# Patient Record
Sex: MALE | Race: BLACK OR AFRICAN AMERICAN | NOT HISPANIC OR LATINO | Employment: FULL TIME | URBAN - METROPOLITAN AREA
[De-identification: names, ages, dates, MRNs, and addresses within clinical notes are randomized per-mention and may not be internally consistent; named-entity substitution may affect disease eponyms.]

---

## 2023-06-26 ENCOUNTER — APPOINTMENT (EMERGENCY)
Dept: RADIOLOGY | Facility: HOSPITAL | Age: 43
DRG: 720 | End: 2023-06-26
Payer: COMMERCIAL

## 2023-06-26 ENCOUNTER — APPOINTMENT (EMERGENCY)
Dept: CT IMAGING | Facility: HOSPITAL | Age: 43
DRG: 720 | End: 2023-06-26
Payer: COMMERCIAL

## 2023-06-26 ENCOUNTER — HOSPITAL ENCOUNTER (INPATIENT)
Facility: HOSPITAL | Age: 43
LOS: 8 days | Discharge: LEFT BEFORE DISCHARGE | DRG: 720 | End: 2023-07-04
Attending: EMERGENCY MEDICINE | Admitting: FAMILY MEDICINE
Payer: COMMERCIAL

## 2023-06-26 DIAGNOSIS — A41.9 SEVERE SEPSIS (HCC): ICD-10-CM

## 2023-06-26 DIAGNOSIS — B96.1 BACTEREMIA DUE TO KLEBSIELLA PNEUMONIAE: ICD-10-CM

## 2023-06-26 DIAGNOSIS — Z21 HIV P24 ANTIGEN POSITIVE (HCC): ICD-10-CM

## 2023-06-26 DIAGNOSIS — I76 SEPTIC EMBOLISM (HCC): ICD-10-CM

## 2023-06-26 DIAGNOSIS — R16.0 LIVER MASS: ICD-10-CM

## 2023-06-26 DIAGNOSIS — R65.20 SEVERE SEPSIS (HCC): ICD-10-CM

## 2023-06-26 DIAGNOSIS — K75.0 LIVER ABSCESS: ICD-10-CM

## 2023-06-26 DIAGNOSIS — I82.0 THROMBOSIS, HEPATIC VEIN (HCC): ICD-10-CM

## 2023-06-26 DIAGNOSIS — R77.8 ELEVATED TROPONIN: ICD-10-CM

## 2023-06-26 DIAGNOSIS — A41.9 SEPSIS (HCC): ICD-10-CM

## 2023-06-26 DIAGNOSIS — R78.81 BACTEREMIA DUE TO KLEBSIELLA PNEUMONIAE: ICD-10-CM

## 2023-06-26 DIAGNOSIS — J18.9 COMMUNITY ACQUIRED PNEUMONIA: Primary | ICD-10-CM

## 2023-06-26 PROBLEM — N17.9 ACUTE RENAL FAILURE (ARF) (HCC): Status: ACTIVE | Noted: 2023-06-26

## 2023-06-26 PROBLEM — R79.89 ELEVATED TROPONIN: Status: ACTIVE | Noted: 2023-06-26

## 2023-06-26 PROBLEM — R73.9 HYPERGLYCEMIA: Status: ACTIVE | Noted: 2023-06-26

## 2023-06-26 PROBLEM — E87.1 HYPONATREMIA: Status: ACTIVE | Noted: 2023-06-26

## 2023-06-26 LAB
2HR DELTA HS TROPONIN: -2 NG/L
4HR DELTA HS TROPONIN: -3 NG/L
ALBUMIN SERPL BCP-MCNC: 3.9 G/DL (ref 3.5–5)
ALP SERPL-CCNC: 141 U/L (ref 34–104)
ALT SERPL W P-5'-P-CCNC: 191 U/L (ref 7–52)
ANION GAP SERPL CALCULATED.3IONS-SCNC: 17 MMOL/L
ANISOCYTOSIS BLD QL SMEAR: PRESENT
APTT PPP: 33 SECONDS (ref 23–37)
AST SERPL W P-5'-P-CCNC: 82 U/L (ref 13–39)
BACTERIA UR QL AUTO: NORMAL /HPF
BASE EX.OXY STD BLDV CALC-SCNC: 65.2 % (ref 60–80)
BASE EXCESS BLDV CALC-SCNC: -5.3 MMOL/L
BASOPHILS # BLD MANUAL: 0.11 THOUSAND/UL (ref 0–0.1)
BASOPHILS NFR MAR MANUAL: 1 % (ref 0–1)
BILIRUB SERPL-MCNC: 1.64 MG/DL (ref 0.2–1)
BILIRUB UR QL STRIP: NEGATIVE
BNP SERPL-MCNC: 225 PG/ML (ref 0–100)
BUN SERPL-MCNC: 24 MG/DL (ref 5–25)
CALCIUM SERPL-MCNC: 9.9 MG/DL (ref 8.4–10.2)
CARDIAC TROPONIN I PNL SERPL HS: 52 NG/L
CARDIAC TROPONIN I PNL SERPL HS: 53 NG/L
CARDIAC TROPONIN I PNL SERPL HS: 55 NG/L
CHLORIDE SERPL-SCNC: 95 MMOL/L (ref 96–108)
CLARITY UR: CLEAR
CO2 SERPL-SCNC: 18 MMOL/L (ref 21–32)
COLOR UR: ABNORMAL
CREAT SERPL-MCNC: 1.33 MG/DL (ref 0.6–1.3)
D DIMER PPP FEU-MCNC: 3.07 UG/ML FEU
DOHLE BOD BLD QL SMEAR: PRESENT
EOSINOPHIL # BLD MANUAL: 0 THOUSAND/UL (ref 0–0.4)
EOSINOPHIL NFR BLD MANUAL: 0 % (ref 0–6)
ERYTHROCYTE [DISTWIDTH] IN BLOOD BY AUTOMATED COUNT: 14.2 % (ref 11.6–15.1)
FLUAV RNA RESP QL NAA+PROBE: NEGATIVE
FLUBV RNA RESP QL NAA+PROBE: NEGATIVE
GFR SERPL CREATININE-BSD FRML MDRD: 65 ML/MIN/1.73SQ M
GLUCOSE SERPL-MCNC: 302 MG/DL (ref 65–140)
GLUCOSE SERPL-MCNC: 448 MG/DL (ref 65–140)
GLUCOSE UR STRIP-MCNC: ABNORMAL MG/DL
HCO3 BLDV-SCNC: 19.4 MMOL/L (ref 24–30)
HCT VFR BLD AUTO: 39.5 % (ref 36.5–49.3)
HGB BLD-MCNC: 13 G/DL (ref 12–17)
HGB UR QL STRIP.AUTO: ABNORMAL
INR PPP: 1.25 (ref 0.84–1.19)
KETONES UR STRIP-MCNC: ABNORMAL MG/DL
LACTATE SERPL-SCNC: 1.4 MMOL/L (ref 0.5–2)
LACTATE SERPL-SCNC: 2.6 MMOL/L (ref 0.5–2)
LACTATE SERPL-SCNC: 2.7 MMOL/L (ref 0.5–2)
LEUKOCYTE ESTERASE UR QL STRIP: ABNORMAL
LG PLATELETS BLD QL SMEAR: PRESENT
LYMPHOCYTES # BLD AUTO: 1.9 THOUSAND/UL (ref 0.6–4.47)
LYMPHOCYTES # BLD AUTO: 17 % (ref 14–44)
MCH RBC QN AUTO: 28.1 PG (ref 26.8–34.3)
MCHC RBC AUTO-ENTMCNC: 32.9 G/DL (ref 31.4–37.4)
MCV RBC AUTO: 85 FL (ref 82–98)
METAMYELOCYTES NFR BLD MANUAL: 1 % (ref 0–1)
MONOCYTES # BLD AUTO: 0.45 THOUSAND/UL (ref 0–1.22)
MONOCYTES NFR BLD: 4 % (ref 4–12)
NEUTROPHILS # BLD MANUAL: 8.59 THOUSAND/UL (ref 1.85–7.62)
NEUTS BAND NFR BLD MANUAL: 8 % (ref 0–8)
NEUTS SEG NFR BLD AUTO: 69 % (ref 43–75)
NITRITE UR QL STRIP: NEGATIVE
NON-SQ EPI CELLS URNS QL MICRO: NORMAL /HPF
O2 CT BLDV-SCNC: 12.7 ML/DL
PCO2 BLDV: 35.2 MM HG (ref 42–50)
PH BLDV: 7.36 [PH] (ref 7.3–7.4)
PH UR STRIP.AUTO: 5 [PH]
PLATELET # BLD AUTO: 122 THOUSANDS/UL (ref 149–390)
PLATELET # BLD AUTO: 152 THOUSANDS/UL (ref 149–390)
PLATELET BLD QL SMEAR: ADEQUATE
PMV BLD AUTO: 11.5 FL (ref 8.9–12.7)
PMV BLD AUTO: 11.7 FL (ref 8.9–12.7)
PO2 BLDV: 36.1 MM HG (ref 35–45)
POLYCHROMASIA BLD QL SMEAR: PRESENT
POTASSIUM SERPL-SCNC: 4.3 MMOL/L (ref 3.5–5.3)
PROCALCITONIN SERPL-MCNC: 14.64 NG/ML
PROT SERPL-MCNC: 8.1 G/DL (ref 6.4–8.4)
PROT UR STRIP-MCNC: ABNORMAL MG/DL
PROTHROMBIN TIME: 15.5 SECONDS (ref 11.6–14.5)
RBC # BLD AUTO: 4.63 MILLION/UL (ref 3.88–5.62)
RBC #/AREA URNS AUTO: NORMAL /HPF
RBC MORPH BLD: PRESENT
RSV RNA RESP QL NAA+PROBE: NEGATIVE
SARS-COV-2 RNA RESP QL NAA+PROBE: NEGATIVE
SODIUM SERPL-SCNC: 130 MMOL/L (ref 135–147)
SP GR UR STRIP.AUTO: 1.03 (ref 1–1.03)
TOXIC GRANULES BLD QL SMEAR: PRESENT
UROBILINOGEN UR STRIP-ACNC: <2 MG/DL
WBC # BLD AUTO: 11.15 THOUSAND/UL (ref 4.31–10.16)
WBC #/AREA URNS AUTO: NORMAL /HPF
WBC TOXIC VACUOLES BLD QL SMEAR: PRESENT

## 2023-06-26 PROCEDURE — 87154 CUL TYP ID BLD PTHGN 6+ TRGT: CPT | Performed by: PHYSICIAN ASSISTANT

## 2023-06-26 PROCEDURE — 85730 THROMBOPLASTIN TIME PARTIAL: CPT | Performed by: PHYSICIAN ASSISTANT

## 2023-06-26 PROCEDURE — 83605 ASSAY OF LACTIC ACID: CPT | Performed by: FAMILY MEDICINE

## 2023-06-26 PROCEDURE — 85007 BL SMEAR W/DIFF WBC COUNT: CPT | Performed by: PHYSICIAN ASSISTANT

## 2023-06-26 PROCEDURE — 93005 ELECTROCARDIOGRAM TRACING: CPT

## 2023-06-26 PROCEDURE — 96361 HYDRATE IV INFUSION ADD-ON: CPT

## 2023-06-26 PROCEDURE — 71045 X-RAY EXAM CHEST 1 VIEW: CPT

## 2023-06-26 PROCEDURE — 36415 COLL VENOUS BLD VENIPUNCTURE: CPT | Performed by: PHYSICIAN ASSISTANT

## 2023-06-26 PROCEDURE — 99285 EMERGENCY DEPT VISIT HI MDM: CPT | Performed by: EMERGENCY MEDICINE

## 2023-06-26 PROCEDURE — G1004 CDSM NDSC: HCPCS

## 2023-06-26 PROCEDURE — 0241U HB NFCT DS VIR RESP RNA 4 TRGT: CPT | Performed by: PHYSICIAN ASSISTANT

## 2023-06-26 PROCEDURE — 85379 FIBRIN DEGRADATION QUANT: CPT | Performed by: PHYSICIAN ASSISTANT

## 2023-06-26 PROCEDURE — 84145 PROCALCITONIN (PCT): CPT | Performed by: PHYSICIAN ASSISTANT

## 2023-06-26 PROCEDURE — 87449 NOS EACH ORGANISM AG IA: CPT | Performed by: FAMILY MEDICINE

## 2023-06-26 PROCEDURE — 81001 URINALYSIS AUTO W/SCOPE: CPT | Performed by: PHYSICIAN ASSISTANT

## 2023-06-26 PROCEDURE — 99223 1ST HOSP IP/OBS HIGH 75: CPT | Performed by: FAMILY MEDICINE

## 2023-06-26 PROCEDURE — 99284 EMERGENCY DEPT VISIT MOD MDM: CPT

## 2023-06-26 PROCEDURE — 87040 BLOOD CULTURE FOR BACTERIA: CPT | Performed by: PHYSICIAN ASSISTANT

## 2023-06-26 PROCEDURE — 83605 ASSAY OF LACTIC ACID: CPT | Performed by: PHYSICIAN ASSISTANT

## 2023-06-26 PROCEDURE — 96365 THER/PROPH/DIAG IV INF INIT: CPT

## 2023-06-26 PROCEDURE — 87077 CULTURE AEROBIC IDENTIFY: CPT | Performed by: PHYSICIAN ASSISTANT

## 2023-06-26 PROCEDURE — 87186 SC STD MICRODIL/AGAR DIL: CPT | Performed by: PHYSICIAN ASSISTANT

## 2023-06-26 PROCEDURE — 82805 BLOOD GASES W/O2 SATURATION: CPT | Performed by: PHYSICIAN ASSISTANT

## 2023-06-26 PROCEDURE — 85027 COMPLETE CBC AUTOMATED: CPT | Performed by: PHYSICIAN ASSISTANT

## 2023-06-26 PROCEDURE — 83036 HEMOGLOBIN GLYCOSYLATED A1C: CPT | Performed by: FAMILY MEDICINE

## 2023-06-26 PROCEDURE — 82948 REAGENT STRIP/BLOOD GLUCOSE: CPT

## 2023-06-26 PROCEDURE — 84484 ASSAY OF TROPONIN QUANT: CPT | Performed by: PHYSICIAN ASSISTANT

## 2023-06-26 PROCEDURE — 85049 AUTOMATED PLATELET COUNT: CPT | Performed by: FAMILY MEDICINE

## 2023-06-26 PROCEDURE — 80053 COMPREHEN METABOLIC PANEL: CPT | Performed by: PHYSICIAN ASSISTANT

## 2023-06-26 PROCEDURE — 85610 PROTHROMBIN TIME: CPT | Performed by: PHYSICIAN ASSISTANT

## 2023-06-26 PROCEDURE — 83880 ASSAY OF NATRIURETIC PEPTIDE: CPT | Performed by: PHYSICIAN ASSISTANT

## 2023-06-26 PROCEDURE — 71275 CT ANGIOGRAPHY CHEST: CPT

## 2023-06-26 RX ORDER — CEFTRIAXONE 1 G/50ML
1000 INJECTION, SOLUTION INTRAVENOUS EVERY 24 HOURS
Status: DISCONTINUED | OUTPATIENT
Start: 2023-06-27 | End: 2023-06-27

## 2023-06-26 RX ORDER — GUAIFENESIN/DEXTROMETHORPHAN 100-10MG/5
10 SYRUP ORAL EVERY 4 HOURS PRN
Status: DISCONTINUED | OUTPATIENT
Start: 2023-06-26 | End: 2023-07-04 | Stop reason: HOSPADM

## 2023-06-26 RX ORDER — SODIUM CHLORIDE 9 MG/ML
75 INJECTION, SOLUTION INTRAVENOUS CONTINUOUS
Status: DISCONTINUED | OUTPATIENT
Start: 2023-06-26 | End: 2023-07-03

## 2023-06-26 RX ORDER — AZITHROMYCIN 500 MG/1
500 TABLET, FILM COATED ORAL EVERY 24 HOURS
Status: DISCONTINUED | OUTPATIENT
Start: 2023-06-26 | End: 2023-06-27

## 2023-06-26 RX ORDER — ACETAMINOPHEN 325 MG/1
650 TABLET ORAL ONCE
Status: COMPLETED | OUTPATIENT
Start: 2023-06-26 | End: 2023-06-26

## 2023-06-26 RX ORDER — ALBUTEROL SULFATE 2.5 MG/3ML
5 SOLUTION RESPIRATORY (INHALATION) ONCE
Status: COMPLETED | OUTPATIENT
Start: 2023-06-26 | End: 2023-06-26

## 2023-06-26 RX ORDER — ACETAMINOPHEN 325 MG/1
650 TABLET ORAL EVERY 6 HOURS PRN
Status: DISCONTINUED | OUTPATIENT
Start: 2023-06-26 | End: 2023-07-04 | Stop reason: HOSPADM

## 2023-06-26 RX ORDER — INSULIN LISPRO 100 [IU]/ML
1-5 INJECTION, SOLUTION INTRAVENOUS; SUBCUTANEOUS
Status: DISCONTINUED | OUTPATIENT
Start: 2023-06-27 | End: 2023-07-04 | Stop reason: HOSPADM

## 2023-06-26 RX ORDER — INSULIN LISPRO 100 [IU]/ML
1-5 INJECTION, SOLUTION INTRAVENOUS; SUBCUTANEOUS
Status: DISCONTINUED | OUTPATIENT
Start: 2023-06-26 | End: 2023-07-04 | Stop reason: HOSPADM

## 2023-06-26 RX ORDER — METOPROLOL TARTRATE 5 MG/5ML
5 INJECTION INTRAVENOUS ONCE
Status: COMPLETED | OUTPATIENT
Start: 2023-06-26 | End: 2023-06-26

## 2023-06-26 RX ORDER — ENOXAPARIN SODIUM 100 MG/ML
40 INJECTION SUBCUTANEOUS DAILY
Status: DISCONTINUED | OUTPATIENT
Start: 2023-06-27 | End: 2023-06-30

## 2023-06-26 RX ORDER — CEFTRIAXONE 1 G/50ML
1000 INJECTION, SOLUTION INTRAVENOUS ONCE
Status: COMPLETED | OUTPATIENT
Start: 2023-06-26 | End: 2023-06-26

## 2023-06-26 RX ORDER — METOPROLOL TARTRATE 5 MG/5ML
2.5 INJECTION INTRAVENOUS ONCE
Status: DISCONTINUED | OUTPATIENT
Start: 2023-06-26 | End: 2023-06-26

## 2023-06-26 RX ADMIN — SODIUM CHLORIDE 1000 ML: 0.9 INJECTION, SOLUTION INTRAVENOUS at 15:39

## 2023-06-26 RX ADMIN — CEFTRIAXONE 1000 MG: 1 INJECTION, SOLUTION INTRAVENOUS at 16:36

## 2023-06-26 RX ADMIN — SODIUM CHLORIDE 1000 ML: 0.9 INJECTION, SOLUTION INTRAVENOUS at 16:42

## 2023-06-26 RX ADMIN — ACETAMINOPHEN 650 MG: 325 TABLET, FILM COATED ORAL at 19:47

## 2023-06-26 RX ADMIN — ALBUTEROL SULFATE 5 MG: 2.5 SOLUTION RESPIRATORY (INHALATION) at 15:38

## 2023-06-26 RX ADMIN — SODIUM CHLORIDE 125 ML/HR: 0.9 INJECTION, SOLUTION INTRAVENOUS at 21:12

## 2023-06-26 RX ADMIN — AZITHROMYCIN 500 MG: 500 TABLET, FILM COATED ORAL at 21:24

## 2023-06-26 RX ADMIN — IOHEXOL 100 ML: 350 INJECTION, SOLUTION INTRAVENOUS at 17:18

## 2023-06-26 RX ADMIN — METOROPROLOL TARTRATE 5 MG: 5 INJECTION, SOLUTION INTRAVENOUS at 20:05

## 2023-06-26 RX ADMIN — IPRATROPIUM BROMIDE 0.5 MG: 0.5 SOLUTION RESPIRATORY (INHALATION) at 15:38

## 2023-06-26 RX ADMIN — SODIUM CHLORIDE 500 ML: 0.9 INJECTION, SOLUTION INTRAVENOUS at 18:05

## 2023-06-26 RX ADMIN — INSULIN LISPRO 3 UNITS: 100 INJECTION, SOLUTION INTRAVENOUS; SUBCUTANEOUS at 21:31

## 2023-06-26 NOTE — ED PROVIDER NOTES
History  Chief Complaint   Patient presents with   • Asthma     Patient reports "I have had the flu for 5 days". Patient reports generalized body aches, chill and reports "my asthma is acting up". Patient reports shortness of breath  and is out of their inhaler medications at home. Visible work of breathing in [facility]     35yo male with a history of asthma, type 2 diabetes, and hyperlipidemia presenting for evaluation of flu-like symptoms and shortness of breath. He has been sick for the past 5 days with cough, body aches, and fevers. Tmax 100.8. His shortness of breath worsened today prompting him to come to the ED. He believes he has the flu. He denies any chest pain, vomiting, diarrhea. No known sick contacts. He has a history of asthma but has not needed an inhaler for several years. History provided by:  Patient   used: No        None       Past Medical History:   Diagnosis Date   • Asthma        History reviewed. No pertinent surgical history. History reviewed. No pertinent family history. I have reviewed and agree with the history as documented. E-Cigarette/Vaping     E-Cigarette/Vaping Substances     Social History     Tobacco Use   • Smoking status: Never   • Smokeless tobacco: Never   Substance Use Topics   • Alcohol use: Not Currently   • Drug use: Never       Review of Systems   Constitutional: Positive for chills, fatigue and fever. HENT: Positive for congestion. Negative for drooling and voice change. Eyes: Negative for discharge and redness. Respiratory: Positive for cough and shortness of breath. Negative for stridor. Cardiovascular: Negative for chest pain and leg swelling. Gastrointestinal: Negative for abdominal pain and vomiting. Musculoskeletal: Negative for neck pain and neck stiffness. Skin: Negative for color change and rash. Neurological: Positive for weakness. Negative for seizures and syncope.    Psychiatric/Behavioral: Negative for confusion. The patient is not nervous/anxious. All other systems reviewed and are negative. Physical Exam  Physical Exam  Vitals and nursing note reviewed. Constitutional:       General: He is not in acute distress. Appearance: He is well-developed. He is ill-appearing. He is not toxic-appearing or diaphoretic. HENT:      Head: Normocephalic and atraumatic. Right Ear: External ear normal.      Left Ear: External ear normal.      Mouth/Throat:      Mouth: Mucous membranes are moist.      Pharynx: Oropharynx is clear. No oropharyngeal exudate or posterior oropharyngeal erythema. Eyes:      General: No scleral icterus. Right eye: No discharge. Left eye: No discharge. Conjunctiva/sclera: Conjunctivae normal.   Cardiovascular:      Rate and Rhythm: Regular rhythm. Tachycardia present. Heart sounds: Normal heart sounds. No murmur heard. Pulmonary:      Effort: Pulmonary effort is normal. Tachypnea present. No respiratory distress. Breath sounds: No stridor. Examination of the right-lower field reveals rales. Examination of the left-lower field reveals rales. Rales present. No wheezing. Abdominal:      General: Bowel sounds are normal. There is no distension. Palpations: Abdomen is soft. Tenderness: There is generalized abdominal tenderness (mild). There is no guarding. Musculoskeletal:         General: No deformity. Normal range of motion. Cervical back: Normal range of motion and neck supple. Skin:     General: Skin is warm and dry. Neurological:      Mental Status: He is alert. He is not disoriented. GCS: GCS eye subscore is 4. GCS verbal subscore is 5. GCS motor subscore is 6.    Psychiatric:         Behavior: Behavior normal.         Vital Signs  ED Triage Vitals   Temperature Pulse Respirations Blood Pressure SpO2   06/26/23 1512 06/26/23 1512 06/26/23 1512 06/26/23 1512 06/26/23 1512   98.9 °F (37.2 °C) (!) 132 20 161/74 97 %      Temp Source Heart Rate Source Patient Position - Orthostatic VS BP Location FiO2 (%)   06/26/23 1512 06/26/23 1512 06/26/23 1512 06/26/23 1512 --   Tympanic Monitor Sitting Left arm       Pain Score       06/26/23 1947       Med Not Given for Pain - for MAR use only           Vitals:    06/26/23 1530 06/26/23 1600 06/26/23 1830 06/26/23 1930   BP: 143/67 117/57 115/63 128/72   Pulse: (!) 132 (!) 124 104 (!) 119   Patient Position - Orthostatic VS: Sitting Sitting Lying Lying         Visual Acuity      ED Medications  Medications   sodium chloride 0.9 % bolus 1,000 mL (0 mL Intravenous Stopped 6/26/23 1643)   albuterol inhalation solution 5 mg (5 mg Nebulization Given 6/26/23 1538)   ipratropium (ATROVENT) 0.02 % inhalation solution 0.5 mg (0.5 mg Nebulization Given 6/26/23 1538)   cefTRIAXone (ROCEPHIN) IVPB (premix in dextrose) 1,000 mg 50 mL (0 mg Intravenous Stopped 6/26/23 1758)   sodium chloride 0.9 % bolus 1,000 mL (1,000 mL Intravenous New Bag 6/26/23 1642)   sodium chloride 0.9 % bolus 500 mL (500 mL Intravenous New Bag 6/26/23 1805)   iohexol (OMNIPAQUE) 350 MG/ML injection (MULTI-DOSE) 100 mL (100 mL Intravenous Given 6/26/23 1718)   acetaminophen (TYLENOL) tablet 650 mg (650 mg Oral Given 6/26/23 1947)       Diagnostic Studies  Results Reviewed     Procedure Component Value Units Date/Time    Lactic acid 2 Hours [[id number]] Collected: 06/26/23 1936    Lab Status: In process Specimen: Blood from Arm, Left Updated: 06/26/23 1939    HS Troponin I 4hr [[id number]] Collected: 06/26/23 1936    Lab Status:  In process Specimen: Blood from Arm, Left Updated: 06/26/23 1939    Urine Microscopic [[id number]]  (Normal) Collected: 06/26/23 1715    Lab Status: Final result Specimen: Urine, Clean Catch Updated: 06/26/23 1847     RBC, UA 1-2 /hpf      WBC, UA None Seen /hpf      Epithelial Cells None Seen /hpf      Bacteria, UA None Seen /hpf     UA w Reflex to Microscopic w Reflex to Culture [[id number]]  (Abnormal) Collected: 06/26/23 1715    Lab Status: Final result Specimen: Urine, Clean Catch Updated: 06/26/23 1840     Color, UA Light Yellow     Clarity, UA Clear     Specific Gravity, UA 1.035     pH, UA 5.0     Leukocytes, UA Elevated glucose may cause decreased leukocyte values. See urine microscopic for UWBC result     Nitrite, UA Negative     Protein, UA 50 (1+) mg/dl      Glucose, UA >=1000 (1%) mg/dl      Ketones,  (3+) mg/dl      Urobilinogen, UA <2.0 mg/dl      Bilirubin, UA Negative     Occult Blood, UA Small    HS Troponin I 2hr [[id number]]  (Abnormal) Collected: 06/26/23 1805    Lab Status: Final result Specimen: Blood from Arm, Left Updated: 06/26/23 1838     hs TnI 2hr 53 ng/L      Delta 2hr hsTnI -2 ng/L     Procalcitonin [[id number]]  (Abnormal) Collected: 06/26/23 1539    Lab Status: Final result Specimen: Blood from Arm, Left Updated: 06/26/23 1711     Procalcitonin 14.64 ng/ml     RBC Morphology Reflex Test [[id number]] Collected: 06/26/23 1539    Lab Status: Final result Specimen: Blood from Arm, Left Updated: 06/26/23 1701    FLU/RSV/COVID - if FLU/RSV clinically relevant [[id number]]  (Normal) Collected: 06/26/23 1551    Lab Status: Final result Specimen: Nares from Nose Updated: 06/26/23 1636     SARS-CoV-2 Negative     INFLUENZA A PCR Negative     INFLUENZA B PCR Negative     RSV PCR Negative    Narrative:      FOR PEDIATRIC PATIENTS - copy/paste COVID Guidelines URL to browser: [email or web address]    SARS-CoV-2 assay is a Nucleic Acid Amplification assay intended for the  qualitative detection of nucleic acid from SARS-CoV-2 in nasopharyngeal  swabs. Results are for the presumptive identification of SARS-CoV-2 RNA. Positive results are indicative of infection with SARS-CoV-2, the virus  causing COVID-19, but do not rule out bacterial infection or co-infection  with other viruses.  Laboratories within the [facility] and its  territories are required to report all positive results to the appropriate  public health authorities. Negative results do not preclude SARS-CoV-2  infection and should not be used as the sole basis for treatment or other  patient management decisions. Negative results must be combined with  clinical observations, patient history, and epidemiological information. This test has not been FDA cleared or approved. This test has been authorized by FDA under an Emergency Use Authorization  (EUA). This test is only authorized for the duration of time the  declaration that circumstances exist justifying the authorization of the  emergency use of an in vitro diagnostic tests for detection of SARS-CoV-2  virus and/or diagnosis of COVID-19 infection under section 564(b)(1) of  the Act, 21 U. S.C. 189FVO-1(K)(1), unless the authorization is terminated  or revoked sooner. The test has been validated but independent review by FDA  and CLIA is pending. Test performed using [email or web address] GeneXpert: This RT-PCR assay targets N2,  a region unique to SARS-CoV-2. A conserved region in the E-gene was chosen  for pan-Sarbecovirus detection which includes SARS-CoV-2. According to CMS-2020-01-R, this platform meets the definition of high-throughput technology. CBC and differential [[id number]]  (Abnormal) Collected: 06/26/23 1539    Lab Status: Final result Specimen: Blood from Arm, Left Updated: 06/26/23 1627     WBC 11.15 Thousand/uL      RBC 4.63 Million/uL      Hemoglobin 13.0 g/dL      Hematocrit 39.5 %      MCV 85 fL      MCH 28.1 pg      MCHC 32.9 g/dL      RDW 14.2 %      MPV 11.7 fL      Platelets 189 Thousands/uL     Narrative: This is an appended report. These results have been appended to a previously verified report.     Manual Differential(PHLEBS Do Not Order) [[id number]]  (Abnormal) Collected: 06/26/23 1539    Lab Status: Final result Specimen: Blood from Arm, Left Updated: 06/26/23 1627     Segmented % 69 %      Bands % 8 % Lymphocytes % 17 %      Monocytes % 4 %      Eosinophils, % 0 %      Basophils % 1 %      Metamyelocytes% 1 %      Absolute Neutrophils 8.59 Thousand/uL      Lymphocytes Absolute 1.90 Thousand/uL      Monocytes Absolute 0.45 Thousand/uL      Eosinophils Absolute 0.00 Thousand/uL      Basophils Absolute 0.11 Thousand/uL      Total Counted --     Dohle Bodies Present     Toxic Granulation Present     Toxic Vacuolated Neutrophils Present     RBC Morphology Present     Platelet Estimate Adequate     Large Platelet Present     Anisocytosis Present     Polychromasia Present    HS Troponin 0hr (reflex protocol) [[id number]]  (Abnormal) Collected: 06/26/23 1539    Lab Status: Final result Specimen: Blood from Arm, Left Updated: 06/26/23 1627     hs TnI 0hr 55 ng/L     B-Type Natriuretic Peptide(BNP) [[id number]]  (Abnormal) Collected: 06/26/23 1539    Lab Status: Final result Specimen: Blood from Arm, Left Updated: 06/26/23 1625      pg/mL     Lactic acid [[id number]]  (Abnormal) Collected: 06/26/23 1539    Lab Status: Final result Specimen: Blood from Arm, Left Updated: 06/26/23 1625     LACTIC ACID 2.7 mmol/L     Narrative:      Result may be elevated if tourniquet was used during collection.     Comprehensive metabolic panel [[id number]]  (Abnormal) Collected: 06/26/23 1539    Lab Status: Final result Specimen: Blood from Arm, Left Updated: 06/26/23 1619     Sodium 130 mmol/L      Potassium 4.3 mmol/L      Chloride 95 mmol/L      CO2 18 mmol/L      ANION GAP 17 mmol/L      BUN 24 mg/dL      Creatinine 1.33 mg/dL      Glucose 448 mg/dL      Calcium 9.9 mg/dL      AST 82 U/L       U/L      Alkaline Phosphatase 141 U/L      Total Protein 8.1 g/dL      Albumin 3.9 g/dL      Total Bilirubin 1.64 mg/dL      eGFR 65 ml/min/1.73sq m     Narrative:      [place] guidelines for Chronic Kidney Disease (CKD):   •  Stage 1 with normal or high GFR (GFR > 90 mL/min/1.73 square meters)  •  Stage 2 Mild CKD (GFR = 60-89 mL/min/1.73 square meters)  •  Stage 3A Moderate CKD (GFR = 45-59 mL/min/1.73 square meters)  •  Stage 3B Moderate CKD (GFR = 30-44 mL/min/1.73 square meters)  •  Stage 4 Severe CKD (GFR = 15-29 mL/min/1.73 square meters)  •  Stage 5 End Stage CKD (GFR <15 mL/min/1.73 square meters)  Note: GFR calculation is accurate only with a steady state creatinine    D-Dimer [[id number]]  (Abnormal) Collected: 06/26/23 1539    Lab Status: Final result Specimen: Blood from Arm, Left Updated: 06/26/23 1616     D-Dimer, Quant 3.07 ug/ml FEU     Protime-INR [[id number]]  (Abnormal) Collected: 06/26/23 1539    Lab Status: Final result Specimen: Blood from Arm, Left Updated: 06/26/23 1615     Protime 15.5 seconds      INR 1.25    APTT [[id number]]  (Normal) Collected: 06/26/23 1539    Lab Status: Final result Specimen: Blood from Arm, Left Updated: 06/26/23 1615     PTT 33 seconds     Blood gas, venous [[id number]]  (Abnormal) Collected: 06/26/23 1539    Lab Status: Final result Specimen: Blood from Arm, Left Updated: 06/26/23 1601     pH, Danilo 7.359     pCO2, Danilo 35.2 mm Hg      pO2, Danilo 36.1 mm Hg      HCO3, Danilo 19.4 mmol/L      Base Excess, Danilo -5.3 mmol/L      O2 Content, Danilo 12.7 ml/dL      O2 HGB, VENOUS 65.2 %     Blood culture #1 [[id number]] Collected: 06/26/23 1539    Lab Status: In process Specimen: Blood from Arm, Left Updated: 06/26/23 1556    Blood culture #2 [[id number]] Collected: 06/26/23 1551    Lab Status: In process Specimen: Blood from Hand, Left Updated: 06/26/23 1556                 CTA ED chest PE study   Final Result by Hannah Mcgrath MD (06/26 1934)      No pulmonary embolus. Moderate consolidation in the posterior segment right upper lobe and superior segment right lower lobe due to pneumonia. Multiple bilateral ill-defined lung nodules, 1.1 cm and and smaller. The patient has an indeterminate 5.4 x 2.9 cm liver mass which will need to be further evaluated by nonemergent MRI.  While the lung nodules are likely bronchopneumonia or septic emboli,    metastatic disease cannot be excluded. I personally discussed this study with Rhonda Joshi PA-C, on 6/26/2023 7:34 PM.            Workstation performed: [id number]         XR chest 1 view portable   Final Result by Nabor Jolley MD (06/26 1540)      Right upper lung infiltrate and/or atelectasis. Workstation performed: [id number]                    Procedures  ECG 12 Lead Documentation Only    Date/Time: 6/26/2023 4:41 PM    Performed by: Holland Colin PA-C  Authorized by: Holland Colin PA-C    Indications / Diagnosis:  SOB  ECG reviewed by me, the ED Provider: yes    Patient location:  ED  Previous ECG:     Previous ECG:  Unavailable  Rate:     ECG rate:  132    ECG rate assessment: tachycardic    Rhythm:     Rhythm: sinus tachycardia    Ectopy:     Ectopy: none    QRS:     QRS axis:  Normal  Conduction:     Conduction: normal    ST segments:     ST segments:  Normal  T waves:     T waves: non-specific               ED Course         Wells' Criteria for PE    Flowsheet Row Most Recent Value   Wells' Criteria for PE    Clinical signs and symptoms of DVT 0 Filed at: 06/26/2023 1620   PE is primary diagnosis or equally likely 0 Filed at: 06/26/2023 1620   HR >100 1.5 Filed at: 06/26/2023 1620   Immobilization at least 3 days or Surgery in the previous 4 weeks 0 Filed at: 06/26/2023 1620   Previous, objectively diagnosed PE or DVT 0 Filed at: 06/26/2023 1620   Hemoptysis 0 Filed at: 06/26/2023 1620   Malignancy with treatment within 6 months or palliative 0 Filed at: 06/26/2023 1620   Wells' Criteria Total 1.5 Filed at: 06/26/2023 1620                Medical Decision Making  42yoM presenting for flu-like symptoms x 5 days. C/o cough, fever, shortness of breath. Temperature is 99.8 on arrival. He is tachycardic in the 130s and tachypneic. He is ill appearing but non-toxic.  Bibasilar rales on lung exam. Patient able to speak in full sentences. Initial ED plan: Check septic workup, cardiac labs, COVID swab, EKG, and CXR. DuoNeb and IV fluid bolus. Final assessment: Labs reveal a leukocytosis with a WBC of 11.5 and a lactate of 2.7. Procalcitonin elevated at 14. Troponin is 55. EKG reveals sinus tachycardia with nonspecific changes. Glucose 448, venous pH normal. There is also a mild transaminitis present. COVID negative. RUL infiltrate seen on CXR. Patient signed out to [facility] MICHAEL prior to CTA chest results. 30cc/kg IV fluids and IV Rocephin ordered. Patient will require admission. Community acquired pneumonia: acute illness or injury  Elevated troponin: acute illness or injury  Sepsis [facility]): acute illness or injury  Amount and/or Complexity of Data Reviewed  Labs: ordered. Radiology: ordered. ECG/medicine tests: ordered and independent interpretation performed. Risk  OTC drugs. Prescription drug management. Decision regarding hospitalization. Disposition  Final diagnoses:   Community acquired pneumonia   Sepsis ([address])   Elevated troponin     Time reflects when diagnosis was documented in both MDM as applicable and the Disposition within this note     Time User Action Codes Description Comment    6/26/2023  4:42 PM [address], [address] [J18.9] Community acquired pneumonia     6/26/2023  4:42 PM [address], [address] [A41.9] Sepsis ([address])     6/26/2023  5:28 PM [address], [address] [R77.8] Elevated troponin       ED Disposition     ED Disposition   Admit    Condition   Stable    Date/Time   Mon Jun 26, 2023  7:52 PM    Comment   Case was discussed with Tyler Elizondo and the patient's admission status was agreed to be Admission Status: inpatient status to the service of Dr. Francisco J Cheung . Follow-up Information    None         Patient's Medications    No medications on file       No discharge procedures on file.     PDMP Review     None          ED Provider  Electronically Signed by           Iola Harada, PA-C  06/26/23 2005

## 2023-06-27 ENCOUNTER — APPOINTMENT (INPATIENT)
Dept: ULTRASOUND IMAGING | Facility: HOSPITAL | Age: 43
DRG: 720 | End: 2023-06-27
Payer: COMMERCIAL

## 2023-06-27 ENCOUNTER — APPOINTMENT (INPATIENT)
Dept: NON INVASIVE DIAGNOSTICS | Facility: HOSPITAL | Age: 43
DRG: 720 | End: 2023-06-27
Payer: COMMERCIAL

## 2023-06-27 LAB
ALBUMIN SERPL BCP-MCNC: 3.2 G/DL (ref 3.5–5)
ALP SERPL-CCNC: 129 U/L (ref 34–104)
ALT SERPL W P-5'-P-CCNC: 110 U/L (ref 7–52)
AMPHETAMINES SERPL QL SCN: NEGATIVE
ANION GAP SERPL CALCULATED.3IONS-SCNC: 15 MMOL/L
AORTIC ROOT: 2.6 CM
APICAL FOUR CHAMBER EJECTION FRACTION: 72 %
ASCENDING AORTA: 2.5 CM
AST SERPL W P-5'-P-CCNC: 37 U/L (ref 13–39)
BARBITURATES UR QL: NEGATIVE
BENZODIAZ UR QL: NEGATIVE
BILIRUB SERPL-MCNC: 1.44 MG/DL (ref 0.2–1)
BUN SERPL-MCNC: 20 MG/DL (ref 5–25)
CALCIUM ALBUM COR SERPL-MCNC: 8.9 MG/DL (ref 8.3–10.1)
CALCIUM SERPL-MCNC: 8.3 MG/DL (ref 8.4–10.2)
CHLORIDE SERPL-SCNC: 105 MMOL/L (ref 96–108)
CO2 SERPL-SCNC: 11 MMOL/L (ref 21–32)
COCAINE UR QL: NEGATIVE
CREAT SERPL-MCNC: 1.16 MG/DL (ref 0.6–1.3)
DOP CALC LVOT AREA: 3.1
DOP CALC LVOT DIAMETER: 2 CM
E WAVE DECELERATION TIME: 127 MS
E/A RATIO: 1.27
ERYTHROCYTE [DISTWIDTH] IN BLOOD BY AUTOMATED COUNT: 15 % (ref 11.6–15.1)
EST. AVERAGE GLUCOSE BLD GHB EST-MCNC: 335 MG/DL
FRACTIONAL SHORTENING: 39 (ref 28–44)
GFR SERPL CREATININE-BSD FRML MDRD: 77 ML/MIN/1.73SQ M
GLUCOSE SERPL-MCNC: 217 MG/DL (ref 65–140)
GLUCOSE SERPL-MCNC: 245 MG/DL (ref 65–140)
GLUCOSE SERPL-MCNC: 275 MG/DL (ref 65–140)
GLUCOSE SERPL-MCNC: 278 MG/DL (ref 65–140)
GLUCOSE SERPL-MCNC: 356 MG/DL (ref 65–140)
HBA1C MFR BLD: 13.3 %
HCT VFR BLD AUTO: 36.2 % (ref 36.5–49.3)
HGB BLD-MCNC: 11.9 G/DL (ref 12–17)
INTERVENTRICULAR SEPTUM IN DIASTOLE (PARASTERNAL SHORT AXIS VIEW): 1 CM
INTERVENTRICULAR SEPTUM: 1 CM (ref 0.6–1.1)
L PNEUMO1 AG UR QL IA.RAPID: NEGATIVE
LA/AORTA RATIO 2D: 1.46
LAAS-AP2: 13.5 CM2
LAAS-AP4: 16.8 CM2
LEFT ATRIUM SIZE: 3.8 CM
LEFT ATRIUM VOLUME (MOD BIPLANE): 40 ML
LEFT ATRIUM VOLUME INDEX (MOD BIPLANE): 21.4
LEFT INTERNAL DIMENSION IN SYSTOLE: 2.3 CM (ref 2.1–4)
LEFT VENTRICULAR INTERNAL DIMENSION IN DIASTOLE: 3.8 CM (ref 3.5–6)
LEFT VENTRICULAR POSTERIOR WALL IN END DIASTOLE: 1 CM
LEFT VENTRICULAR STROKE VOLUME: 44 ML
LVSV (TEICH): 44 ML
MCH RBC QN AUTO: 28.3 PG (ref 26.8–34.3)
MCHC RBC AUTO-ENTMCNC: 32.9 G/DL (ref 31.4–37.4)
MCV RBC AUTO: 86 FL (ref 82–98)
METHADONE UR QL: NEGATIVE
MV E'TISSUE VEL-SEP: 12 CM/S
MV PEAK A VEL: 0.94 M/S
MV PEAK E VEL: 119 CM/S
MV STENOSIS PRESSURE HALF TIME: 37 MS
MV VALVE AREA P 1/2 METHOD: 5.9
OPIATES UR QL SCN: NEGATIVE
OXYCODONE+OXYMORPHONE UR QL SCN: NEGATIVE
PCP UR QL: NEGATIVE
PLATELET # BLD AUTO: 138 THOUSANDS/UL (ref 149–390)
PMV BLD AUTO: 10.8 FL (ref 8.9–12.7)
POTASSIUM SERPL-SCNC: 4.6 MMOL/L (ref 3.5–5.3)
PROCALCITONIN SERPL-MCNC: 24.42 NG/ML
PROT SERPL-MCNC: 6.8 G/DL (ref 6.4–8.4)
RBC # BLD AUTO: 4.2 MILLION/UL (ref 3.88–5.62)
S PNEUM AG UR QL: NEGATIVE
SL CV PED ECHO LEFT VENTRICLE DIASTOLIC VOLUME (MOD BIPLANE) 2D: 62 ML
SL CV PED ECHO LEFT VENTRICLE SYSTOLIC VOLUME (MOD BIPLANE) 2D: 18 ML
SODIUM SERPL-SCNC: 131 MMOL/L (ref 135–147)
THC UR QL: NEGATIVE
WBC # BLD AUTO: 15.03 THOUSAND/UL (ref 4.31–10.16)

## 2023-06-27 PROCEDURE — 94664 DEMO&/EVAL PT USE INHALER: CPT

## 2023-06-27 PROCEDURE — 99223 1ST HOSP IP/OBS HIGH 75: CPT | Performed by: INTERNAL MEDICINE

## 2023-06-27 PROCEDURE — 99233 SBSQ HOSP IP/OBS HIGH 50: CPT | Performed by: FAMILY MEDICINE

## 2023-06-27 PROCEDURE — 76705 ECHO EXAM OF ABDOMEN: CPT

## 2023-06-27 PROCEDURE — 93306 TTE W/DOPPLER COMPLETE: CPT | Performed by: INTERNAL MEDICINE

## 2023-06-27 PROCEDURE — 85027 COMPLETE CBC AUTOMATED: CPT | Performed by: FAMILY MEDICINE

## 2023-06-27 PROCEDURE — 99255 IP/OBS CONSLTJ NEW/EST HI 80: CPT | Performed by: INTERNAL MEDICINE

## 2023-06-27 PROCEDURE — 87449 NOS EACH ORGANISM AG IA: CPT | Performed by: FAMILY MEDICINE

## 2023-06-27 PROCEDURE — 84145 PROCALCITONIN (PCT): CPT | Performed by: FAMILY MEDICINE

## 2023-06-27 PROCEDURE — 80053 COMPREHEN METABOLIC PANEL: CPT | Performed by: FAMILY MEDICINE

## 2023-06-27 PROCEDURE — 94760 N-INVAS EAR/PLS OXIMETRY 1: CPT

## 2023-06-27 PROCEDURE — 82948 REAGENT STRIP/BLOOD GLUCOSE: CPT

## 2023-06-27 PROCEDURE — 80307 DRUG TEST PRSMV CHEM ANLYZR: CPT | Performed by: FAMILY MEDICINE

## 2023-06-27 PROCEDURE — 92610 EVALUATE SWALLOWING FUNCTION: CPT

## 2023-06-27 PROCEDURE — 93306 TTE W/DOPPLER COMPLETE: CPT

## 2023-06-27 RX ORDER — CEFEPIME HYDROCHLORIDE 2 G/50ML
2000 INJECTION, SOLUTION INTRAVENOUS EVERY 12 HOURS
Status: DISCONTINUED | OUTPATIENT
Start: 2023-06-27 | End: 2023-06-27

## 2023-06-27 RX ORDER — KETOROLAC TROMETHAMINE 30 MG/ML
15 INJECTION, SOLUTION INTRAMUSCULAR; INTRAVENOUS ONCE
Status: COMPLETED | OUTPATIENT
Start: 2023-06-27 | End: 2023-06-27

## 2023-06-27 RX ORDER — METOPROLOL TARTRATE 5 MG/5ML
5 INJECTION INTRAVENOUS ONCE
Status: COMPLETED | OUTPATIENT
Start: 2023-06-27 | End: 2023-06-27

## 2023-06-27 RX ADMIN — ENOXAPARIN SODIUM 40 MG: 40 INJECTION SUBCUTANEOUS at 10:21

## 2023-06-27 RX ADMIN — MEROPENEM 1000 MG: 1 INJECTION, POWDER, FOR SOLUTION INTRAVENOUS at 10:46

## 2023-06-27 RX ADMIN — METOROPROLOL TARTRATE 5 MG: 5 INJECTION, SOLUTION INTRAVENOUS at 01:44

## 2023-06-27 RX ADMIN — SODIUM CHLORIDE 125 ML/HR: 0.9 INJECTION, SOLUTION INTRAVENOUS at 14:54

## 2023-06-27 RX ADMIN — KETOROLAC TROMETHAMINE 15 MG: 30 INJECTION, SOLUTION INTRAMUSCULAR; INTRAVENOUS at 01:44

## 2023-06-27 RX ADMIN — ACETAMINOPHEN 650 MG: 325 TABLET, FILM COATED ORAL at 18:47

## 2023-06-27 RX ADMIN — MEROPENEM 1000 MG: 1 INJECTION, POWDER, FOR SOLUTION INTRAVENOUS at 18:48

## 2023-06-27 RX ADMIN — INSULIN DETEMIR 5 UNITS: 100 INJECTION, SOLUTION SUBCUTANEOUS at 21:27

## 2023-06-27 RX ADMIN — INSULIN LISPRO 4 UNITS: 100 INJECTION, SOLUTION INTRAVENOUS; SUBCUTANEOUS at 10:27

## 2023-06-27 RX ADMIN — INSULIN LISPRO 2 UNITS: 100 INJECTION, SOLUTION INTRAVENOUS; SUBCUTANEOUS at 18:48

## 2023-06-27 RX ADMIN — INSULIN LISPRO 2 UNITS: 100 INJECTION, SOLUTION INTRAVENOUS; SUBCUTANEOUS at 21:27

## 2023-06-27 RX ADMIN — GUAIFENESIN AND DEXTROMETHORPHAN 10 ML: 100; 10 SYRUP ORAL at 18:47

## 2023-06-27 RX ADMIN — SODIUM CHLORIDE 125 ML/HR: 0.9 INJECTION, SOLUTION INTRAVENOUS at 04:21

## 2023-06-27 RX ADMIN — CEFEPIME HYDROCHLORIDE 2000 MG: 2 INJECTION, SOLUTION INTRAVENOUS at 04:21

## 2023-06-27 RX ADMIN — ACETAMINOPHEN 650 MG: 325 TABLET, FILM COATED ORAL at 10:20

## 2023-06-27 RX ADMIN — INSULIN LISPRO 3 UNITS: 100 INJECTION, SOLUTION INTRAVENOUS; SUBCUTANEOUS at 14:28

## 2023-06-27 NOTE — PLAN OF CARE
Recommendations:   Diet: regular diet and thin liquids   Meds: whole with liquid (as tolerated)  Feeding Assistance: tray set up, assist with meals as needed  Frequent Oral care: 2-4x/day  Aspiration precautions and compensatory swallowing strategies: upright posture, only feed when fully alert, slow rate of feeding, small bites/sips and alternating bites and sips  Other Recommendations/ considerations: SLP will monitor during full meal 1-3x to ensure mgmt of oral diet and adjust as needed

## 2023-06-27 NOTE — PLAN OF CARE
Problem: MOBILITY - ADULT  Goal: Maintain or return to baseline ADL function  Description: INTERVENTIONS:  -  Assess patient's ability to carry out ADLs; assess patient's baseline for ADL function and identify physical deficits which impact ability to perform ADLs (bathing, care of mouth/teeth, toileting, grooming, dressing, etc.)  - Assess/evaluate cause of self-care deficits   - Assess range of motion  - Assess patient's mobility; develop plan if impaired  - Assess patient's need for assistive devices and provide as appropriate  - Encourage maximum independence but intervene and supervise when necessary  - Involve family in performance of ADLs  - Assess for home care needs following discharge   - Consider OT consult to assist with ADL evaluation and planning for discharge  - Provide patient education as appropriate  Outcome: Progressing  Goal: Maintains/Returns to pre admission functional level  Description: INTERVENTIONS:  - Perform BMAT or MOVE assessment daily.   - Set and communicate daily mobility goal to care team and patient/family/caregiver. - Collaborate with rehabilitation services on mobility goals if consulted  - Perform Range of Motion  times a day. - Reposition patient every  hours.   - Dangle patient  times a day  - Stand patient  times a day  - Ambulate patient  times a day  - Out of bed to chair  times a day   - Out of bed for meal times a day  - Out of bed for toileting  - Record patient progress and toleration of activity level   Outcome: Progressing     Problem: PAIN - ADULT  Goal: Verbalizes/displays adequate comfort level or baseline comfort level  Description: Interventions:  - Encourage patient to monitor pain and request assistance  - Assess pain using appropriate pain scale  - Administer analgesics based on type and severity of pain and evaluate response  - Implement non-pharmacological measures as appropriate and evaluate response  - Consider cultural and social influences on pain and pain management  - Notify physician/advanced practitioner if interventions unsuccessful or patient reports new pain  Outcome: Progressing

## 2023-06-27 NOTE — RESPIRATORY THERAPY NOTE
RT Protocol Note  Maylin Quiñonez 43 y.o. male MRN: [id number]  Unit/Bed#: -01 Encounter: [id number]    Assessment    Principal Problem:    Severe sepsis ([address])  Active Problems:    Liver mass    Septic embolism (HCC)    Elevated troponin    Hyperglycemia    Acute renal failure (ARF) (HCC)    Hyponatremia      Home Pulmonary Medications:  na       Past Medical History:   Diagnosis Date   • Asthma      Social History     Socioeconomic History   • Marital status: /Civil Union     Spouse name: None   • Number of children: None   • Years of education: None   • Highest education level: None   Occupational History   • None   Tobacco Use   • Smoking status: Never   • Smokeless tobacco: Never   Substance and Sexual Activity   • Alcohol use: Not Currently   • Drug use: Never   • Sexual activity: None   Other Topics Concern   • None   Social History Narrative   • None     Social Determinants of Health     Financial Resource Strain: Not on file   Food Insecurity: Not on file   Transportation Needs: Not on file   Physical Activity: Not on file   Stress: Not on file   Social Connections: Not on file   Intimate Partner Violence: Not on file   Housing Stability: Not on file       Subjective         Objective    Physical Exam:   Assessment Type: Assess only  General Appearance: Alert, Awake  Respiratory Pattern: Dyspnea with exertion  Chest Assessment: Chest expansion symmetrical  Bilateral Breath Sounds: Diminished, Clear    Vitals:  Blood pressure 132/73, pulse (!) 123, temperature 99.7 °F (37.6 °C), temperature source Oral, resp. rate 20, height 5' 5" (1.651 m), weight 79.4 kg (175 lb), SpO2 98 %. Imaging and other studies: I have personally reviewed pertinent reports. Plan    Respiratory Plan: No distress/Pulmonary history, Mild Distress pathway        Pt admitted for sepsis. No respiratory history. BBS are clear, decreased. Pt has strong non-productive cough and can clear and protect airway. No modalities at this time.

## 2023-06-27 NOTE — ASSESSMENT & PLAN NOTE
· Known history of diabetes. Check a hemoglobin A1c in the morning.   · Aggressive hydration with sliding scale coverage for now

## 2023-06-27 NOTE — ASSESSMENT & PLAN NOTE
· Echo results reviewed  · MRI showing thrombus extending from the accessory hepatic vein to the IVC which could be septic resulting in pulmonary septic embolism  · Cont management as per #1

## 2023-06-27 NOTE — ASSESSMENT & PLAN NOTE
Background: History of diabetes mellitus presented to the hospital with febrile illness found to have sepsis secondary to Klebsiella bacteremia and Klebsiella liver abscess  · Status post IR drain ID following currently on cefazolin/metronidazole  · Follows positive HIV  · Due to concerns for septic emboli including pulmonary findings and hepatic vein phlebitis, ID recommends 6-week duration of IV antibiotics

## 2023-06-27 NOTE — ASSESSMENT & PLAN NOTE
· Non-MI elevation of troponin secondary to sepsis    Lab Results   Component Value Date    HSTNI0 55 (H) 06/26/2023    HSTNI2 53 (H) 06/26/2023    HSTNI4 52 (H) 06/26/2023

## 2023-06-27 NOTE — H&P
[address]  H&P  Name: Buck Arnold 43 y.o. male I MRN: [id number]  Unit/Bed#: FT 04 I Date of Admission: 6/26/2023   Date of Service: 6/26/2023 I Hospital Day: 0      Assessment/Plan   Severe sepsis [facility])  Assessment & Plan  · Patient with severe sepsis as evidenced by lactic acidosis, tachycardia, tachypnea with leukocytosis  · Likely pneumonia with possible septic emboli  · Patient got ceftriaxone emergency department. I am to keep the patient on ceftriaxone and azithromycin since he looks pretty septic at this time although this should just be community-acquired pneumonia the patient is appearing. · Check an echocardiogram although septic emboli could be from the pneumonia. Patient does not have any history of drug use  · Check sputum  · Legionella, strep  · Pulmonology evaluation  · Given severe sepsis with tachycardia in the 150s I am going to keep the patient on telemetry for 24 hours    Septic embolism ([address])  Assessment & Plan  · Will be due to pneumonia. Continue to monitor    Elevated troponin  Assessment & Plan  · Mild elevation was stable. This is likely secondary to demand secondary to the tachycardia and sepsis. not an MI    Liver mass  Assessment & Plan  · Patient will need an outpatient MRI. Currently more pressing issues with his shortness of breath and pneumonia with septic emboli. VTE Prophylaxis: Lovenox/ sequential compression device   Code Status: Full code  POLST: POLST is not applicable to this patient      Anticipated Length of Stay:  Patient will be admitted on an Inpatient basis with an anticipated length of stay of greater than 2 midnights. Justification for Hospital Stay: Is going require greater than 2 midnights secondary to having severe sepsis in need of IV antibiotics and close monitoring given his significant tachycardia and septic emboli    Total Time for Visit, including Counseling / Coordination of Care: 60 minutes.   Greater than 50% of this total time spent on direct patient counseling and coordination of care. Chief Complaint:   Flulike symptoms    History of Present Illness: Nori Quiñonez is a 43 y.o. male who presents with states he has been having flulike symptoms for the past 3 to 4 days however it got progressively worse this morning where he had some shortness of breath and some palpitations and fevers associated with it. The patient states he has had no recent travels. The patient complains of generalized body aches. He feels very anxious at this time as well. Patient states he is very tired to. He has had no recent travel or any sick contacts. Patient has a history of asthma but that is fairly well controlled. Denies any illicit drug use. Patient does not smoke or drink either. Review of Systems:    Review of Systems   Constitutional: Positive for activity change, appetite change, chills, diaphoresis, fatigue and fever. Respiratory: Positive for cough and shortness of breath. Cardiovascular: Positive for palpitations. Neurological: Positive for weakness. All other systems reviewed and are negative. Past Medical and Surgical History:     Past Medical History:   Diagnosis Date   • Asthma        History reviewed. No pertinent surgical history. Meds/Allergies:    Prior to Admission medications    Not on File     I have reviewed home medications with patient personally. Allergies: No Known Allergies    Social History:     Marital Status: /Civil Union     Patient Pre-hospital Living Situation: Independent  Patient Pre-hospital Level of Mobility: Independent  Patient Pre-hospital Diet Restrictions: None  Substance Use History:   Social History     Substance and Sexual Activity   Alcohol Use Not Currently     Social History     Tobacco Use   Smoking Status Never   Smokeless Tobacco Never     Social History     Substance and Sexual Activity   Drug Use Never       Family History:    History reviewed. No pertinent family history. Physical Exam:     Vitals:   Blood Pressure: 128/72 (06/26/23 1930)  Pulse: (!) 119 (06/26/23 1930)  Temperature: 100.1 °F (37.8 °C) (06/26/23 1940)  Temp Source: Oral (06/26/23 1940)  Respirations: (!) 23 (06/26/23 1930)  Height: 5' 5" (165.1 cm) (06/26/23 1512)  Weight - Scale: 79.4 kg (175 lb) (06/26/23 1512)  SpO2: 97 % (06/26/23 1930)    Physical Exam    (   General Appearance:    Alert, cooperative, no distress, appears stated age   Head:    Normocephalic, without obvious abnormality, atraumatic   Eyes:    PERRL, conjunctiva/corneas clear, EOM's intact,             Nose:   Nares normal, septum midline, mucosa normal   Throat:   Lips, mucosa, and tongue normal; teeth and gums normal   Neck:   Supple, symmetrical, no adenopathy;        thyroid:  No enlargement/tenderness/nodules; no carotid    bruit or JVD   Back:     Symmetric, no curvature, ROM normal, no CVA tenderness   Lungs:    Scattered rhonchi       Heart:   Tachycardic   Abdomen:     Soft, non-tender, bowel sounds active all four quadrants,     no masses, no organomegaly           Extremities:   Extremities normal, atraumatic, no cyanosis or edema   Pulses:   2+ and symmetric all extremities   Skin:   Skin color, texture, turgor normal, no rashes or lesions   Lymph nodes:   Cervical, supraclavicular, and axillary nodes normal   Neurologic:   CNII-XII intact. Normal strength, sensation and reflexes       throughout          Additional Data:     Lab Results: I have personally reviewed pertinent reports.       Results from last 7 days   Lab Units 06/26/23  1539   WBC Thousand/uL 11.15*   HEMOGLOBIN g/dL 13.0   HEMATOCRIT % 39.5   PLATELETS Thousands/uL 152   BANDS PCT % 8   LYMPHO PCT % 17   MONO PCT % 4   EOS PCT % 0     Results from last 7 days   Lab Units 06/26/23  1539   SODIUM mmol/L 130*   POTASSIUM mmol/L 4.3   CHLORIDE mmol/L 95*   CO2 mmol/L 18*   BUN mg/dL 24   CREATININE mg/dL 1.33*   ANION GAP mmol/L 17   CALCIUM mg/dL 9.9   ALBUMIN g/dL 3.9   TOTAL BILIRUBIN mg/dL 1.64*   ALK PHOS U/L 141*   ALT U/L 191*   AST U/L 82*   GLUCOSE RANDOM mg/dL 448*     Results from last 7 days   Lab Units 06/26/23  1539   INR  1.25*             Results from last 7 days   Lab Units 06/26/23  1936 06/26/23  1539   LACTIC ACID mmol/L 2.6* 2.7*   PROCALCITONIN ng/ml  --  14.64*       Imaging: I have personally reviewed pertinent reports. CTA ED chest PE study   Final Result by Erwin Woodruff MD (06/26 1934)      No pulmonary embolus. Moderate consolidation in the posterior segment right upper lobe and superior segment right lower lobe due to pneumonia. Multiple bilateral ill-defined lung nodules, 1.1 cm and and smaller. The patient has an indeterminate 5.4 x 2.9 cm liver mass which will need to be further evaluated by nonemergent MRI. While the lung nodules are likely bronchopneumonia or septic emboli,    metastatic disease cannot be excluded. I personally discussed this study with Rose Wilson PA-C, on 6/26/2023 7:34 PM.            Workstation performed: [id number]         XR chest 1 view portable   Final Result by Santiago Coreas MD (06/26 2510)      Right upper lung infiltrate and/or atelectasis. Workstation performed: Rakesh Becerra / [employer] Reviewed: Yes     ** Please Note: This note has been constructed using a voice recognition system.  **

## 2023-06-27 NOTE — QUICK NOTE
Paged by the nurse. The patient's oxygen levels went down to 81% he is on 3 L and in the low 90s and feels little bit better at this time. Continue with the supplemental oxygen and keep a close eye.

## 2023-06-27 NOTE — PLAN OF CARE
Problem: MOBILITY - ADULT  Goal: Maintain or return to baseline ADL function  Description: INTERVENTIONS:  -  Assess patient's ability to carry out ADLs; assess patient's baseline for ADL function and identify physical deficits which impact ability to perform ADLs (bathing, care of mouth/teeth, toileting, grooming, dressing, etc.)  - Assess/evaluate cause of self-care deficits   - Assess range of motion  - Assess patient's mobility; develop plan if impaired  - Assess patient's need for assistive devices and provide as appropriate  - Encourage maximum independence but intervene and supervise when necessary  - Involve family in performance of ADLs  - Assess for home care needs following discharge   - Consider OT consult to assist with ADL evaluation and planning for discharge  - Provide patient education as appropriate  Outcome: Progressing  Goal: Maintains/Returns to pre admission functional level  Description: INTERVENTIONS:  - Perform BMAT or MOVE assessment daily.   - Set and communicate daily mobility goal to care team and patient/family/caregiver. - Collaborate with rehabilitation services on mobility goals if consulted  - Perform Range of Motion  times a day. - Reposition patient every  hours.   - Dangle patient  times a day  - Stand patient  times a day  - Ambulate patient  times a day  - Out of bed to chair  times a day   - Out of bed for meals  times a day  - Out of bed for toileting  - Record patient progress and toleration of activity level   Outcome: Progressing     Problem: PAIN - ADULT  Goal: Verbalizes/displays adequate comfort level or baseline comfort level  Description: Interventions:  - Encourage patient to monitor pain and request assistance  - Assess pain using appropriate pain scale  - Administer analgesics based on type and severity of pain and evaluate response  - Implement non-pharmacological measures as appropriate and evaluate response  - Consider cultural and social influences on pain and pain management  - Notify physician/advanced practitioner if interventions unsuccessful or patient reports new pain  Outcome: Progressing     Problem: INFECTION - ADULT  Goal: Absence or prevention of progression during hospitalization  Description: INTERVENTIONS:  - Assess and monitor for signs and symptoms of infection  - Monitor lab/diagnostic results  - Monitor all insertion sites, i.e. indwelling lines, tubes, and drains  - Monitor endotracheal if appropriate and nasal secretions for changes in amount and color  - [place] appropriate cooling/warming therapies per order  - Administer medications as ordered  - Instruct and encourage patient and family to use good hand hygiene technique  - Identify and instruct in appropriate isolation precautions for identified infection/condition  Outcome: Progressing  Goal: Absence of fever/infection during neutropenic period  Description: INTERVENTIONS:  - Monitor WBC    Outcome: Progressing     Problem: SAFETY ADULT  Goal: Maintain or return to baseline ADL function  Description: INTERVENTIONS:  -  Assess patient's ability to carry out ADLs; assess patient's baseline for ADL function and identify physical deficits which impact ability to perform ADLs (bathing, care of mouth/teeth, toileting, grooming, dressing, etc.)  - Assess/evaluate cause of self-care deficits   - Assess range of motion  - Assess patient's mobility; develop plan if impaired  - Assess patient's need for assistive devices and provide as appropriate  - Encourage maximum independence but intervene and supervise when necessary  - Involve family in performance of ADLs  - Assess for home care needs following discharge   - Consider OT consult to assist with ADL evaluation and planning for discharge  - Provide patient education as appropriate  Outcome: Progressing  Goal: Maintains/Returns to pre admission functional level  Description: INTERVENTIONS:  - Perform BMAT or MOVE assessment daily.   - Set and communicate daily mobility goal to care team and patient/family/caregiver. - Collaborate with rehabilitation services on mobility goals if consulted  - Perform Range of Motion  times a day. - Reposition patient every  hours.   - Dangle patient  times a day  - Stand patient  times a day  - Ambulate patient  times a day  - Out of bed to chair  times a day   - Out of bed for meals  times a day  - Out of bed for toileting  - Record patient progress and toleration of activity level   Outcome: Progressing  Goal: Patient will remain free of falls  Description: INTERVENTIONS:  - Educate patient/family on patient safety including physical limitations  - Instruct patient to call for assistance with activity   - Consult OT/PT to assist with strengthening/mobility   - Keep Call bell within reach  - Keep bed low and locked with side rails adjusted as appropriate  - Keep care items and personal belongings within reach  - Initiate and maintain comfort rounds  - Make Fall Risk Sign visible to staff  - Offer Toileting every Hours, in advance of need  - Initiate/Maintainalarm  - Obtain necessary fall risk management equipment:   - Apply yellow socks and bracelet for high fall risk patients  - Consider moving patient to room near nurses station  Outcome: Progressing     Problem: DISCHARGE PLANNING  Goal: Discharge to home or other facility with appropriate resources  Description: INTERVENTIONS:  - Identify barriers to discharge w/patient and caregiver  - Arrange for needed discharge resources and transportation as appropriate  - Identify discharge learning needs (meds, wound care, etc.)  - Arrange for interpretive services to assist at discharge as needed  - Refer to Case Management Department for coordinating discharge planning if the patient needs post-hospital services based on physician/advanced practitioner order or complex needs related to functional status, cognitive ability, or social support system  Outcome: Progressing Problem: Knowledge Deficit  Goal: Patient/family/caregiver demonstrates understanding of disease process, treatment plan, medications, and discharge instructions  Description: Complete learning assessment and assess knowledge base.   Interventions:  - Provide teaching at level of understanding  - Provide teaching via preferred learning methods  Outcome: Progressing

## 2023-06-27 NOTE — ASSESSMENT & PLAN NOTE
· Patient with severe sepsis as evidenced by lactic acidosis, tachycardia, tachypnea with leukocytosis  · Likely pneumonia with possible septic emboli  · Patient got ceftriaxone emergency department. I am to give the patient cefepime and vancomycin since he looks pretty septic at this time although this should just be community-acquired pneumonia the patient is appearing. · Check an echocardiogram although septic emboli could be from the pneumonia.   Patient does not have any history of drug use  · Check sputum  · Legionella, strep  · Pulmonology evaluation  · Given severe sepsis with tachycardia in the 150s I am going to keep the patient on telemetry for 24 hours

## 2023-06-27 NOTE — UTILIZATION REVIEW
Initial Clinical Review    Admission: Date/Time/Statement:   Admission Orders (From admission, onward)     Ordered        06/26/23 [facility]  Once                      Orders Placed This Encounter   Procedures   • INPATIENT ADMISSION     Standing Status:   Standing     Number of Occurrences:   1     Order Specific Question:   Level of Care     Answer:   Med Surg [16]     Order Specific Question:   Estimated length of stay     Answer:   More than 2 Midnights     Order Specific Question:   Certification     Answer:   I certify that inpatient services are medically necessary for this patient for a duration of greater than two midnights. See H&P and MD Progress Notes for additional information about the patient's course of treatment. ED Arrival Information     Expected   -    Arrival   6/26/2023 15:09    Acuity   Emergent            Means of arrival   Walk-In    Escorted by   Self    Service   Hospitalist    Admission type   Emergency            Arrival complaint   SOB            Chief Complaint   Patient presents with   • Asthma     Patient reports "I have had the flu for 5 days". Patient reports generalized body aches, chill and reports "my asthma is acting up". Patient reports shortness of breath  and is out of their inhaler medications at home. Visible work of breathing in [address]       Initial Presentation: 43 y.o. male to the ED from home with complaints of body aches, chills, shortness of breath. Admitted to inpatient for severe sepsis, septic embolism, elevated troponin. H/O asthma, DMII . Arrives appearing ill, tachypneic, tachycardic, rales heard in bases of lungs. Lactic acid 2.7, D-dimer 3.07, procal elevated. IN the ED, given iv fluids x 2 liter bolus, nebulizer. Blood cultures pending. Started on IV abx. Likely pneumonia with possible septic emboli. Check ECHo, sputum, legionella. PUlmonary consult. Pulse ox dropped to 81% on 3 Liters,with improvement in to the 90s.   Continue to monitor. Date: 6/27   Day 2: WBCs increased. Continue with IV fluids. Klebsiella bacteremia: Concern for ESBL hence antibiotic switched to meropenem at this time. ID following. ID consult:Klebsiella pneumonia bacteremia. Possibly a pulmonary source based upon the radiographic findings and hypoxemia. Consideration for the possibility of a hepatobiliary source in this patient with liver mass, elevated liver function test and epigastric/chest pressure. Recheck Blood cultures, HIV. Noted to have liver mass of unclear etiology. Continue iv abx. PUlmonary consult:  Now requiring 2LNC. Blood cultures are showing gram-negative rods. Remains tachycardic, tachypneic and ill appearing.      ED Triage Vitals   Temperature Pulse Respirations Blood Pressure SpO2   06/26/23 1512 06/26/23 1512 06/26/23 1512 06/26/23 1512 06/26/23 1512   98.9 °F (37.2 °C) (!) 132 20 161/74 97 %      Temp Source Heart Rate Source Patient Position - Orthostatic VS BP Location FiO2 (%)   06/26/23 1512 06/26/23 1512 06/26/23 1512 06/26/23 1512 --   Tympanic Monitor Sitting Left arm       Pain Score       06/26/23 1947       Med Not Given for Pain - for MAR use only          Wt Readings from Last 1 Encounters:   06/27/23 79.4 kg (175 lb)     Additional Vital Signs:   Date/Time Temp Pulse Resp BP MAP (mmHg) SpO2 Calculated FIO2 (%) - Nasal Cannula Nasal Cannula O2 Flow Rate (L/min) O2 Device Patient Position - Orthostatic VS   06/27/23 0900 -- 125 Abnormal  -- 132/73 -- -- -- -- -- --   06/27/23 07:42:56 -- 122 Abnormal  -- 132/73 93 98 % -- -- -- --   06/27/23 01:40:29 99.1 °F (37.3 °C) 148 Abnormal  24 Abnormal  175/92 Abnormal  120 92 % 28 2 L/min Nasal cannula Lying   06/26/23 2300 98.8 °F (37.1 °C) 119 Abnormal  20 121/68 86 91 % -- -- None (Room air) Lying   06/26/23 20:40:49 103.2 °F (39.6 °C) Abnormal  129 Abnormal  20 128/68 88 92 % -- -- None (Room air) Lying   06/26/23 1940 100.1 °F (37.8 °C) -- -- -- -- -- -- -- -- --   06/26/23 1930 -- 119 Abnormal  23 Abnormal  128/72 91 97 % -- -- None (Room air) Lying   06/26/23 1830 -- 104 22 115/63 82 94 % -- -- None (Room air) Lying   06/26/23 1600 -- 124 Abnormal  22 117/57 81 95 % -- -- None (Room air) Sitting   06/26/23 1530 99.8 °F (37.7 °C) 132 Abnormal  26 Abnormal  143/67 97 96 % -- -- None (Room air) Sitting   06/26/23 1512 98.9 °F (37.2 °C) 132 Abnormal  20 161/74 -- 97 % -- -- None (Room air) Sitting       Pertinent Labs/Diagnostic Test Results:   6/27 ECHO: Left Ventricle: Left ventricular cavity size is normal. Wall thickness is normal. Systolic function is normal (60%). Wall motion is normal. Diastolic function is normal.  •  Right Ventricle: Right ventricular cavity size is normal. Systolic function is normal.   6/26 EKG:Previous ECG:     Previous ECG:  Unavailable   Rate:     ECG rate:  132     ECG rate assessment: tachycardic     Rhythm:     Rhythm: sinus tachycardia     Ectopy:     Ectopy: none     QRS:     QRS axis:  Normal   Conduction:     Conduction: normal     ST segments:     ST segments:  Normal   T waves:     T waves: non-specific    CTA ED chest PE study   Final Result by Tray Potts MD (06/26 1934)      No pulmonary embolus. Moderate consolidation in the posterior segment right upper lobe and superior segment right lower lobe due to pneumonia. Multiple bilateral ill-defined lung nodules, 1.1 cm and and smaller. The patient has an indeterminate 5.4 x 2.9 cm liver mass which will need to be further evaluated by nonemergent MRI. While the lung nodules are likely bronchopneumonia or septic emboli,    metastatic disease cannot be excluded. I personally discussed this study with Meryle Kinds, PA-C, on 6/26/2023 7:34 PM.            Workstation performed: [id number]         XR chest 1 view portable   Final Result by Kev Marques MD (06/26 3211)      Right upper lung infiltrate and/or atelectasis.                   Workstation performed: [id number] US right upper quadrant 6/27  Parenchyma:  Echogenicity and echotexture are within normal limits. As noted on CT dated 6/26/2023, there is a well-circumscribed complex hypoechoic mass identified in the posterior right hepatic dome in segment 7 measuring 5.1 x 3.3 x 4.7 cm with internal vascularity. The lesion does not have the typical appearance of a   hemangioma and a neoplasm must be considered. Indeterminate 5.1 cm right hepatic dome mass as described above for which further evaluation/characterization is advised with contrast-enhanced MRI. 2. Trace pericholecystic fluid without other findings to suggest cholecystitis or biliary obstruction.      Results from last 7 days   Lab Units 06/26/23  1551   SARS-COV-2  Negative     Results from last 7 days   Lab Units 06/27/23  1141 06/26/23 2128 06/26/23  1539   WBC Thousand/uL 15.03*  --  11.15*   HEMOGLOBIN g/dL 11.9*  --  13.0   HEMATOCRIT % 36.2*  --  39.5   PLATELETS Thousands/uL 138* 122* 152   BANDS PCT %  --   --  8         Results from last 7 days   Lab Units 06/27/23  1141 06/26/23  1539   SODIUM mmol/L 131* 130*   POTASSIUM mmol/L 4.6 4.3   CHLORIDE mmol/L 105 95*   CO2 mmol/L 11* 18*   ANION GAP mmol/L 15 17   BUN mg/dL 20 24   CREATININE mg/dL 1.16 1.33*   EGFR ml/min/1.73sq m 77 65   CALCIUM mg/dL 8.3* 9.9     Results from last 7 days   Lab Units 06/27/23  1141 06/26/23  1539   AST U/L 37 82*   ALT U/L 110* 191*   ALK PHOS U/L 129* 141*   TOTAL PROTEIN g/dL 6.8 8.1   ALBUMIN g/dL 3.2* 3.9   TOTAL BILIRUBIN mg/dL 1.44* 1.64*     Results from last 7 days   Lab Units 06/27/23  1055 06/27/23  0741 06/26/23 2127   POC GLUCOSE mg/dl 278* 356* 302*     Results from last 7 days   Lab Units 06/27/23  1141 06/26/23  1539   GLUCOSE RANDOM mg/dL 275* 448*         Results from last 7 days   Lab Units 06/26/23 2128   HEMOGLOBIN A1C % 13.3*   EAG mg/dl 335     No results found for: "BETA-HYDROXYBUTYRATE"       Results from last 7 days   Lab Units 06/26/23  1539   PH CHINA  7.359   PCO2 CH[place] mm Hg 35.2*   PO2 CH[place] mm Hg 36.1   HCO3 [place] mmol/L 19.4*   BASE EXC [place] mmol/L -5.3   O2 CONTENT CHINA ml/dL 12.7   O2 HGB, VENOUS % 65.2       Results from last 7 days   Lab Units 06/26/23  1936 06/26/23  1805 06/26/23  1539   HS TNI 0HR ng/L  --   --  55*   HS TNI 2HR ng/L  --  53*  --    HSTNI D2 ng/L  --  -2  --    HS TNI 4HR ng/L 52*  --   --    HSTNI D4 ng/L -3  --   --      Results from last 7 days   Lab Units 06/26/23  1539   D-DIMER QUANTITATIVE ug/ml FEU 3.07*     Results from last 7 days   Lab Units 06/26/23  1539   PROTIME seconds 15.5*   INR  1.25*   PTT seconds 33         Results from last 7 days   Lab Units 06/27/23  0445 06/26/23  1539   PROCALCITONIN ng/ml 24.42* 14.64*     Results from last 7 days   Lab Units 06/26/23  2128 06/26/23  1936 06/26/23  1539   LACTIC ACID mmol/L 1.4 2.6* 2.7*       Results from last 7 days   Lab Units 06/26/23  1539   BNP pg/mL 225*         Results from last 7 days   Lab Units 06/26/23  1715   CLARITY UA  Clear   COLOR UA  Light Yellow   SPEC GRAV UA  1.035*   PH UA  5.0   GLUCOSE UA mg/dl >=1000 (1%)*   KETONES UA mg/dl 100 (3+)*   BLOOD UA  Small*   PROTEIN UA mg/dl 50 (1+)*   NITRITE UA  Negative   BILIRUBIN UA  Negative   UROBILINOGEN UA (BE) mg/dl <2.0   LEUKOCYTES UA  Elevated glucose may cause decreased leukocyte values.  See urine microscopic for UWBC result*   WBC UA /hpf None Seen   RBC UA /hpf 1-2   BACTERIA UA /hpf None Seen   EPITHELIAL CELLS WET PREP /hpf None Seen     Results from last 7 days   Lab Units 06/27/23  0825 06/26/23  2019 06/26/23  1551   STREP PNEUMONIAE ANTIGEN, URINE  Negative  --   --    LEGIONELLA URINARY ANTIGEN   --  Negative  --    INFLUENZA A PCR   --   --  Negative   INFLUENZA B PCR   --   --  Negative   RSV PCR   --   --  Negative         Results from last 7 days   Lab Units 06/27/23  0848   AMPH/METH  Negative   BARBITURATE UR  Negative   BENZODIAZEPINE UR  Negative   COCAINE UR  Negative METHADONE URINE  Negative   OPIATE UR  Negative   PCP UR  Negative   THC UR  Negative       Results from last 7 days   Lab Units 06/26/23  1551 06/26/23  1539   GRAM STAIN RESULT  Gram negative rods* Gram negative rods*       ED Treatment:   Medication Administration from 06/26/2023 1509 to 06/26/2023 2032       Date/Time Order Dose Route Action     06/26/2023 1539 EDT sodium chloride 0.9 % bolus 1,000 mL 1,000 mL Intravenous New Bag     06/26/2023 1538 EDT albuterol inhalation solution 5 mg 5 mg Nebulization Given     06/26/2023 1538 EDT ipratropium (ATROVENT) 0.02 % inhalation solution 0.5 mg 0.5 mg Nebulization Given     06/26/2023 1636 EDT cefTRIAXone (ROCEPHIN) IVPB (premix in dextrose) 1,000 mg 50 mL 1,000 mg Intravenous New Bag     06/26/2023 1642 EDT sodium chloride 0.9 % bolus 1,000 mL 1,000 mL Intravenous New Bag     06/26/2023 1805 EDT sodium chloride 0.9 % bolus 500 mL 500 mL Intravenous New Bag     06/26/2023 1947 EDT acetaminophen (TYLENOL) tablet 650 mg 650 mg Oral Given     06/26/2023 2005 EDT metoprolol (LOPRESSOR) injection 5 mg 5 mg Intravenous Given        Past Medical History:   Diagnosis Date   • Asthma        Admitting Diagnosis: SOB (shortness of breath) [R06.02]  Asthma [J45.909]  Community acquired pneumonia [J18.9]  Elevated troponin [R77.8]  Septic embolism (HCC) [I76]  Sepsis ([address]) [A41.9]  Severe sepsis ([address]) [A41.9, R65.20]  Age/Sex: 43 y.o. male  Admission Orders:  Blood cultures, SCds   Elevate HOB, Up and OOB  ECHO  Scheduled Medications:  enoxaparin, 40 mg, Subcutaneous, Daily  insulin detemir, 5 Units, Subcutaneous, HS  insulin lispro, 1-5 Units, Subcutaneous, TID AC  insulin lispro, 1-5 Units, Subcutaneous, HS  meropenem, 1,000 mg, Intravenous, Q8H      Continuous IV Infusions:  sodium chloride, 125 mL/hr, Intravenous, Continuous      PRN Meds:  acetaminophen, 650 mg, Oral, Q6H PRN  dextromethorphan-guaiFENesin, 10 mL, Oral, Q4H PRN        IP CONSULT TO PULMONOLOGY  IP CONSULT TO INFECTIOUS DISEASES    Network Utilization Review Department  ATTENTION: Please call with any questions or concerns to [phone] and carefully listen to the prompts so that you are directed to the right person. All voicemails are confidential.  Marylin Bonner all requests for admission clinical reviews, approved or denied determinations and any other requests to dedicated fax number below belonging to the campus where the patient is receiving treatment.  List of dedicated fax numbers for the Facilities:  [place] DENIALS (Administrative/Medical Necessity) [phone] [address] [facility] (Maternity/NICU/Pediatrics) [phone]   [address] [place] [address] [phone]   [facility] [address] [facility] [phone]   [address] [place] [address] [address] [facility] [address] [phone]   [address] [facility] [address] [place] [address] [phone]

## 2023-06-27 NOTE — CONSULTS
Consultation - Infectious Disease   Deric Quiñonez 43 y.o. male MRN: [id number]  Unit/Bed#: -01 Encounter: [id number]      IMPRESSION & RECOMMENDATIONS:   1. Severe sepsis. Present on admission. Leukocytosis, tachycardia, and fever soon after admission. Appears to be secondary to Klebsiella pneumonia bacteremia. Possibly secondary to pneumonia, however the radiographic findings are a bit atypical for an acute bacterial pneumonia, and concerned about the possibility of a hepatobiliary source with his elevated LFTs and chest pressure. He appears to be quite ill although hemodynamically stable.  -Discontinue cefepime  -Begin meropenem 1 g IV every 8 hours awaiting additional data and clarification  -Follow-up cultures and sensitivities and adjust the antibiotics needed  -Check CT abdomen pelvis  -Recheck CBC with differential and CMP  -Supportive care    2. Klebsiella pneumonia bacteremia. Possibly a pulmonary source based upon the radiographic findings and hypoxemia. Consideration for the possibility of a hepatobiliary source in this patient with liver mass, elevated liver function test and epigastric/chest pressure  -Antibiotics as above  -Recheck blood cultures x2 sets to confirm clearance in this patient with possible septic emboli  -Follow-up echocardiogram  -Check CT abdomen pelvis or right upper quadrant ultrasound to make sure no biliary obstruction  -Additional work-up as needed  -Check HIV screen in young patient with severe infectious process    3. Liver mass. Unclear etiology. Cannot get full picture without additional imaging  -Additional imaging as above  -Check hepatitis panel  -Additional work-up as needed    4. Hyperglycemia. No known history of diabetes mellitus. Check hemoglobin A1c.    5.  Elevated serum creatinine. Suspect this is a prerenal issue.   Unclear baseline.  -Recheck BMP  -Volume management    Have discussed the above management plan in detail with the primary service    Extensive review of the medical records in epic including review of the notes, radiographs, and laboratory results     HISTORY OF PRESENT ILLNESS:  Reason for Consult: Klebsiella bacteremia  HPI: Ronald Quiñonez is a 43y.o. year old male admitted to [address] [place] [address] with several days of fever, shaking chills, sweats, and chest pressure with the development of shortness of breath who we are asked to assist with work-up and management. The patient is otherwise healthy prior to this illness. He was born in [place] and moved [place] the [place] 20 years ago. 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 He is  and has 3 children, all of which are healthy and doing well. This past Friday the patient began having fever, chills, and sweats. The symptoms progressed and became quite severe. He also began feeling a bit short of breath and came to the ER yesterday for further evaluation. On arrival to the emergency department he began having some chest pressure. He was found to have a high fever, had blood cultures obtained, and was started on ceftriaxone and azithromycin for possible pneumonia and admitted for further management. CT of the chest did reveal areas of consolidation and some areas that were more nodular suggesting the possibility of septic emboli versus metastatic disease. The patient's been hypoxemic requiring oxygen support. He is remained hemodynamically stable despite his systemic illness. He denies any nausea vomiting or diarrhea, denies any cough or sputum production, denies any dysuria or hematuria, denies any new rash or skin lesions, denies any new joint or muscle pains. He has not been having any sore throat rhinorrhea or nasal congestion. He does admit to having a mild headache. His blood cultures have come back positive for Klebsiella pneumonia and his antibiotics have been changed to cefepime.   He continues to require oxygen support. The patient denies ever injecting drugs. He is only sexually active with his wife in a monogamous relationship. He has had no known ill contacts. He has not traveled out of the country in the last 3 years. He has not traveled regionally. REVIEW OF SYSTEMS:  A complete review of systems is negative other than that noted in the HPI. PAST MEDICAL HISTORY:  Past Medical History:   Diagnosis Date   • Asthma      History reviewed. No pertinent surgical history. FAMILY HISTORY:  Non-contributory    SOCIAL HISTORY:  Social History   Social History     Substance and Sexual Activity   Alcohol Use Not Currently     Social History     Substance and Sexual Activity   Drug Use Never     Social History     Tobacco Use   Smoking Status Never   Smokeless Tobacco Never       ALLERGIES:  No Known Allergies    MEDICATIONS:  All current active medications have been reviewed.   Antibiotics: Cefepime    PHYSICAL EXAM:  Temp:  [98.8 °F (37.1 °C)-103.2 °F (39.6 °C)] 99.7 °F (37.6 °C)  HR:  [104-148] 122  Resp:  [20-26] 20  BP: (114-175)/(57-92) 132/73  SpO2:  [91 %-98 %] 98 %  Temp (24hrs), Av.7 °F (37.6 °C), Min:98.8 °F (37.1 °C), Max:103.2 °F (39.6 °C)  Current: Temperature: 99.7 °F (37.6 °C)    Intake/Output Summary (Last 24 hours) at 2023 2746  Last data filed at 2023  Gross per 24 hour   Intake 1321.25 ml   Output 1050 ml   Net 271.25 ml       General Appearance:  Ill-appearing, toxic, and in mild distress   Head:  Normocephalic, without obvious abnormality, atraumatic   Eyes:  Conjunctiva pink and sclera anicteric, both eyes   Nose: Nares normal, mucosa normal, no drainage   Throat: Oropharynx moist without lesions   Neck: Supple, symmetrical, no adenopathy, no tenderness/mass/nodules   Back:   Symmetric, no curvature, ROM normal, no CVA tenderness   Lungs:    Decreased breath sounds, no rales or rhonchi, respirations mildly labored   Chest Wall:  No tenderness or deformity Heart:  Tachycardic; no murmur, rub or gallop   Abdomen:   Soft, non-tender, non-distended, positive bowel sounds    Extremities: No cyanosis, clubbing or edema   Skin: No rashes or lesions. No draining wounds noted. Lymph nodes: Cervical, supraclavicular nodes normal   Neurologic: Alert and oriented times 3, extremity strength 5/5 and symmetric       LABS, IMAGING, & OTHER STUDIES:  Lab Results:  I have personally reviewed pertinent labs.   Results from last 7 days   Lab Units 06/26/23  2128 06/26/23  1539   WBC Thousand/uL  --  11.15*   HEMOGLOBIN g/dL  --  13.0   PLATELETS Thousands/uL 122* 152     Results from last 7 days   Lab Units 06/26/23  1539   SODIUM mmol/L 130*   POTASSIUM mmol/L 4.3   CHLORIDE mmol/L 95*   CO2 mmol/L 18*   BUN mg/dL 24   CREATININE mg/dL 1.33*   EGFR ml/min/1.73sq m 65   CALCIUM mg/dL 9.9   AST U/L 82*   ALT U/L 191*   ALK PHOS U/L 141*     Results from last 7 days   Lab Units 06/26/23  1551 06/26/23  1539   GRAM STAIN RESULT  Gram negative rods* Gram negative rods*     Results from last 7 days   Lab Units 06/27/23  0445 06/26/23  1539   PROCALCITONIN ng/ml 24.42* 14.64*             Results from last 7 days   Lab Units 06/26/23  1539   D-DIMER QUANTITATIVE ug/ml FEU 3.07*       Imaging Studies:     CT chest.  Right-sided area of consolidation with other areas of the lung having more nodular appearance    Images personally reviewed by me in PACS

## 2023-06-27 NOTE — CONSULTS
Consultation - Pulmonary Medicine   Wes Quiñonez 43 y.o. male MRN: [id number]  Unit/Bed#: -01 Encounter: [id number]      Assessment:  1. Acute hypoxemic respiratory insufficiency  2. Severe sepsis with gram-negative bacteremia  3. Multifocal pneumonia with suspected septic emboli    Plan:   Acute hypoxemic respiratory insufficiency secondary to severe sepsis with multifocal pneumonia, gram-negative bacteremia and septic emboli  He is requiring 2L NC, titrate to maintain O2 sats > 89%  CTA on admission shows no PE, shows moderate consolidation in the right upper lobe and superior segment of the right lower lobe, multiple bilateral ill-defined lung nodules measuring up to 1.1 cm  Also has a liver mass which will need further evaluation  Procalcitonin significantly elevated, max is 103 and does have a leukocytosis, sputum culture has been ordered though he currently has no sputum  Blood cultures are showing gram-negative rods  Infectious disease is following, currently on meropenem  HIV has also been ordered given severe infection in young healthy patient  Source of the bacteremia appears to be pneumonia although patient did not have any respiratory symptoms. ID also ruling out hepatobiliary source. He does report poor dental health as well  Would need outpatient follow up for repeat imaging in 6-8 weeks  If no clinical improvement, may need bronchoscopy, will see how he does on antibiotics over next 24-48 hours  Will continue to follow    History of Present Illness   Physician Requesting Consult: Sandi Velázquez MD  Reason for Consult / Principal Problem: Pneumonia  Hx and PE limited by: None  HPI: Wes Quiñonez is a 43y.o. year old male non-smoker with past medical history of mild intermittent asthma who presents with complaint of fevers, chills and body aches for the last 3 to 4 days.   He denies any cough, does now have some shortness of breath, no proceeding viral symptoms such as runny stuffy nose, sore throat. Denies any nausea, vomiting or diarrhea. He is a non-smoker. He does have a history of mild asthma which has been well controlled with as needed albuterol. He has never had pneumonia in the past.  No recent travel. Consults    Review of Systems   Constitutional: Positive for chills, fatigue and fever. HENT: Negative. Respiratory: Positive for shortness of breath. Cardiovascular: Negative. Gastrointestinal: Negative. Genitourinary: Negative. Musculoskeletal: Positive for myalgias. Skin: Negative. Allergic/Immunologic: Negative. Neurological: Negative. Psychiatric/Behavioral: Negative. Historical Information   Past Medical History:   Diagnosis Date   • Asthma      History reviewed. No pertinent surgical history. Social History   Social History     Substance and Sexual Activity   Alcohol Use Not Currently     Social History     Substance and Sexual Activity   Drug Use Never     E-Cigarette/Vaping     E-Cigarette/Vaping Substances     Social History     Tobacco Use   Smoking Status Never   Smokeless Tobacco Never     Occupational History:     Family History: History reviewed. No pertinent family history.     Meds/Allergies   all current active meds have been reviewed, pertinent pulmonary meds have been reviewed and current meds:   Current Facility-Administered Medications   Medication Dose Route Frequency   • acetaminophen (TYLENOL) tablet 650 mg  650 mg Oral Q6H PRN   • dextromethorphan-guaiFENesin (ROBITUSSIN DM) oral syrup 10 mL  10 mL Oral Q4H PRN   • enoxaparin (LOVENOX) subcutaneous injection 40 mg  40 mg Subcutaneous Daily   • insulin detemir (LEVEMIR) subcutaneous injection 5 Units  5 Units Subcutaneous HS   • insulin lispro (HumaLOG) 100 units/mL subcutaneous injection 1-5 Units  1-5 Units Subcutaneous TID AC   • insulin lispro (HumaLOG) 100 units/mL subcutaneous injection 1-5 Units  1-5 Units Subcutaneous HS   • meropenem (MERREM) 1,000 mg in sodium chloride 0.9 % 100 mL IVPB  1,000 mg Intravenous Q8H   • sodium chloride 0.9 % infusion  125 mL/hr Intravenous Continuous       No Known Allergies    Objective   Vitals: Blood pressure 132/73, pulse (!) 125, temperature 99.7 °F (37.6 °C), temperature source Oral, resp. rate 20, height 5' 5" (1.651 m), weight 79.4 kg (175 lb), SpO2 98 %. ,Body mass index is 29.12 kg/m². Intake/Output Summary (Last 24 hours) at 6/27/2023 1045  Last data filed at 6/27/2023 8995  Gross per 24 hour   Intake 1321.25 ml   Output 1050 ml   Net 271.25 ml     Invasive Devices     Peripheral Intravenous Line  Duration           Peripheral IV 06/26/23 Left Antecubital <1 day                Physical Exam  Vitals reviewed. Constitutional:       Appearance: He is ill-appearing. HENT:      Head: Normocephalic and atraumatic. Mouth/Throat:      Pharynx: Oropharynx is clear. Eyes:      Conjunctiva/sclera: Conjunctivae normal.   Cardiovascular:      Rate and Rhythm: Regular rhythm. Tachycardia present. Pulmonary:      Effort: Pulmonary effort is normal. Tachypnea present. Breath sounds: Decreased breath sounds present. No wheezing, rhonchi or rales. Abdominal:      General: Abdomen is flat. There is no distension. Musculoskeletal:         General: Normal range of motion. Cervical back: Normal range of motion. Right lower leg: No edema. Left lower leg: No edema. Skin:     General: Skin is warm and dry. Neurological:      Mental Status: He is alert and oriented to person, place, and time. Psychiatric:         Mood and Affect: Mood normal.         Behavior: Behavior normal.         Lab Results:   I have personally reviewed pertinent lab results. , CBC:   Lab Results   Component Value Date    WBC 11.15 (H) 06/26/2023    HGB 13.0 06/26/2023    HCT 39.5 06/26/2023    MCV 85 06/26/2023     (L) 06/26/2023    RBC 4.63 06/26/2023    MCH 28.1 06/26/2023    MCHC 32.9 06/26/2023    RDW 14.2 06/26/2023    MPV 11.5 06/26/2023   , CMP:   Lab Results   Component Value Date    SODIUM 130 (L) 06/26/2023    K 4.3 06/26/2023    CL 95 (L) 06/26/2023    CO2 18 (L) 06/26/2023    BUN 24 06/26/2023    CREATININE 1.33 (H) 06/26/2023    CALCIUM 9.9 06/26/2023    AST 82 (H) 06/26/2023     (H) 06/26/2023    ALKPHOS 141 (H) 06/26/2023    EGFR 65 06/26/2023     Imaging Studies: I have personally reviewed pertinent reports. and I have personally reviewed pertinent films in PACS  EKG, Pathology, and Other Studies: I have personally reviewed pertinent reports.     VTE Prophylaxis: Sequential compression device (Venodyne)  and Enoxaparin (Lovenox)    Code Status: Level 1 - Full Code  Advance Directive and Living Will:      Power of :    POLST:

## 2023-06-27 NOTE — PROGRESS NOTES
[address]  Progress Note  Name: Janna Atkins I  MRN: [id number]  Unit/Bed#: -01 I Date of Admission: 6/26/2023   Date of Service: 6/27/2023 I Hospital Day: 1    Assessment/Plan      Severe sepsis:   · Etiology could be multifactorial PNA vs biliary   · Discussed with ID, escalated antibiotics to meropenem in case the klebsiella is ESBL  · RUQ US stat ordered after discussing with ID  · Continue IV fluids, ID checking HIV status  · ECHO results pending for septic emboli  · Wbc uptrended, follow procal    Septic embolism:Echo results pending  Cont management as per #1    Klebsiella bacteremia: Concern for ESBL hence antibiotic switched to meropenem at this time. ID following    Liver mass: Right upper quadrant ultrasound ordered. Concern for hepatobiliary source for sepsis    Elevated troponin: Likely secondary to demand ischemia. No chest pain    Uncontrolled diabetes mellitus type 2 with A1c of 13: Added Levemir 5 units at bedtime and uptitrate as needed, continue sliding scale insulin  IV fluids  Diabetic diet    Hyponatremia  Assessment & Plan  · Corrected sodium 134  · Cont to monitor and treat hyperglycemia    Acute renal failure (ARF) (HCC)  Assessment & Plan  · IV fluids and monitor  · Likely pre renal in setting on sepsis      Hyperglycemia  Assessment & Plan  · A1c 13- add levemir 5 U HS and uptitrate as needed  · Cont SSI  · Known history of diabetes  · Aggressive hydration with sliding scale coverage for now             VTE Pharmacologic Prophylaxis: VTE Score: 5 Moderate Risk (Score 3-4) - Pharmacological DVT Prophylaxis Ordered: enoxaparin (Lovenox). Patient Centered Rounds: I performed bedside rounds with nursing staff today. Discussions with Specialists or Other Care Team Provider: Yes, infectious disease, pulmonary    Education and Discussions with Family / Patient: Updated  (wife) via phone.     Total Time Spent on Date of Encounter in care of patient: 35 minutes This time was spent on one or more of the following: performing physical exam; counseling and coordination of care; obtaining or reviewing history; documenting in the medical record; reviewing/ordering tests, medications or procedures; communicating with other healthcare professionals and discussing with patient's family/caregivers. Current Length of Stay: 1 day(s)  Current Patient Status: Inpatient   Certification Statement: The patient will continue to require additional inpatient hospital stay due to Need for further antibiotics and further investigation with determine the etiology of sepsis  Discharge Plan: Anticipate discharge in >72 hrs to home. Code Status: Level 1 - Full Code    Subjective:   Seen and examined at bedside  Patient feels very tired and does have feeling of a fever  Denies any abdominal pain  Does complain of pleuritic chest pain better with Tylenol  No hemoptysis or hematemesis at this time    Objective:     Vitals:   Temp (24hrs), Av.7 °F (37.6 °C), Min:98.8 °F (37.1 °C), Max:103.2 °F (39.6 °C)    Temp:  [98.8 °F (37.1 °C)-103.2 °F (39.6 °C)] 99.7 °F (37.6 °C)  HR:  [104-148] 123  Resp:  [20-26] 20  BP: (114-175)/(57-92) 132/73  SpO2:  [91 %-98 %] 98 %  Body mass index is 29.12 kg/m². Input and Output Summary (last 24 hours):      Intake/Output Summary (Last 24 hours) at 2023 1240  Last data filed at 2023 7190  Gross per 24 hour   Intake 1321.25 ml   Output 1050 ml   Net 271.25 ml       Physical Exam:   Physical Exam General- Awake, alert and oriented x 3, looks uncomfortable, ill-appearing  HEENT- Normocephalic, atraumatic, oral mucosa- moist  Neck- Supple, No carotid bruit, no JVD  CVS- Normal S1/ S2, tachycardia, No murmur, No edema  Respiratory system-tachypnea, reduced breath sounds mainly on the right side  Abdomen- Soft, Non distended, no tenderness, Bowel sound- present 4 quads  Genitourinary- No suprapubic tenderness, No CVA tenderness  Skin- No new bruise or rash  Musculoskeletal- No gross deformity  Psych- No acute psychosis  CNS- CN II- XII grossly intact, No acute focal neurologic deficit noted      Additional Data:     Labs:  Results from last 7 days   Lab Units 06/27/23  1141 06/26/23 2128 06/26/23  1539   WBC Thousand/uL 15.03*  --  11.15*   HEMOGLOBIN g/dL 11.9*  --  13.0   HEMATOCRIT % 36.2*  --  39.5   PLATELETS Thousands/uL 138*   < > 152   BANDS PCT %  --   --  8   LYMPHO PCT %  --   --  17   MONO PCT %  --   --  4   EOS PCT %  --   --  0    < > = values in this interval not displayed. Results from last 7 days   Lab Units 06/27/23  1141   SODIUM mmol/L 131*   POTASSIUM mmol/L 4.6   CHLORIDE mmol/L 105   CO2 mmol/L 11*   BUN mg/dL 20   CREATININE mg/dL 1.16   ANION GAP mmol/L 15   CALCIUM mg/dL 8.3*   ALBUMIN g/dL 3.2*   TOTAL BILIRUBIN mg/dL 1.44*   ALK PHOS U/L 129*   ALT U/L 110*   AST U/L 37   GLUCOSE RANDOM mg/dL 275*     Results from last 7 days   Lab Units 06/26/23  1539   INR  1.25*     Results from last 7 days   Lab Units 06/27/23  1055 06/27/23  0741 06/26/23 2127   POC GLUCOSE mg/dl 278* 356* 302*     Results from last 7 days   Lab Units 06/26/23 2128   HEMOGLOBIN A1C % 13.3*     Results from last 7 days   Lab Units 06/27/23  0445 06/26/23 2128 06/26/23  1936 06/26/23  1539   LACTIC ACID mmol/L  --  1.4 2.6* 2.7*   PROCALCITONIN ng/ml 24.42*  --   --  14.64*       Lines/Drains:  Invasive Devices     Peripheral Intravenous Line  Duration           Peripheral IV 06/26/23 Left Antecubital <1 day                  Telemetry:  Telemetry Orders (From admission, onward)             24 Hour Telemetry Monitoring  Continuous x 24 Hours (Telem)        Question:  Reason for 24 Hour Telemetry  Answer:  Arrhythmias requiring acute medical intervention / PPM or ICD malfunction                 Telemetry Reviewed: Sinus Tachycardia  Indication for Continued Telemetry Use: No indication for continued use. Will discontinue. Imaging: Reviewed radiology reports from this admission including: chest CT scan    Recent Cultures (last 7 days):   Results from last 7 days   Lab Units 06/26/23  1551 06/26/23  1539   GRAM STAIN RESULT  Gram negative rods* Gram negative rods*       Last 24 Hours Medication List:   Current Facility-Administered Medications   Medication Dose Route Frequency Provider Last Rate   • acetaminophen  650 mg Oral Q6H PRN Philip Mike MD     • dextromethorphan-guaiFENesin  10 mL Oral Q4H PRN Philip Mike MD     • enoxaparin  40 mg Subcutaneous Daily Philip Mike MD     • insulin detemir  5 Units Subcutaneous HS Jackie Hampton MD     • insulin lispro  1-5 Units Subcutaneous TID AC Philip Mike MD     • insulin lispro  1-5 Units Subcutaneous HS Philip Mike MD     • meropenem  1,000 mg Intravenous Q8H Nehemiah Reed MD 1,000 mg (06/27/23 1046)   • sodium chloride  125 mL/hr Intravenous Continuous Philip Mike  mL/hr (06/27/23 0421)        Today, Patient Was Seen By: Jackie Hampton MD    **Please Note: This note may have been constructed using a voice recognition system. **

## 2023-06-27 NOTE — ASSESSMENT & PLAN NOTE
· A1c 13- add levemir 5 U HS and uptitrate as needed  · Cont SSI  · Known history of diabetes  · Aggressive hydration with sliding scale coverage for now

## 2023-06-27 NOTE — ASSESSMENT & PLAN NOTE
· Repeat CT abdomen with contrast today as the white count is trending up.   Follow results  · MRI with contrast reviewed  · Abscess drain present, culture sent shows klebsiella  · Etiology unknown at this time, Multispot HIV test pending

## 2023-06-27 NOTE — H&P
[address]  H&P  Name: Fuad Pino 43 y.o. male I MRN: [id number]  Unit/Bed#: FT 04 I Date of Admission: 6/26/2023   Date of Service: 6/26/2023 I Hospital Day: 0      Assessment/Plan   Severe sepsis [facility])  Assessment & Plan  · Patient with severe sepsis as evidenced by lactic acidosis, tachycardia, tachypnea with leukocytosis  · Likely pneumonia with possible septic emboli  · Patient got ceftriaxone emergency department. I am to give the patient cefepime and vancomycin since he looks pretty septic at this time although this should just be community-acquired pneumonia the patient is appearing. · Check an echocardiogram although septic emboli could be from the pneumonia. Patient does not have any history of drug use  · Check sputum  · Legionella, strep  · Pulmonology evaluation  · Given severe sepsis with tachycardia in the 150s I am going to keep the patient on telemetry for 24 hours    Septic embolism ([address])  Assessment & Plan  · Will be due to pneumonia. Continue to monitor    Elevated troponin  Assessment & Plan  · Mild elevation was stable. This is likely secondary to demand secondary to the tachycardia and sepsis. not an MI    Hyponatremia  Assessment & Plan  · Can [place] to dehydration and also pseudohyponatremia given the significant hyperglycemia    Acute renal failure (ARF) (HCC)  Assessment & Plan  · IV fluids and monitor    Hyperglycemia  Assessment & Plan  · Known history of diabetes. Check a hemoglobin A1c in the morning. · Aggressive hydration with sliding scale coverage for now    Liver mass  Assessment & Plan  · Patient will need an outpatient MRI. Currently more pressing issues with his shortness of breath and pneumonia with septic emboli.           VTE Prophylaxis: Lovenox/ sequential compression device   Code Status: Full code  POLST: POLST is not applicable to this patient        Anticipated Length of Stay:  Patient will be admitted on an Inpatient basis with an anticipated length of stay of greater than 2 midnights. Justification for Hospital Stay: Is going require greater than 2 midnights secondary to having severe sepsis in need of IV antibiotics and close monitoring given his significant tachycardia and septic emboli     Total Time for Visit, including Counseling / Coordination of Care: 60 minutes. Greater than 50% of this total time spent on direct patient counseling and coordination of care.     Chief Complaint:   Flulike symptoms     History of Present Illness:     Adilene Quiñonez is a 43 y.o. male who presents with states he has been having flulike symptoms for the past 3 to 4 days however it got progressively worse this morning where he had some shortness of breath and some palpitations and fevers associated with it. The patient states he has had no recent travels. The patient complains of generalized body aches. He feels very anxious at this time as well. Patient states he is very tired to. He has had no recent travel or any sick contacts. Patient has a history of asthma but that is fairly well controlled. Denies any illicit drug use. Patient does not smoke or drink either.     Review of Systems:     Review of Systems   Constitutional: Positive for activity change, appetite change, chills, diaphoresis, fatigue and fever. Respiratory: Positive for cough and shortness of breath. Cardiovascular: Positive for palpitations. Neurological: Positive for weakness. All other systems reviewed and are negative.        Past Medical and Surgical History:      Medical History        Past Medical History:   Diagnosis Date   • Asthma              Surgical History   History reviewed.  No pertinent surgical history.        Meds/Allergies:     Prior to Admission medications    Not on File      I have reviewed home medications with patient personally.     Allergies: No Known Allergies     Social History:     Marital Status: /Civil Union      Patient Pre-hospital Living Situation: Independent  Patient Pre-hospital Level of Mobility: Independent  Patient Pre-hospital Diet Restrictions: None  Substance Use History:   Social History      Substance and Sexual Activity   Alcohol Use Not Currently      Social History          Tobacco Use   Smoking Status Never   Smokeless Tobacco Never      Social History          Substance and Sexual Activity   Drug Use Never         Family History:     Family History   History reviewed. No pertinent family history.        Physical Exam:      Vitals:   Blood Pressure: 128/72 (06/26/23 1930)  Pulse: (!) 119 (06/26/23 1930)  Temperature: 100.1 °F (37.8 °C) (06/26/23 1940)  Temp Source: Oral (06/26/23 1940)  Respirations: (!) 23 (06/26/23 1930)  Height: 5' 5" (165.1 cm) (06/26/23 1512)  Weight - Scale: 79.4 kg (175 lb) (06/26/23 1512)  SpO2: 97 % (06/26/23 1930)     Physical Exam     (   General Appearance:    Alert, cooperative, no distress, appears stated age   Head:    Normocephalic, without obvious abnormality, atraumatic   Eyes:    PERRL, conjunctiva/corneas clear, EOM's intact,               Nose:   Nares normal, septum midline, mucosa normal   Throat:   Lips, mucosa, and tongue normal; teeth and gums normal   Neck:   Supple, symmetrical, no adenopathy;        thyroid:  No enlargement/tenderness/nodules; no carotid    bruit or JVD   Back:     Symmetric, no curvature, ROM normal, no CVA tenderness   Lungs:    Scattered rhonchi         Heart:   Tachycardic   Abdomen:     Soft, non-tender, bowel sounds active all four quadrants,     no masses, no organomegaly               Extremities:   Extremities normal, atraumatic, no cyanosis or edema   Pulses:   2+ and symmetric all extremities   Skin:   Skin color, texture, turgor normal, no rashes or lesions   Lymph nodes:   Cervical, supraclavicular, and axillary nodes normal   Neurologic:   CNII-XII intact.  Normal strength, sensation and reflexes       throughout            Additional Data:    Lab Results: I have personally reviewed pertinent reports.       Results from last 7 days   Lab Units 06/26/23  1539   WBC Thousand/uL 11.15*   HEMOGLOBIN g/dL 13.0   HEMATOCRIT % 39.5   PLATELETS Thousands/uL 152   BANDS PCT % 8   LYMPHO PCT % 17   MONO PCT % 4   EOS PCT % 0           Results from last 7 days   Lab Units 06/26/23  1539   SODIUM mmol/L 130*   POTASSIUM mmol/L 4.3   CHLORIDE mmol/L 95*   CO2 mmol/L 18*   BUN mg/dL 24   CREATININE mg/dL 1.33*   ANION GAP mmol/L 17   CALCIUM mg/dL 9.9   ALBUMIN g/dL 3.9   TOTAL BILIRUBIN mg/dL 1.64*   ALK PHOS U/L 141*   ALT U/L 191*   AST U/L 82*   GLUCOSE RANDOM mg/dL 448*      Results from last 7 days   Lab Units 06/26/23  1539   INR   1.25*                    Results from last 7 days   Lab Units 06/26/23  1936 06/26/23  1539   LACTIC ACID mmol/L 2.6* 2.7*   PROCALCITONIN ng/ml  --  14.64*         Imaging: I have personally reviewed pertinent reports.       CTA ED chest PE study   Final Result by Todd Avelar MD (06/26 1934)       No pulmonary embolus.       Moderate consolidation in the posterior segment right upper lobe and superior segment right lower lobe due to pneumonia.       Multiple bilateral ill-defined lung nodules, 1.1 cm and and smaller. The patient has an indeterminate 5.4 x 2.9 cm liver mass which will need to be further evaluated by nonemergent MRI. While the lung nodules are likely bronchopneumonia or septic emboli,    metastatic disease cannot be excluded.       I personally discussed this study with Collins Holter, PA-C, on 6/26/2023 7:34 PM.               Workstation performed: [id number]           XR chest 1 view portable   Final Result by Judy Moore MD (06/26 1540)       Right upper lung infiltrate and/or atelectasis.                     Workstation performed: [id number]                 [employer] / [place] Records Reviewed: Yes      ** Please Note: This note has been constructed using a voice recognition system.  **    Revision History

## 2023-06-27 NOTE — SPEECH THERAPY NOTE
Speech-Language Pathology Bedside Swallow Evaluation      Patient Name: Shi Burgos    MHYST'J Date: 6/27/2023     Problem List  Active Problems:    Severe sepsis [facility])    Liver mass    Septic embolism (HCC)    Elevated troponin    Hyperglycemia    Acute renal failure (ARF) (HCC)    Hyponatremia      Past Medical History  Past Medical History:   Diagnosis Date   • Asthma        Past Surgical History  History reviewed. No pertinent surgical history. Summary   Pt presents with an overall functional oropharyngeal swallow. RN reports change in pt's mental status likely due to severe sepsis. Pt received sleeping, woke to voice. Pt appeared lethargic but remained attentive throughout evaluation. With bolus trials, pt's bolus control and manipulation appeared adequate. With liquids, pt appeared impulsive and took quick, consecutive sips. Cues and assistance were provided to take small, single sips. Mastication appeared prolonged but complete. Oral transfer appeared timely. With hard solids, mild oral residue was observed on left side of tongue, cleared independently as pt requested water for liquid wash. No overt s/s of aspiration or distress. O2 remained within the high 90s. Recommendations:   Diet: regular diet and thin liquids   Meds: whole with liquid (as tolerated)  Feeding Assistance: tray set up, assist with meals as needed  Frequent Oral care: 2-4x/day  Aspiration precautions and compensatory swallowing strategies: upright posture, only feed when fully alert, slow rate of feeding, small bites/sips and alternating bites and sips  Other Recommendations/ considerations: SLP will monitor during full meal 1-3x to ensure mgmt of oral diet and adjust as needed     Current Medical Status  Pt is a 43 y.o. male who presented to [id number] [place] [address] with a history of asthma, type 2 diabetes, and hyperlipidemia presenting for evaluation of flu-like symptoms and shortness of breath.  He has been sick for the past 5 days with cough, body aches, and fevers. Tmax 100.8. His shortness of breath worsened today prompting him to come to the ED. He believes he has the flu. He denies any chest pain, vomiting, diarrhea. No known sick contacts. He has a history of asthma but has not needed an inhaler for several years. RN reported change in mental status likely due to current medical status of severe sepsis. ST orders placed. Allergies:  No known food allergies    Past medical history:  Please see H&P for details    Special Studies:  XR chest 6/26/23:   Right upper lung infiltrate and/or atelectasis. CTA ED chest 6/26/23:  No pulmonary embolus. Moderate consolidation in the posterior segment right upper lobe and superior segment right lower lobe due to pneumonia. Multiple bilateral ill-defined lung nodules, 1.1 cm and and smaller. The patient has an indeterminate 5.4 x 2.9 cm liver mass which will need to be further evaluated by nonemergent MRI. While the lung nodules are likely bronchopneumonia or septic emboli, metastatic disease cannot be excluded.      right upper quadrant 6/2723:  Ordered    Social/Education/Vocational Hx:  Pt lives with family    Swallow Information   Current Risks for Dysphagia & Aspiration: severe sepsis  Current Symptoms/Concerns: pneumonia  Current Diet: regular diet and thin liquids   Baseline Diet: regular diet and thin liquids      Baseline Assessment   Behavior/Cognition: lethargic  Speech/Language Status: able to participate in basic conversation and able to follow commands  Patient Positioning: upright in bed       Swallow Mechanism Exam  Facial: asymmetrical  Labial: WFL  Lingual: WFL  Velum: symmetrical  Mandible: adequate ROM  Dentition: adequate  Vocal quality:clear/adequate   Volitional Cough: strong/productive   Respiratory Status: on 3L O2      Consistencies Assessed and Performance   Consistencies Administered: thin liquids, nectar thick, puree and hard solids    Oral Stage: Bolus control and manipulation appeared adequate. With liquids, pt appeared impulsive and took quick, consecutive sips. Cues and assistance were provided to take small, single sips. Mastication appeared prolonged but complete. Oral transfer appeared timely. Mild oral residue on left side independently cleared with liquid wash. Pharyngeal Stage: Laryngeal rise noted upon palpation. Swallow initiation appears prompt. No overt s/s of aspiration or distress otherwise.     Esophageal Concerns: none reported      Plan  Regular/Thin  Feeding assistance as needed  Will follow up 1-3x    Results Reviewed with: patient, RN and MD     Speech Therapy Prognosis   Prognosis: good    Prognosis Considerations: therapeutic potential

## 2023-06-27 NOTE — ASSESSMENT & PLAN NOTE
· Mild elevation was stable. This is likely secondary to demand secondary to the tachycardia and sepsis.  not an MI

## 2023-06-27 NOTE — ASSESSMENT & PLAN NOTE
· Patient will need an outpatient MRI. Currently more pressing issues with his shortness of breath and pneumonia with septic emboli.

## 2023-06-28 ENCOUNTER — APPOINTMENT (INPATIENT)
Dept: MRI IMAGING | Facility: HOSPITAL | Age: 43
DRG: 720 | End: 2023-06-28
Payer: COMMERCIAL

## 2023-06-28 LAB
ALBUMIN SERPL BCP-MCNC: 3 G/DL (ref 3.5–5)
ALP SERPL-CCNC: 130 U/L (ref 34–104)
ALT SERPL W P-5'-P-CCNC: 80 U/L (ref 7–52)
ANION GAP SERPL CALCULATED.3IONS-SCNC: 14 MMOL/L
AST SERPL W P-5'-P-CCNC: 33 U/L (ref 13–39)
ATRIAL RATE: 132 BPM
BASOPHILS # BLD AUTO: 0.08 THOUSANDS/ÂΜL (ref 0–0.1)
BASOPHILS NFR BLD AUTO: 1 % (ref 0–1)
BILIRUB SERPL-MCNC: 1.9 MG/DL (ref 0.2–1)
BUN SERPL-MCNC: 18 MG/DL (ref 5–25)
CALCIUM ALBUM COR SERPL-MCNC: 9 MG/DL (ref 8.3–10.1)
CALCIUM SERPL-MCNC: 8.2 MG/DL (ref 8.4–10.2)
CHLORIDE SERPL-SCNC: 108 MMOL/L (ref 96–108)
CO2 SERPL-SCNC: 12 MMOL/L (ref 21–32)
CREAT SERPL-MCNC: 1 MG/DL (ref 0.6–1.3)
EOSINOPHIL # BLD AUTO: 0.01 THOUSAND/ÂΜL (ref 0–0.61)
EOSINOPHIL NFR BLD AUTO: 0 % (ref 0–6)
ERYTHROCYTE [DISTWIDTH] IN BLOOD BY AUTOMATED COUNT: 15.1 % (ref 11.6–15.1)
GFR SERPL CREATININE-BSD FRML MDRD: 92 ML/MIN/1.73SQ M
GLUCOSE SERPL-MCNC: 203 MG/DL (ref 65–140)
GLUCOSE SERPL-MCNC: 214 MG/DL (ref 65–140)
GLUCOSE SERPL-MCNC: 218 MG/DL (ref 65–140)
GLUCOSE SERPL-MCNC: 224 MG/DL (ref 65–140)
GLUCOSE SERPL-MCNC: 231 MG/DL (ref 65–140)
HBV CORE AB SER QL: NORMAL
HBV CORE IGM SER QL: NORMAL
HBV SURFACE AG SER QL: NORMAL
HCT VFR BLD AUTO: 34 % (ref 36.5–49.3)
HCV AB SER QL: NORMAL
HGB BLD-MCNC: 10.9 G/DL (ref 12–17)
HIV 1+2 AB+HIV1 P24 AG SERPL QL IA: REACTIVE
HIV1 P24 AG SER QL: ABNORMAL
IMM GRANULOCYTES # BLD AUTO: >0.5 THOUSAND/UL (ref 0–0.2)
IMM GRANULOCYTES NFR BLD AUTO: 6 % (ref 0–2)
LYMPHOCYTES # BLD AUTO: 1.32 THOUSANDS/ÂΜL (ref 0.6–4.47)
LYMPHOCYTES NFR BLD AUTO: 8 % (ref 14–44)
MCH RBC QN AUTO: 27.2 PG (ref 26.8–34.3)
MCHC RBC AUTO-ENTMCNC: 32.1 G/DL (ref 31.4–37.4)
MCV RBC AUTO: 85 FL (ref 82–98)
MONOCYTES # BLD AUTO: 2.45 THOUSAND/ÂΜL (ref 0.17–1.22)
MONOCYTES NFR BLD AUTO: 14 % (ref 4–12)
NEUTROPHILS # BLD AUTO: 12.3 THOUSANDS/ÂΜL (ref 1.85–7.62)
NEUTS SEG NFR BLD AUTO: 71 % (ref 43–75)
NRBC BLD AUTO-RTO: 0 /100 WBCS
P AXIS: 72 DEGREES
PLATELET # BLD AUTO: 174 THOUSANDS/UL (ref 149–390)
PMV BLD AUTO: 11.2 FL (ref 8.9–12.7)
POTASSIUM SERPL-SCNC: 4.1 MMOL/L (ref 3.5–5.3)
PR INTERVAL: 136 MS
PROCALCITONIN SERPL-MCNC: 17.74 NG/ML
PROT SERPL-MCNC: 6.9 G/DL (ref 6.4–8.4)
QRS AXIS: 89 DEGREES
QRSD INTERVAL: 80 MS
QT INTERVAL: 320 MS
QTC INTERVAL: 474 MS
RBC # BLD AUTO: 4.01 MILLION/UL (ref 3.88–5.62)
SODIUM SERPL-SCNC: 134 MMOL/L (ref 135–147)
T WAVE AXIS: 46 DEGREES
VENTRICULAR RATE: 132 BPM
WBC # BLD AUTO: 17.14 THOUSAND/UL (ref 4.31–10.16)

## 2023-06-28 PROCEDURE — 99222 1ST HOSP IP/OBS MODERATE 55: CPT | Performed by: INTERNAL MEDICINE

## 2023-06-28 PROCEDURE — 93010 ELECTROCARDIOGRAM REPORT: CPT | Performed by: INTERNAL MEDICINE

## 2023-06-28 PROCEDURE — 87806 HIV AG W/HIV1&2 ANTB W/OPTIC: CPT | Performed by: INTERNAL MEDICINE

## 2023-06-28 PROCEDURE — 86361 T CELL ABSOLUTE COUNT: CPT

## 2023-06-28 PROCEDURE — A9585 GADOBUTROL INJECTION: HCPCS | Performed by: FAMILY MEDICINE

## 2023-06-28 PROCEDURE — 87040 BLOOD CULTURE FOR BACTERIA: CPT | Performed by: INTERNAL MEDICINE

## 2023-06-28 PROCEDURE — 87535 HIV-1 PROBE&REVERSE TRNSCRPJ: CPT | Performed by: INTERNAL MEDICINE

## 2023-06-28 PROCEDURE — 74183 MRI ABD W/O CNTR FLWD CNTR: CPT

## 2023-06-28 PROCEDURE — 82948 REAGENT STRIP/BLOOD GLUCOSE: CPT

## 2023-06-28 PROCEDURE — 99232 SBSQ HOSP IP/OBS MODERATE 35: CPT | Performed by: INTERNAL MEDICINE

## 2023-06-28 PROCEDURE — 80053 COMPREHEN METABOLIC PANEL: CPT | Performed by: INTERNAL MEDICINE

## 2023-06-28 PROCEDURE — 87538 HIV-2 PROBE&REVRSE TRNSCRIPJ: CPT | Performed by: INTERNAL MEDICINE

## 2023-06-28 PROCEDURE — 86704 HEP B CORE ANTIBODY TOTAL: CPT | Performed by: INTERNAL MEDICINE

## 2023-06-28 PROCEDURE — 84145 PROCALCITONIN (PCT): CPT | Performed by: FAMILY MEDICINE

## 2023-06-28 PROCEDURE — 92526 ORAL FUNCTION THERAPY: CPT

## 2023-06-28 PROCEDURE — 86702 HIV-2 ANTIBODY: CPT | Performed by: INTERNAL MEDICINE

## 2023-06-28 PROCEDURE — NC001 PR NO CHARGE: Performed by: RADIOLOGY

## 2023-06-28 PROCEDURE — 86803 HEPATITIS C AB TEST: CPT | Performed by: INTERNAL MEDICINE

## 2023-06-28 PROCEDURE — 99233 SBSQ HOSP IP/OBS HIGH 50: CPT | Performed by: INTERNAL MEDICINE

## 2023-06-28 PROCEDURE — 86701 HIV-1ANTIBODY: CPT | Performed by: INTERNAL MEDICINE

## 2023-06-28 PROCEDURE — 87340 HEPATITIS B SURFACE AG IA: CPT | Performed by: INTERNAL MEDICINE

## 2023-06-28 PROCEDURE — 86705 HEP B CORE ANTIBODY IGM: CPT | Performed by: INTERNAL MEDICINE

## 2023-06-28 PROCEDURE — 85025 COMPLETE CBC W/AUTO DIFF WBC: CPT | Performed by: INTERNAL MEDICINE

## 2023-06-28 PROCEDURE — 99233 SBSQ HOSP IP/OBS HIGH 50: CPT | Performed by: FAMILY MEDICINE

## 2023-06-28 RX ADMIN — MEROPENEM 1000 MG: 1 INJECTION, POWDER, FOR SOLUTION INTRAVENOUS at 02:06

## 2023-06-28 RX ADMIN — GUAIFENESIN AND DEXTROMETHORPHAN 10 ML: 100; 10 SYRUP ORAL at 18:21

## 2023-06-28 RX ADMIN — SODIUM CHLORIDE 125 ML/HR: 0.9 INJECTION, SOLUTION INTRAVENOUS at 02:06

## 2023-06-28 RX ADMIN — ACETAMINOPHEN 650 MG: 325 TABLET, FILM COATED ORAL at 11:54

## 2023-06-28 RX ADMIN — ENOXAPARIN SODIUM 40 MG: 40 INJECTION SUBCUTANEOUS at 09:48

## 2023-06-28 RX ADMIN — INSULIN DETEMIR 10 UNITS: 100 INJECTION, SOLUTION SUBCUTANEOUS at 22:10

## 2023-06-28 RX ADMIN — INSULIN LISPRO 2 UNITS: 100 INJECTION, SOLUTION INTRAVENOUS; SUBCUTANEOUS at 09:49

## 2023-06-28 RX ADMIN — MEROPENEM 1000 MG: 1 INJECTION, POWDER, FOR SOLUTION INTRAVENOUS at 18:21

## 2023-06-28 RX ADMIN — INSULIN LISPRO 1 UNITS: 100 INJECTION, SOLUTION INTRAVENOUS; SUBCUTANEOUS at 22:09

## 2023-06-28 RX ADMIN — GADOBUTROL 7 ML: 604.72 INJECTION INTRAVENOUS at 17:37

## 2023-06-28 RX ADMIN — MEROPENEM 1000 MG: 1 INJECTION, POWDER, FOR SOLUTION INTRAVENOUS at 09:48

## 2023-06-28 RX ADMIN — SODIUM CHLORIDE 125 ML/HR: 0.9 INJECTION, SOLUTION INTRAVENOUS at 13:58

## 2023-06-28 RX ADMIN — INSULIN LISPRO 2 UNITS: 100 INJECTION, SOLUTION INTRAVENOUS; SUBCUTANEOUS at 18:34

## 2023-06-28 RX ADMIN — ACETAMINOPHEN 650 MG: 325 TABLET, FILM COATED ORAL at 02:06

## 2023-06-28 RX ADMIN — INSULIN LISPRO 2 UNITS: 100 INJECTION, SOLUTION INTRAVENOUS; SUBCUTANEOUS at 12:00

## 2023-06-28 NOTE — PLAN OF CARE
Plan:  Continue to follow up 1-3x  Continue to provide cues and follow aspiration precautions  Feeding assistance as needed

## 2023-06-28 NOTE — PLAN OF CARE
Problem: MOBILITY - ADULT  Goal: Maintain or return to baseline ADL function  Description: INTERVENTIONS:  -  Assess patient's ability to carry out ADLs; assess patient's baseline for ADL function and identify physical deficits which impact ability to perform ADLs (bathing, care of mouth/teeth, toileting, grooming, dressing, etc.)  - Assess/evaluate cause of self-care deficits   - Assess range of motion  - Assess patient's mobility; develop plan if impaired  - Assess patient's need for assistive devices and provide as appropriate  - Encourage maximum independence but intervene and supervise when necessary  - Involve family in performance of ADLs  - Assess for home care needs following discharge   - Consider OT consult to assist with ADL evaluation and planning for discharge  - Provide patient education as appropriate  Outcome: Progressing  Goal: Maintains/Returns to pre admission functional level  Description: INTERVENTIONS:  - Perform BMAT or MOVE assessment daily.   - Set and communicate daily mobility goal to care team and patient/family/caregiver. - Collaborate with rehabilitation services on mobility goals if consulted  - Perform Range of Motion times a day. - Reposition patient every  hours.   - Dangle patient  times a day  - Stand patient  times a day  - Ambulate patient  times a day  - Out of bed to chair  times a day   - Out of bed for meals  times a day  - Out of bed for toileting  - Record patient progress and toleration of activity level   Outcome: Progressing     Problem: PAIN - ADULT  Goal: Verbalizes/displays adequate comfort level or baseline comfort level  Description: Interventions:  - Encourage patient to monitor pain and request assistance  - Assess pain using appropriate pain scale  - Administer analgesics based on type and severity of pain and evaluate response  - Implement non-pharmacological measures as appropriate and evaluate response  - Consider cultural and social influences on pain and pain management  - Notify physician/advanced practitioner if interventions unsuccessful or patient reports new pain  Outcome: Progressing     Problem: INFECTION - ADULT  Goal: Absence or prevention of progression during hospitalization  Description: INTERVENTIONS:  - Assess and monitor for signs and symptoms of infection  - Monitor lab/diagnostic results  - Monitor all insertion sites, i.e. indwelling lines, tubes, and drains  - Monitor endotracheal if appropriate and nasal secretions for changes in amount and color  - [place] appropriate cooling/warming therapies per order  - Administer medications as ordered  - Instruct and encourage patient and family to use good hand hygiene technique  - Identify and instruct in appropriate isolation precautions for identified infection/condition  Outcome: Progressing  Goal: Absence of fever/infection during neutropenic period  Description: INTERVENTIONS:  - Monitor WBC    Outcome: Progressing     Problem: SAFETY ADULT  Goal: Maintain or return to baseline ADL function  Description: INTERVENTIONS:  -  Assess patient's ability to carry out ADLs; assess patient's baseline for ADL function and identify physical deficits which impact ability to perform ADLs (bathing, care of mouth/teeth, toileting, grooming, dressing, etc.)  - Assess/evaluate cause of self-care deficits   - Assess range of motion  - Assess patient's mobility; develop plan if impaired  - Assess patient's need for assistive devices and provide as appropriate  - Encourage maximum independence but intervene and supervise when necessary  - Involve family in performance of ADLs  - Assess for home care needs following discharge   - Consider OT consult to assist with ADL evaluation and planning for discharge  - Provide patient education as appropriate  Outcome: Progressing  Goal: Maintains/Returns to pre admission functional level  Description: INTERVENTIONS:  - Perform BMAT or MOVE assessment daily.   - Set and communicate daily mobility goal to care team and patient/family/caregiver. - Collaborate with rehabilitation services on mobility goals if consulted  - Perform Range of Motion  times a day. - Reposition patient every  hours.   - Dangle patient  times a day  - Stand patient  times a day  - Ambulate patient  times a day  - Out of bed to chair  times a day   - Out of bed for meals  times a day  - Out of bed for toileting  - Record patient progress and toleration of activity level   Outcome: Progressing  Goal: Patient will remain free of falls  Description: INTERVENTIONS:  - Educate patient/family on patient safety including physical limitations  - Instruct patient to call for assistance with activity   - Consult OT/PT to assist with strengthening/mobility   - Keep Call bell within reach  - Keep bed low and locked with side rails adjusted as appropriate  - Keep care items and personal belongings within reach  - Initiate and maintain comfort rounds  - Make Fall Risk Sign visible to staff  - Offer Toileting every  Hours, in advance of need  - Initiate/Maintain alarm  - Obtain necessary fall risk management equipment:  - Apply yellow socks and bracelet for high fall risk patients  - Consider moving patient to room near nurses station  Outcome: Progressing     Problem: DISCHARGE PLANNING  Goal: Discharge to home or other facility with appropriate resources  Description: INTERVENTIONS:  - Identify barriers to discharge w/patient and caregiver  - Arrange for needed discharge resources and transportation as appropriate  - Identify discharge learning needs (meds, wound care, etc.)  - Arrange for interpretive services to assist at discharge as needed  - Refer to Case Management Department for coordinating discharge planning if the patient needs post-hospital services based on physician/advanced practitioner order or complex needs related to functional status, cognitive ability, or social support system  Outcome: Progressing Problem: Knowledge Deficit  Goal: Patient/family/caregiver demonstrates understanding of disease process, treatment plan, medications, and discharge instructions  Description: Complete learning assessment and assess knowledge base.   Interventions:  - Provide teaching at level of understanding  - Provide teaching via preferred learning methods  Outcome: Progressing

## 2023-06-28 NOTE — PROGRESS NOTES
Progress Note - Pulmonary   April Hudson Mbow 43 y.o. male MRN: [id number]  Unit/Bed#: -01 Encounter: [id number]    Assessment:  1. Acute hypoxemic respiratory insufficiency  2. Severe sepsis with gram-negative bacteremia  3. Multifocal pneumonia with suspected septic emboli    Plan:  Acute hypoxemic respiratory insufficiency in the setting of severe sepsis with gram-negative bacteremia, multifocal pneumonia with likely septic emboli versus metastases  Patient does not have clinical symptoms of pneumonia, denies any cough or sputum production though findings on CT scan and elevated procalcitonin are suggestive of pneumonia  I did place him on room air this morning and sats remained in the high 90s, titrate to maintain O2 sats greater than 90%  Procalcitonin is trending down, he remains febrile though Tmax is slightly lower at 102.4  Infectious disease is following for antibiotic coverage, blood culture concerning for Klebsiella  There is concern for possible hepatobiliary source of his bacteremia with liver lesion, concern this could be an abscess versus malignancy  MRI of the abdomen has been ordered as well as an IR consult by infectious disease  HIV screen is positive, awaiting confirmatory testing  Patient unable to provide sputum sample, still without cough  Will continue to follow. Subjective:   Patient resting in bed. He is still feeling fatigued and with body aches, still not bringing up any sputum. Objective:     Vitals: Blood pressure 134/79, pulse (!) 127, temperature (!) 101.5 °F (38.6 °C), temperature source Oral, resp. rate 18, height 5' 5" (1.651 m), weight 79.4 kg (175 lb), SpO2 100 %. ,Body mass index is 29.12 kg/m².       Intake/Output Summary (Last 24 hours) at 6/28/2023 1031  Last data filed at 6/28/2023 0731  Gross per 24 hour   Intake 240 ml   Output 1700 ml   Net -1460 ml       Invasive Devices     Peripheral Intravenous Line  Duration           Peripheral IV 06/26/23 Left Antecubital 1 day                Physical Exam: /79 (BP Location: Right arm)   Pulse (!) 127   Temp (!) 101.5 °F (38.6 °C) (Oral)   Resp 18   Ht 5' 5" (1.651 m)   Wt 79.4 kg (175 lb)   SpO2 100%   BMI 29.12 kg/m²   General appearance: alert and ill appearing  Head: Normocephalic, without obvious abnormality, atraumatic  Eyes: negative findings: conjunctivae and sclerae normal  Lungs: diminished breath sounds  Heart: regular rate and rhythm  Abdomen: normal findings: soft, non-tender  Extremities: no edema  Skin: warm and dry  Neurologic: Mental status: Alert, oriented, thought content appropriate     Labs: I have personally reviewed pertinent lab results. , CBC:   Lab Results   Component Value Date    WBC 17.14 (H) 06/28/2023    HGB 10.9 (L) 06/28/2023    HCT 34.0 (L) 06/28/2023    MCV 85 06/28/2023     06/28/2023    RBC 4.01 06/28/2023    MCH 27.2 06/28/2023    MCHC 32.1 06/28/2023    RDW 15.1 06/28/2023    MPV 11.2 06/28/2023    NRBC 0 06/28/2023   , CMP:   Lab Results   Component Value Date    SODIUM 134 (L) 06/28/2023    K 4.1 06/28/2023     06/28/2023    CO2 12 (L) 06/28/2023    BUN 18 06/28/2023    CREATININE 1.00 06/28/2023    CALCIUM 8.2 (L) 06/28/2023    AST 33 06/28/2023    ALT 80 (H) 06/28/2023    ALKPHOS 130 (H) 06/28/2023    EGFR 92 06/28/2023     Imaging and other studies: I have personally reviewed pertinent reports.    and I have personally reviewed pertinent films in PACS

## 2023-06-28 NOTE — PLAN OF CARE
Problem: MOBILITY - ADULT  Goal: Maintain or return to baseline ADL function  Description: INTERVENTIONS:  -  Assess patient's ability to carry out ADLs; assess patient's baseline for ADL function and identify physical deficits which impact ability to perform ADLs (bathing, care of mouth/teeth, toileting, grooming, dressing, etc.)  - Assess/evaluate cause of self-care deficits   - Assess range of motion  - Assess patient's mobility; develop plan if impaired  - Assess patient's need for assistive devices and provide as appropriate  - Encourage maximum independence but intervene and supervise when necessary  - Involve family in performance of ADLs  - Assess for home care needs following discharge   - Consider OT consult to assist with ADL evaluation and planning for discharge  - Provide patient education as appropriate  Outcome: Progressing  Goal: Maintains/Returns to pre admission functional level  Description: INTERVENTIONS:  - Perform BMAT or MOVE assessment daily.   - Set and communicate daily mobility goal to care team and patient/family/caregiver. - Collaborate with rehabilitation services on mobility goals if consulted  - Perform Range of Motion times a day. - Reposition patient every  hours.   - Dangle patient  times a day  - Stand patient  times a day  - Ambulate patient  times a day  - Out of bed to chair  times a day   - Out of bed for meals  times a day  - Out of bed for toileting  - Record patient progress and toleration of activity level   Outcome: Progressing     Problem: PAIN - ADULT  Goal: Verbalizes/displays adequate comfort level or baseline comfort level  Description: Interventions:  - Encourage patient to monitor pain and request assistance  - Assess pain using appropriate pain scale  - Administer analgesics based on type and severity of pain and evaluate response  - Implement non-pharmacological measures as appropriate and evaluate response  - Consider cultural and social influences on pain and pain management  - Notify physician/advanced practitioner if interventions unsuccessful or patient reports new pain  Outcome: Progressing

## 2023-06-28 NOTE — CONSULTS
Consultation - [address]  [facility]  Beatrice Quiñonez 43 y.o. male MRN: [id number]  Unit/Bed#: -01 Encounter: [id number]      Inpatient consult to gastroenterology  Consult performed by: Johan Carranza PA-C  Consult ordered by: Michele Schmitz MD          Reason for Consult / Principal Problem: Liver mass    HPI: Adrian Gaviria is a 43y.o. year old male who presents to the emergency department 2 days ago with a chief complaint of flulike symptoms. Patient reports that he has been having on and off fevers and chills for several days with associated shortness of breath as well as feeling like his heart was racing. Unfortunately in the emergency department patient did meet criteria for severe sepsis. Patient also with Klebsiella pneumonia bacteremia, liver mass, KIRSTEN, septic emboli,, and HIV screen positive. GI consulted on patient today for liver mass. Patient CTA from admission does show multiple bilateral ill-defined lung nodules as well as an indeterminant 5.4 x 2.9 cm liver mass. Right upper quadrant ultrasound from 6/27/2023 shows an indeterminant 5.1 cm right hepatic dome mass as well as trace pericholecystic fluid. MRI is pending. Interventional radiology note reviewed. Upon speaking to patient today he reports that his symptoms seem to be improved since admission. He reports that he is still experiencing fevers and chills. He denies any nausea, vomiting, diarrhea. He reports that he did tolerate some breakfast.    Patient denies any previous history of liver abnormality. Patient denies any significant family history. Patient denies any significant alcohol intake. Patient denies any recent travel. REVIEW OF SYSTEMS:     CONSTITUTIONAL: Denies any fever, chills, or rigors. Good appetite, and no recent weight loss. HEENT: No earache or tinnitus. Denies hearing loss or visual disturbances. CARDIOVASCULAR: No chest pain or palpitations.    RESPIRATORY: Denies any cough, hemoptysis, shortness of breath or dyspnea on exertion. GASTROINTESTINAL: As noted in the History of Present Illness. GENITOURINARY: No problems with urination. Denies any hematuria or dysuria. NEUROLOGIC: No dizziness or vertigo, denies headaches. MUSCULOSKELETAL: Denies any muscle or joint pain. SKIN: Denies skin rashes or itching. ENDOCRINE: Denies excessive thirst. Denies intolerance to heat or cold. PSYCHOSOCIAL: Denies depression or anxiety. Denies any recent memory loss. Historical Information   Past Medical History:   Diagnosis Date   • Asthma      History reviewed. No pertinent surgical history. Social History   Social History     Substance and Sexual Activity   Alcohol Use Not Currently     Social History     Substance and Sexual Activity   Drug Use Never     Social History     Tobacco Use   Smoking Status Never   Smokeless Tobacco Never     History reviewed. No pertinent family history. Meds/Allergies     No medications prior to admission. Current Facility-Administered Medications   Medication Dose Route Frequency   • acetaminophen (TYLENOL) tablet 650 mg  650 mg Oral Q6H PRN   • dextromethorphan-guaiFENesin (ROBITUSSIN DM) oral syrup 10 mL  10 mL Oral Q4H PRN   • enoxaparin (LOVENOX) subcutaneous injection 40 mg  40 mg Subcutaneous Daily   • insulin detemir (LEVEMIR) subcutaneous injection 10 Units  10 Units Subcutaneous HS   • insulin lispro (HumaLOG) 100 units/mL subcutaneous injection 1-5 Units  1-5 Units Subcutaneous TID AC   • insulin lispro (HumaLOG) 100 units/mL subcutaneous injection 1-5 Units  1-5 Units Subcutaneous HS   • meropenem (MERREM) 1,000 mg in sodium chloride 0.9 % 100 mL IVPB  1,000 mg Intravenous Q8H   • sodium chloride 0.9 % infusion  125 mL/hr Intravenous Continuous       No Known Allergies    Objective   Blood pressure 136/80, pulse (!) 126, temperature (!) 100.6 °F (38.1 °C), resp.  rate 18, height 5' 5" (1.651 m), weight 79.4 kg (175 lb), SpO2 99 %.    Intake/Output Summary (Last 24 hours) at 6/28/2023 1153  Last data filed at 6/28/2023 1149  Gross per 24 hour   Intake 240 ml   Output 2400 ml   Net -2160 ml         PHYSICAL EXAM:      General Appearance:   Alert, cooperative, no distress, appears stated age    HEENT:   Normocephalic, atraumatic, anicteric.     Neck:  Supple, symmetrical, trachea midline, no adenopathy;    thyroid: no enlargement/tenderness/nodules; no carotid  bruit or JVD    Lungs:   Clear to auscultation bilaterally; no rales, rhonchi or wheezing; respirations unlabored    Heart[de-identified]   S1 and S2 normal; regular rate and rhythm; no murmur, rub, or gallop. Abdomen:   Soft, non-tender, non-distended; normal bowel sounds; no masses, no organomegaly    Genitalia:   Deferred    Rectal:   Deferred    Extremities:  No cyanosis, clubbing or edema    Pulses:  2+ and symmetric all extremities    Skin:  Skin color, texture, turgor normal, no rashes or lesions    Lymph nodes:  No palpable cervical, axillary or inguinal lymphadenopathy        Lab Results:   No results displayed because visit has over 200 results. Imaging Studies: I have personally reviewed pertinent imaging studies. ASSESSMENT & PLAN:  Sepsis  Klebsiella bacteremia  Liver mass  -GI consulted today on patient due to liver mass. -CTA from 6/26/2023 shows no pulmonary embolism, moderate consolidation in the posterior segment of the right upper lobe of the lung, multiple bilateral ill-defined lung nodules, indeterminate 5.4 x 2.9 cm liver mass. -Right quadrant ultrasound from 6/27/2023 shows an indeterminant 5.1 cm right hepatic dome mass. -MRI pending.  -Continue antibiotics per infectious disease team.  -HIV screening is positive awaiting confirmatory testing.  -Chronic hepatitis.  -Liver enzyme panel reviewed. -IR consult reviewed. -Diet as tolerated. -Continue to monitor vitals.    -Pain control and antiemetics.  -Ambulate as tolerated.     Patient will be seen and examined by Dr. Marisa Bowman. All key medical decision was made by Dr. Marisa Bowman.     Josie Poe PA-C  6/28/2023,11:53 AM

## 2023-06-28 NOTE — UTILIZATION REVIEW
NOTIFICATION OF INPATIENT ADMISSION   AUTHORIZATION REQUEST   SERVICING FACILITY:   [facility] Chelsie Garcia[address]  Tax ID: [id number]  NPI: [id number] ATTENDING PROVIDER:  Attending Name and NPI#: Breana Nunez Md [[id number]]  Address: Aimee Ly, [address] [place] [address]  Phone: [phone]     ADMISSION INFORMATION:  Place of Service: Replaced by [facility]  Place of Service Code: 21  Inpatient Admission Date/Time: 6/26/23  7:52 PM  Discharge Date/Time: No discharge date for patient encounter. Admitting Diagnosis Code/Description:  SOB (shortness of breath) [R06.02]  Asthma [J45.909]  Community acquired pneumonia [J18.9]  Elevated troponin [R77.8]  Septic embolism ([address]) [I76]  Sepsis ([address]) [A41.9]  Severe sepsis ([facility]) [A41.9, R65.20]     UTILIZATION REVIEW CONTACT:  Chris Rivera Utilization   Network Utilization Review Department  Phone: [phone]  Fax [phone]  Email: [email or web address]  Contact for approvals/pending authorizations, clinical reviews, and discharge. PHYSICIAN ADVISORY SERVICES:  Medical Necessity Denial & [id number]-Nefo Review  Phone: [phone]  Fax: [phone]  Email: [email or web address]

## 2023-06-28 NOTE — PROGRESS NOTES
Progress Note - Infectious Disease   Ruby Median Mbow 43 y.o. male MRN: [id number]  Unit/Bed#: -01 Encounter: [id number]      Impression/Plan:  1. Severe sepsis. Present on admission. Leukocytosis, tachycardia, and fever soon after admission. Appears to be secondary to Klebsiella pneumonia bacteremia. Possibly secondary to pneumonia, however the radiographic findings are a bit atypical for an acute bacterial pneumonia, and concerned about the possibility of a hepatobiliary source with his elevated LFTs and chest pressure. He appears to be quite ill although hemodynamically stable. -Continue meropenem  -Follow-up cultures and sensitivities and adjust the antibiotics needed  -Source control measures as below  -Recheck CBC with differential and CMP  -Supportive care     2. Klebsiella pneumonia bacteremia. Possibly a pulmonary source based upon the radiographic findings and hypoxemia. Consideration for the possibility of a hepatobiliary source in this patient with liver mass versus abscess, elevated liver function test and epigastric/chest pressure. Concern for septic emboli based upon CT scan imaging but transthoracic echocardiogram without valvular vegetation appreciated  -Antibiotics as above  -Follow-up repeat blood cultures  -Follow-up sensitivities and adjust antibiotics needed  -Additional work-up as needed     3. Liver mass. Unclear etiology. Concerned about the possibility of liver abscess. Consideration for the possibly of hepatic mass.  -Consult interventional radiology for aspiration/biopsy and possible drain placement if purulence found  -Check hepatitis panel  -Additional work-up as needed     4. Hyperglycemia. No known history of diabetes mellitus. Check hemoglobin A1c.     5. Acute kidney injury. Suspect this is a prerenal issue. Unclear baseline. Function now substantially improved  -Recheck BMP  -Volume management    6. HIV screen positive. With P24 antigen negative. Could represent chronic HIV infection versus false positive.  -Follow-up multispot  -We will send HIV RNA    Discussed the above management plan with the primary service    Antibiotics:  Meropenem 2  Antibiotics 3    Subjective:  Patient still having fever; no nausea, vomiting, diarrhea; still with scant dry cough, still requiring oxygen by nasal cannula; no pain. No new symptoms. Objective:  Vitals:  Temp:  [98.3 °F (36.8 °C)-102.4 °F (39.1 °C)] 101.5 °F (38.6 °C)  HR:  [114-137] 127  Resp:  [18-20] 18  BP: (121-134)/(67-79) 134/79  SpO2:  [95 %-100 %] 100 %  Temp (24hrs), Av.2 °F (37.9 °C), Min:98.3 °F (36.8 °C), Max:102.4 °F (39.1 °C)  Current: Temperature: (!) 101.5 °F (38.6 °C)    Physical Exam:   General Appearance:  Alert, interactive, nontoxic, no acute distress. Throat: Oropharynx moist without lesions. Lungs:   Clear to auscultation bilaterally; no wheezes, rhonchi or rales; respirations unlabored   Heart:  RRR; no murmur, rub or gallop   Abdomen:   Soft, non-tender, non-distended, positive bowel sounds. Extremities: No clubbing, cyanosis or edema   Skin: No new rashes or lesions. No draining wounds noted.        Labs, Imaging, & Other studies:   All pertinent labs and imaging studies were personally reviewed  Results from last 7 days   Lab Units 23  0432 23  11423  1539   WBC Thousand/uL 17.14* 15.03*  --  11.15*   HEMOGLOBIN g/dL 10.9* 11.9*  --  13.0   PLATELETS Thousands/uL 174 138* 122* 152     Results from last 7 days   Lab Units 23  0432 23  1141 23  1539   SODIUM mmol/L 134* 131* 130*   POTASSIUM mmol/L 4.1 4.6 4.3   CHLORIDE mmol/L 108 105 95*   CO2 mmol/L 12* 11* 18*   BUN mg/dL 18 20 24   CREATININE mg/dL 1.00 1.16 1.33*   EGFR ml/min/1.73sq m 92 77 65   CALCIUM mg/dL 8.2* 8.3* 9.9   AST U/L 33 37 82*   ALT U/L 80* 110* 191*   ALK PHOS U/L 130* 129* 141*     Results from last 7 days   Lab Units 23  0000 06/26/23  1539   BLOOD CULTURE   --  Klebsiella-Enterobacter  group* Klebsiella-Enterobacter  group*   GRAM STAIN RESULT   --  Gram negative rods* Gram negative rods*   LEGIONELLA URINARY ANTIGEN  Negative  --   --      Results from last 7 days   Lab Units 06/28/23  0432 06/27/23  0445 06/26/23  1539   PROCALCITONIN ng/ml 17.74* 24.42* 14.64*             Results from last 7 days   Lab Units 06/26/23  1539   D-DIMER QUANTITATIVE ug/ml FEU 3.07*     Right upper quadrant ultrasound.   Right hepatic mass    Images personally reviewed by me in PACS

## 2023-06-28 NOTE — SPEECH THERAPY NOTE
Speech Language/Pathology     Speech/Language Pathology Progress Note     Patient Name: Tsering Christianson    FGIOC'F Date: 6/28/2023     Problem List  Principal Problem:    Severe sepsis [facility])  Active Problems:    Liver mass    Septic embolism (HCC)    Elevated troponin    Hyperglycemia    Acute renal failure (ARF) (HCC)    Hyponatremia       Subjective:  Pt received awake, upright in bed. Objective:  Pt appears slightly more alert today and ordered chicken broth and coffee for breakfast. Pt agreed to trials in which he had homemade soup from his wife. Soup consisted of softened potatoes, carrots, lamb, and broth. Pt mastication of lamb appeared prolonged. Mild amount of oral residue in oral cavity in which pt was able to clear with multiple swallows and a liquid wash. Pt appeared to benefit from alternating between small amounts of solids and liquids. With mild cueing, pt took small, single sips of thins. Occasional delayed cough observed following all consistencies, not suspected to be related to aspiration. No overt s/s of aspiration or distress otherwise. Pt reports he will request for his wife to pre-cut meat into small pieces with meals. Assessment:  Pt's mental status and impulsivity during bolus trials appears to be improving overall.       Plan:  Continue to follow up 1-3x  Continue to provide cues and follow aspiration precautions  Feeding assistance as needed

## 2023-06-28 NOTE — CONSULTS
[facility] Electronic Health Record Consult  IR has been consulted to evaluate the patient, determine the appropriate procedure, and whether or not a procedure can and should be performed regarding the care of Beatrice Quiñonez. We were consulted by hospitalist group concerning Annette Quiñonez, and to possibly perform a percutaneous liver biopsy/drainage if medically appropriate for the patient. The patient is aware that a specialty consultation is taking place, and agrees to the IR Consult on their behalf. Assessment/Plan:   44 yo male that presented with sepsis with multiple lung lesions which are concerning for either septic emboli vs metastases. Liver lesion which could be malignancy vs abscess. HIV screen is positive, confirmatory test is pending. MRI is pending. Hopefully will be done today. Diagnostic sampling is desired. Patient is not NPO but will be made NPO after midnight. Coagulation labs are appropriate for procedure. Anticipate procedure Biospy vs Abscess drainage for tomorrow. Will coordinate with the IR team    I spent 25 minutes in medical consultative time evaluating the medical record and imaging of Annette Quiñonez. Thank you for allowing [facility] to participate in the care of Annette Quiñonez. Please don't hesitate to call or TigerText us with any questions.      Jerrod Gant, DO

## 2023-06-28 NOTE — PROGRESS NOTES
[address]  Progress Note  Name: Tyrese Marques I  MRN: [id number]  Unit/Bed#: -01 I Date of Admission: 6/26/2023   Date of Service: 6/28/2023 I Hospital Day: 2    Assessment/Plan   Acute renal failure (ARF) ([address])  Assessment & Plan  · Follow-up  · IV fluids and monitor  · Likely pre renal in setting on sepsis      Hyperglycemia  Assessment & Plan  · A1c 13-increase Levemir to 10 units at bedtime, insulin therapy has been started here  · Cont SSI  · Known history of diabetes  · Aggressive hydration with sliding scale coverage for now          Severe sepsis  · Etiology could be multifactorial PNA vs hepatobiliary. IR consulted for possible liver abscess needing drain vs mass Bx  · Discussed with ID, escalated antibiotics to meropenem in case the klebsiella is ESBL  · MRI w contrast pending  · Continue IV fluids, ID checking HIV status- HIV 1/2 (+)/ P24 Ag negative, awaiting multispot test  · ECHO results reviewed, no atrial clot  · Wbc uptrended, procalcitonin down trended    Septic embolism possible: Echo results reviewed    Liver mass: MRI with contrast pending. IR consulted for possibility of liver abscess which may need drainage          VTE Pharmacologic Prophylaxis: VTE Score: 5 High Risk (Score >/= 5) - Pharmacological DVT Prophylaxis Ordered: enoxaparin (Lovenox). Sequential Compression Devices Ordered. Patient Centered Rounds: I performed bedside rounds with nursing staff today. Discussions with Specialists or Other Care Team Provider: Yes, IR, infectious disease    Education and Discussions with Family / Patient: Updated  (wife) at bedside.     Total Time Spent on Date of Encounter in care of patient: 35 minutes This time was spent on one or more of the following: performing physical exam; counseling and coordination of care; obtaining or reviewing history; documenting in the medical record; reviewing/ordering tests, medications or procedures; communicating with other healthcare professionals and discussing with patient's family/caregivers. Current Length of Stay: 2 day(s)  Current Patient Status: Inpatient   Certification Statement: The patient will continue to require additional inpatient hospital stay due to Need for further culture results, IR and overall improvement in sepsis  Discharge Plan: Anticipate discharge in 48-72 hrs to home. Code Status: Level 1 - Full Code    Subjective:   Seen and examined at bedside  Continues to feel very tired and fatigued but better than yesterday  Denies any abdominal pain or nausea vomiting diarrhea  Shortness of breath is better  No cough-continues to spike fevers    Objective:     Vitals:   Temp (24hrs), Av.2 °F (37.9 °C), Min:98.3 °F (36.8 °C), Max:102.4 °F (39.1 °C)    Temp:  [98.3 °F (36.8 °C)-102.4 °F (39.1 °C)] 100.6 °F (38.1 °C)  HR:  [114-137] 126  Resp:  [18-20] 18  BP: (121-136)/(67-80) 136/80  SpO2:  [95 %-100 %] 99 %  Body mass index is 29.12 kg/m². Input and Output Summary (last 24 hours):      Intake/Output Summary (Last 24 hours) at 2023 1437  Last data filed at 2023 1149  Gross per 24 hour   Intake 240 ml   Output 2400 ml   Net -2160 ml       Physical Exam:   Physical Exam General- Awake, alert and oriented x 3, looks comfortable  HEENT- Normocephalic, atraumatic, oral mucosa-slightly dry   neck- Supple, No carotid bruit, no JVD  CVS-tachycardic, no murmur, No edema  Respiratory system- B/L clear breath sounds, no wheezing  Abdomen- Soft, Non distended, no tenderness, Bowel sound- present 4 quads  Genitourinary- No suprapubic tenderness, No CVA tenderness  Skin- No new bruise or rash  Musculoskeletal- No gross deformity  Psych- No acute psychosis  CNS- CN II- XII grossly intact, No acute focal neurologic deficit noted      Additional Data:     Labs:  Results from last 7 days   Lab Units 23  0432 23  2128 23  1539   WBC Thousand/uL 17.14*   < > 11.15*   HEMOGLOBIN g/dL 10.9*   < > 13.0   HEMATOCRIT % 34.0*   < > 39.5   PLATELETS Thousands/uL 174   < > 152   BANDS PCT %  --   --  8   NEUTROS PCT % 71  --   --    LYMPHS PCT % 8*  --   --    LYMPHO PCT %  --   --  17   MONOS PCT % 14*  --   --    MONO PCT %  --   --  4   EOS PCT % 0  --  0    < > = values in this interval not displayed. Results from last 7 days   Lab Units 23  0432   SODIUM mmol/L 134*   POTASSIUM mmol/L 4.1   CHLORIDE mmol/L 108   CO2 mmol/L 12*   BUN mg/dL 18   CREATININE mg/dL 1.00   ANION GAP mmol/L 14   CALCIUM mg/dL 8.2*   ALBUMIN g/dL 3.0*   TOTAL BILIRUBIN mg/dL 1.90*   ALK PHOS U/L 130*   ALT U/L 80*   AST U/L 33   GLUCOSE RANDOM mg/dL 214*     Results from last 7 days   Lab Units 23  1539   INR  1.25*     Results from last 7 days   Lab Units 23  1043 23  0746 23  2102 23  1618 23  1055 23  0741 23  2127   POC GLUCOSE mg/dl 224* 231* 217* 245* 278* 356* 302*     Results from last 7 days   Lab Units 23   HEMOGLOBIN A1C % 13.3*     Results from last 7 days   Lab Units 23  0432 23  0445 23  2128 23  1936 23  1539   LACTIC ACID mmol/L  --   --  1.4 2.6* 2.7*   PROCALCITONIN ng/ml 17.74* 24.42*  --   --  14.64*       Lines/Drains:  Invasive Devices     Peripheral Intravenous Line  Duration           Peripheral IV 23 Left Antecubital 1 day                  Telemetry:  Telemetry Orders (From admission, onward)             24 Hour Telemetry Monitoring  Continuous x 24 Hours (Telem)           Question:  Reason for 24 Hour Telemetry  Answer:  Arrhythmias requiring acute medical intervention / PPM or ICD malfunction                 Telemetry Reviewed: Sinus Tachycardia  Indication for Continued Telemetry Use: No indication for continued use. Will discontinue. Imaging: No pertinent imaging reviewed.     Recent Cultures (last 7 days):   Results from last 7 days   Lab Units 23  0081 06/26/23  2019 06/26/23  1551 06/26/23  1539   BLOOD CULTURE  Received in Microbiology Lab. Culture in Progress. Received in Microbiology Lab. Culture in Progress. --  Klebsiella pneumoniae* Klebsiella pneumoniae*   GRAM STAIN RESULT   --   --  Gram negative rods* Gram negative rods*   LEGIONELLA URINARY ANTIGEN   --  Negative  --   --        Last 24 Hours Medication List:   Current Facility-Administered Medications   Medication Dose Route Frequency Provider Last Rate   • acetaminophen  650 mg Oral Q6H PRN Philip Lay MD     • dextromethorphan-guaiFENesin  10 mL Oral Q4H PRN Philip Lay MD     • enoxaparin  40 mg Subcutaneous Daily Philip Lay MD     • insulin detemir  10 Units Subcutaneous HS Dhaval Bray MD     • insulin lispro  1-5 Units Subcutaneous TID AC Philip Lay MD     • insulin lispro  1-5 Units Subcutaneous HS Philip Lay MD     • meropenem  1,000 mg Intravenous Q8H Emanuel Douglas MD 1,000 mg (06/28/23 2887)   • sodium chloride  125 mL/hr Intravenous Continuous Philip Lay  mL/hr (06/28/23 5294)        Today, Patient Was Seen By: Dhaval Bray MD    **Please Note: This note may have been constructed using a voice recognition system. **

## 2023-06-28 NOTE — ASSESSMENT & PLAN NOTE
· A1c 13-increase Levemir to 10 units at bedtime, insulin therapy has been started here  · Cont SSI  · Known history of diabetes  · Aggressive hydration with sliding scale coverage for now

## 2023-06-29 ENCOUNTER — APPOINTMENT (INPATIENT)
Dept: CT IMAGING | Facility: HOSPITAL | Age: 43
DRG: 720 | End: 2023-06-29
Attending: RADIOLOGY
Payer: COMMERCIAL

## 2023-06-29 PROBLEM — K75.0 LIVER ABSCESS: Status: ACTIVE | Noted: 2023-06-26

## 2023-06-29 LAB
ANION GAP SERPL CALCULATED.3IONS-SCNC: 11 MMOL/L
BACTERIA BLD CULT: ABNORMAL
BACTERIA BLD CULT: ABNORMAL
BASOPHILS # BLD AUTO: 0.11 THOUSANDS/ÂΜL (ref 0–0.1)
BASOPHILS NFR BLD AUTO: 1 % (ref 0–1)
BUN SERPL-MCNC: 16 MG/DL (ref 5–25)
CALCIUM SERPL-MCNC: 8.1 MG/DL (ref 8.4–10.2)
CHLORIDE SERPL-SCNC: 108 MMOL/L (ref 96–108)
CO2 SERPL-SCNC: 15 MMOL/L (ref 21–32)
CREAT SERPL-MCNC: 0.89 MG/DL (ref 0.6–1.3)
EOSINOPHIL # BLD AUTO: 0.02 THOUSAND/ÂΜL (ref 0–0.61)
EOSINOPHIL NFR BLD AUTO: 0 % (ref 0–6)
ERYTHROCYTE [DISTWIDTH] IN BLOOD BY AUTOMATED COUNT: 15.2 % (ref 11.6–15.1)
GFR SERPL CREATININE-BSD FRML MDRD: 105 ML/MIN/1.73SQ M
GLUCOSE SERPL-MCNC: 192 MG/DL (ref 65–140)
GLUCOSE SERPL-MCNC: 212 MG/DL (ref 65–140)
GLUCOSE SERPL-MCNC: 237 MG/DL (ref 65–140)
GLUCOSE SERPL-MCNC: 242 MG/DL (ref 65–140)
GRAM STN SPEC: ABNORMAL
GRAM STN SPEC: ABNORMAL
HCT VFR BLD AUTO: 32.8 % (ref 36.5–49.3)
HGB BLD-MCNC: 10.9 G/DL (ref 12–17)
IMM GRANULOCYTES # BLD AUTO: >0.5 THOUSAND/UL (ref 0–0.2)
IMM GRANULOCYTES NFR BLD AUTO: 8 % (ref 0–2)
KLEBSIELLA SP DNA BLD POS QL NAA+NON-PRB: DETECTED
LYMPHOCYTES # BLD AUTO: 1.87 THOUSANDS/ÂΜL (ref 0.6–4.47)
LYMPHOCYTES NFR BLD AUTO: 11 % (ref 14–44)
MCH RBC QN AUTO: 27.5 PG (ref 26.8–34.3)
MCHC RBC AUTO-ENTMCNC: 33.2 G/DL (ref 31.4–37.4)
MCV RBC AUTO: 83 FL (ref 82–98)
MONOCYTES # BLD AUTO: 2.61 THOUSAND/ÂΜL (ref 0.17–1.22)
MONOCYTES NFR BLD AUTO: 15 % (ref 4–12)
NEUTROPHILS # BLD AUTO: 11.84 THOUSANDS/ÂΜL (ref 1.85–7.62)
NEUTS SEG NFR BLD AUTO: 65 % (ref 43–75)
NRBC BLD AUTO-RTO: 0 /100 WBCS
PLATELET # BLD AUTO: 197 THOUSANDS/UL (ref 149–390)
PMV BLD AUTO: 11 FL (ref 8.9–12.7)
POTASSIUM SERPL-SCNC: 4 MMOL/L (ref 3.5–5.3)
RBC # BLD AUTO: 3.96 MILLION/UL (ref 3.88–5.62)
SODIUM SERPL-SCNC: 134 MMOL/L (ref 135–147)
WBC # BLD AUTO: 17.86 THOUSAND/UL (ref 4.31–10.16)

## 2023-06-29 PROCEDURE — 87186 SC STD MICRODIL/AGAR DIL: CPT | Performed by: FAMILY MEDICINE

## 2023-06-29 PROCEDURE — 99232 SBSQ HOSP IP/OBS MODERATE 35: CPT | Performed by: INTERNAL MEDICINE

## 2023-06-29 PROCEDURE — 80048 BASIC METABOLIC PNL TOTAL CA: CPT | Performed by: FAMILY MEDICINE

## 2023-06-29 PROCEDURE — 88305 TISSUE EXAM BY PATHOLOGIST: CPT | Performed by: PATHOLOGY

## 2023-06-29 PROCEDURE — 10030 IMG GID FLU COLL DRG SFT TIS: CPT

## 2023-06-29 PROCEDURE — 87077 CULTURE AEROBIC IDENTIFY: CPT | Performed by: FAMILY MEDICINE

## 2023-06-29 PROCEDURE — C1769 GUIDE WIRE: HCPCS

## 2023-06-29 PROCEDURE — 99233 SBSQ HOSP IP/OBS HIGH 50: CPT | Performed by: INTERNAL MEDICINE

## 2023-06-29 PROCEDURE — 0F903ZX DRAINAGE OF LIVER, PERCUTANEOUS APPROACH, DIAGNOSTIC: ICD-10-PCS | Performed by: INTERNAL MEDICINE

## 2023-06-29 PROCEDURE — 99152 MOD SED SAME PHYS/QHP 5/>YRS: CPT

## 2023-06-29 PROCEDURE — 99152 MOD SED SAME PHYS/QHP 5/>YRS: CPT | Performed by: RADIOLOGY

## 2023-06-29 PROCEDURE — 87070 CULTURE OTHR SPECIMN AEROBIC: CPT | Performed by: FAMILY MEDICINE

## 2023-06-29 PROCEDURE — 92526 ORAL FUNCTION THERAPY: CPT

## 2023-06-29 PROCEDURE — 87536 HIV-1 QUANT&REVRSE TRNSCRPJ: CPT | Performed by: INTERNAL MEDICINE

## 2023-06-29 PROCEDURE — 87205 SMEAR GRAM STAIN: CPT | Performed by: FAMILY MEDICINE

## 2023-06-29 PROCEDURE — C1729 CATH, DRAINAGE: HCPCS

## 2023-06-29 PROCEDURE — 85025 COMPLETE CBC W/AUTO DIFF WBC: CPT | Performed by: FAMILY MEDICINE

## 2023-06-29 PROCEDURE — 99233 SBSQ HOSP IP/OBS HIGH 50: CPT | Performed by: FAMILY MEDICINE

## 2023-06-29 PROCEDURE — 88112 CYTOPATH CELL ENHANCE TECH: CPT | Performed by: PATHOLOGY

## 2023-06-29 PROCEDURE — 82948 REAGENT STRIP/BLOOD GLUCOSE: CPT

## 2023-06-29 PROCEDURE — 49405 IMAGE CATH FLUID COLXN VISC: CPT | Performed by: RADIOLOGY

## 2023-06-29 RX ORDER — METRONIDAZOLE 500 MG/1
500 TABLET ORAL EVERY 8 HOURS SCHEDULED
Status: DISCONTINUED | OUTPATIENT
Start: 2023-06-29 | End: 2023-07-03

## 2023-06-29 RX ORDER — CEFAZOLIN SODIUM 2 G/50ML
2000 SOLUTION INTRAVENOUS EVERY 8 HOURS
Status: DISCONTINUED | OUTPATIENT
Start: 2023-06-29 | End: 2023-07-04

## 2023-06-29 RX ORDER — MIDAZOLAM HYDROCHLORIDE 2 MG/2ML
INJECTION, SOLUTION INTRAMUSCULAR; INTRAVENOUS AS NEEDED
Status: COMPLETED | OUTPATIENT
Start: 2023-06-29 | End: 2023-06-29

## 2023-06-29 RX ORDER — OXYCODONE HYDROCHLORIDE 5 MG/1
5 TABLET ORAL EVERY 6 HOURS PRN
Status: DISCONTINUED | OUTPATIENT
Start: 2023-06-29 | End: 2023-07-04 | Stop reason: HOSPADM

## 2023-06-29 RX ORDER — FENTANYL CITRATE 50 UG/ML
INJECTION, SOLUTION INTRAMUSCULAR; INTRAVENOUS AS NEEDED
Status: COMPLETED | OUTPATIENT
Start: 2023-06-29 | End: 2023-06-29

## 2023-06-29 RX ORDER — LIDOCAINE HYDROCHLORIDE 10 MG/ML
INJECTION, SOLUTION EPIDURAL; INFILTRATION; INTRACAUDAL; PERINEURAL AS NEEDED
Status: COMPLETED | OUTPATIENT
Start: 2023-06-29 | End: 2023-06-29

## 2023-06-29 RX ADMIN — OXYCODONE HYDROCHLORIDE 5 MG: 5 TABLET ORAL at 15:09

## 2023-06-29 RX ADMIN — CEFAZOLIN SODIUM 2000 MG: 2 SOLUTION INTRAVENOUS at 12:30

## 2023-06-29 RX ADMIN — INSULIN LISPRO 2 UNITS: 100 INJECTION, SOLUTION INTRAVENOUS; SUBCUTANEOUS at 12:31

## 2023-06-29 RX ADMIN — ACETAMINOPHEN 650 MG: 325 TABLET, FILM COATED ORAL at 07:30

## 2023-06-29 RX ADMIN — METRONIDAZOLE 500 MG: 500 TABLET ORAL at 14:38

## 2023-06-29 RX ADMIN — METRONIDAZOLE 500 MG: 500 TABLET ORAL at 23:22

## 2023-06-29 RX ADMIN — MIDAZOLAM HYDROCHLORIDE 1 MG: 1 INJECTION, SOLUTION INTRAMUSCULAR; INTRAVENOUS at 08:57

## 2023-06-29 RX ADMIN — INSULIN LISPRO 2 UNITS: 100 INJECTION, SOLUTION INTRAVENOUS; SUBCUTANEOUS at 17:22

## 2023-06-29 RX ADMIN — MEROPENEM 1000 MG: 1 INJECTION, POWDER, FOR SOLUTION INTRAVENOUS at 10:35

## 2023-06-29 RX ADMIN — ACETAMINOPHEN 650 MG: 325 TABLET, FILM COATED ORAL at 19:00

## 2023-06-29 RX ADMIN — INSULIN LISPRO 2 UNITS: 100 INJECTION, SOLUTION INTRAVENOUS; SUBCUTANEOUS at 23:22

## 2023-06-29 RX ADMIN — FENTANYL CITRATE 50 MCG: 50 INJECTION, SOLUTION INTRAMUSCULAR; INTRAVENOUS at 08:44

## 2023-06-29 RX ADMIN — MIDAZOLAM HYDROCHLORIDE 1 MG: 1 INJECTION, SOLUTION INTRAMUSCULAR; INTRAVENOUS at 08:44

## 2023-06-29 RX ADMIN — ACETAMINOPHEN 650 MG: 325 TABLET, FILM COATED ORAL at 12:29

## 2023-06-29 RX ADMIN — MEROPENEM 1000 MG: 1 INJECTION, POWDER, FOR SOLUTION INTRAVENOUS at 02:28

## 2023-06-29 RX ADMIN — INSULIN DETEMIR 12 UNITS: 100 INJECTION, SOLUTION SUBCUTANEOUS at 23:23

## 2023-06-29 RX ADMIN — CEFAZOLIN SODIUM 2000 MG: 2 SOLUTION INTRAVENOUS at 19:00

## 2023-06-29 RX ADMIN — SODIUM CHLORIDE 125 ML/HR: 0.9 INJECTION, SOLUTION INTRAVENOUS at 01:50

## 2023-06-29 RX ADMIN — LIDOCAINE HYDROCHLORIDE 10 ML: 10 INJECTION, SOLUTION EPIDURAL; INFILTRATION; INTRACAUDAL; PERINEURAL at 08:56

## 2023-06-29 RX ADMIN — FENTANYL CITRATE 50 MCG: 50 INJECTION, SOLUTION INTRAMUSCULAR; INTRAVENOUS at 08:57

## 2023-06-29 NOTE — PROGRESS NOTES
Progress Note - Infectious Disease   Beatrice Mbow 43 y.o. male MRN: [id number]  Unit/Bed#: -Yaneli Encounter: [id number]      Impression/Plan:  1.  Severe sepsis.  Present on admission.  Leukocytosis, tachycardia, and fever soon after admission.  Appears to be secondary to Klebsiella pneumonia bacteremia.  Possibly secondary to pneumonia, however the radiographic findings are a bit atypical for an acute bacterial pneumonia, and concerned about the possibility of a hepatobiliary source with his elevated LFTs and chest pressure.  He appears to be quite ill although hemodynamically stable. No resistant organisms isolated  -Discontinue meropenem  -Begin cefazolin 2 g IV every 8 hours and Flagyl 500 mg p.o. every 8 hours  -Source control measures as below  -Recheck CBC with differential and CMP  -Supportive care     2.  Klebsiella pneumonia bacteremia. Suspect related to the hepatobiliary infection. Less likely a pulmonary source. Concern for septic emboli based upon CT scan imaging but transthoracic echocardiogram without valvular vegetation appreciated  -Antibiotics as above  -Follow-up repeat blood cultures  -Check MRI MRCP  -Additional work-up as needed     3.  Liver abscess. Status post IR drainage with drain in place on 6/29/2023. Unclear primary etiology.  -Antibiotics as above  -Drain management  -Follow-up abscess cultures and adjust antibiotics needed  -Check MRI MRCP  -GI follow-up  -May need colonoscopy  -Additional work-up as needed     4.  Hyperglycemia.  No known history of diabetes mellitus.  Check hemoglobin A1c.     5. Acute kidney injury.  Suspect this is a prerenal issue.  Unclear baseline. Function now substantially improved  -Recheck BMP  -Volume management     6. HIV screen positive. With P24 antigen negative.   Could represent chronic HIV infection versus false positive.  -Follow-up multispot  -Follow-up HIV RNA  -Check CD4    Discussed the above management plan with the primary service    Antibiotics:  Meropenem 3  Antibiotics 4    Subjective:  Patient still having intermittent fever; no nausea, vomiting, diarrhea; no longer requiring any oxygen support; no pain. No new symptoms. He underwent IR drainage of his liver abscess this morning. Objective:  Vitals:  Temp:  [98.4 °F (36.9 °C)-102.4 °F (39.1 °C)] 98.4 °F (36.9 °C)  HR:  [] 98  Resp:  [17-20] 18  BP: (107-145)/(55-85) 136/84  SpO2:  [95 %-100 %] 98 %  Temp (24hrs), Av.9 °F (37.7 °C), Min:98.4 °F (36.9 °C), Max:102.4 °F (39.1 °C)  Current: Temperature: 98.4 °F (36.9 °C)    Physical Exam:   General Appearance:  Alert, interactive, nontoxic, no acute distress. Throat: Oropharynx moist without lesions. Lungs:   Clear to auscultation bilaterally; no wheezes, rhonchi or rales; respirations unlabored   Heart:  RRR; no murmur, rub or gallop   Abdomen:   Soft, non-tender, non-distended, positive bowel sounds. Right-sided GENARO drain in place. Extremities: No clubbing, cyanosis or edema   Skin: No new rashes or lesions. No draining wounds noted.        Labs, Imaging, & Other studies:   All pertinent labs and imaging studies were personally reviewed  Results from last 7 days   Lab Units 23  04323  1141   WBC Thousand/uL 17.86* 17.14* 15.03*   HEMOGLOBIN g/dL 10.9* 10.9* 11.9*   PLATELETS Thousands/uL 197 174 138*     Results from last 7 days   Lab Units 23  0437 23  0432 23  1141 23  1539   SODIUM mmol/L 134* 134* 131* 130*   POTASSIUM mmol/L 4.0 4.1 4.6 4.3   CHLORIDE mmol/L 108 108 105 95*   CO2 mmol/L 15* 12* 11* 18*   BUN mg/dL 16 18 20 24   CREATININE mg/dL 0.89 1.00 1.16 1.33*   EGFR ml/min/1.73sq m 105 92 77 65   CALCIUM mg/dL 8.1* 8.2* 8.3* 9.9   AST U/L  --  33 37 82*   ALT U/L  --  80* 110* 191*   ALK PHOS U/L  --  130* 129* 141*     Results from last 7 days   Lab Units 23  0448 23  2019 23  1551 23  1539   BLOOD CULTURE  No Growth at 24 hrs.  No Growth at 24 hrs.  --  Klebsiella pneumoniae* Klebsiella pneumoniae*   GRAM STAIN RESULT   --   --  Gram negative rods* Gram negative rods*   LEGIONELLA URINARY ANTIGEN   --  Negative  --   --      Results from last 7 days   Lab Units 06/28/23  0432 06/27/23  0445 06/26/23  1539   PROCALCITONIN ng/ml 17.74* 24.42* 14.64*             Results from last 7 days   Lab Units 06/26/23  1539   D-DIMER QUANTITATIVE ug/ml FEU 3.07*

## 2023-06-29 NOTE — PLAN OF CARE
Problem: MOBILITY - ADULT  Goal: Maintain or return to baseline ADL function  Description: INTERVENTIONS:  -  Assess patient's ability to carry out ADLs; assess patient's baseline for ADL function and identify physical deficits which impact ability to perform ADLs (bathing, care of mouth/teeth, toileting, grooming, dressing, etc.)  - Assess/evaluate cause of self-care deficits   - Assess range of motion  - Assess patient's mobility; develop plan if impaired  - Assess patient's need for assistive devices and provide as appropriate  - Encourage maximum independence but intervene and supervise when necessary  - Involve family in performance of ADLs  - Assess for home care needs following discharge   - Consider OT consult to assist with ADL evaluation and planning for discharge  - Provide patient education as appropriate  Outcome: Progressing  Goal: Maintains/Returns to pre admission functional level  Description: INTERVENTIONS:  - Perform BMAT or MOVE assessment daily.   - Set and communicate daily mobility goal to care team and patient/family/caregiver. - Collaborate with rehabilitation services on mobility goals if consulted  - Perform Range of Motion  times a day. - Reposition patient every  hours.   - Dangle patient  times a day  - Stand patient  times a day  - Ambulate patient  times a day  - Out of bed to chair  times a day   - Out of bed for meals  times a day  - Out of bed for toileting  - Record patient progress and toleration of activity level   Outcome: Progressing     Problem: PAIN - ADULT  Goal: Verbalizes/displays adequate comfort level or baseline comfort level  Description: Interventions:  - Encourage patient to monitor pain and request assistance  - Assess pain using appropriate pain scale  - Administer analgesics based on type and severity of pain and evaluate response  - Implement non-pharmacological measures as appropriate and evaluate response  - Consider cultural and social influences on pain and pain management  - Notify physician/advanced practitioner if interventions unsuccessful or patient reports new pain  Outcome: Progressing     Problem: INFECTION - ADULT  Goal: Absence or prevention of progression during hospitalization  Description: INTERVENTIONS:  - Assess and monitor for signs and symptoms of infection  - Monitor lab/diagnostic results  - Monitor all insertion sites, i.e. indwelling lines, tubes, and drains  - Monitor endotracheal if appropriate and nasal secretions for changes in amount and color  - [place] appropriate cooling/warming therapies per order  - Administer medications as ordered  - Instruct and encourage patient and family to use good hand hygiene technique  - Identify and instruct in appropriate isolation precautions for identified infection/condition  Outcome: Progressing  Goal: Absence of fever/infection during neutropenic period  Description: INTERVENTIONS:  - Monitor WBC    Outcome: Progressing     Problem: SAFETY ADULT  Goal: Maintain or return to baseline ADL function  Description: INTERVENTIONS:  -  Assess patient's ability to carry out ADLs; assess patient's baseline for ADL function and identify physical deficits which impact ability to perform ADLs (bathing, care of mouth/teeth, toileting, grooming, dressing, etc.)  - Assess/evaluate cause of self-care deficits   - Assess range of motion  - Assess patient's mobility; develop plan if impaired  - Assess patient's need for assistive devices and provide as appropriate  - Encourage maximum independence but intervene and supervise when necessary  - Involve family in performance of ADLs  - Assess for home care needs following discharge   - Consider OT consult to assist with ADL evaluation and planning for discharge  - Provide patient education as appropriate  Outcome: Progressing  Goal: Maintains/Returns to pre admission functional level  Description: INTERVENTIONS:  - Perform BMAT or MOVE assessment daily.   - Set and communicate daily mobility goal to care team and patient/family/caregiver. - Collaborate with rehabilitation services on mobility goals if consulted  - Perform Range of Motion  times a day. - Reposition patient every  hours.   - Dangle patient  times a day  - Stand patient  times a day  - Ambulate patient  times a day  - Out of bed to chair times a day   - Out of bed for meals  times a day  - Out of bed for toileting  - Record patient progress and toleration of activity level   Outcome: Progressing  Goal: Patient will remain free of falls  Description: INTERVENTIONS:  - Educate patient/family on patient safety including physical limitations  - Instruct patient to call for assistance with activity   - Consult OT/PT to assist with strengthening/mobility   - Keep Call bell within reach  - Keep bed low and locked with side rails adjusted as appropriate  - Keep care items and personal belongings within reach  - Initiate and maintain comfort rounds  - Make Fall Risk Sign visible to staff  - Offer Toileting every  Hours, in advance of need  - Initiate/Maintain alarm  - Obtain necessary fall risk management equipment:   - Apply yellow socks and bracelet for high fall risk patients  - Consider moving patient to room near nurses station  Outcome: Progressing     Problem: DISCHARGE PLANNING  Goal: Discharge to home or other facility with appropriate resources  Description: INTERVENTIONS:  - Identify barriers to discharge w/patient and caregiver  - Arrange for needed discharge resources and transportation as appropriate  - Identify discharge learning needs (meds, wound care, etc.)  - Arrange for interpretive services to assist at discharge as needed  - Refer to Case Management Department for coordinating discharge planning if the patient needs post-hospital services based on physician/advanced practitioner order or complex needs related to functional status, cognitive ability, or social support system  Outcome: Progressing Problem: Knowledge Deficit  Goal: Patient/family/caregiver demonstrates understanding of disease process, treatment plan, medications, and discharge instructions  Description: Complete learning assessment and assess knowledge base. Interventions:  - Provide teaching at level of understanding  - Provide teaching via preferred learning methods  Outcome: Progressing     Problem: Nutrition/Hydration-ADULT  Goal: Nutrient/Hydration intake appropriate for improving, restoring or maintaining nutritional needs  Description: Monitor and assess patient's nutrition/hydration status for malnutrition. Collaborate with interdisciplinary team and initiate plan and interventions as ordered. Monitor patient's weight and dietary intake as ordered or per policy. Utilize nutrition screening tool and intervene as necessary. Determine patient's food preferences and provide high-protein, high-caloric foods as appropriate.      INTERVENTIONS:  - Monitor oral intake, urinary output, labs, and treatment plans  - Assess nutrition and hydration status and recommend course of action  - Evaluate amount of meals eaten  - Assist patient with eating if necessary   - Allow adequate time for meals  - Recommend/ encourage appropriate diets, oral nutritional supplements, and vitamin/mineral supplements  - Order, calculate, and assess calorie counts as needed  - Recommend, monitor, and adjust tube feedings based on assessed needs  - Assess need for intravenous fluids  - Provide nutrition/hydration education as appropriate  - Include patient/family/caregiver in decisions related to nutrition  Outcome: Progressing

## 2023-06-29 NOTE — PROGRESS NOTES
[address]  Progress Note  Name: Adrian Gaviria I  MRN: [id number]  Unit/Bed#: -01 I Date of Admission: 6/26/2023   Date of Service: 6/29/2023 I Hospital Day: 3    Assessment/Plan   Hyponatremia  Assessment & Plan  · Stable with corrected sodium  · Cont to monitor and treat hyperglycemia    Acute renal failure (ARF) (HCC)  Assessment & Plan  · Resolved      Hyperglycemia  Assessment & Plan  · A1c 13-increase Levemir to 12 units  · Cont SSI  · Known history of diabetes  · Aggressive hydration with sliding scale coverage for now         Liver abscess: MRI with contrast pending  · Abscess drained, culture sent. Drain present. Etiology unknown at this time, HIV test pending, Defer further need for colonoscopy to gastroenterology    Severe Sepsis:  · Likely etiology liver abscess, status post IR drain, follow drainage cultures  · Antibiotics de-escalated to cefazolin and Flagyl as per sensitivities. Repeat blood cultures are negative at 24 hours rosa  · MRI/MRCP w contrast results pending  · Continue IV fluids, ID checking HIV status- HIV 1/2 (+)/ P24 Ag negative, awaiting multispot test  · ECHO results reviewed, no atrial clot  · Stable WBC, afebrile since around 7 AM this morning      Septic embolism: Echo results reviewed. Cont management as per #1    VTE Pharmacologic Prophylaxis: VTE Score: 5 High Risk (Score >/= 5) - Pharmacological DVT Prophylaxis Ordered: enoxaparin (Lovenox). Sequential Compression Devices Ordered. Patient Centered Rounds: I performed bedside rounds with nursing staff today. Discussions with Specialists or Other Care Team Provider: yes ID, GI    Education and Discussions with Family / Patient: Updated  (wife) via phone.     Total Time Spent on Date of Encounter in care of patient: 25 minutes This time was spent on one or more of the following: performing physical exam; counseling and coordination of care; obtaining or reviewing history; documenting in the medical record; reviewing/ordering tests, medications or procedures; communicating with other healthcare professionals and discussing with patient's family/caregivers. Current Length of Stay: 3 day(s)  Current Patient Status: Inpatient   Certification Statement: The patient will continue to require additional inpatient hospital stay due to need for further investigations reg liver abscess and IV antibiotics  Discharge Plan: Anticipate discharge in 48-72 hrs to home. Code Status: Level 1 - Full Code    Subjective:   Seen and examined at bedside  No new complaints  S/p IR drain- sleeping comfortably    Objective:     Vitals:   Temp (24hrs), Av.8 °F (37.7 °C), Min:98.4 °F (36.9 °C), Max:102.4 °F (39.1 °C)    Temp:  [98.4 °F (36.9 °C)-102.4 °F (39.1 °C)] 98.4 °F (36.9 °C)  HR:  [] 98  Resp:  [17-20] 18  BP: (107-145)/(55-85) 136/84  SpO2:  [95 %-100 %] 97 %  Body mass index is 29.12 kg/m². Input and Output Summary (last 24 hours):      Intake/Output Summary (Last 24 hours) at 2023 1400  Last data filed at 2023 0931  Gross per 24 hour   Intake 1500 ml   Output 915 ml   Net 585 ml       Physical Exam:   Physical Exam General- Awake, alert and oriented x 3, looks comfortable  HEENT- Normocephalic, atraumatic, oral mucosa- moist  Neck- Supple, No carotid bruit, no JVD  CVS- Normal S1/ S2, Regular rate and rhythm, No murmur, No edema  Respiratory system- B/L clear breath sounds, no wheezing  Abdomen- Soft, Non distended, no tenderness, Bowel sound- present 4 quads, IR drain present  Genitourinary- No suprapubic tenderness, No CVA tenderness  Skin- No new bruise or rash  Musculoskeletal- No gross deformity  Psych- No acute psychosis  CNS- CN II- XII grossly intact, No acute focal neurologic deficit noted      Additional Data:     Labs:  Results from last 7 days   Lab Units 23  0437 23  2128 23  1539   WBC Thousand/uL 17.86*   < > 11.15*   HEMOGLOBIN g/dL 10.9* < > 13.0   HEMATOCRIT % 32.8*   < > 39.5   PLATELETS Thousands/uL 197   < > 152   BANDS PCT %  --   --  8   NEUTROS PCT % 65   < >  --    LYMPHS PCT % 11*   < >  --    LYMPHO PCT %  --   --  17   MONOS PCT % 15*   < >  --    MONO PCT %  --   --  4   EOS PCT % 0   < > 0    < > = values in this interval not displayed.      Results from last 7 days   Lab Units 23  0437 23  043   SODIUM mmol/L 134* 134*   POTASSIUM mmol/L 4.0 4.1   CHLORIDE mmol/L 108 108   CO2 mmol/L 15* 12*   BUN mg/dL 16 18   CREATININE mg/dL 0.89 1.00   ANION GAP mmol/L 11 14   CALCIUM mg/dL 8.1* 8.2*   ALBUMIN g/dL  --  3.0*   TOTAL BILIRUBIN mg/dL  --  1.90*   ALK PHOS U/L  --  130*   ALT U/L  --  80*   AST U/L  --  33   GLUCOSE RANDOM mg/dL 192* 214*     Results from last 7 days   Lab Units 23  1539   INR  1.25*     Results from last 7 days   Lab Units 23  1051 23  0736 23  2050 23  1614 23  1043 23  0746 23  2102 23  1618 23  1055 23  0741 23   POC GLUCOSE mg/dl 237* 212* 203* 218* 224* 231* 217* 245* 278* 356* 302*     Results from last 7 days   Lab Units 23   HEMOGLOBIN A1C % 13.3*     Results from last 7 days   Lab Units 23  0432 23  0445 23  2128 23  1936 23  1539   LACTIC ACID mmol/L  --   --  1.4 2.6* 2.7*   PROCALCITONIN ng/ml 17.74* 24.42*  --   --  14.64*       Lines/Drains:  Invasive Devices     Peripheral Intravenous Line  Duration           Peripheral IV 23 Left Antecubital 2 days          Drain  Duration           Abscess Drain Abdomen <1 day                  Telemetry:  Telemetry Orders (From admission, onward)             24 Hour Telemetry Monitoring  Continuous x 24 Hours (Telem)           Question:  Reason for 24 Hour Telemetry  Answer:  Arrhythmias requiring acute medical intervention / PPM or ICD malfunction                 Telemetry Reviewed: Normal Sinus Rhythm  Indication for Continued Telemetry Use: No indication for continued use. Will discontinue. Imaging: No pertinent imaging reviewed. Recent Cultures (last 7 days):   Results from last 7 days   Lab Units 06/28/23  0448 06/26/23  2019 06/26/23  1551 06/26/23  1539   BLOOD CULTURE  No Growth at 24 hrs. No Growth at 24 hrs.  --  Klebsiella pneumoniae* Klebsiella pneumoniae*   GRAM STAIN RESULT   --   --  Gram negative rods* Gram negative rods*   LEGIONELLA URINARY ANTIGEN   --  Negative  --   --        Last 24 Hours Medication List:   Current Facility-Administered Medications   Medication Dose Route Frequency Provider Last Rate   • acetaminophen  650 mg Oral Q6H PRN Philip Mike MD     • cefazolin  2,000 mg Intravenous Q8H Nehemiah Reed MD 2,000 mg (06/29/23 1230)   • dextromethorphan-guaiFENesin  10 mL Oral Q4H PRN Philip Mike MD     • enoxaparin  40 mg Subcutaneous Daily Philip Mike MD     • insulin detemir  12 Units Subcutaneous HS Jackie Hampton MD     • insulin lispro  1-5 Units Subcutaneous TID AC Philip Mike MD     • insulin lispro  1-5 Units Subcutaneous HS Philip Mike MD     • metroNIDAZOLE  500 mg Oral Q8H [facility] Nehemiah Reed MD     • sodium chloride  125 mL/hr Intravenous Continuous Philip Mike  mL/hr (06/29/23 0150)        Today, Patient Was Seen By: Jackie Hampton MD    **Please Note: This note may have been constructed using a voice recognition system. **

## 2023-06-29 NOTE — ASSESSMENT & PLAN NOTE
· A1c 13-increase Levemir to 12 units  · Cont SSI  · Known history of diabetes  · Aggressive hydration with sliding scale coverage for now

## 2023-06-29 NOTE — BRIEF OP NOTE (RAD/CATH)
INTERVENTIONAL RADIOLOGY PROCEDURE NOTE    Date: 6/29/2023    Procedure: Liver abscess drain placement  Procedure Summary     Date:  Room / Location:     Anesthesia Start:  Anesthesia Stop:     Procedure:  Diagnosis:     Scheduled Providers:  Responsible Provider:     Anesthesia Type: Not recorded ASA Status: Not recorded          Preoperative diagnosis:   1. Community acquired pneumonia    2. Sepsis ([address])    3. Elevated troponin    4. Septic embolism ([address])    5. Severe sepsis ([address])    6. Liver mass    7. HIV p24 antigen positive ([address])         Postoperative diagnosis: Same. Surgeon: Vicente Lopez MD     Assistant: None. No qualified resident was available. Blood loss: None    Specimens: 10 mL purulent fluid aspirate sent for cultures and cytology. Findings: The segment 7 liver lesion was abscess. 10 Fr drainage catheter placed. Complications: None immediate.     Anesthesia: conscious sedation and local

## 2023-06-29 NOTE — PLAN OF CARE
Plan:  Continue to take small bites, single sips  Use cues and liquid wash if needed  Feeding assistance as needed  No further follow up indicated  Re-consult if changes occur or problems arise

## 2023-06-29 NOTE — PROGRESS NOTES
Progress Note  Deric Quiñonez 43 y.o. male MRN: [id number]  Unit/Bed#: -[facility] Encounter: [id number]    Subjective:  Patient is status post drainage of liver abscess. Patient does currently have a drain in place. Patient  denies any significant complaints. Objective:     Vitals:   Vitals:    06/29/23 1053   BP: 136/84   Pulse: 98   Resp:    Temp: 98.4 °F (36.9 °C)   SpO2: 98%   ,Body mass index is 29.12 kg/m². Intake/Output Summary (Last 24 hours) at 6/29/2023 1152  Last data filed at 6/29/2023 0931  Gross per 24 hour   Intake 1500 ml   Output 915 ml   Net 585 ml       Physical Exam:     General Appearance: Alert, appears stated age and cooperative  Lungs: Clear to auscultation bilaterally, no rales or rhonchi, no labored breathing/accessory muscle use  Heart: Regular rate and rhythm, S1, S2 normal, no murmur, click, rub or gallop  Abdomen: Soft, non-tender, non-distended; bowel sounds normal; no masses or no organomegaly  Extremities: No cyanosis, edema    Invasive Devices     Peripheral Intravenous Line  Duration           Peripheral IV 06/26/23 Left Antecubital 2 days          Drain  Duration           Abscess Drain Abdomen <1 day                Lab Results:  No results displayed because visit has over 200 results. Imaging Studies:   I have personally reviewed pertinent imaging studies. IR biopsy liver mass    Result Date: 6/29/2023  Narrative: PROCEDURE: Liver abscess drainage catheter placement Procedural Personnel Attending physician(s): Dr. Neptali Marte Pre-procedure diagnosis: Liver abscess Post-procedure diagnosis: Same Indication: Segment 7 liver mass concerning for abscess. PROCEDURE SUMMARY: - Liver abscess drainage catheter placement under CT guidance PROCEDURE DETAILS: Pre-procedure Consent: Informed consent for the procedure including risks, benefits and alternatives was obtained and time-out was performed prior to the procedure.  Preparation: The site was prepared and draped using maximal sterile barrier technique including cutaneous antisepsis. Anesthesia/sedation Level of anesthesia/sedation: Moderate sedation (conscious sedation) Anesthesia/sedation administered by: IR nurse under attending supervision with continuous monitoring of the patient's level of consciousness and physiologic status Total intra-service sedation time (minutes): 30 Drainage catheter placement The patient was positioned left lateral decubitus oblique. Initial imaging was performed. Local anesthesia was administered. Under serial CT guidance, a 17-gauge coaxial used to needle was advanced into the segment 7 liver mass. Mass was found to containing purulent fluid upon aspiration. Amplatz wire was advanced through the needle into the abscess cavity. Tract was dilated and a 10 [place] citizen catheter advanced into the liver abscess cavity. 10 mL of purulent fluid was aspirated and sent for cultures and cytology. Final position of the catheter was verified with CT. Catheter was secured to skin using 0 Prolene suture and connected to bulb suction drainage. Radiation Dose CT dose length product (mGy-cm): 806.99 Additional Details Specimens removed: 10 mL purulent fluid aspirate sent for cultures and cytology. Estimated blood loss (mL): None Complications: No immediate complications. Impression: Percutaneous placement of a 10 [place] citizen drainage catheter into segment 7 liver abscess, yielding 10 mL of purulent fluid. Plan: - Drainage catheter to bulb suction drainage. - Flush catheter with 10 mL normal saline daily. - Follow-up for drain check in 2 weeks. Workstation performed: [id number]     US right upper quadrant    Result Date: 6/27/2023  Narrative: RIGHT UPPER QUADRANT ULTRASOUND INDICATION:     elevated LFTs in patient in severe sepsis, need to rule out biliary etiology.  COMPARISON: CTA chest 6/26/2023 TECHNIQUE:   Real-time ultrasound of the right upper quadrant was performed with a curvilinear transducer with both volumetric sweeps and still imaging techniques. FINDINGS: PANCREAS:  Visualized portions of the pancreas are within normal limits. AORTA AND IVC:  Visualized portions are normal for patient age. LIVER: Size: Moderately enlarged. The liver measures 19.4 cm in the midclavicular line. Contour:  Surface contour is smooth. Parenchyma:  Echogenicity and echotexture are within normal limits. As noted on CT dated 6/26/2023, there is a well-circumscribed complex hypoechoic mass identified in the posterior right hepatic dome in segment 7 measuring 5.1 x 3.3 x 4.7 cm with internal vascularity. The lesion does not have the typical appearance of a  hemangioma and a neoplasm must be considered. Further evaluation with contrast-enhanced MRI is advised. Limited imaging of the main portal vein shows it to be patent and hepatopetal. BILIARY: The gallbladder is normal in caliber. No wall thickening noted. Trace pericholecystic fluid. No stones or sludge identified. No sonographic Jaimes sign. No intrahepatic biliary dilatation. CBD measures 4.0 mm. No choledocholithiasis. KIDNEY: Right kidney measures 10.6 x 5.6 x 5.8 cm. Volume 180.4 mL Kidney within normal limits. ASCITES:   None. Impression: 1. Indeterminate 5.1 cm right hepatic dome mass as described above for which further evaluation/characterization is advised with contrast-enhanced MRI. 2. Trace pericholecystic fluid without other findings to suggest cholecystitis or biliary obstruction. The study was marked in [facility] for immediate notification. Workstation performed: [id number]     Echo complete w/ contrast if indicated    Result Date: 6/27/2023  Narrative: •  Left Ventricle: Left ventricular cavity size is normal. Wall thickness is normal. Systolic function is normal (60%).  Wall motion is normal. Diastolic function is normal. •  Right Ventricle: Right ventricular cavity size is normal. Systolic function is normal.     CTA ED chest PE study    Result Date: 6/26/2023  Narrative: CTA - CHEST WITH IV CONTRAST - PULMONARY ANGIOGRAM INDICATION:   Pulmonary embolism (PE) suspected, positive D-dimer Shortness of breath, elevated D-dimer. Per my review of the medical record, patient has a history of asthma presenting with flulike symptoms for 5 days with generalized body aches, chills, and shortness of breath. Fever. COMPARISON: Chest radiograph from today. TECHNIQUE: CT angiogram timed for optimal opacification of the pulmonary arteries. Axial, sagittal, and coronal 2D reformats created from source data. Coronal 3D MIP postprocessing on the acquisition scanner. Radiation dose length product (DLP):  298 mGy-cm . Radiation dose exposure minimized using iterative reconstruction and automated exposure control. IV Contrast:  100 mL of iohexol (OMNIPAQUE) FINDINGS: PULMONARY ARTERIES:  No pulmonary embolus. LUNGS: Moderate consolidation in the posterior central right upper lobe and superior segment right lower lobe. Multiple bilateral ill-defined nodules, 1.1 cm and smaller. One in the posterior basal left lower lobe contains an air bronchogram. AIRWAYS: No significant filling defects. PLEURA:  Unremarkable. HEART/GREAT VESSELS:  Normal for age. MEDIASTINUM AND FRANCISCA:  Unremarkable. CHEST WALL AND LOWER NECK: Unremarkable. UPPER ABDOMEN: 5.4 x 2.9 cm mass in the posterior segment right hepatic lobe. OSSEOUS STRUCTURES: Mild degenerative disease in the spine. Impression: No pulmonary embolus. Moderate consolidation in the posterior segment right upper lobe and superior segment right lower lobe due to pneumonia. Multiple bilateral ill-defined lung nodules, 1.1 cm and and smaller. The patient has an indeterminate 5.4 x 2.9 cm liver mass which will need to be further evaluated by nonemergent MRI. While the lung nodules are likely bronchopneumonia or septic emboli,  metastatic disease cannot be excluded.  I personally discussed this study with Ashanti Gunderson PA-C, on 6/26/2023 7:34 PM. Workstation performed: [id number]     XR chest 1 view portable    Result Date: 6/26/2023  Narrative: CHEST INDICATION:   SOB. COMPARISON:  None EXAM PERFORMED/VIEWS:  XR CHEST PORTABLE FINDINGS: Cardiomediastinal silhouette appears unremarkable. Right upper lung airspace opacity. No pneumothorax or pleural effusion. Osseous structures appear within normal limits for patient age. Impression: Right upper lung infiltrate and/or atelectasis. Workstation performed: [id number]         Assessment & Plan  Sepsis  Klebsiella bacteremia  Liver abscess  -GI consulted today on patient due to liver mass. -CTA from 6/26/2023 shows no pulmonary embolism, moderate consolidation in the posterior segment of the right upper lobe of the lung, multiple bilateral ill-defined lung nodules, indeterminate 5.4 x 2.9 cm liver mass. -Right quadrant ultrasound from 6/27/2023 shows an indeterminant 5.1 cm right hepatic dome mass. -MRI/MRCP pending.  -Continue antibiotics per infectious disease team.  -HIV screening is positive awaiting confirmatory testing.  -Chronic hepatitis panel negative.  -Liver enzyme panel reviewed. -Patient is status post IR drainage. Patient does have a drain left in place. Follow-up cultures. -Diet as tolerated. -Continue to monitor vitals.    -Pain control and antiemetics.  -Ambulate as tolerated. Patient be seen and examined by Dr. Nicholas Bowles.       Teresita Sánchez PA-C  6/29/2023,11:52 AM

## 2023-06-29 NOTE — PROGRESS NOTES
Progress Note - Pulmonary   Beatrice Mbow 43 y.o. male MRN: [id number]  Unit/Bed#: -01 Encounter: [id number]    Assessment:  1.  Acute hypoxemic respiratory insufficiency, improved  2.  Severe sepsis with Klebsiella bacteremia  3.  Multifocal pneumonia with suspected septic emboli    Plan:  Acute hypoxemic respiratory insufficiency in the setting of severe sepsis with Klebsiella bacteremia, multifocal pneumonia with suspected septic emboli, liver abscess  Was hypoxic initially on presentation, has now been on room air since yesterday with sats in the mid to high 90s on room air  Tachypnea likely driven by sepsis and fevers, patient currently breathing more comfortably this morning  Klebsiella bacteremia seems to be hepatobiliary source with purulent drainage from liver abscess yesterday, does have significant abnormal findings on CT scan though minimal respiratory symptoms  Suspect septic emboli related to bacteremia  He would need repeat CT scan in 6 to 8 weeks to assess for resolution  HIV screen is positive, awaiting T-cell and confirmatory testing which is pending  Will follow intermittently, please call with questions. Subjective:   Patient resting in bed. He is feeling better this morning. Objective:     Vitals: Blood pressure 107/65, pulse 100, temperature (!) 102.4 °F (39.1 °C), resp. rate 18, height 5' 5" (1.651 m), weight 79.4 kg (175 lb), SpO2 97 %. ,Body mass index is 29.12 kg/m².       Intake/Output Summary (Last 24 hours) at 6/29/2023 0946  Last data filed at 6/29/2023 0905  Gross per 24 hour   Intake 1500 ml   Output 1615 ml   Net -115 ml       Invasive Devices     Peripheral Intravenous Line  Duration           Peripheral IV 06/26/23 Left Antecubital 2 days          Drain  Duration           Abscess Drain Abdomen <1 day                Physical Exam: /65   Pulse 100   Temp (!) 102.4 °F (39.1 °C)   Resp 18   Ht 5' 5" (1.651 m)   Wt 79.4 kg (175 lb)   SpO2 97%   BMI 29.12 kg/m²   General appearance: alert, ill appearing  Head: Normocephalic, without obvious abnormality, atraumatic  Eyes: negative findings: conjunctivae and sclerae normal  Lungs: clear to auscultation bilaterally  Heart: regular rate and rhythm  Abdomen: normal findings: soft, non-tender  Extremities: no edema  Skin: warm and dry  Neurologic: Mental status: Alert, oriented, thought content appropriate     Labs: I have personally reviewed pertinent lab results. , CBC:   Lab Results   Component Value Date    WBC 17.86 (H) 06/29/2023    HGB 10.9 (L) 06/29/2023    HCT 32.8 (L) 06/29/2023    MCV 83 06/29/2023     06/29/2023    RBC 3.96 06/29/2023    MCH 27.5 06/29/2023    MCHC 33.2 06/29/2023    RDW 15.2 (H) 06/29/2023    MPV 11.0 06/29/2023    NRBC 0 06/29/2023   , CMP:   Lab Results   Component Value Date    SODIUM 134 (L) 06/29/2023    K 4.0 06/29/2023     06/29/2023    CO2 15 (L) 06/29/2023    BUN 16 06/29/2023    CREATININE 0.89 06/29/2023    CALCIUM 8.1 (L) 06/29/2023    EGFR 105 06/29/2023     Imaging and other studies: I have personally reviewed pertinent reports.    and I have personally reviewed pertinent films in PACS

## 2023-06-29 NOTE — PLAN OF CARE
Problem: MOBILITY - ADULT  Goal: Maintain or return to baseline ADL function  Description: INTERVENTIONS:  -  Assess patient's ability to carry out ADLs; assess patient's baseline for ADL function and identify physical deficits which impact ability to perform ADLs (bathing, care of mouth/teeth, toileting, grooming, dressing, etc.)  - Assess/evaluate cause of self-care deficits   - Assess range of motion  - Assess patient's mobility; develop plan if impaired  - Assess patient's need for assistive devices and provide as appropriate  - Encourage maximum independence but intervene and supervise when necessary  - Involve family in performance of ADLs  - Assess for home care needs following discharge   - Consider OT consult to assist with ADL evaluation and planning for discharge  - Provide patient education as appropriate  Outcome: Progressing  Goal: Maintains/Returns to pre admission functional level  Description: INTERVENTIONS:  - Perform BMAT or MOVE assessment daily.   - Set and communicate daily mobility goal to care team and patient/family/caregiver. - Collaborate with rehabilitation services on mobility goals if consulted  - Perform Range of Motion  times a day. - Reposition patient every  hours.   - Dangle patient  times a day  - Stand patient  times a day  - Ambulate patient  times a day  - Out of bed to chair  times a day   - Out of bed for meals  times a day  - Out of bed for toileting  - Record patient progress and toleration of activity level   Outcome: Progressing     Problem: PAIN - ADULT  Goal: Verbalizes/displays adequate comfort level or baseline comfort level  Description: Interventions:  - Encourage patient to monitor pain and request assistance  - Assess pain using appropriate pain scale  - Administer analgesics based on type and severity of pain and evaluate response  - Implement non-pharmacological measures as appropriate and evaluate response  - Consider cultural and social influences on pain and pain management  - Notify physician/advanced practitioner if interventions unsuccessful or patient reports new pain  Outcome: Progressing     Problem: INFECTION - ADULT  Goal: Absence or prevention of progression during hospitalization  Description: INTERVENTIONS:  - Assess and monitor for signs and symptoms of infection  - Monitor lab/diagnostic results  - Monitor all insertion sites, i.e. indwelling lines, tubes, and drains  - Monitor endotracheal if appropriate and nasal secretions for changes in amount and color  - [place] appropriate cooling/warming therapies per order  - Administer medications as ordered  - Instruct and encourage patient and family to use good hand hygiene technique  - Identify and instruct in appropriate isolation precautions for identified infection/condition  Outcome: Progressing  Goal: Absence of fever/infection during neutropenic period  Description: INTERVENTIONS:  - Monitor WBC    Outcome: Progressing     Problem: SAFETY ADULT  Goal: Maintain or return to baseline ADL function  Description: INTERVENTIONS:  -  Assess patient's ability to carry out ADLs; assess patient's baseline for ADL function and identify physical deficits which impact ability to perform ADLs (bathing, care of mouth/teeth, toileting, grooming, dressing, etc.)  - Assess/evaluate cause of self-care deficits   - Assess range of motion  - Assess patient's mobility; develop plan if impaired  - Assess patient's need for assistive devices and provide as appropriate  - Encourage maximum independence but intervene and supervise when necessary  - Involve family in performance of ADLs  - Assess for home care needs following discharge   - Consider OT consult to assist with ADL evaluation and planning for discharge  - Provide patient education as appropriate  Outcome: Progressing  Goal: Maintains/Returns to pre admission functional level  Description: INTERVENTIONS:  - Perform BMAT or MOVE assessment daily.   - Set and communicate daily mobility goal to care team and patient/family/caregiver. - Collaborate with rehabilitation services on mobility goals if consulted  - Perform Range of Motion  times a day. - Reposition patient every  hours.   - Dangle patient  times a day  - Stand patient  times a day  - Ambulate patient times a day  - Out of bed to chair times a day   - Out of bed for meals  times a day  - Out of bed for toileting  - Record patient progress and toleration of activity level   Outcome: Progressing  Goal: Patient will remain free of falls  Description: INTERVENTIONS:  - Educate patient/family on patient safety including physical limitations  - Instruct patient to call for assistance with activity   - Consult OT/PT to assist with strengthening/mobility   - Keep Call bell within reach  - Keep bed low and locked with side rails adjusted as appropriate  - Keep care items and personal belongings within reach  - Initiate and maintain comfort rounds  - Make Fall Risk Sign visible to staff  - Offer Toileting every  Hours, in advance of need  - Initiate/Maintain alarm  - Obtain necessary fall risk management equipment:  - Apply yellow socks and bracelet for high fall risk patients  - Consider moving patient to room near nurses station  Outcome: Progressing     Problem: DISCHARGE PLANNING  Goal: Discharge to home or other facility with appropriate resources  Description: INTERVENTIONS:  - Identify barriers to discharge w/patient and caregiver  - Arrange for needed discharge resources and transportation as appropriate  - Identify discharge learning needs (meds, wound care, etc.)  - Arrange for interpretive services to assist at discharge as needed  - Refer to Case Management Department for coordinating discharge planning if the patient needs post-hospital services based on physician/advanced practitioner order or complex needs related to functional status, cognitive ability, or social support system  Outcome: Progressing Problem: Knowledge Deficit  Goal: Patient/family/caregiver demonstrates understanding of disease process, treatment plan, medications, and discharge instructions  Description: Complete learning assessment and assess knowledge base. Interventions:  - Provide teaching at level of understanding  - Provide teaching via preferred learning methods  Outcome: Progressing     Problem: Nutrition/Hydration-ADULT  Goal: Nutrient/Hydration intake appropriate for improving, restoring or maintaining nutritional needs  Description: Monitor and assess patient's nutrition/hydration status for malnutrition. Collaborate with interdisciplinary team and initiate plan and interventions as ordered. Monitor patient's weight and dietary intake as ordered or per policy. Utilize nutrition screening tool and intervene as necessary. Determine patient's food preferences and provide high-protein, high-caloric foods as appropriate.      INTERVENTIONS:  - Monitor oral intake, urinary output, labs, and treatment plans  - Assess nutrition and hydration status and recommend course of action  - Evaluate amount of meals eaten  - Assist patient with eating if necessary   - Allow adequate time for meals  - Recommend/ encourage appropriate diets, oral nutritional supplements, and vitamin/mineral supplements  - Order, calculate, and assess calorie counts as needed  - Recommend, monitor, and adjust tube feedings based on assessed needs  - Assess need for intravenous fluids  - Provide nutrition/hydration education as appropriate  - Include patient/family/caregiver in decisions related to nutrition  Outcome: Progressing

## 2023-06-29 NOTE — SPEECH THERAPY NOTE
Speech Language/Pathology     Speech/Language Pathology Progress Note     Patient Name: Barbie Phillips    FWKPP'E Date: 6/29/2023     Problem List  Principal Problem:    Severe sepsis [facility])  Active Problems:    Liver mass    Septic embolism (HCC)    Elevated troponin    Hyperglycemia    Acute renal failure (ARF) (HCC)    Hyponatremia          Subjective:  Pt received awake, reclined in bed, eating ice cream. Wife present, feeding pt. Objective:  Pt appeared lethargic today, but was able to follow commands. Pt's mastication of a soft solid appeared prolonged, yet functional. Mild oral residue observed with complete oral clearance using liquid wash. Minimal cues needed today as pt implemented taking small bites and single sips. Occasional cough observed during session, does not appear to be related to aspiration from trials. No overt s/s of aspiration otherwise. Pt's wife informed of safe swallowing strategies and precautions, she confirmed understanding of this (I.e. fully upright when eating, checking for residue). Wife has no other concerns regarding swallow function. Assessment:  Pt's mental status and impulsivity appears to be improving. Pt appears to be managing current oral diet.     Plan:  Continue to take small bites, single sips  Use cues and liquid wash if needed  Feeding assistance as needed  No further follow up indicated  Re-consult if changes occur or problems arise

## 2023-06-29 NOTE — DISCHARGE INSTRUCTIONS
TUBE CARE INSTRUCTIONS    Care after your procedure:    Resume your normal diet. Small sips of flat soda will help with nausea. 1. The properly functioning catheter should be forward flushed once (1x) daily with 10ml of normal saline using clean technique. You will be given a prescription for flushes. To flush the tube, clean both connections with alcohol swab. Twist off the drainage bag/ bulb  tubing and twist the saline syringe into the drainage tube and flush. Remove the syringe and twist the drainage bag / bulb tubing tubing back on.    2. The drainage bag/bulb may be emptied as necessary. Keep a record of the amount of fluid you drain from your tube. This should be done with clean technique as well. 3. A fresh dressing should be applied daily over the tube insertion site. 4. As the tube is secured to the skin with only a suture,try not to pull on your tube. Tub baths are not permitted. Showers are permitted if the patient's skin entry site is prevented from getting wet. Similarly, washcloth "baths" are acceptable. Contact Interventional Radiology at [phone] Vivi PATIENTS: Contact Interventional Radiology at [phone]) Sarah Zuehl PATIENTS: Contact Interventional Radiology at [phone]) if:    1. Leakage or large amounts of liquid around the catheter. 2. Fever of 101 degrees lasting several hours without other obvious cause (such as sore throat, flu, etc). 3. Persistent nausea or vomiting. 4. Diminished drainage, which may be associated with pressure or pain. Or when the     drainage from your tube is less than 10mls for 48 hours. 5. Catheter pulled back or falls out. The following pharmacies carry the flush syringes.        [facility] AND [address] [place]  Phone [phone]            Phone [phone] [facility]                                [phone]  [address] [facility] Javi Dejesus [place]  Phone [phone]            Phone [phone]                      Liana Ruff                                                                                                          [phone]  [facility]  [address] [place] [address].   [address] [facility] [address]                                                                               [address] [facility]  Phone [phone]

## 2023-06-29 NOTE — PLAN OF CARE
Problem: MOBILITY - ADULT  Goal: Maintain or return to baseline ADL function  Description: INTERVENTIONS:  -  Assess patient's ability to carry out ADLs; assess patient's baseline for ADL function and identify physical deficits which impact ability to perform ADLs (bathing, care of mouth/teeth, toileting, grooming, dressing, etc.)  - Assess/evaluate cause of self-care deficits   - Assess range of motion  - Assess patient's mobility; develop plan if impaired  - Assess patient's need for assistive devices and provide as appropriate  - Encourage maximum independence but intervene and supervise when necessary  - Involve family in performance of ADLs  - Assess for home care needs following discharge   - Consider OT consult to assist with ADL evaluation and planning for discharge  - Provide patient education as appropriate  Outcome: Progressing  Goal: Maintains/Returns to pre admission functional level  Description: INTERVENTIONS:  - Perform BMAT or MOVE assessment daily.   - Set and communicate daily mobility goal to care team and patient/family/caregiver. - Collaborate with rehabilitation services on mobility goals if consulted  - Perform Range of Motion  times a day. - Reposition patient every  hours.   - Dangle patient  times a day  - Stand patient  times a day  - Ambulate patient  times a day  - Out of bed to chair  times a day   - Out of bed for meals  times a day  - Out of bed for toileting  - Record patient progress and toleration of activity level   Outcome: Progressing     Problem: PAIN - ADULT  Goal: Verbalizes/displays adequate comfort level or baseline comfort level  Description: Interventions:  - Encourage patient to monitor pain and request assistance  - Assess pain using appropriate pain scale  - Administer analgesics based on type and severity of pain and evaluate response  - Implement non-pharmacological measures as appropriate and evaluate response  - Consider cultural and social influences on pain and pain management  - Notify physician/advanced practitioner if interventions unsuccessful or patient reports new pain  Outcome: Progressing     Problem: INFECTION - ADULT  Goal: Absence or prevention of progression during hospitalization  Description: INTERVENTIONS:  - Assess and monitor for signs and symptoms of infection  - Monitor lab/diagnostic results  - Monitor all insertion sites, i.e. indwelling lines, tubes, and drains  - Monitor endotracheal if appropriate and nasal secretions for changes in amount and color  - [place] appropriate cooling/warming therapies per order  - Administer medications as ordered  - Instruct and encourage patient and family to use good hand hygiene technique  - Identify and instruct in appropriate isolation precautions for identified infection/condition  Outcome: Progressing  Goal: Absence of fever/infection during neutropenic period  Description: INTERVENTIONS:  - Monitor WBC    Outcome: Progressing     Problem: SAFETY ADULT  Goal: Maintain or return to baseline ADL function  Description: INTERVENTIONS:  -  Assess patient's ability to carry out ADLs; assess patient's baseline for ADL function and identify physical deficits which impact ability to perform ADLs (bathing, care of mouth/teeth, toileting, grooming, dressing, etc.)  - Assess/evaluate cause of self-care deficits   - Assess range of motion  - Assess patient's mobility; develop plan if impaired  - Assess patient's need for assistive devices and provide as appropriate  - Encourage maximum independence but intervene and supervise when necessary  - Involve family in performance of ADLs  - Assess for home care needs following discharge   - Consider OT consult to assist with ADL evaluation and planning for discharge  - Provide patient education as appropriate  Outcome: Progressing  Goal: Maintains/Returns to pre admission functional level  Description: INTERVENTIONS:  - Perform BMAT or MOVE assessment daily.   - Set and communicate daily mobility goal to care team and patient/family/caregiver. - Collaborate with rehabilitation services on mobility goals if consulted  - Perform Range of Motion  times a day. - Reposition patient every  hours.   - Dangle patient  times a day  - Stand patient times a day  - Ambulate patient  times a day  - Out of bed to chair  times a day   - Out of bed for meals  times a day  - Out of bed for toileting  - Record patient progress and toleration of activity level   Outcome: Progressing  Goal: Patient will remain free of falls  Description: INTERVENTIONS:  - Educate patient/family on patient safety including physical limitations  - Instruct patient to call for assistance with activity   - Consult OT/PT to assist with strengthening/mobility   - Keep Call bell within reach  - Keep bed low and locked with side rails adjusted as appropriate  - Keep care items and personal belongings within reach  - Initiate and maintain comfort rounds  - Make Fall Risk Sign visible to staff  - Offer Toileting every  Hours, in advance of need  - Initiate/Maintain alarm  - Obtain necessary fall risk management equipment:  - Apply yellow socks and bracelet for high fall risk patients  - Consider moving patient to room near nurses station  Outcome: Progressing     Problem: DISCHARGE PLANNING  Goal: Discharge to home or other facility with appropriate resources  Description: INTERVENTIONS:  - Identify barriers to discharge w/patient and caregiver  - Arrange for needed discharge resources and transportation as appropriate  - Identify discharge learning needs (meds, wound care, etc.)  - Arrange for interpretive services to assist at discharge as needed  - Refer to Case Management Department for coordinating discharge planning if the patient needs post-hospital services based on physician/advanced practitioner order or complex needs related to functional status, cognitive ability, or social support system  Outcome: Progressing Problem: Knowledge Deficit  Goal: Patient/family/caregiver demonstrates understanding of disease process, treatment plan, medications, and discharge instructions  Description: Complete learning assessment and assess knowledge base. Interventions:  - Provide teaching at level of understanding  - Provide teaching via preferred learning methods  Outcome: Progressing     Problem: Nutrition/Hydration-ADULT  Goal: Nutrient/Hydration intake appropriate for improving, restoring or maintaining nutritional needs  Description: Monitor and assess patient's nutrition/hydration status for malnutrition. Collaborate with interdisciplinary team and initiate plan and interventions as ordered. Monitor patient's weight and dietary intake as ordered or per policy. Utilize nutrition screening tool and intervene as necessary. Determine patient's food preferences and provide high-protein, high-caloric foods as appropriate.      INTERVENTIONS:  - Monitor oral intake, urinary output, labs, and treatment plans  - Assess nutrition and hydration status and recommend course of action  - Evaluate amount of meals eaten  - Assist patient with eating if necessary   - Allow adequate time for meals  - Recommend/ encourage appropriate diets, oral nutritional supplements, and vitamin/mineral supplements  - Order, calculate, and assess calorie counts as needed  - Recommend, monitor, and adjust tube feedings based on assessed needs  - Assess need for intravenous fluids  - Provide nutrition/hydration education as appropriate  - Include patient/family/caregiver in decisions related to nutrition  Outcome: Progressing

## 2023-06-30 ENCOUNTER — TELEPHONE (OUTPATIENT)
Dept: HEMATOLOGY ONCOLOGY | Facility: CLINIC | Age: 43
End: 2023-06-30

## 2023-06-30 PROBLEM — I82.0: Status: ACTIVE | Noted: 2023-06-30

## 2023-06-30 PROBLEM — E87.6 HYPOKALEMIA: Status: ACTIVE | Noted: 2023-06-30

## 2023-06-30 LAB
ALBUMIN SERPL BCP-MCNC: 2.5 G/DL (ref 3.5–5)
ALP SERPL-CCNC: 112 U/L (ref 34–104)
ALT SERPL W P-5'-P-CCNC: 48 U/L (ref 7–52)
ANION GAP SERPL CALCULATED.3IONS-SCNC: 8 MMOL/L
APTT PPP: 39 SECONDS (ref 23–37)
APTT PPP: 71 SECONDS (ref 23–37)
AST SERPL W P-5'-P-CCNC: 41 U/L (ref 13–39)
BASOPHILS # BLD AUTO: 0 X10E3/UL (ref 0–0.2)
BASOPHILS # BLD MANUAL: 0.13 THOUSAND/UL (ref 0–0.1)
BASOPHILS NFR BLD AUTO: 0 %
BASOPHILS NFR MAR MANUAL: 1 % (ref 0–1)
BILIRUB SERPL-MCNC: 1.53 MG/DL (ref 0.2–1)
BUN SERPL-MCNC: 17 MG/DL (ref 5–25)
CALCIUM ALBUM COR SERPL-MCNC: 8.9 MG/DL (ref 8.3–10.1)
CALCIUM SERPL-MCNC: 7.7 MG/DL (ref 8.4–10.2)
CD3+CD4+ CELLS # BLD: 761 /UL (ref 359–1519)
CD3+CD4+ CELLS NFR BLD: 33.1 % (ref 30.8–58.5)
CHLORIDE SERPL-SCNC: 110 MMOL/L (ref 96–108)
CO2 SERPL-SCNC: 18 MMOL/L (ref 21–32)
CREAT SERPL-MCNC: 0.81 MG/DL (ref 0.6–1.3)
EOSINOPHIL # BLD AUTO: 0 X10E3/UL (ref 0–0.4)
EOSINOPHIL # BLD MANUAL: 0 THOUSAND/UL (ref 0–0.4)
EOSINOPHIL NFR BLD AUTO: 0 %
EOSINOPHIL NFR BLD MANUAL: 0 % (ref 0–6)
ERYTHROCYTE [DISTWIDTH] IN BLOOD BY AUTOMATED COUNT: 14.3 % (ref 11.6–15.4)
ERYTHROCYTE [DISTWIDTH] IN BLOOD BY AUTOMATED COUNT: 15 % (ref 11.6–15.1)
GFR SERPL CREATININE-BSD FRML MDRD: 109 ML/MIN/1.73SQ M
GLUCOSE SERPL-MCNC: 186 MG/DL (ref 65–140)
GLUCOSE SERPL-MCNC: 194 MG/DL (ref 65–140)
GLUCOSE SERPL-MCNC: 201 MG/DL (ref 65–140)
GLUCOSE SERPL-MCNC: 221 MG/DL (ref 65–140)
GLUCOSE SERPL-MCNC: 258 MG/DL (ref 65–140)
HCT VFR BLD AUTO: 25.9 % (ref 36.5–49.3)
HCT VFR BLD AUTO: 30.5 % (ref 37.5–51)
HGB BLD-MCNC: 10.5 G/DL (ref 13–17.7)
HGB BLD-MCNC: 8.9 G/DL (ref 12–17)
HIV1 RNA # PLAS NAA DL=20: NOT DETECTED {COPIES}/ML
INR PPP: 1.33 (ref 0.84–1.19)
LYMPHOCYTES # BLD AUTO: 1.44 THOUSAND/UL (ref 0.6–4.47)
LYMPHOCYTES # BLD AUTO: 11 % (ref 14–44)
LYMPHOCYTES # BLD AUTO: 2.3 X10E3/UL (ref 0.7–3.1)
LYMPHOCYTES NFR BLD AUTO: 11 %
MCH RBC QN AUTO: 27.7 PG (ref 26.6–33)
MCH RBC QN AUTO: 28.1 PG (ref 26.8–34.3)
MCHC RBC AUTO-ENTMCNC: 34.4 G/DL (ref 31.4–37.4)
MCHC RBC AUTO-ENTMCNC: 34.4 G/DL (ref 31.5–35.7)
MCV RBC AUTO: 81 FL (ref 79–97)
MCV RBC AUTO: 82 FL (ref 82–98)
MONOCYTES # BLD AUTO: 1.7 THOUSAND/UL (ref 0–1.22)
MONOCYTES # BLD AUTO: 3.4 X10E3/UL (ref 0.1–0.9)
MONOCYTES NFR BLD AUTO: 16 %
MONOCYTES NFR BLD: 13 % (ref 4–12)
MORPHOLOGY BLD-IMP: ABNORMAL
NEUTROPHILS # BLD AUTO: 15.5 X10E3/UL (ref 1.4–7)
NEUTROPHILS # BLD MANUAL: 9.8 THOUSAND/UL (ref 1.85–7.62)
NEUTROPHILS NFR BLD AUTO: 73 %
NEUTS BAND NFR BLD MANUAL: 2 % (ref 0–8)
NEUTS SEG NFR BLD AUTO: 73 % (ref 43–75)
PLATELET # BLD AUTO: 196 X10E3/UL (ref 150–450)
PLATELET # BLD AUTO: 224 THOUSANDS/UL (ref 149–390)
PLATELET BLD QL SMEAR: ADEQUATE
PMV BLD AUTO: 10.9 FL (ref 8.9–12.7)
POTASSIUM SERPL-SCNC: 3.3 MMOL/L (ref 3.5–5.3)
PROCALCITONIN SERPL-MCNC: 8.26 NG/ML
PROT SERPL-MCNC: 6.1 G/DL (ref 6.4–8.4)
PROTHROMBIN TIME: 16.3 SECONDS (ref 11.6–14.5)
RBC # BLD AUTO: 3.17 MILLION/UL (ref 3.88–5.62)
RBC # BLD AUTO: 3.79 X10E6/UL (ref 4.14–5.8)
SODIUM SERPL-SCNC: 136 MMOL/L (ref 135–147)
WBC # BLD AUTO: 13.06 THOUSAND/UL (ref 4.31–10.16)
WBC # BLD AUTO: 21.2 X10E3/UL (ref 3.4–10.8)

## 2023-06-30 PROCEDURE — 99232 SBSQ HOSP IP/OBS MODERATE 35: CPT | Performed by: INTERNAL MEDICINE

## 2023-06-30 PROCEDURE — 86146 BETA-2 GLYCOPROTEIN ANTIBODY: CPT | Performed by: PHYSICIAN ASSISTANT

## 2023-06-30 PROCEDURE — 99233 SBSQ HOSP IP/OBS HIGH 50: CPT | Performed by: FAMILY MEDICINE

## 2023-06-30 PROCEDURE — 85007 BL SMEAR W/DIFF WBC COUNT: CPT | Performed by: FAMILY MEDICINE

## 2023-06-30 PROCEDURE — 81241 F5 GENE: CPT | Performed by: PHYSICIAN ASSISTANT

## 2023-06-30 PROCEDURE — 82948 REAGENT STRIP/BLOOD GLUCOSE: CPT

## 2023-06-30 PROCEDURE — 86147 CARDIOLIPIN ANTIBODY EA IG: CPT | Performed by: PHYSICIAN ASSISTANT

## 2023-06-30 PROCEDURE — 85610 PROTHROMBIN TIME: CPT | Performed by: FAMILY MEDICINE

## 2023-06-30 PROCEDURE — 99255 IP/OBS CONSLTJ NEW/EST HI 80: CPT | Performed by: PHYSICIAN ASSISTANT

## 2023-06-30 PROCEDURE — 85730 THROMBOPLASTIN TIME PARTIAL: CPT | Performed by: FAMILY MEDICINE

## 2023-06-30 PROCEDURE — 84145 PROCALCITONIN (PCT): CPT | Performed by: FAMILY MEDICINE

## 2023-06-30 PROCEDURE — 80053 COMPREHEN METABOLIC PANEL: CPT | Performed by: FAMILY MEDICINE

## 2023-06-30 PROCEDURE — 85027 COMPLETE CBC AUTOMATED: CPT | Performed by: FAMILY MEDICINE

## 2023-06-30 PROCEDURE — 81240 F2 GENE: CPT | Performed by: PHYSICIAN ASSISTANT

## 2023-06-30 RX ORDER — HEPARIN SODIUM 10000 [USP'U]/100ML
3-30 INJECTION, SOLUTION INTRAVENOUS
Status: DISCONTINUED | OUTPATIENT
Start: 2023-06-30 | End: 2023-07-03

## 2023-06-30 RX ORDER — IBUPROFEN 400 MG/1
400 TABLET ORAL ONCE
Status: COMPLETED | OUTPATIENT
Start: 2023-06-30 | End: 2023-06-30

## 2023-06-30 RX ORDER — POTASSIUM CHLORIDE 20 MEQ/1
40 TABLET, EXTENDED RELEASE ORAL EVERY 6 HOURS
Status: COMPLETED | OUTPATIENT
Start: 2023-06-30 | End: 2023-06-30

## 2023-06-30 RX ADMIN — POTASSIUM CHLORIDE 40 MEQ: 1500 TABLET, EXTENDED RELEASE ORAL at 13:26

## 2023-06-30 RX ADMIN — IBUPROFEN 400 MG: 400 TABLET ORAL at 16:32

## 2023-06-30 RX ADMIN — ENOXAPARIN SODIUM 40 MG: 40 INJECTION SUBCUTANEOUS at 09:18

## 2023-06-30 RX ADMIN — SODIUM CHLORIDE 75 ML/HR: 0.9 INJECTION, SOLUTION INTRAVENOUS at 09:19

## 2023-06-30 RX ADMIN — METRONIDAZOLE 500 MG: 500 TABLET ORAL at 05:11

## 2023-06-30 RX ADMIN — SODIUM CHLORIDE 75 ML/HR: 0.9 INJECTION, SOLUTION INTRAVENOUS at 19:32

## 2023-06-30 RX ADMIN — OXYCODONE HYDROCHLORIDE 5 MG: 5 TABLET ORAL at 21:30

## 2023-06-30 RX ADMIN — ACETAMINOPHEN 650 MG: 325 TABLET, FILM COATED ORAL at 11:38

## 2023-06-30 RX ADMIN — CEFAZOLIN SODIUM 2000 MG: 2 SOLUTION INTRAVENOUS at 20:30

## 2023-06-30 RX ADMIN — OXYCODONE HYDROCHLORIDE 5 MG: 5 TABLET ORAL at 03:31

## 2023-06-30 RX ADMIN — POTASSIUM CHLORIDE 40 MEQ: 1500 TABLET, EXTENDED RELEASE ORAL at 09:18

## 2023-06-30 RX ADMIN — CEFAZOLIN SODIUM 2000 MG: 2 SOLUTION INTRAVENOUS at 11:34

## 2023-06-30 RX ADMIN — INSULIN DETEMIR 12 UNITS: 100 INJECTION, SOLUTION SUBCUTANEOUS at 21:31

## 2023-06-30 RX ADMIN — INSULIN LISPRO 1 UNITS: 100 INJECTION, SOLUTION INTRAVENOUS; SUBCUTANEOUS at 09:17

## 2023-06-30 RX ADMIN — METRONIDAZOLE 500 MG: 500 TABLET ORAL at 21:30

## 2023-06-30 RX ADMIN — INSULIN LISPRO 2 UNITS: 100 INJECTION, SOLUTION INTRAVENOUS; SUBCUTANEOUS at 21:32

## 2023-06-30 RX ADMIN — CEFAZOLIN SODIUM 2000 MG: 2 SOLUTION INTRAVENOUS at 03:23

## 2023-06-30 RX ADMIN — ACETAMINOPHEN 650 MG: 325 TABLET, FILM COATED ORAL at 03:30

## 2023-06-30 RX ADMIN — HEPARIN SODIUM 18 UNITS/KG/HR: 10000 INJECTION, SOLUTION INTRAVENOUS at 12:57

## 2023-06-30 RX ADMIN — ACETAMINOPHEN 650 MG: 325 TABLET, FILM COATED ORAL at 21:30

## 2023-06-30 RX ADMIN — METRONIDAZOLE 500 MG: 500 TABLET ORAL at 13:26

## 2023-06-30 RX ADMIN — INSULIN LISPRO 2 UNITS: 100 INJECTION, SOLUTION INTRAVENOUS; SUBCUTANEOUS at 16:33

## 2023-06-30 RX ADMIN — INSULIN LISPRO 1 UNITS: 100 INJECTION, SOLUTION INTRAVENOUS; SUBCUTANEOUS at 12:57

## 2023-06-30 NOTE — ASSESSMENT & PLAN NOTE
· A1c 13-increase Levemir to 12 units, 194 this AM  · Cont SSI  · Known history of diabetes  · Aggressive hydration with sliding scale coverage for now

## 2023-06-30 NOTE — TELEPHONE ENCOUNTER
New Patient Intake Form   Patient Details:    Vanessa Crockett  1980    Appointment Information   Who is calling to schedule? Yung Cosby   If not self, what is the caller's name? NA   DID YOU CONFIRM INSURANCE WITH PATIENT? E verified, Routed to finance   Referring provider Rach Ritchie PA-C   What is the diagnosis? 1  Thrombosis, hepatic vein 2  Septic embolism 3  Liver abscess   4  Severe sepsis, klebsiella bacteremia 5  Acute renal failure 6  Multifocal pneumonia 7  Acute hypoxemic respiratory insufficiency 8  HIV screening positive 9  DMII 10  Normocytic anemia      Is there a confirmed tissue diagnosis? NA     Is there a biopsy ordered or pending? Please specify dates  If yes, route to /OCC   Liver abscess bx taken on 6/29     Is patient aware of diagnosis? Yes     Have you had any imaging or labs done? If yes, where? (If imaging done outside of [facility], please remind patient to bring a disk ) Yes-[facility]     If imaging done at outside facility, did you instruct patient to obtain discs and bring to visit? NA   Have you been seen by another Oncologist/Hematologist?  If so, who and where? No   Are the records in [facility] or Care Everywhere? Yes   Does the patient have records at another facility/hospital?    If yes, Name of facility, city and state where facility is located  Yes-[facility]     Did you instruct patient to have records faxed to right[facility] and provide rightfax number? In [address]   Is the patient willing to be seen by another provider? (This is for breast patients only) NA     Did you send new patient paperwork? Email or mail? NA   Miscellaneous Information: The patient is scheduled for a HFU appointment with Rach Ritchie PA-C on 7/27 at 0830 in the [facility] office

## 2023-06-30 NOTE — CONSULTS
Medical Oncology/Hematology Consult Note  Ryan Pardo, male, 43 y. o., 1980,  /-[birth year], [id number]     Reason for admission: Severe sepsis  Reason for consultation: Septic embolism, hepatic vein thrombosis. Imagin2023 MRI abdomen with without contrast and MRCP  IMPRESSION:     4.9 x 2.9 x 3.1 cm lesion in the right lobe of the liver. Imaging characteristics are compatible with hepatic abscess. This could be confirmed with image directed fine-needle aspiration.     Thrombus extending from the abscess to a small accessory right hepatic vein. Although this may well be septic thrombosis, there are no imaging findings to suggest septic thrombus.     Bibasilar pneumonia redemonstrated and better shown on recent CT scan however, there has been definite worsening in the posterior basilar segment of the right lower lobe. 2023 IR biopsy liver mets  Drainage catheter placement  The patient was positioned left lateral decubitus oblique. Initial imaging was performed. Local anesthesia was administered. Under serial CT guidance, a 17-gauge coaxial used to needle was advanced into the segment 7 liver mass. Mass was found to   containing purulent fluid upon aspiration. Amplatz wire was advanced through the needle into the abscess cavity. Tract was dilated and a 10 Ukrainian catheter advanced into the liver abscess cavity. 10 mL of purulent fluid was aspirated and sent for   cultures and cytology. Final position of the catheter was verified with CT. Catheter was secured to skin using 0 Prolene suture and connected to bulb suction drainage.     Radiation Dose  CT dose length product (mGy-cm): 806.99     Additional Details  Specimens removed: 10 mL purulent fluid aspirate sent for cultures and cytology.   Estimated blood loss (mL): None  Complications: No immediate complications.     IMPRESSION:     Percutaneous placement of a 10 Ukrainian drainage catheter into segment 7 liver abscess, yielding 10 mL of purulent fluid.     Plan:     - Drainage catheter to bulb suction drainage.  - Flush catheter with 10 mL normal saline daily.  - Follow-up for drain check in 2 weeks. ASSESSMENT AND PLAN:   1. Thrombosis, hepatic vein   2. Septic embolism  3. Liver abscess  4. Severe sepsis, klebsiella bacteremia  5. Acute renal failure   6. Multifocal pneumonia  7. Acute hypoxemic respiratory insufficiency   8. HIV screening positive   9. DMII  10. Normocytic anemia      Recommend heparin drip for patient's hepatic vein thrombosis with eventual transition to p.o. anticoagulation. Agent to be determined pending patient's ongoing liver function assessment. Likely provoked thrombus formation secondary to liver abscess, drain placement however due to extensive nature do not feel as though hypercoagulable work-up is unreasonable at this point time. We will proceed with initial work-up as able while patient was on heparin, and timing of acute clot. Patient will need follow-up outpatient for ongoing recommendations of total duration of anticoagulation will be at minimum 3 to 6 months. Vascular surgery consultation. CBC differential mostly normal 1 year ago in care everywhere. Leukocytosis likely reactive, hemoglobin likely multifactorial from antibiotic use, dilutional component, sepsis, KIRSTEN. Continue to monitor for active bleeding. Platelet counts are normal and stable. Patient understands and is in agreement with this plan. Thank you for the opportunity to participate in this patient's care. History of present illness: Patient is a 66-year-old male past medical history significant for asthma, DM 2 who presented to emergency department for evaluation of generalized weakness, malaise, chills, shortness of breath with history of asthma, flulike symptoms x5 days Tmax 100.8 per ED note.     Upon conversation with patient, he was having nausea, vomiting, generalized weakness, fevers for about 2 to 3 days prior to admission. He denies personal history or family history of blood clots or clotting disorders. Denies personal or family history of malignancy. Denies other medical conditions aside from asthma, DM 2. Denies surgical procedures aside from liver abscess. He denies recent travel, sick contacts, insect bites, eating raw or undercooked foods. Review of Systems:   Review of Systems   Eyes: Negative for visual disturbance. Respiratory: Positive for chest tightness (POA). Negative for shortness of breath. Gastrointestinal: Negative for anal bleeding and blood in stool. Genitourinary: Negative for hematuria. Neurological: Negative for light-headedness and headaches. All other systems reviewed and are negative. PHYSICAL EXAM:    /83   Pulse (!) 110   Temp (!) 102.5 °F (39.2 °C)   Resp 20   Ht 5' 5" (1.651 m)   Wt 79.4 kg (175 lb)   SpO2 97%   BMI 29.12 kg/m²     Physical Exam  Constitutional:       General: He is not in acute distress. Appearance: He is ill-appearing. HENT:      Head: Normocephalic and atraumatic. Eyes:      General: No scleral icterus. Cardiovascular:      Rate and Rhythm: Regular rhythm. Tachycardia present. Heart sounds: Normal heart sounds. Pulmonary:      Effort: Pulmonary effort is normal.      Breath sounds: No rhonchi. Abdominal:      Palpations: Abdomen is soft. Tenderness: There is no abdominal tenderness. Musculoskeletal:      Right lower leg: No edema. Left lower leg: No edema. Skin:     Findings: No rash. Neurological:      Mental Status: He is alert and oriented to person, place, and time.          LABS:     Recent Results (from the past 48 hour(s))   Fingerstick Glucose (POCT)    Collection Time: 06/28/23  4:14 PM   Result Value Ref Range    POC Glucose 218 (H) 65 - 140 mg/dl   Fingerstick Glucose (POCT)    Collection Time: 06/28/23  8:50 PM   Result Value Ref Range    POC Glucose 203 (H) 65 - 140 mg/dl   CBC and differential Collection Time: 06/29/23  4:37 AM   Result Value Ref Range    WBC 17.86 (H) 4.31 - 10.16 Thousand/uL    RBC 3.96 3.88 - 5.62 Million/uL    Hemoglobin 10.9 (L) 12.0 - 17.0 g/dL    Hematocrit 32.8 (L) 36.5 - 49.3 %    MCV 83 82 - 98 fL    MCH 27.5 26.8 - 34.3 pg    MCHC 33.2 31.4 - 37.4 g/dL    RDW 15.2 (H) 11.6 - 15.1 %    MPV 11.0 8.9 - 12.7 fL    Platelets 641 312 - 925 Thousands/uL    nRBC 0 /100 WBCs    Neutrophils Relative 65 43 - 75 %    Immat GRANS % 8 (H) 0 - 2 %    Lymphocytes Relative 11 (L) 14 - 44 %    Monocytes Relative 15 (H) 4 - 12 %    Eosinophils Relative 0 0 - 6 %    Basophils Relative 1 0 - 1 %    Neutrophils Absolute 11.84 (H) 1.85 - 7.62 Thousands/µL    Immature Grans Absolute >0.50 (H) 0.00 - 0.20 Thousand/uL    Lymphocytes Absolute 1.87 0.60 - 4.47 Thousands/µL    Monocytes Absolute 2.61 (H) 0.17 - 1.22 Thousand/µL    Eosinophils Absolute 0.02 0.00 - 0.61 Thousand/µL    Basophils Absolute 0.11 (H) 0.00 - 0.10 Thousands/µL   Basic metabolic panel    Collection Time: 06/29/23  4:37 AM   Result Value Ref Range    Sodium 134 (L) 135 - 147 mmol/L    Potassium 4.0 3.5 - 5.3 mmol/L    Chloride 108 96 - 108 mmol/L    CO2 15 (L) 21 - 32 mmol/L    ANION GAP 11 mmol/L    BUN 16 5 - 25 mg/dL    Creatinine 0.89 0.60 - 1.30 mg/dL    Glucose 192 (H) 65 - 140 mg/dL    Calcium 8.1 (L) 8.4 - 10.2 mg/dL    eGFR 105 ml/min/1.73sq m   HIV-1 RNA, Quantitative PCR, [facility]    Collection Time: 06/29/23  4:37 AM    Specimen: Arm, Right; Blood   Result Value Ref Range    HIV-1 TARGET Not Detected Not Detected    HIV-1 TITER     Fingerstick Glucose (POCT)    Collection Time: 06/29/23  7:36 AM   Result Value Ref Range    POC Glucose 212 (H) 65 - 140 mg/dl   Body fluid culture and Gram stain    Collection Time: 06/29/23  9:01 AM    Specimen: Abscess;  Body Fluid   Result Value Ref Range    Body Fluid Culture, Sterile 2+ Growth of Gram Negative Juan C (A)     Gram Stain Result 2+ Disintegrating polys (A)     Gram Stain Result 3+ Gram negative rods (A)    Fingerstick Glucose (POCT)    Collection Time: 06/29/23 10:51 AM   Result Value Ref Range    POC Glucose 237 (H) 65 - 140 mg/dl   Fingerstick Glucose (POCT)    Collection Time: 06/29/23  4:13 PM   Result Value Ref Range    POC Glucose 242 (H) 65 - 140 mg/dl   Comprehensive metabolic panel    Collection Time: 06/30/23  5:19 AM   Result Value Ref Range    Sodium 136 135 - 147 mmol/L    Potassium 3.3 (L) 3.5 - 5.3 mmol/L    Chloride 110 (H) 96 - 108 mmol/L    CO2 18 (L) 21 - 32 mmol/L    ANION GAP 8 mmol/L    BUN 17 5 - 25 mg/dL    Creatinine 0.81 0.60 - 1.30 mg/dL    Glucose 201 (H) 65 - 140 mg/dL    Calcium 7.7 (L) 8.4 - 10.2 mg/dL    Corrected Calcium 8.9 8.3 - 10.1 mg/dL    AST 41 (H) 13 - 39 U/L    ALT 48 7 - 52 U/L    Alkaline Phosphatase 112 (H) 34 - 104 U/L    Total Protein 6.1 (L) 6.4 - 8.4 g/dL    Albumin 2.5 (L) 3.5 - 5.0 g/dL    Total Bilirubin 1.53 (H) 0.20 - 1.00 mg/dL    eGFR 109 ml/min/1.73sq m   Procalcitonin    Collection Time: 06/30/23  5:19 AM   Result Value Ref Range    Procalcitonin 8.26 (H) <=0.25 ng/ml   CBC and differential    Collection Time: 06/30/23  5:19 AM   Result Value Ref Range    WBC 13.06 (H) 4.31 - 10.16 Thousand/uL    RBC 3.17 (L) 3.88 - 5.62 Million/uL    Hemoglobin 8.9 (L) 12.0 - 17.0 g/dL    Hematocrit 25.9 (L) 36.5 - 49.3 %    MCV 82 82 - 98 fL    MCH 28.1 26.8 - 34.3 pg    MCHC 34.4 31.4 - 37.4 g/dL    RDW 15.0 11.6 - 15.1 %    MPV 10.9 8.9 - 12.7 fL    Platelets 046 481 - 694 Thousands/uL   Manual Differential(PHLEBS Do Not Order)    Collection Time: 06/30/23  5:19 AM   Result Value Ref Range    Segmented % 73 43 - 75 %    Bands % 2 0 - 8 %    Lymphocytes % 11 (L) 14 - 44 %    Monocytes % 13 (H) 4 - 12 %    Eosinophils, % 0 0 - 6 %    Basophils % 1 0 - 1 %    Absolute Neutrophils 9.80 (H) 1.85 - 7.62 Thousand/uL    Lymphocytes Absolute 1.44 0.60 - 4.47 Thousand/uL    Monocytes Absolute 1.70 (H) 0.00 - 1.22 Thousand/uL    Eosinophils Absolute 0.00 0.00 - 0.40 Thousand/uL    Basophils Absolute 0.13 (H) 0.00 - 0.10 Thousand/uL    Total Counted      Platelet Estimate Adequate Adequate   Fingerstick Glucose (POCT)    Collection Time: 06/30/23  7:26 AM   Result Value Ref Range    POC Glucose 194 (H) 65 - 140 mg/dl   Fingerstick Glucose (POCT)    Collection Time: 06/30/23 11:36 AM   Result Value Ref Range    POC Glucose 186 (H) 65 - 140 mg/dl       MRI abdomen w wo contrast and mrcp    Result Date: 6/30/2023  Narrative: MRI OF THE ABDOMEN WITH AND WITHOUT CONTRAST WITH MRCP INDICATION: Elevated LFTs and severe sepsis. Rule out biliary etiology. COMPARISON: Comparison is made to prior exams, including an ultrasound dated June 27, 2023 and a CT scan dated June 26, 2023. TECHNIQUE:  The following pulse sequences were obtained:  Coronal and axial T2 with TE of 90 and 180 respectively, axial T2 with fat saturation, axial FIESTA fat-sat, axial T1-weighted in-and-out-of phase, axial DWI/ADC, pre-contrast axial T1 with fat saturation, post-contrast dynamic axial T1 with fat saturation at 20, 70, and 180 seconds, followed by coronal and 7 minute delayed axial T1 with fat saturation. IV Contrast:  7 mL of Gadobutrol injection (SINGLE-DOSE) FINDINGS: LOWER CHEST:   Multifocal pneumonia better shown on recent CT scan from June 26, but definitely worse in the posterior basilar segment of the right lower lobe. Trace right pleural effusion. LIVER: Normal signal intensity and morphology. Again shown is a 4.9 x 2.9 x 3.1 cm lesion in the right lobe of the liver which increased in signal on T2 weighted images containing incomplete, mildly enhancing septations. No additional liver lesions. No intrahepatic or extrahepatic biliary ductal dilatation. In addition, there is a nonenhancing small accessory right hepatic vein compatible with thrombus extending from the lesion in the liver. No enhancement of the wall of the vein to suggest septic thrombus.  BILE DUCTS:  Normal caliber and morphology. GALLBLADDER: No gallstones. Trace amount of fluid or gallbladder wall edema at the interface between the gallbladder and the liver. The free wall is normal. SPLEEN:  Within normal limits. PANCREAS:  Within normal limits. Pancreatic duct is normal in caliber and morphology. ADRENAL GLANDS:  Within normal limits. KIDNEYS:  Kidneys BOWEL:   Within normal limits. PERITONEUM/RETROPERITONEUM:  No ascites. LYMPH NODES:  No abdominal lymphadenopathy. VESSELS:  No aneurysm or acute findings. OSSEOUS STRUCTURES:  No suspicious osseous lesion. ABDOMINAL WALL:  Within normal limits. Impression: 4.9 x 2.9 x 3.1 cm lesion in the right lobe of the liver. Imaging characteristics are compatible with hepatic abscess. This could be confirmed with image directed fine-needle aspiration. Thrombus extending from the abscess to a small accessory right hepatic vein. Although this may well be septic thrombosis, there are no imaging findings to suggest septic thrombus. Bibasilar pneumonia redemonstrated and better shown on recent CT scan however, there has been definite worsening in the posterior basilar segment of the right lower lobe. Significant findings were relayed to Dr. Sandi Velázquez at the time of this dictation. Workstation performed: [id number]     IR biopsy liver mass    Result Date: 6/29/2023  Narrative: PROCEDURE: Liver abscess drainage catheter placement Procedural Personnel Attending physician(s): Dr. Jan Harris Pre-procedure diagnosis: Liver abscess Post-procedure diagnosis: Same Indication: Segment 7 liver mass concerning for abscess. PROCEDURE SUMMARY: - Liver abscess drainage catheter placement under CT guidance PROCEDURE DETAILS: Pre-procedure Consent: Informed consent for the procedure including risks, benefits and alternatives was obtained and time-out was performed prior to the procedure. Preparation: The site was prepared and draped using maximal sterile barrier technique including cutaneous antisepsis. Anesthesia/sedation Level of anesthesia/sedation: Moderate sedation (conscious sedation) Anesthesia/sedation administered by: IR nurse under attending supervision with continuous monitoring of the patient's level of consciousness and physiologic status Total intra-service sedation time (minutes): 30 Drainage catheter placement The patient was positioned left lateral decubitus oblique. Initial imaging was performed. Local anesthesia was administered. Under serial CT guidance, a 17-gauge coaxial used to needle was advanced into the segment 7 liver mass. Mass was found to containing purulent fluid upon aspiration. Amplatz wire was advanced through the needle into the abscess cavity. Tract was dilated and a 10 Rwandan catheter advanced into the liver abscess cavity. 10 mL of purulent fluid was aspirated and sent for cultures and cytology. Final position of the catheter was verified with CT. Catheter was secured to skin using 0 Prolene suture and connected to bulb suction drainage. Radiation Dose CT dose length product (mGy-cm): 806.99 Additional Details Specimens removed: 10 mL purulent fluid aspirate sent for cultures and cytology. Estimated blood loss (mL): None Complications: No immediate complications. Impression: Percutaneous placement of a 10 [place]wandan drainage catheter into segment 7 liver abscess, yielding 10 mL of purulent fluid. Plan: - Drainage catheter to bulb suction drainage. - Flush catheter with 10 mL normal saline daily. - Follow-up for drain check in 2 weeks. Workstation performed: [id number]     US right upper quadrant    Result Date: 6/27/2023  Narrative: RIGHT UPPER QUADRANT ULTRASOUND INDICATION:     elevated LFTs in patient in severe sepsis, need to rule out biliary etiology. COMPARISON: CTA chest 6/26/2023 TECHNIQUE:   Real-time ultrasound of the right upper quadrant was performed with a curvilinear transducer with both volumetric sweeps and still imaging techniques.  FINDINGS: PANCREAS: Visualized portions of the pancreas are within normal limits. AORTA AND IVC:  Visualized portions are normal for patient age. LIVER: Size: Moderately enlarged. The liver measures 19.4 cm in the midclavicular line. Contour:  Surface contour is smooth. Parenchyma:  Echogenicity and echotexture are within normal limits. As noted on CT dated 6/26/2023, there is a well-circumscribed complex hypoechoic mass identified in the posterior right hepatic dome in segment 7 measuring 5.1 x 3.3 x 4.7 cm with internal vascularity. The lesion does not have the typical appearance of a  hemangioma and a neoplasm must be considered. Further evaluation with contrast-enhanced MRI is advised. Limited imaging of the main portal vein shows it to be patent and hepatopetal. BILIARY: The gallbladder is normal in caliber. No wall thickening noted. Trace pericholecystic fluid. No stones or sludge identified. No sonographic Jaimes sign. No intrahepatic biliary dilatation. CBD measures 4.0 mm. No choledocholithiasis. KIDNEY: Right kidney measures 10.6 x 5.6 x 5.8 cm. Volume 180.4 mL Kidney within normal limits. ASCITES:   None. Impression: 1. Indeterminate 5.1 cm right hepatic dome mass as described above for which further evaluation/characterization is advised with contrast-enhanced MRI. 2. Trace pericholecystic fluid without other findings to suggest cholecystitis or biliary obstruction. The study was marked in [facility] for immediate notification. Workstation performed: [id number]     Echo complete w/ contrast if indicated    Result Date: 6/27/2023  Narrative: •  Left Ventricle: Left ventricular cavity size is normal. Wall thickness is normal. Systolic function is normal (60%).  Wall motion is normal. Diastolic function is normal. •  Right Ventricle: Right ventricular cavity size is normal. Systolic function is normal.     CTA ED chest PE study    Result Date: 6/26/2023  Narrative: CTA - CHEST WITH IV CONTRAST - PULMONARY ANGIOGRAM INDICATION: Pulmonary embolism (PE) suspected, positive D-dimer Shortness of breath, elevated D-dimer. Per my review of the medical record, patient has a history of asthma presenting with flulike symptoms for 5 days with generalized body aches, chills, and shortness of breath. Fever. COMPARISON: Chest radiograph from today. TECHNIQUE: CT angiogram timed for optimal opacification of the pulmonary arteries. Axial, sagittal, and coronal 2D reformats created from source data. Coronal 3D MIP postprocessing on the acquisition scanner. Radiation dose length product (DLP):  298 mGy-cm . Radiation dose exposure minimized using iterative reconstruction and automated exposure control. IV Contrast:  100 mL of iohexol (OMNIPAQUE) FINDINGS: PULMONARY ARTERIES:  No pulmonary embolus. LUNGS: Moderate consolidation in the posterior central right upper lobe and superior segment right lower lobe. Multiple bilateral ill-defined nodules, 1.1 cm and smaller. One in the posterior basal left lower lobe contains an air bronchogram. AIRWAYS: No significant filling defects. PLEURA:  Unremarkable. HEART/GREAT VESSELS:  Normal for age. MEDIASTINUM AND FRANCISCA:  Unremarkable. CHEST WALL AND LOWER NECK: Unremarkable. UPPER ABDOMEN: 5.4 x 2.9 cm mass in the posterior segment right hepatic lobe. OSSEOUS STRUCTURES: Mild degenerative disease in the spine. Impression: No pulmonary embolus. Moderate consolidation in the posterior segment right upper lobe and superior segment right lower lobe due to pneumonia. Multiple bilateral ill-defined lung nodules, 1.1 cm and and smaller. The patient has an indeterminate 5.4 x 2.9 cm liver mass which will need to be further evaluated by nonemergent MRI. While the lung nodules are likely bronchopneumonia or septic emboli,  metastatic disease cannot be excluded.  I personally discussed this study with Aram Barajas PA-C, on 6/26/2023 7:34 PM. Workstation performed: [id number]     XR chest 1 view portable    Result Date: 6/26/2023  Narrative: CHEST INDICATION:   SOB. COMPARISON:  None EXAM PERFORMED/VIEWS:  XR CHEST PORTABLE FINDINGS: Cardiomediastinal silhouette appears unremarkable. Right upper lung airspace opacity. No pneumothorax or pleural effusion. Osseous structures appear within normal limits for patient age. Impression: Right upper lung infiltrate and/or atelectasis. Workstation performed: [id number]         HISTORY:    Past Medical History:   Diagnosis Date   • Asthma        Past Surgical History:   Procedure Laterality Date   • IR BIOPSY LIVER MASS  6/29/2023       History reviewed. No pertinent family history.     Social History     Socioeconomic History   • Marital status: /Civil Union     Spouse name: None   • Number of children: None   • Years of education: None   • Highest education level: None   Occupational History   • None   Tobacco Use   • Smoking status: Never   • Smokeless tobacco: Never   Substance and Sexual Activity   • Alcohol use: Not Currently   • Drug use: Never   • Sexual activity: None   Other Topics Concern   • None   Social History Narrative   • None     Social Determinants of Health     Financial Resource Strain: Not on file   Food Insecurity: Not on file   Transportation Needs: Not on file   Physical Activity: Not on file   Stress: Not on file   Social Connections: Not on file   Intimate Partner Violence: Not on file   Housing Stability: Not on file         Current Facility-Administered Medications:   •  acetaminophen (TYLENOL) tablet 650 mg, 650 mg, Oral, Q6H PRN, Philip Ovalle MD, 650 mg at 06/30/23 1138  •  ceFAZolin (ANCEF) IVPB (premix in dextrose) 2,000 mg 50 mL, 2,000 mg, Intravenous, Q8H, Jose Brown MD, Last Rate: 100 mL/hr at 06/30/23 1134, 2,000 mg at 06/30/23 1134  •  dextromethorphan-guaiFENesin (ROBITUSSIN DM) oral syrup 10 mL, 10 mL, Oral, Q4H PRN, Philip Ovalle MD, 10 mL at 06/28/23 1821  •  heparin (porcine) 25,000 units in 0.45% NaCl 250 mL infusion (premix), 3-30 Units/kg/hr (Order-Specific), Intravenous, Titrated, Shreya Coreas MD  •  insulin detemir (LEVEMIR) subcutaneous injection 12 Units, 12 Units, Subcutaneous, HS, Shreya Coreas MD, 12 Units at 06/29/23 2323  •  insulin lispro (HumaLOG) 100 units/mL subcutaneous injection 1-5 Units, 1-5 Units, Subcutaneous, TID AC, 1 Units at 06/30/23 1257 **AND** Fingerstick Glucose (POCT), , , TID AC, Philip Abarca MD  •  insulin lispro (HumaLOG) 100 units/mL subcutaneous injection 1-5 Units, 1-5 Units, Subcutaneous, HS, Philip Abarca MD, 2 Units at 06/29/23 2322  •  metroNIDAZOLE (FLAGYL) tablet 500 mg, 500 mg, Oral, Q8H [address], Tesfaye Galloway MD, 500 mg at 06/30/23 0511  •  oxyCODONE (ROXICODONE) IR tablet 5 mg, 5 mg, Oral, Q6H PRN, Shreya Coreas MD, 5 mg at 06/30/23 0331  •  potassium chloride (K-DUR,KLOR-CON) CR tablet 40 mEq, 40 mEq, Oral, Q6H, Shreya Coreas MD, 40 mEq at 06/30/23 1121  •  sodium chloride 0.9 % infusion, 75 mL/hr, Intravenous, Continuous, Shreya Coreas MD, Last Rate: 75 mL/hr at 06/30/23 0919, 75 mL/hr at 06/30/23 0919    No medications prior to admission. No Known Allergies    Labs and pertinent reports reviewed. This note has been generated by voice recognition software system. Therefore, there may be spelling, grammar, and or syntax errors. Please contact if questions arise.

## 2023-06-30 NOTE — PROGRESS NOTES
[address]  Progress Note  Name: Jenny Osborn I  MRN: [id number]  Unit/Bed#: -01 I Date of Admission: 6/26/2023   Date of Service: 6/30/2023 I Hospital Day: 4    Assessment/Plan   Thrombosis, hepatic vein ([address])  Assessment & Plan  · Consult hematology , vascular surgery  · Will start AC with heparin gtt     Hypokalemia  Assessment & Plan  - 3.3, 40meq PO x 2 dose, 6 hours apart  - recheck in am    Hyponatremia  Assessment & Plan  · stable    Acute renal failure (ARF) (HCC)  Assessment & Plan  · Resolved      Hyperglycemia  Assessment & Plan  · A1c 13-increase Levemir to 12 units, 194 this AM  · Cont SSI  · Known history of diabetes  · Aggressive hydration with sliding scale coverage for now         Severe sepsis:   · Likely etiology liver abscess, status post IR drain, follow drainage cultures  · Antibiotics de-escalated to cefazolin and Flagyl as per sensitivities. Repeat blood cultures are negative so far  · MRI/MRCP w contrast results for liver abscess in addition also shows thrombus extending from perihepatic drain to IVC. · Continue IV fluids, ID checking HIV status- HIV 1/2 (+)/ P24 Ag negative, awaiting multispot test  · ECHO results reviewed, no atrial clot  · WBC downtrending, procalcitonin downtrending      Septic embolism:  · Echo results reviewed  · MRI showing thrombus extending from the accessory hepatic vein to the IVC which could be septic resulting in pulmonary septic embolism  · Cont management as per #1        VTE Pharmacologic Prophylaxis: VTE Score: 5 heparin gtt    Patient Centered Rounds: I performed bedside rounds with nursing staff today. Discussions with Specialists or Other Care Team Provider: yes ID, vascular, GI, hemonc    Education and Discussions with Family / Patient: Updated  (wife) via phone.     Total Time Spent on Date of Encounter in care of patient: 45 minutes This time was spent on one or more of the following: performing physical exam; counseling and coordination of care; obtaining or reviewing history; documenting in the medical record; reviewing/ordering tests, medications or procedures; communicating with other healthcare professionals and discussing with patient's family/caregivers. Current Length of Stay: 4 day(s)  Current Patient Status: Inpatient   Certification Statement: The patient will continue to require additional inpatient hospital stay due to need for further investigations into hepativ v thrombosis and antibiotics for current infection and liver abscess  Discharge Plan: Anticipate discharge in 48-72 hrs to home. Code Status: Level 1 - Full Code    Subjective:   Seen and examined at bedside  Pt says he feels better   no sob    Objective:     Vitals:   Temp (24hrs), Av.8 °F (38.2 °C), Min:98.9 °F (37.2 °C), Max:102.9 °F (39.4 °C)    Temp:  [98.9 °F (37.2 °C)-102.9 °F (39.4 °C)] 102.9 °F (39.4 °C)  HR:  [103-125] 122  Resp:  [20] 20  BP: (133-160)/(73-89) 154/83  SpO2:  [95 %-98 %] 97 %  Body mass index is 29.12 kg/m². Input and Output Summary (last 24 hours):      Intake/Output Summary (Last 24 hours) at 2023 1150  Last data filed at 2023 0801  Gross per 24 hour   Intake 10 ml   Output 826 ml   Net -816 ml       Physical Exam:   Physical Exam General- Awake, alert and oriented x 3, looks comfortable  HEENT- Normocephalic, atraumatic, oral mucosa- moist  Neck- Supple, No carotid bruit, no JVD  CVS- Normal S1/ S2, Regular rate and rhythm, No murmur, No edema  Respiratory system- B/L clear breath sounds, no wheezing  Abdomen- Soft, Non distended, no tenderness, Bowel sound- present 4 quads, drain present  Genitourinary- No suprapubic tenderness, No CVA tenderness  Skin- No new bruise or rash  Musculoskeletal- No gross deformity  Psych- No acute psychosis  CNS- CN II- XII grossly intact, No acute focal neurologic deficit noted      Additional Data:     Labs:  Results from last 7 days   Lab Units 06/30/23  0519 06/29/23  0437   WBC Thousand/uL 13.06* 17.86*   HEMOGLOBIN g/dL 8.9* 10.9*   HEMATOCRIT % 25.9* 32.8*   PLATELETS Thousands/uL 224 197   BANDS PCT % 2  --    NEUTROS PCT %  --  65   LYMPHS PCT %  --  11*   LYMPHO PCT % 11*  --    MONOS PCT %  --  15*   MONO PCT % 13*  --    EOS PCT % 0 0     Results from last 7 days   Lab Units 06/30/23  0519   SODIUM mmol/L 136   POTASSIUM mmol/L 3.3*   CHLORIDE mmol/L 110*   CO2 mmol/L 18*   BUN mg/dL 17   CREATININE mg/dL 0.81   ANION GAP mmol/L 8   CALCIUM mg/dL 7.7*   ALBUMIN g/dL 2.5*   TOTAL BILIRUBIN mg/dL 1.53*   ALK PHOS U/L 112*   ALT U/L 48   AST U/L 41*   GLUCOSE RANDOM mg/dL 201*     Results from last 7 days   Lab Units 06/26/23  1539   INR  1.25*     Results from last 7 days   Lab Units 06/30/23  0726 06/29/23  1613 06/29/23  1051 06/29/23  0736 06/28/23  2050 06/28/23  1614 06/28/23  1043 06/28/23  0746 06/27/23  2102 06/27/23  1618 06/27/23  1055 06/27/23  0741   POC GLUCOSE mg/dl 194* 242* 237* 212* 203* 218* 224* 231* 217* 245* 278* 356*     Results from last 7 days   Lab Units 06/26/23 2128   HEMOGLOBIN A1C % 13.3*     Results from last 7 days   Lab Units 06/30/23  0519 06/28/23  0432 06/27/23  0445 06/26/23  2128 06/26/23  1936 06/26/23  1539   LACTIC ACID mmol/L  --   --   --  1.4 2.6* 2.7*   PROCALCITONIN ng/ml 8.26* 17.74* 24.42*  --   --  14.64*       Lines/Drains:  Invasive Devices     Peripheral Intravenous Line  Duration           Peripheral IV 06/26/23 Left Antecubital 3 days    Peripheral IV 06/30/23 Right Antecubital <1 day          Drain  Duration           Abscess Drain Abdomen 1 day                  Telemetry:  Telemetry Orders (From admission, onward)             24 Hour Telemetry Monitoring  Continuous x 24 Hours (Telem)        Question:  Reason for 24 Hour Telemetry  Answer:  Arrhythmias requiring acute medical intervention / PPM or ICD malfunction                 Telemetry Reviewed: Normal Sinus Rhythm  Indication for Continued Telemetry Use: No indication for continued use. Will discontinue. Imaging: Reviewed radiology reports from this admission including: MRI abdomen/MRCP    Recent Cultures (last 7 days):   Results from last 7 days   Lab Units 06/29/23  0901 06/28/23  0448 06/26/23  2019 06/26/23  1551 06/26/23  1539   BLOOD CULTURE   --  No Growth at 48 hrs. No Growth at 48 hrs. --  Klebsiella pneumoniae* Klebsiella pneumoniae*   GRAM STAIN RESULT  2+ Disintegrating polys*  3+ Gram negative rods*  --   --  Gram negative rods* Gram negative rods*   BODY FLUID CULTURE, STERILE  2+ Growth of Gram Negative Juan C*  --   --   --   --    LEGIONELLA URINARY ANTIGEN   --   --  Negative  --   --        Last 24 Hours Medication List:   Current Facility-Administered Medications   Medication Dose Route Frequency Provider Last Rate   • acetaminophen  650 mg Oral Q6H PRN Philip Orozco MD     • cefazolin  2,000 mg Intravenous Q8H Jani Narayan MD 2,000 mg (06/30/23 1134)   • dextromethorphan-guaiFENesin  10 mL Oral Q4H PRN Philip Orozco MD     • enoxaparin  40 mg Subcutaneous Daily Philip Orozco MD     • insulin detemir  12 Units Subcutaneous HS Terrell Vance MD     • insulin lispro  1-5 Units Subcutaneous TID AC Philip Orozco MD     • insulin lispro  1-5 Units Subcutaneous HS Philip Orozco MD     • metroNIDAZOLE  500 mg Oral Q8H [facility] Jani Narayan MD     • oxyCODONE  5 mg Oral Q6H PRN Terrell Vance MD     • potassium chloride  40 mEq Oral Q6H Terrell Vance MD     • sodium chloride  75 mL/hr Intravenous Continuous Terrell Vance MD 75 mL/hr (06/30/23 0919)        Today, Patient Was Seen By: Terrell Vance MD    **Please Note: This note may have been constructed using a voice recognition system. **

## 2023-06-30 NOTE — PLAN OF CARE
Problem: MOBILITY - ADULT  Goal: Maintain or return to baseline ADL function  Description: INTERVENTIONS:  -  Assess patient's ability to carry out ADLs; assess patient's baseline for ADL function and identify physical deficits which impact ability to perform ADLs (bathing, care of mouth/teeth, toileting, grooming, dressing, etc.)  - Assess/evaluate cause of self-care deficits   - Assess range of motion  - Assess patient's mobility; develop plan if impaired  - Assess patient's need for assistive devices and provide as appropriate  - Encourage maximum independence but intervene and supervise when necessary  - Involve family in performance of ADLs  - Assess for home care needs following discharge   - Consider OT consult to assist with ADL evaluation and planning for discharge  - Provide patient education as appropriate  Outcome: Progressing  Goal: Maintains/Returns to pre admission functional level  Description: INTERVENTIONS:  - Perform BMAT or MOVE assessment daily.   - Set and communicate daily mobility goal to care team and patient/family/caregiver. - Collaborate with rehabilitation services on mobility goals if consulted  - Perform Range of Motion  times a day. - Reposition patient every  hours.   - Dangle patient  times a day  - Stand patient  times a day  - Ambulate patient  times a day  - Out of bed to chair  times a day   - Out of bed for meals  times a day  - Out of bed for toileting  - Record patient progress and toleration of activity level   Outcome: Progressing     Problem: PAIN - ADULT  Goal: Verbalizes/displays adequate comfort level or baseline comfort level  Description: Interventions:  - Encourage patient to monitor pain and request assistance  - Assess pain using appropriate pain scale  - Administer analgesics based on type and severity of pain and evaluate response  - Implement non-pharmacological measures as appropriate and evaluate response  - Consider cultural and social influences on pain and pain management  - Notify physician/advanced practitioner if interventions unsuccessful or patient reports new pain  Outcome: Progressing     Problem: INFECTION - ADULT  Goal: Absence or prevention of progression during hospitalization  Description: INTERVENTIONS:  - Assess and monitor for signs and symptoms of infection  - Monitor lab/diagnostic results  - Monitor all insertion sites, i.e. indwelling lines, tubes, and drains  - Monitor endotracheal if appropriate and nasal secretions for changes in amount and color  - [place] appropriate cooling/warming therapies per order  - Administer medications as ordered  - Instruct and encourage patient and family to use good hand hygiene technique  - Identify and instruct in appropriate isolation precautions for identified infection/condition  Outcome: Progressing  Goal: Absence of fever/infection during neutropenic period  Description: INTERVENTIONS:  - Monitor WBC    Outcome: Progressing     Problem: SAFETY ADULT  Goal: Maintain or return to baseline ADL function  Description: INTERVENTIONS:  -  Assess patient's ability to carry out ADLs; assess patient's baseline for ADL function and identify physical deficits which impact ability to perform ADLs (bathing, care of mouth/teeth, toileting, grooming, dressing, etc.)  - Assess/evaluate cause of self-care deficits   - Assess range of motion  - Assess patient's mobility; develop plan if impaired  - Assess patient's need for assistive devices and provide as appropriate  - Encourage maximum independence but intervene and supervise when necessary  - Involve family in performance of ADLs  - Assess for home care needs following discharge   - Consider OT consult to assist with ADL evaluation and planning for discharge  - Provide patient education as appropriate  Outcome: Progressing  Goal: Maintains/Returns to pre admission functional level  Description: INTERVENTIONS:  - Perform BMAT or MOVE assessment daily.   - Set and communicate daily mobility goal to care team and patient/family/caregiver. - Collaborate with rehabilitation services on mobility goals if consulted  - Perform Range of Motion  times a day. - Reposition patient every  hours.   - Dangle patient  times a day  - Stand patient  times a day  - Ambulate patient  times a day  - Out of bed to chair  times a day   - Out of bed for meals  times a day  - Out of bed for toileting  - Record patient progress and toleration of activity level   Outcome: Progressing  Goal: Patient will remain free of falls  Description: INTERVENTIONS:  - Educate patient/family on patient safety including physical limitations  - Instruct patient to call for assistance with activity   - Consult OT/PT to assist with strengthening/mobility   - Keep Call bell within reach  - Keep bed low and locked with side rails adjusted as appropriate  - Keep care items and personal belongings within reach  - Initiate and maintain comfort rounds  - Make Fall Risk Sign visible to staff  - Offer Toileting every  Hours, in advance of need  - Initiate/Maintain alarm  - Obtain necessary fall risk management equipment:  - Apply yellow socks and bracelet for high fall risk patients  - Consider moving patient to room near nurses station  Outcome: Progressing     Problem: DISCHARGE PLANNING  Goal: Discharge to home or other facility with appropriate resources  Description: INTERVENTIONS:  - Identify barriers to discharge w/patient and caregiver  - Arrange for needed discharge resources and transportation as appropriate  - Identify discharge learning needs (meds, wound care, etc.)  - Arrange for interpretive services to assist at discharge as needed  - Refer to Case Management Department for coordinating discharge planning if the patient needs post-hospital services based on physician/advanced practitioner order or complex needs related to functional status, cognitive ability, or social support system  Outcome: Progressing Problem: Knowledge Deficit  Goal: Patient/family/caregiver demonstrates understanding of disease process, treatment plan, medications, and discharge instructions  Description: Complete learning assessment and assess knowledge base. Interventions:  - Provide teaching at level of understanding  - Provide teaching via preferred learning methods  Outcome: Progressing     Problem: Nutrition/Hydration-ADULT  Goal: Nutrient/Hydration intake appropriate for improving, restoring or maintaining nutritional needs  Description: Monitor and assess patient's nutrition/hydration status for malnutrition. Collaborate with interdisciplinary team and initiate plan and interventions as ordered. Monitor patient's weight and dietary intake as ordered or per policy. Utilize nutrition screening tool and intervene as necessary. Determine patient's food preferences and provide high-protein, high-caloric foods as appropriate.      INTERVENTIONS:  - Monitor oral intake, urinary output, labs, and treatment plans  - Assess nutrition and hydration status and recommend course of action  - Evaluate amount of meals eaten  - Assist patient with eating if necessary   - Allow adequate time for meals  - Recommend/ encourage appropriate diets, oral nutritional supplements, and vitamin/mineral supplements  - Order, calculate, and assess calorie counts as needed  - Recommend, monitor, and adjust tube feedings based on assessed needs  - Assess need for intravenous fluids  - Provide nutrition/hydration education as appropriate  - Include patient/family/caregiver in decisions related to nutrition  Outcome: Progressing

## 2023-06-30 NOTE — PROGRESS NOTES
Spiritual Care Progress Note    2023  Patient: Wes Quiñonez : 1980  Admission Date & Time: 2023 1520  MRN: [id number] CSN: [id number]     visited patient during unit rounds. Pt's wife and children at bedside.  introduced self & role, offered hospitality, provided orientation to hospital, and provided reflective listening. Patient shared that his Buddhist rio brings him hope and comfort and pt [facility]. Pt and family thanked  for hospitality and support. Spiritual care will remain available.              Chaplaincy Interventions Utilized:   Empowerment: Encouraged self-care, Normalized experience of patient/family, and Provided chaplaincy education    Exploration: Explored emotional needs & resources and Explored spiritual needs & resources    Relationship Building: Cultivated a relationship of care and support and Listened empathically      Chaplaincy Outcomes Achieved:  Distress reduced, Expressed gratitude, and Improved communication      Spiritual Coping Strategies Utilized:   Holiday observance     23 1300   Clinical Encounter Type   Visited With Patient and family together   Routine Visit Introduction   Referral From Other (Comment)  (Unit rounds)

## 2023-06-30 NOTE — PROGRESS NOTES
Progress Note - Infectious Disease   Beatrice Mbow 43 y.o. male MRN: [id number]  Unit/Bed#: -[facility] Encounter: [id number]      Impression/Plan:  1.  Severe sepsis.  Present on admission.  Leukocytosis, tachycardia, and fever soon after admission.  Appears to be secondary to Klebsiella pneumonia bacteremia.  Possibly secondary to pneumonia, however the radiographic findings are a bit atypical for an acute bacterial pneumonia, and concerned about the possibility of a hepatobiliary source with his elevated LFTs and chest pressure.  He appears to be quite ill although hemodynamically stable. No resistant organisms isolated  -Continue cefazolin and Flagyl  -Source control measures as below  -Recheck CBC with differential and CMP  -Supportive care     2.  Klebsiella pneumonia bacteremia. Suspect related to the hepatobiliary infection. Less likely a pulmonary source. Concern for septic emboli based upon CT scan imaging but transthoracic echocardiogram without valvular vegetation appreciated. Repeat blood cultures are negative  -Antibiotics as above  -Follow-up repeat blood cultures  -Check MRI MRCP  -Additional work-up as needed     3.  Liver abscess. Status post IR drainage with drain in place on 6/29/2023. Unclear primary etiology. Gram stain suggest a gram-negative infection which is not a surprise  -Antibiotics as above  -Drain management  -Follow-up abscess cultures and adjust antibiotics needed  -Check MRI MRCP  -GI follow-up  -May need colonoscopy  -Additional work-up as needed  -Anticipate intravenous antibiotics through at least early next week  -Likely repeat CT abdomen pelvis before discharge     4.  Diabetes mellitus. Type II. Poorly controlled with hemoglobin A1c of 13. This all is a risk factor for recurrent infection.  -Tighten diabetic control     5.  HIV screen positive.  With P24 antigen negative.  Could represent chronic HIV infection versus false positive.   CD4 count 761 so even if truly HIV positive, not immunosuppressed  -Follow-up multispot  -Follow-up HIV RNA    Discussed the above management plan with the primary service    We will see the patient again 7/3/2023. Please Tiger text me over the weekend if questions    Antibiotics:  Cefazolin 2  Flagyl 2  Antibiotics 5  Postprocedure day 1    Subjective:  Patient continues to have intermittent fever; no nausea, vomiting, diarrhea; no cough, shortness of breath; some right-sided abdominal pain. No new symptoms. Feeling better overall. Objective:  Vitals:  Temp:  [98.4 °F (36.9 °C)-102.8 °F (39.3 °C)] 99.6 °F (37.6 °C)  HR:  [] 110  Resp:  [20] 20  BP: (110-160)/(65-89) 160/89  SpO2:  [95 %-98 %] 96 %  Temp (24hrs), Av °F (37.8 °C), Min:98.4 °F (36.9 °C), Max:102.8 °F (39.3 °C)  Current: Temperature: 99.6 °F (37.6 °C)    Physical Exam:   General Appearance:  Alert, interactive, nontoxic, no acute distress. Throat: Oropharynx moist without lesions. Lungs:   Clear to auscultation bilaterally; no wheezes, rhonchi or rales; respirations unlabored   Heart:  Tachycardic; no murmur, rub or gallop   Abdomen:   Soft, non-tender, non-distended, positive bowel sounds. Right-sided GENARO drain in place. Extremities: No clubbing, cyanosis or edema   Skin: No new rashes or lesions. No draining wounds noted.        Labs, Imaging, & Other studies:   All pertinent labs and imaging studies were personally reviewed  Results from last 7 days   Lab Units 23  0523  0437 23  1135   WBC Thousand/uL 13.06* 17.86*  --    WHITE BLOOD CELL COUNT. x10E3/uL  --   --  21.2*   HEMOGLOBIN g/dL 8.9* 10.9*  --    HEMOGLOBIN. g/dL  --   --  10.5*   PLATELETS Thousands/uL 224 197  --    PLATELETS. O35Y9/EH  --   --  196     Results from last 7 days   Lab Units 23  0519 23  0437 23  0432 23  1141   SODIUM mmol/L 136 134* 134* 131*   POTASSIUM mmol/L 3.3* 4.0 4.1 4.6   CHLORIDE mmol/L 110* 108 108 105   CO2 mmol/L 18* 15* 12* 11*   BUN mg/dL 17 16 18 20   CREATININE mg/dL 0.81 0.89 1.00 1.16   EGFR ml/min/1.73sq m 109 105 92 77   CALCIUM mg/dL 7.7* 8.1* 8.2* 8.3*   AST U/L 41*  --  33 37   ALT U/L 48  --  80* 110*   ALK PHOS U/L 112*  --  130* 129*     Results from last 7 days   Lab Units 06/29/23  0901 06/28/23  0448 06/26/23  2019 06/26/23  1551 06/26/23  1539   BLOOD CULTURE   --  No Growth at 48 hrs. No Growth at 48 hrs.   --  Klebsiella pneumoniae* Klebsiella pneumoniae*   GRAM STAIN RESULT  2+ Disintegrating polys*  3+ Gram negative rods*  --   --  Gram negative rods* Gram negative rods*   LEGIONELLA URINARY ANTIGEN   --   --  Negative  --   --      Results from last 7 days   Lab Units 06/30/23  0519 06/28/23  0432 06/27/23  0445 06/26/23  1539   PROCALCITONIN ng/ml 8.26* 17.74* 24.42* 14.64*             Results from last 7 days   Lab Units 06/26/23  1539   D-DIMER QUANTITATIVE ug/ml FEU 3.07*

## 2023-07-01 LAB
ANION GAP SERPL CALCULATED.3IONS-SCNC: 6 MMOL/L
APTT PPP: 71 SECONDS (ref 23–37)
APTT PPP: 86 SECONDS (ref 23–37)
BACTERIA SPEC BFLD CULT: ABNORMAL
BASOPHILS # BLD MANUAL: 0.15 THOUSAND/UL (ref 0–0.1)
BASOPHILS NFR MAR MANUAL: 1 % (ref 0–1)
BUN SERPL-MCNC: 15 MG/DL (ref 5–25)
CALCIUM SERPL-MCNC: 7.3 MG/DL (ref 8.4–10.2)
CHLORIDE SERPL-SCNC: 108 MMOL/L (ref 96–108)
CO2 SERPL-SCNC: 21 MMOL/L (ref 21–32)
CREAT SERPL-MCNC: 0.84 MG/DL (ref 0.6–1.3)
EOSINOPHIL # BLD MANUAL: 0.29 THOUSAND/UL (ref 0–0.4)
EOSINOPHIL NFR BLD MANUAL: 2 % (ref 0–6)
ERYTHROCYTE [DISTWIDTH] IN BLOOD BY AUTOMATED COUNT: 15.2 % (ref 11.6–15.1)
GFR SERPL CREATININE-BSD FRML MDRD: 107 ML/MIN/1.73SQ M
GLUCOSE SERPL-MCNC: 149 MG/DL (ref 65–140)
GLUCOSE SERPL-MCNC: 161 MG/DL (ref 65–140)
GLUCOSE SERPL-MCNC: 191 MG/DL (ref 65–140)
GLUCOSE SERPL-MCNC: 213 MG/DL (ref 65–140)
GLUCOSE SERPL-MCNC: 217 MG/DL (ref 65–140)
GLUCOSE SERPL-MCNC: 222 MG/DL (ref 65–140)
GRAM STN SPEC: ABNORMAL
GRAM STN SPEC: ABNORMAL
HCT VFR BLD AUTO: 28 % (ref 36.5–49.3)
HGB BLD-MCNC: 9.5 G/DL (ref 12–17)
HYPERCHROMIA BLD QL SMEAR: PRESENT
LYMPHOCYTES # BLD AUTO: 0.44 THOUSAND/UL (ref 0.6–4.47)
LYMPHOCYTES # BLD AUTO: 3 % (ref 14–44)
MCH RBC QN AUTO: 27.9 PG (ref 26.8–34.3)
MCHC RBC AUTO-ENTMCNC: 33.9 G/DL (ref 31.4–37.4)
MCV RBC AUTO: 82 FL (ref 82–98)
METAMYELOCYTES NFR BLD MANUAL: 2 % (ref 0–1)
MONOCYTES # BLD AUTO: 1.31 THOUSAND/UL (ref 0–1.22)
MONOCYTES NFR BLD: 9 % (ref 4–12)
MYELOCYTES NFR BLD MANUAL: 5 % (ref 0–1)
NEUTROPHILS # BLD MANUAL: 11.36 THOUSAND/UL (ref 1.85–7.62)
NEUTS SEG NFR BLD AUTO: 78 % (ref 43–75)
PLATELET # BLD AUTO: 271 THOUSANDS/UL (ref 149–390)
PLATELET BLD QL SMEAR: ADEQUATE
PMV BLD AUTO: 11.3 FL (ref 8.9–12.7)
POTASSIUM SERPL-SCNC: 3.4 MMOL/L (ref 3.5–5.3)
RBC # BLD AUTO: 3.41 MILLION/UL (ref 3.88–5.62)
RBC MORPH BLD: PRESENT
SODIUM SERPL-SCNC: 135 MMOL/L (ref 135–147)
WBC # BLD AUTO: 14.57 THOUSAND/UL (ref 4.31–10.16)

## 2023-07-01 PROCEDURE — 85730 THROMBOPLASTIN TIME PARTIAL: CPT | Performed by: FAMILY MEDICINE

## 2023-07-01 PROCEDURE — 82948 REAGENT STRIP/BLOOD GLUCOSE: CPT

## 2023-07-01 PROCEDURE — 85027 COMPLETE CBC AUTOMATED: CPT | Performed by: FAMILY MEDICINE

## 2023-07-01 PROCEDURE — 80048 BASIC METABOLIC PNL TOTAL CA: CPT | Performed by: FAMILY MEDICINE

## 2023-07-01 PROCEDURE — 99255 IP/OBS CONSLTJ NEW/EST HI 80: CPT | Performed by: NURSE PRACTITIONER

## 2023-07-01 PROCEDURE — 85007 BL SMEAR W/DIFF WBC COUNT: CPT | Performed by: FAMILY MEDICINE

## 2023-07-01 PROCEDURE — 99233 SBSQ HOSP IP/OBS HIGH 50: CPT | Performed by: FAMILY MEDICINE

## 2023-07-01 RX ORDER — POTASSIUM CHLORIDE 20 MEQ/1
40 TABLET, EXTENDED RELEASE ORAL EVERY 6 HOURS
Status: COMPLETED | OUTPATIENT
Start: 2023-07-01 | End: 2023-07-01

## 2023-07-01 RX ADMIN — INSULIN LISPRO 2 UNITS: 100 INJECTION, SOLUTION INTRAVENOUS; SUBCUTANEOUS at 21:45

## 2023-07-01 RX ADMIN — METRONIDAZOLE 500 MG: 500 TABLET ORAL at 15:41

## 2023-07-01 RX ADMIN — ACETAMINOPHEN 650 MG: 325 TABLET, FILM COATED ORAL at 21:41

## 2023-07-01 RX ADMIN — CEFAZOLIN SODIUM 2000 MG: 2 SOLUTION INTRAVENOUS at 21:10

## 2023-07-01 RX ADMIN — INSULIN LISPRO 2 UNITS: 100 INJECTION, SOLUTION INTRAVENOUS; SUBCUTANEOUS at 17:55

## 2023-07-01 RX ADMIN — SODIUM CHLORIDE 75 ML/HR: 0.9 INJECTION, SOLUTION INTRAVENOUS at 21:11

## 2023-07-01 RX ADMIN — CEFAZOLIN SODIUM 2000 MG: 2 SOLUTION INTRAVENOUS at 04:08

## 2023-07-01 RX ADMIN — INSULIN LISPRO 2 UNITS: 100 INJECTION, SOLUTION INTRAVENOUS; SUBCUTANEOUS at 11:48

## 2023-07-01 RX ADMIN — POTASSIUM CHLORIDE 40 MEQ: 1500 TABLET, EXTENDED RELEASE ORAL at 17:55

## 2023-07-01 RX ADMIN — METRONIDAZOLE 500 MG: 500 TABLET ORAL at 21:45

## 2023-07-01 RX ADMIN — OXYCODONE HYDROCHLORIDE 5 MG: 5 TABLET ORAL at 21:41

## 2023-07-01 RX ADMIN — HEPARIN SODIUM 18 UNITS/KG/HR: 10000 INJECTION, SOLUTION INTRAVENOUS at 05:00

## 2023-07-01 RX ADMIN — HEPARIN SODIUM 18 UNITS/KG/HR: 10000 INJECTION, SOLUTION INTRAVENOUS at 21:46

## 2023-07-01 RX ADMIN — METRONIDAZOLE 500 MG: 500 TABLET ORAL at 05:01

## 2023-07-01 RX ADMIN — INSULIN DETEMIR 14 UNITS: 100 INJECTION, SOLUTION SUBCUTANEOUS at 21:45

## 2023-07-01 RX ADMIN — CEFAZOLIN SODIUM 2000 MG: 2 SOLUTION INTRAVENOUS at 11:48

## 2023-07-01 RX ADMIN — POTASSIUM CHLORIDE 40 MEQ: 1500 TABLET, EXTENDED RELEASE ORAL at 11:48

## 2023-07-01 RX ADMIN — ACETAMINOPHEN 650 MG: 325 TABLET, FILM COATED ORAL at 04:52

## 2023-07-01 RX ADMIN — ACETAMINOPHEN 650 MG: 325 TABLET, FILM COATED ORAL at 15:41

## 2023-07-01 NOTE — ASSESSMENT & PLAN NOTE
43year old male with asthma, DM presented with 2 day history of nausea, abdominal pain with severe sepsis, ARF found to have 5cm liver abscess, s/p biopsy and drain placement, + klebsiella pneumoniae, rapid HIV +, septic emboli and hepatic vein thrombosis, started on heparin gtt. Vascular is consulted for hepatic vein thrombosis    Diagnostics   MRI abd 6/28/23 - 4.9 x 2.9 x 3.1 cm lesion in the right lobe of the liver. Thrombus extending from the abscess to a small accessory right hepatic vein. Bibasilar pneumonia  CTA pe study 6/26/23 - negative PE. Moderate consolidation in the posterior segment right upper lobe and superior segment right lower lobe due to pneumonia. Multiple bilateral ill-defined lung nodules, 1.1 cm and and smaller.  Indeterminate 5.4 x 2.9 cm liver mass     Labs   Hgb - 9.5  WBC - 14.57  sCr - 0.84/107  BC No growth x48 hours   Abcess fluid culture + Klebsiella pneumoniae    Recommendations   -Hepatic vein thrombosis likely secondary to venous compression related to abscess  -Started on Heparin with plan to transition to oral anticoagulant  -no prior self or family hx of DVT  -Recommend 6 months of anticoagulation, no repeat imaging necessary from a vascular standpoint   -No vascular intervention   -Does not need outpatient vascular follow up for hepatic vein thrombosis    -Discussed with Dr Gordo Ruiz   -Discussed with LADONNA

## 2023-07-01 NOTE — PLAN OF CARE
Problem: MOBILITY - ADULT  Goal: Maintain or return to baseline ADL function  Description: INTERVENTIONS:  -  Assess patient's ability to carry out ADLs; assess patient's baseline for ADL function and identify physical deficits which impact ability to perform ADLs (bathing, care of mouth/teeth, toileting, grooming, dressing, etc.)  - Assess/evaluate cause of self-care deficits   - Assess range of motion  - Assess patient's mobility; develop plan if impaired  - Assess patient's need for assistive devices and provide as appropriate  - Encourage maximum independence but intervene and supervise when necessary  - Involve family in performance of ADLs  - Assess for home care needs following discharge   - Consider OT consult to assist with ADL evaluation and planning for discharge  - Provide patient education as appropriate  Outcome: Progressing  Goal: Maintains/Returns to pre admission functional level  Description: INTERVENTIONS:  - Perform BMAT or MOVE assessment daily.   - Set and communicate daily mobility goal to care team and patient/family/caregiver. - Collaborate with rehabilitation services on mobility goals if consulted  - Perform Range of Motion  times a day. - Reposition patient every  hours.   - Dangle patient  times a day  - Stand patient  times a day  - Ambulate patient  times a day  - Out of bed to chair times a day   - Out of bed for meals  times a day  - Out of bed for toileting  - Record patient progress and toleration of activity level   Outcome: Progressing     Problem: PAIN - ADULT  Goal: Verbalizes/displays adequate comfort level or baseline comfort level  Description: Interventions:  - Encourage patient to monitor pain and request assistance  - Assess pain using appropriate pain scale  - Administer analgesics based on type and severity of pain and evaluate response  - Implement non-pharmacological measures as appropriate and evaluate response  - Consider cultural and social influences on pain and pain management  - Notify physician/advanced practitioner if interventions unsuccessful or patient reports new pain  Outcome: Progressing     Problem: INFECTION - ADULT  Goal: Absence or prevention of progression during hospitalization  Description: INTERVENTIONS:  - Assess and monitor for signs and symptoms of infection  - Monitor lab/diagnostic results  - Monitor all insertion sites, i.e. indwelling lines, tubes, and drains  - Monitor endotracheal if appropriate and nasal secretions for changes in amount and color  - [place] appropriate cooling/warming therapies per order  - Administer medications as ordered  - Instruct and encourage patient and family to use good hand hygiene technique  - Identify and instruct in appropriate isolation precautions for identified infection/condition  Outcome: Progressing  Goal: Absence of fever/infection during neutropenic period  Description: INTERVENTIONS:  - Monitor WBC    Outcome: Progressing     Problem: SAFETY ADULT  Goal: Maintain or return to baseline ADL function  Description: INTERVENTIONS:  -  Assess patient's ability to carry out ADLs; assess patient's baseline for ADL function and identify physical deficits which impact ability to perform ADLs (bathing, care of mouth/teeth, toileting, grooming, dressing, etc.)  - Assess/evaluate cause of self-care deficits   - Assess range of motion  - Assess patient's mobility; develop plan if impaired  - Assess patient's need for assistive devices and provide as appropriate  - Encourage maximum independence but intervene and supervise when necessary  - Involve family in performance of ADLs  - Assess for home care needs following discharge   - Consider OT consult to assist with ADL evaluation and planning for discharge  - Provide patient education as appropriate  Outcome: Progressing  Goal: Maintains/Returns to pre admission functional level  Description: INTERVENTIONS:  - Perform BMAT or MOVE assessment daily.   - Set and communicate daily mobility goal to care team and patient/family/caregiver. - Collaborate with rehabilitation services on mobility goals if consulted  - Perform Range of Motion  times a day. - Reposition patient every  hours.   - Dangle patient  times a day  - Stand patient  times a day  - Ambulate patient  times a day  - Out of bed to chair  times a day   - Out of bed for meals  times a day  - Out of bed for toileting  - Record patient progress and toleration of activity level   Outcome: Progressing  Goal: Patient will remain free of falls  Description: INTERVENTIONS:  - Educate patient/family on patient safety including physical limitations  - Instruct patient to call for assistance with activity   - Consult OT/PT to assist with strengthening/mobility   - Keep Call bell within reach  - Keep bed low and locked with side rails adjusted as appropriate  - Keep care items and personal belongings within reach  - Initiate and maintain comfort rounds  - Make Fall Risk Sign visible to staff  - Offer Toileting every  Hours, in advance of need  - Initiate/Maintain alarm  - Obtain necessary fall risk management equipment:  - Apply yellow socks and bracelet for high fall risk patients  - Consider moving patient to room near nurses station  Outcome: Progressing     Problem: DISCHARGE PLANNING  Goal: Discharge to home or other facility with appropriate resources  Description: INTERVENTIONS:  - Identify barriers to discharge w/patient and caregiver  - Arrange for needed discharge resources and transportation as appropriate  - Identify discharge learning needs (meds, wound care, etc.)  - Arrange for interpretive services to assist at discharge as needed  - Refer to Case Management Department for coordinating discharge planning if the patient needs post-hospital services based on physician/advanced practitioner order or complex needs related to functional status, cognitive ability, or social support system  Outcome: Progressing Problem: Knowledge Deficit  Goal: Patient/family/caregiver demonstrates understanding of disease process, treatment plan, medications, and discharge instructions  Description: Complete learning assessment and assess knowledge base. Interventions:  - Provide teaching at level of understanding  - Provide teaching via preferred learning methods  Outcome: Progressing     Problem: Nutrition/Hydration-ADULT  Goal: Nutrient/Hydration intake appropriate for improving, restoring or maintaining nutritional needs  Description: Monitor and assess patient's nutrition/hydration status for malnutrition. Collaborate with interdisciplinary team and initiate plan and interventions as ordered. Monitor patient's weight and dietary intake as ordered or per policy. Utilize nutrition screening tool and intervene as necessary. Determine patient's food preferences and provide high-protein, high-caloric foods as appropriate.      INTERVENTIONS:  - Monitor oral intake, urinary output, labs, and treatment plans  - Assess nutrition and hydration status and recommend course of action  - Evaluate amount of meals eaten  - Assist patient with eating if necessary   - Allow adequate time for meals  - Recommend/ encourage appropriate diets, oral nutritional supplements, and vitamin/mineral supplements  - Order, calculate, and assess calorie counts as needed  - Recommend, monitor, and adjust tube feedings based on assessed needs  - Assess need for intravenous fluids  - Provide nutrition/hydration education as appropriate  - Include patient/family/caregiver in decisions related to nutrition  Outcome: Progressing

## 2023-07-01 NOTE — ASSESSMENT & PLAN NOTE
· A1c 13-increase Levemir to 14units, 222 this AM  · Cont SSI  · Known history of diabetes  · Aggressive hydration with sliding scale coverage for now

## 2023-07-01 NOTE — PROGRESS NOTES
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[address])  Assessment & Plan  · Consult hematology , vascular surgery  · Will start AC with heparin gtt and transition to PO eliquis at DC    Hypokalemia  Assessment & Plan  - cont PO supplementation  - recheck tomorrow    Hyponatremia  Assessment & Plan  · stable    Acute renal failure (ARF) (HCC)  Assessment & Plan  · Resolved      Hyperglycemia  Assessment & Plan  · A1c 13-increase Levemir to 14units, 222 this AM  · Cont SSI  · Known history of diabetes  · Aggressive hydration with sliding scale coverage for now    Liver abscess:   MRI with contrast reviewed  Abscess drain present, culture sent shows klebsiella  Etiology unknown at this time, Multispot HIV test pending  Defer further need for colonoscopy to gastroenterology    Severe sepsis  · Improving, last fever was yesterday around 3 PM  · Likely etiology liver abscess, status post IR drain, follow drainage cultures  · Antibiotics de-escalated to cefazolin and Flagyl as per sensitivities. Repeat blood cultures are negative so far  · MRI/MRCP w contrast results for liver abscess in addition also shows thrombus extending from perihepatic drain to IVC. · Continue IV fluids, ID checking HIV status- HIV 1/2 (+)/ P24 Ag negative, awaiting multispot test  · ECHO results reviewed, no atrial clot  · WBC downtrending, procalcitonin downtrending    Septic embolism  · Echo results reviewed  · MRI showing thrombus extending from the accessory hepatic vein to the IVC which could be septic resulting in pulmonary septic embolism  · Cont management as per #1    VTE Pharmacologic Prophylaxis: VTE Score: 5 High Risk (Score >/= 5) - Pharmacological DVT Prophylaxis Ordered: heparin drip. Sequential Compression Devices Ordered.     Patient Centered Rounds: I performed bedside rounds with nursing staff today. Discussions with Specialists or Other Care Team Provider: yes ID    Education and Discussions with Family / Patient: Updated  (wife) at bedside. Total Time Spent on Date of Encounter in care of patient: 25 minutes This time was spent on one or more of the following: performing physical exam; counseling and coordination of care; obtaining or reviewing history; documenting in the medical record; reviewing/ordering tests, medications or procedures; communicating with other healthcare professionals and discussing with patient's family/caregivers. Current Length of Stay: 5 day(s)  Current Patient Status: Inpatient   Certification Statement: The patient will continue to require additional inpatient hospital stay due to Need for IV heparin and then transitioning to [address], further IV antibiotics and improvement in the sepsis parameters  Discharge Plan: Anticipate discharge in 48 hrs to home. Code Status: Level 1 - Full Code    Subjective:   Seen and examined at bedside  No new complaints  Patient expresses the desire to go home today. It was explained to him that he is not medically cleared and will need IV antibiotics at this time till cleared by infectious disease. He verbalizes understanding      Objective:     Vitals:   Temp (24hrs), Av.9 °F (37.7 °C), Min:98.2 °F (36.8 °C), Max:103.1 °F (39.5 °C)    Temp:  [98.2 °F (36.8 °C)-103.1 °F (39.5 °C)] 98.5 °F (36.9 °C)  HR:  [] 93  Resp:  [16-19] 19  BP: (111-171)/(60-90) 130/60  SpO2:  [95 %-99 %] 97 %  Body mass index is 29.12 kg/m². Input and Output Summary (last 24 hours):      Intake/Output Summary (Last 24 hours) at 2023 1323  Last data filed at 2023 2030  Gross per 24 hour   Intake --   Output 22 ml   Net -22 ml       Physical Exam:   Physical Exam General- Awake, alert and oriented x 3, looks comfortable  HEENT- Normocephalic, atraumatic, oral mucosa- moist  Neck- Supple, No carotid bruit, no JVD  CVS- Normal S1/ S2, Regular rate and rhythm, No murmur, No edema  Respiratory system- B/L clear breath sounds, no wheezing  Abdomen- Soft, Non distended, no tenderness, Bowel sound- present 4 quads, tenderness around the site of the GENARO drain which has been there since it was inserted. Serosanguineous drainage visible in the bulb.   Genitourinary- No suprapubic tenderness, No CVA tenderness  Skin- No new bruise or rash  Musculoskeletal- No gross deformity  Psych- No acute psychosis  CNS- CN II- XII grossly intact, No acute focal neurologic deficit noted      Additional Data:     Labs:  Results from last 7 days   Lab Units 07/01/23  0448 06/30/23  0519 06/29/23  0437   WBC Thousand/uL 14.57* 13.06* 17.86*   HEMOGLOBIN g/dL 9.5* 8.9* 10.9*   HEMATOCRIT % 28.0* 25.9* 32.8*   PLATELETS Thousands/uL 271 224 197   BANDS PCT %  --  2  --    NEUTROS PCT %  --   --  65   LYMPHS PCT %  --   --  11*   LYMPHO PCT % 3* 11*  --    MONOS PCT %  --   --  15*   MONO PCT % 9 13*  --    EOS PCT % 2 0 0     Results from last 7 days   Lab Units 07/01/23  0448 06/30/23  0519   SODIUM mmol/L 135 136   POTASSIUM mmol/L 3.4* 3.3*   CHLORIDE mmol/L 108 110*   CO2 mmol/L 21 18*   BUN mg/dL 15 17   CREATININE mg/dL 0.84 0.81   ANION GAP mmol/L 6 8   CALCIUM mg/dL 7.3* 7.7*   ALBUMIN g/dL  --  2.5*   TOTAL BILIRUBIN mg/dL  --  1.53*   ALK PHOS U/L  --  112*   ALT U/L  --  48   AST U/L  --  41*   GLUCOSE RANDOM mg/dL 161* 201*     Results from last 7 days   Lab Units 06/30/23  1244   INR  1.33*     Results from last 7 days   Lab Units 07/01/23  1136 07/01/23  0745 06/30/23  2130 06/30/23  1611 06/30/23  1136 06/30/23  0726 06/29/23  1613 06/29/23  1051 06/29/23  0736 06/28/23  2050 06/28/23  1614 06/28/23  1043   POC GLUCOSE mg/dl 222* 149* 221* 258* 186* 194* 242* 237* 212* 203* 218* 224*     Results from last 7 days   Lab Units 06/26/23 2128   HEMOGLOBIN A1C % 13.3*     Results from last 7 days Lab Units 06/30/23  0519 06/28/23  0432 06/27/23  0445 06/26/23  2128 06/26/23  1936 06/26/23  1539   LACTIC ACID mmol/L  --   --   --  1.4 2.6* 2.7*   PROCALCITONIN ng/ml 8.26* 17.74* 24.42*  --   --  14.64*       Lines/Drains:  Invasive Devices     Peripheral Intravenous Line  Duration           Peripheral IV 06/26/23 Left Antecubital 4 days    Peripheral IV 06/30/23 Right Antecubital 1 day    Long-Dwell Peripheral IV (Midline) 06/30/23 Left Brachial <1 day          Drain  Duration           Abscess Drain Abdomen 2 days                  Telemetry:  Telemetry Orders (From admission, onward)             24 Hour Telemetry Monitoring  Continuous x 24 Hours (Telem)        Question:  Reason for 24 Hour Telemetry  Answer:  Arrhythmias requiring acute medical intervention / PPM or ICD malfunction                 Telemetry Reviewed: Normal Sinus Rhythm  Indication for Continued Telemetry Use: No indication for continued use. Will discontinue. Imaging: No pertinent imaging reviewed. Recent Cultures (last 7 days):   Results from last 7 days   Lab Units 06/29/23  0901 06/28/23  0448 06/26/23  2019 06/26/23  1551 06/26/23  1539   BLOOD CULTURE   --  No Growth at 72 hrs. No Growth at 72 hrs.   --  Klebsiella pneumoniae* Klebsiella pneumoniae*   GRAM STAIN RESULT  2+ Disintegrating polys*  3+ Gram negative rods*  --   --  Gram negative rods* Gram negative rods*   BODY FLUID CULTURE, STERILE  2+ Growth of Klebsiella pneumoniae*  --   --   --   --    LEGIONELLA URINARY ANTIGEN   --   --  Negative  --   --        Last 24 Hours Medication List:   Current Facility-Administered Medications   Medication Dose Route Frequency Provider Last Rate   • acetaminophen  650 mg Oral Q6H PRN Philip Welch MD     • cefazolin  2,000 mg Intravenous Q8H Aleksandr Thompson MD 2,000 mg (07/01/23 1148)   • dextromethorphan-guaiFENesin  10 mL Oral Q4H PRN Philip Welch MD     • heparin (porcine)  3-30 Units/kg/hr (Order-Specific) Intravenous Titrated Rachelle Munoz MD 18 Units/kg/hr (07/01/23 0500)   • insulin detemir  14 Units Subcutaneous HS Rachelle Munoz MD     • insulin lispro  1-5 Units Subcutaneous TID AC Philip Victor MD     • insulin lispro  1-5 Units Subcutaneous HS Philip Victor MD     • metroNIDAZOLE  500 mg Oral [facility] Nica Velasquez MD     • oxyCODONE  5 mg Oral Q6H PRN Rachelle Munoz MD     • potassium chloride  40 mEq Oral Q6H Rachelle Munoz MD     • sodium chloride  75 mL/hr Intravenous Continuous Rachelle Munoz MD 75 mL/hr (06/30/23 1932)        Today, Patient Was Seen By: Rachelle Munoz MD    **Please Note: This note may have been constructed using a voice recognition system. **

## 2023-07-01 NOTE — CONSULTS
[address]  Consult Note  Name: Gutierrez Tobar I  MRN: [id number]  Unit/Bed#: -01 I Date of Admission: 6/26/2023   Date of Service: 7/1/2023 I Hospital Day: 5    Assessment/Plan   Thrombosis, hepatic vein [facility])  Assessment & Plan  43year old male with asthma, DM presented with 2 day history of nausea, abdominal pain with severe sepsis, ARF found to have 5cm liver abscess, s/p biopsy and drain placement, + klebsiella pneumoniae, rapid HIV +, septic emboli and hepatic vein thrombosis, started on heparin gtt. Vascular is consulted for hepatic vein thrombosis    Diagnostics   MRI abd 6/28/23 - 4.9 x 2.9 x 3.1 cm lesion in the right lobe of the liver. Thrombus extending from the abscess to a small accessory right hepatic vein. Bibasilar pneumonia  CTA pe study 6/26/23 - negative PE. Moderate consolidation in the posterior segment right upper lobe and superior segment right lower lobe due to pneumonia. Multiple bilateral ill-defined lung nodules, 1.1 cm and and smaller.  Indeterminate 5.4 x 2.9 cm liver mass     Labs   Hgb - 9.5  WBC - 14.57  sCr - 0.84/107  BC No growth x48 hours   Abcess fluid culture + Klebsiella pneumoniae    Recommendations   -Hepatic vein thrombosis likely secondary to venous compression related to abscess  -Started on Heparin with plan to transition to oral anticoagulant  -no prior self or family hx of DVT  -Recommend 6 months of anticoagulation, no repeat imaging necessary from a vascular standpoint   -No vascular intervention   -Does not need outpatient vascular follow up for hepatic vein thrombosis    -Discussed with Dr Derek Bruno   -Discussed with [facility]IM           Consulting Service: SLIM    Reason for consult: hepatic vein thrombosis     HPI: Danii Quiñonez is a 43 y.o. male who presents with a 2 day history of nausea, abdominal pain with severe sepsis, ARF found to have 5cm liver abscess, s/p biopsy and drain placement, + klebsiella pneumoniae, rapid HIV +, septic emboli and hepatic vein thrombosis, started on heparin gtt. Vascular is consulted for hepatic vein thrombosis. Patient has no self or family history of prior DVT. No smoking history. No recent travel. Tmax 103 yesterday. Heparin drip, No bleeding issues overnight. Hemoglobin 9.5. Patient persistent he wants to go home. Review of Systems:  General: positive for  - chills and fatigue  Cardiovascular: no chest pain or dyspnea on exertion  Respiratory: no cough, shortness of breath, or wheezing  Gastrointestinal: pain at abscess drain site   Genitourinary ROS: no dysuria, trouble voiding, or hematuria  Musculoskeletal ROS: negative  Neurological ROS: no TIA or stroke symptoms  Hematological and Lymphatic ROS: positive for - fatigue  Dermatological ROS: negative  Psychological ROS: negative  Ophthalmic ROS: negative  ENT ROS: negative    Past Medical History:  Past Medical History:   Diagnosis Date   • Asthma        Past Surgical History:  Past Surgical History:   Procedure Laterality Date   • IR BIOPSY LIVER MASS  6/29/2023       Social History:  Social History     Substance and Sexual Activity   Alcohol Use Not Currently     Social History     Substance and Sexual Activity   Drug Use Never     Social History     Tobacco Use   Smoking Status Never   Smokeless Tobacco Never       Family History:  History reviewed. No pertinent family history.     Allergies:  No Known Allergies    Medications:  Current Facility-Administered Medications   Medication Dose Route Frequency   • acetaminophen (TYLENOL) tablet 650 mg  650 mg Oral Q6H PRN   • ceFAZolin (ANCEF) IVPB (premix in dextrose) 2,000 mg 50 mL  2,000 mg Intravenous Q8H   • dextromethorphan-guaiFENesin (ROBITUSSIN DM) oral syrup 10 mL  10 mL Oral Q4H PRN   • heparin (porcine) 25,000 units in 0.45% NaCl 250 mL infusion (premix)  3-30 Units/kg/hr (Order-Specific) Intravenous Titrated   • insulin detemir (LEVEMIR) subcutaneous injection 12 Units  12 Units Subcutaneous HS   • insulin lispro (HumaLOG) 100 units/mL subcutaneous injection 1-5 Units  1-5 Units Subcutaneous TID AC   • insulin lispro (HumaLOG) 100 units/mL subcutaneous injection 1-5 Units  1-5 Units Subcutaneous HS   • metroNIDAZOLE (FLAGYL) tablet 500 mg  500 mg Oral Q8H [address]   • oxyCODONE (ROXICODONE) IR tablet 5 mg  5 mg Oral Q6H PRN   • potassium chloride (K-DUR,KLOR-CON) CR tablet 40 mEq  40 mEq Oral Q6H   • sodium chloride 0.9 % infusion  75 mL/hr Intravenous Continuous       Vitals:  Vitals:    07/01/23 0700   BP: 130/60   Pulse: 93   Resp: 19   Temp: 98.5 °F (36.9 °C)   SpO2: 97%       I/Os:  I/O last 3 completed shifts: In: 10 [I.V.:10]  Out: 847 [Urine:800; Drains:47]  No intake/output data recorded. Lab Results and Cultures:   CBC with diff:   Lab Results   Component Value Date    WBC 14.57 (H) 07/01/2023    HGB 9.5 (L) 07/01/2023    HCT 28.0 (L) 07/01/2023    MCV 82 07/01/2023     07/01/2023    RBC 3.41 (L) 07/01/2023    MCH 27.9 07/01/2023    MCHC 33.9 07/01/2023    RDW 15.2 (H) 07/01/2023    MPV 11.3 07/01/2023    NRBC 0 06/29/2023   ,   BMP/CMP:  Lab Results   Component Value Date    K 3.4 (L) 07/01/2023     07/01/2023    CO2 21 07/01/2023    BUN 15 07/01/2023    CREATININE 0.84 07/01/2023    CALCIUM 7.3 (L) 07/01/2023    AST 41 (H) 06/30/2023    ALT 48 06/30/2023    ALKPHOS 112 (H) 06/30/2023    EGFR 107 07/01/2023   ,   Lipid Panel: No results found for: "CHOL",   Coags:   Lab Results   Component Value Date    PTT 71 (H) 07/01/2023    INR 1.33 (H) 06/30/2023   ,     Blood Culture:   Lab Results   Component Value Date    BLOODCX No Growth at 72 hrs. 06/28/2023    BLOODCX No Growth at 72 hrs. 06/28/2023   ,   Urinalysis:   Lab Results   Component Value Date    COLORU Light Yellow 06/26/2023    CLARITYU Clear 06/26/2023    SPECGRAV 1.035 (H) 06/26/2023    PHUR 5.0 06/26/2023    LEUKOCYTESUR (A) 06/26/2023     Elevated glucose may cause decreased leukocyte values.  See urine microscopic for [facility] result    NITRITE Negative 06/26/2023    GLUCOSEU >=1000 (1%) (A) 06/26/2023    KETONESU 100 (3+) (A) 06/26/2023    BILIRUBINUR Negative 06/26/2023    BLOODU Small (A) 06/26/2023   ,   Urine Culture: No results found for: "URINECX",   Wound Culure: No results found for: "WOUNDCULT"    Imaging:  MRI abdomen w wo contrast and mrcp  Narrative: MRI OF THE ABDOMEN WITH AND WITHOUT CONTRAST WITH MRCP    INDICATION: Elevated LFTs and severe sepsis. Rule out biliary etiology. COMPARISON: Comparison is made to prior exams, including an ultrasound dated June 27, 2023 and a CT scan dated June 26, 2023. TECHNIQUE:  The following pulse sequences were obtained:  Coronal and axial T2 with TE of 90 and 180 respectively, axial T2 with fat saturation, axial FIESTA fat-sat, axial T1-weighted in-and-out-of phase, axial DWI/ADC, pre-contrast axial T1 with fat   saturation, post-contrast dynamic axial T1 with fat saturation at 20, 70, and 180 seconds, followed by coronal and 7 minute delayed axial T1 with fat saturation. IV Contrast:  7 mL of Gadobutrol injection (SINGLE-DOSE)    FINDINGS:    LOWER CHEST:   Multifocal pneumonia better shown on recent CT scan from June 26, but definitely worse in the posterior basilar segment of the right lower lobe. Trace right pleural effusion. LIVER: Normal signal intensity and morphology. Again shown is a 4.9 x 2.9 x 3.1 cm lesion in the right lobe of the liver which increased in signal on T2 weighted images containing incomplete, mildly enhancing septations. No additional liver lesions. No   intrahepatic or extrahepatic biliary ductal dilatation. In addition, there is a nonenhancing small accessory right hepatic vein compatible with thrombus extending from the lesion in the liver. No enhancement of the wall of the vein to suggest septic thrombus. BILE DUCTS:  Normal caliber and morphology. GALLBLADDER: No gallstones.  Trace amount of fluid or gallbladder wall edema at the interface between the gallbladder and the liver. The free wall is normal.    SPLEEN:  Within normal limits. PANCREAS:  Within normal limits. Pancreatic duct is normal in caliber and morphology. ADRENAL GLANDS:  Within normal limits. KIDNEYS:  Kidneys    BOWEL:   Within normal limits. PERITONEUM/RETROPERITONEUM:  No ascites. LYMPH NODES:  No abdominal lymphadenopathy. VESSELS:  No aneurysm or acute findings. OSSEOUS STRUCTURES:  No suspicious osseous lesion. ABDOMINAL WALL:  Within normal limits. Impression: 4.9 x 2.9 x 3.1 cm lesion in the right lobe of the liver. Imaging characteristics are compatible with hepatic abscess. This could be confirmed with image directed fine-needle aspiration. Thrombus extending from the abscess to a small accessory right hepatic vein. Although this may well be septic thrombosis, there are no imaging findings to suggest septic thrombus. Bibasilar pneumonia redemonstrated and better shown on recent CT scan however, there has been definite worsening in the posterior basilar segment of the right lower lobe. Significant findings were relayed to Dr. Jory Buerger at the time of this dictation. Workstation performed: [id number]        Physical Exam:    General appearance: alert and oriented, in no acute distress  Head: Normocephalic, without obvious abnormality, atraumatic  Eyes: EOMI  Throat: ambrosio oral mucosa   Neck: no JVD and supple, symmetrical, trachea midline  Back: symmetric, no curvature. ROM normal. No CVA tenderness. Lungs: clear to auscultation bilaterally  Chest wall: no tenderness  Heart: regular rate and rhythm, S1, S2 normal, no murmur, click, rub or gallop  Abdomen: Abdomen soft, nontender,+BS, right upper quadrant drain in place. Genitalia: deferred  Rectal: deferred  Extremities: Bilateral lower extremity trace pedal swelling. Nonpitting in nature.   Extremities warm pink and well-perfused  Skin: Skin color, texture, turgor normal. No rashes or lesions  Neurologic: Grossly normal    ANGELIQUE Sanders  7/1/2023

## 2023-07-01 NOTE — ASSESSMENT & PLAN NOTE
· Consult hematology , vascular surgery  · Will start AC with heparin gtt and transition to PO eliquis at DC

## 2023-07-02 ENCOUNTER — APPOINTMENT (INPATIENT)
Dept: CT IMAGING | Facility: HOSPITAL | Age: 43
DRG: 720 | End: 2023-07-02
Payer: COMMERCIAL

## 2023-07-02 LAB
ANION GAP SERPL CALCULATED.3IONS-SCNC: 5 MMOL/L
ANION GAP SERPL CALCULATED.3IONS-SCNC: 6 MMOL/L
APTT PPP: 59 SECONDS (ref 23–37)
APTT PPP: 75 SECONDS (ref 23–37)
APTT PPP: 92 SECONDS (ref 23–37)
BUN SERPL-MCNC: 11 MG/DL (ref 5–25)
BUN SERPL-MCNC: 8 MG/DL (ref 5–25)
CALCIUM SERPL-MCNC: 6.5 MG/DL (ref 8.4–10.2)
CALCIUM SERPL-MCNC: 7.2 MG/DL (ref 8.4–10.2)
CHLORIDE SERPL-SCNC: 106 MMOL/L (ref 96–108)
CHLORIDE SERPL-SCNC: 109 MMOL/L (ref 96–108)
CO2 SERPL-SCNC: 20 MMOL/L (ref 21–32)
CO2 SERPL-SCNC: 24 MMOL/L (ref 21–32)
CREAT SERPL-MCNC: 0.61 MG/DL (ref 0.6–1.3)
CREAT SERPL-MCNC: 0.74 MG/DL (ref 0.6–1.3)
ERYTHROCYTE [DISTWIDTH] IN BLOOD BY AUTOMATED COUNT: 15.4 % (ref 11.6–15.1)
GFR SERPL CREATININE-BSD FRML MDRD: 113 ML/MIN/1.73SQ M
GFR SERPL CREATININE-BSD FRML MDRD: 123 ML/MIN/1.73SQ M
GLUCOSE SERPL-MCNC: 153 MG/DL (ref 65–140)
GLUCOSE SERPL-MCNC: 157 MG/DL (ref 65–140)
GLUCOSE SERPL-MCNC: 176 MG/DL (ref 65–140)
GLUCOSE SERPL-MCNC: 195 MG/DL (ref 65–140)
GLUCOSE SERPL-MCNC: 205 MG/DL (ref 65–140)
HCT VFR BLD AUTO: 27.3 % (ref 36.5–49.3)
HGB BLD-MCNC: 8.9 G/DL (ref 12–17)
HGB FRACT BLD-IMP: NEGATIVE
HIV 2 RNA SERPL QL NAA+PROBE: NON REACTIVE
HIV IA ALGORITHM INTERP SERPLBLD-IMP: NORMAL
HIV1 AB SERPL QL IA: NON REACTIVE
HIV1 RNA SERPL QL NAA+PROBE: NON REACTIVE
MAGNESIUM SERPL-MCNC: 2.3 MG/DL (ref 1.9–2.7)
MCH RBC QN AUTO: 26.6 PG (ref 26.8–34.3)
MCHC RBC AUTO-ENTMCNC: 32.6 G/DL (ref 31.4–37.4)
MCV RBC AUTO: 82 FL (ref 82–98)
PLATELET # BLD AUTO: 336 THOUSANDS/UL (ref 149–390)
PMV BLD AUTO: 10.8 FL (ref 8.9–12.7)
POTASSIUM SERPL-SCNC: 2.8 MMOL/L (ref 3.5–5.3)
POTASSIUM SERPL-SCNC: 3.4 MMOL/L (ref 3.5–5.3)
PROCALCITONIN SERPL-MCNC: 5.04 NG/ML
RBC # BLD AUTO: 3.34 MILLION/UL (ref 3.88–5.62)
SL AMB HIV 2 AB: NON REACTIVE
SODIUM SERPL-SCNC: 135 MMOL/L (ref 135–147)
SODIUM SERPL-SCNC: 135 MMOL/L (ref 135–147)
WBC # BLD AUTO: 16.22 THOUSAND/UL (ref 4.31–10.16)

## 2023-07-02 PROCEDURE — 84145 PROCALCITONIN (PCT): CPT | Performed by: FAMILY MEDICINE

## 2023-07-02 PROCEDURE — 85730 THROMBOPLASTIN TIME PARTIAL: CPT | Performed by: FAMILY MEDICINE

## 2023-07-02 PROCEDURE — 74177 CT ABD & PELVIS W/CONTRAST: CPT

## 2023-07-02 PROCEDURE — 83735 ASSAY OF MAGNESIUM: CPT | Performed by: FAMILY MEDICINE

## 2023-07-02 PROCEDURE — 99233 SBSQ HOSP IP/OBS HIGH 50: CPT | Performed by: FAMILY MEDICINE

## 2023-07-02 PROCEDURE — 80048 BASIC METABOLIC PNL TOTAL CA: CPT | Performed by: FAMILY MEDICINE

## 2023-07-02 PROCEDURE — G1004 CDSM NDSC: HCPCS

## 2023-07-02 PROCEDURE — 82948 REAGENT STRIP/BLOOD GLUCOSE: CPT

## 2023-07-02 PROCEDURE — 85027 COMPLETE CBC AUTOMATED: CPT | Performed by: FAMILY MEDICINE

## 2023-07-02 RX ORDER — POTASSIUM CHLORIDE 20 MEQ/1
40 TABLET, EXTENDED RELEASE ORAL ONCE
Status: COMPLETED | OUTPATIENT
Start: 2023-07-02 | End: 2023-07-02

## 2023-07-02 RX ORDER — POTASSIUM CHLORIDE 14.9 MG/ML
20 INJECTION INTRAVENOUS
Status: COMPLETED | OUTPATIENT
Start: 2023-07-02 | End: 2023-07-02

## 2023-07-02 RX ORDER — INSULIN LISPRO 100 [IU]/ML
5 INJECTION, SOLUTION INTRAVENOUS; SUBCUTANEOUS
Status: DISCONTINUED | OUTPATIENT
Start: 2023-07-02 | End: 2023-07-04 | Stop reason: HOSPADM

## 2023-07-02 RX ADMIN — ACETAMINOPHEN 650 MG: 325 TABLET, FILM COATED ORAL at 13:50

## 2023-07-02 RX ADMIN — IOHEXOL 100 ML: 350 INJECTION, SOLUTION INTRAVENOUS at 11:28

## 2023-07-02 RX ADMIN — POTASSIUM CHLORIDE 40 MEQ: 1500 TABLET, EXTENDED RELEASE ORAL at 09:18

## 2023-07-02 RX ADMIN — INSULIN DETEMIR 14 UNITS: 100 INJECTION, SOLUTION SUBCUTANEOUS at 22:31

## 2023-07-02 RX ADMIN — INSULIN LISPRO 1 UNITS: 100 INJECTION, SOLUTION INTRAVENOUS; SUBCUTANEOUS at 09:17

## 2023-07-02 RX ADMIN — CEFAZOLIN SODIUM 2000 MG: 2 SOLUTION INTRAVENOUS at 20:25

## 2023-07-02 RX ADMIN — METRONIDAZOLE 500 MG: 500 TABLET ORAL at 13:44

## 2023-07-02 RX ADMIN — INSULIN LISPRO 5 UNITS: 100 INJECTION, SOLUTION INTRAVENOUS; SUBCUTANEOUS at 12:40

## 2023-07-02 RX ADMIN — INSULIN LISPRO 1 UNITS: 100 INJECTION, SOLUTION INTRAVENOUS; SUBCUTANEOUS at 22:31

## 2023-07-02 RX ADMIN — GUAIFENESIN AND DEXTROMETHORPHAN 10 ML: 100; 10 SYRUP ORAL at 22:12

## 2023-07-02 RX ADMIN — METRONIDAZOLE 500 MG: 500 TABLET ORAL at 22:00

## 2023-07-02 RX ADMIN — POTASSIUM CHLORIDE 20 MEQ: 14.9 INJECTION, SOLUTION INTRAVENOUS at 13:39

## 2023-07-02 RX ADMIN — INSULIN LISPRO 1 UNITS: 100 INJECTION, SOLUTION INTRAVENOUS; SUBCUTANEOUS at 12:30

## 2023-07-02 RX ADMIN — OXYCODONE HYDROCHLORIDE 5 MG: 5 TABLET ORAL at 07:59

## 2023-07-02 RX ADMIN — CEFAZOLIN SODIUM 2000 MG: 2 SOLUTION INTRAVENOUS at 12:30

## 2023-07-02 RX ADMIN — HEPARIN SODIUM 18 UNITS/KG/HR: 10000 INJECTION, SOLUTION INTRAVENOUS at 19:03

## 2023-07-02 RX ADMIN — ACETAMINOPHEN 650 MG: 325 TABLET, FILM COATED ORAL at 04:57

## 2023-07-02 RX ADMIN — INSULIN LISPRO 5 UNITS: 100 INJECTION, SOLUTION INTRAVENOUS; SUBCUTANEOUS at 17:23

## 2023-07-02 RX ADMIN — POTASSIUM CHLORIDE 20 MEQ: 14.9 INJECTION, SOLUTION INTRAVENOUS at 09:19

## 2023-07-02 RX ADMIN — ACETAMINOPHEN 650 MG: 325 TABLET, FILM COATED ORAL at 22:00

## 2023-07-02 RX ADMIN — SODIUM CHLORIDE 75 ML/HR: 0.9 INJECTION, SOLUTION INTRAVENOUS at 09:34

## 2023-07-02 RX ADMIN — INSULIN LISPRO 1 UNITS: 100 INJECTION, SOLUTION INTRAVENOUS; SUBCUTANEOUS at 17:23

## 2023-07-02 NOTE — ASSESSMENT & PLAN NOTE
· Consult hematology , vascular surgery: Recommendations reviewed from both. Recommended anticoagulation.   No surgical intervention  · Will start AC with heparin gtt and transition to PO eliquis at DC

## 2023-07-02 NOTE — ASSESSMENT & PLAN NOTE
· A1c 13-increase Levemir to 14units, target goal around 140-180.   Patient is slightly above goal.  Add Premeal insulin 5-5-5  · Cont SSI  · Known history of diabetes  · Aggressive hydration with sliding scale coverage for now

## 2023-07-02 NOTE — PLAN OF CARE
Problem: PAIN - ADULT  Goal: Verbalizes/displays adequate comfort level or baseline comfort level  Description: Interventions:  - Encourage patient to monitor pain and request assistance  - Assess pain using appropriate pain scale  - Administer analgesics based on type and severity of pain and evaluate response  - Implement non-pharmacological measures as appropriate and evaluate response  - Consider cultural and social influences on pain and pain management  - Notify physician/advanced practitioner if interventions unsuccessful or patient reports new pain  Outcome: Progressing     Problem: INFECTION - ADULT  Goal: Absence or prevention of progression during hospitalization  Description: INTERVENTIONS:  - Assess and monitor for signs and symptoms of infection  - Monitor lab/diagnostic results  - Monitor all insertion sites, i.e. indwelling lines, tubes, and drains  - Monitor endotracheal if appropriate and nasal secretions for changes in amount and color  - [place] appropriate cooling/warming therapies per order  - Administer medications as ordered  - Instruct and encourage patient and family to use good hand hygiene technique  - Identify and instruct in appropriate isolation precautions for identified infection/condition  Outcome: Progressing  Goal: Absence of fever/infection during neutropenic period  Description: INTERVENTIONS:  - Monitor WBC    Outcome: Progressing

## 2023-07-02 NOTE — PROGRESS NOTES
[address]  Progress Note  Name: Ann Rutherford I  MRN: [id number]  Unit/Bed#: -01 I Date of Admission: 6/26/2023   Date of Service: 7/2/2023 I Hospital Day: 6        Severe sepsis:  · Fever has resolved at this time  · Likely etiology liver abscess, status post IR drain, follow drainage cultures  · Antibiotics de-escalated to cefazolin and Flagyl as per sensitivities. Repeat blood cultures are negative so far  · MRI/MRCP w contrast results for liver abscess in addition also shows thrombus extending from perihepatic drain to IVC. · Continue IV fluids, ID checking HIV status- HIV 1/2 (+)/ P24 Ag negative, awaiting multispot test  · ECHO results reviewed, no atrial clot  · WBC uptrending, procalcitonin downtrending    Septic embolism:  · Echo results reviewed  · MRI showing thrombus extending from the accessory hepatic vein to the IVC which could be septic resulting in pulmonary septic embolism  · Cont management as per #1    Liver abscess:  · Repeat CT abdomen with contrast today as the white count is trending up. Follow results  · MRI with contrast reviewed  · Abscess drain present, culture sent shows klebsiella  · Etiology unknown at this time, Multispot HIV test pending    Assessment/Plan   Thrombosis, hepatic vein [facility])  Assessment & Plan  · Consult hematology , vascular surgery: Recommendations reviewed from both. Recommended anticoagulation. No surgical intervention  · Will start AC with heparin gtt and transition to PO eliquis at DC    Hypokalemia  Assessment & Plan  - At 2.8 today. Start telemetry  -Giving IV 20 mEq x 2 +40 p.o.  -We will recheck in p.m. Acute renal failure (ARF) (HCC)  Assessment & Plan  · Resolved      Hyperglycemia  Assessment & Plan  · A1c 13-increase Levemir to 14units, target goal around 140-180.   Patient is slightly above goal.  Add Premeal insulin 5-5-5  · Cont SSI  · Known history of diabetes  · Aggressive hydration with sliding scale coverage for now             VTE Pharmacologic Prophylaxis: VTE Score: 5 High Risk (Score >/= 5) - Pharmacological DVT Prophylaxis Ordered: heparin drip. Sequential Compression Devices Ordered. Patient Centered Rounds: I performed bedside rounds with nursing staff today. Discussions with Specialists or Other Care Team Provider: Yes, infectious disease    Education and Discussions with Family / Patient: Patient's wife has been kept updated. Total Time Spent on Date of Encounter in care of patient: 35 minutes This time was spent on one or more of the following: performing physical exam; counseling and coordination of care; obtaining or reviewing history; documenting in the medical record; reviewing/ordering tests, medications or procedures; communicating with other healthcare professionals and discussing with patient's family/caregivers. Current Length of Stay: 6 day(s)  Current Patient Status: Inpatient   Certification Statement: The patient will continue to require additional inpatient hospital stay due to Need for repeat CAT scan, IR reconsultation for possible removal of the GENARO tube, decision of antibiotics and conversion to p.o. anticoagulation  Discharge Plan: Anticipate discharge in 24-48 hrs to home. Code Status: Level 1 - Full Code    Subjective:   Seen and examined at bedside  Patient says he wants to go home today. Was discussed with the patient why he needs to be hospitalized that his treatment is ongoing and the necessity for IV antibiotics and the need for repeat scan.   He said that he will stay until tomorrow but then he would like to go  Patient's fever has resolved and is feeling better and that is why he wants to go    Objective:     Vitals:   Temp (24hrs), Av.9 °F (37.2 °C), Min:98.3 °F (36.8 °C), Max:99.7 °F (37.6 °C)    Temp:  [98.3 °F (36.8 °C)-99.7 °F (37.6 °C)] 98.7 °F (37.1 °C)  HR:  [] 86  Resp:  [16] 16  BP: (123-142)/(66-84) 123/66  SpO2:  [95 %-99 %] 97 %  Body mass index is 29.12 kg/m². Input and Output Summary (last 24 hours): Intake/Output Summary (Last 24 hours) at 7/2/2023 1225  Last data filed at 7/2/2023 0934  Gross per 24 hour   Intake 1000 ml   Output 5 ml   Net 995 ml       Physical Exam:   Physical Exam S1-S2 audible, regular  Slightly diminished at the bases, no crackles or rhonchi  Alert and oriented to time place and person  Abdomen tender around the drain site, nondistended was audible all 4 quadrants  NC/AT, oral mucosa slightly moist      Additional Data:     Labs:  Results from last 7 days   Lab Units 07/02/23 0451 07/01/23 0448 06/30/23  0519 06/29/23  0437   WBC Thousand/uL 16.22* 14.57* 13.06* 17.86*   HEMOGLOBIN g/dL 8.9* 9.5* 8.9* 10.9*   HEMATOCRIT % 27.3* 28.0* 25.9* 32.8*   PLATELETS Thousands/uL 336 271 224 197   BANDS PCT %  --   --  2  --    NEUTROS PCT %  --   --   --  65   LYMPHS PCT %  --   --   --  11*   LYMPHO PCT %  --  3* 11*  --    MONOS PCT %  --   --   --  15*   MONO PCT %  --  9 13*  --    EOS PCT %  --  2 0 0     Results from last 7 days   Lab Units 07/02/23 0451 07/01/23 0448 06/30/23  0519   SODIUM mmol/L 135   < > 136   POTASSIUM mmol/L 2.8*   < > 3.3*   CHLORIDE mmol/L 109*   < > 110*   CO2 mmol/L 20*   < > 18*   BUN mg/dL 11   < > 17   CREATININE mg/dL 0.61   < > 0.81   ANION GAP mmol/L 6   < > 8   CALCIUM mg/dL 6.5*   < > 7.7*   ALBUMIN g/dL  --   --  2.5*   TOTAL BILIRUBIN mg/dL  --   --  1.53*   ALK PHOS U/L  --   --  112*   ALT U/L  --   --  48   AST U/L  --   --  41*   GLUCOSE RANDOM mg/dL 153*   < > 201*    < > = values in this interval not displayed.      Results from last 7 days   Lab Units 06/30/23  1244   INR  1.33*     Results from last 7 days   Lab Units 07/02/23  1048 07/02/23  0726 07/01/23  2138 07/01/23  1942 07/01/23  1532 07/01/23  1136 07/01/23  0745 06/30/23  2130 06/30/23  1611 06/30/23  1136 06/30/23  0726 06/29/23  1613   POC GLUCOSE mg/dl 205* 176* 217* 191* 213* 222* 149* 221* 258* 186* 194* 242*     Results from last 7 days   Lab Units 06/26/23 2128   HEMOGLOBIN A1C % 13.3*     Results from last 7 days   Lab Units 07/02/23  0451 06/30/23  0519 06/28/23  0432 06/27/23  0445 06/26/23 2128 06/26/23  1936 06/26/23  1539   LACTIC ACID mmol/L  --   --   --   --  1.4 2.6* 2.7*   PROCALCITONIN ng/ml 5.04* 8.26* 17.74* 24.42*  --   --  14.64*       Lines/Drains:  Invasive Devices     Peripheral Intravenous Line  Duration           Peripheral IV 06/30/23 Right Antecubital 2 days    Long-Dwell Peripheral IV (Midline) 06/30/23 Left Brachial 1 day          Drain  Duration           Abscess Drain Abdomen 3 days                  Telemetry:  Telemetry Orders (From admission, onward)             24 Hour Telemetry Monitoring  Continuous x 24 Hours (Telem)        Question:  Reason for 24 Hour Telemetry  Answer:  Metabolic/electrolyte disturbance with high probability of dysrhythmia. K level <3 or >6 OR KCL infusion >10mEq/hr                 Telemetry Reviewed: Normal Sinus Rhythm  Indication for Continued Telemetry Use: Metabolic/electrolyte disturbance with high probability of dysrhythmia             Imaging: No pertinent imaging reviewed. Recent Cultures (last 7 days):   Results from last 7 days   Lab Units 06/29/23  0901 06/28/23  0448 06/26/23  2019 06/26/23  1551 06/26/23  1539   BLOOD CULTURE   --  No Growth After 4 Days. No Growth After 4 Days.   --  Klebsiella pneumoniae* Klebsiella pneumoniae*   GRAM STAIN RESULT  2+ Disintegrating polys*  3+ Gram negative rods*  --   --  Gram negative rods* Gram negative rods*   BODY FLUID CULTURE, STERILE  2+ Growth of Klebsiella pneumoniae*  --   --   --   --    LEGIONELLA URINARY ANTIGEN   --   --  Negative  --   --        Last 24 Hours Medication List:   Current Facility-Administered Medications   Medication Dose Route Frequency Provider Last Rate   • acetaminophen  650 mg Oral Q6H PRN Philip Mott MD     • cefazolin  2,000 mg Intravenous Ronn Hawthorne MD 2,000 mg (07/01/23 2110)   • dextromethorphan-guaiFENesin  10 mL Oral Q4H PRN Philip Navarro MD     • heparin (porcine)  3-30 Units/kg/hr (Order-Specific) Intravenous Titrated Miroslava Muniz MD 16 Units/kg/hr (07/02/23 0618)   • insulin detemir  14 Units Subcutaneous HS Miroslava Muniz MD     • insulin lispro  1-5 Units Subcutaneous TID AC Philip Navarro MD     • insulin lispro  1-5 Units Subcutaneous HS Philip Navarro MD     • insulin lispro  5 Units Subcutaneous TID With Meals Miroslava Muniz MD     • metroNIDAZOLE  500 mg Oral [facility] Prisca Thomas MD     • oxyCODONE  5 mg Oral Q6H PRN Miroslava Muniz MD     • sodium chloride  75 mL/hr Intravenous Continuous Miroslava Muniz MD 75 mL/hr (07/02/23 0934)        Today, Patient Was Seen By: Miroslava Muniz MD    **Please Note: This note may have been constructed using a voice recognition system. **

## 2023-07-03 PROBLEM — E11.65 DIABETES MELLITUS WITH HYPERGLYCEMIA (HCC): Status: ACTIVE | Noted: 2023-06-26

## 2023-07-03 PROBLEM — R78.81 BACTEREMIA DUE TO KLEBSIELLA PNEUMONIAE: Status: ACTIVE | Noted: 2023-07-03

## 2023-07-03 PROBLEM — B96.1 BACTEREMIA DUE TO KLEBSIELLA PNEUMONIAE: Status: ACTIVE | Noted: 2023-07-03

## 2023-07-03 LAB
APTT PPP: 63 SECONDS (ref 23–37)
BACTERIA BLD CULT: NORMAL
BACTERIA BLD CULT: NORMAL
GLUCOSE SERPL-MCNC: 171 MG/DL (ref 65–140)
GLUCOSE SERPL-MCNC: 254 MG/DL (ref 65–140)

## 2023-07-03 PROCEDURE — 88112 CYTOPATH CELL ENHANCE TECH: CPT | Performed by: PATHOLOGY

## 2023-07-03 PROCEDURE — 85730 THROMBOPLASTIN TIME PARTIAL: CPT | Performed by: INTERNAL MEDICINE

## 2023-07-03 PROCEDURE — 88185 FLOWCYTOMETRY/TC ADD-ON: CPT

## 2023-07-03 PROCEDURE — 99239 HOSP IP/OBS DSCHRG MGMT >30: CPT | Performed by: INTERNAL MEDICINE

## 2023-07-03 PROCEDURE — 88305 TISSUE EXAM BY PATHOLOGIST: CPT | Performed by: PATHOLOGY

## 2023-07-03 PROCEDURE — 99232 SBSQ HOSP IP/OBS MODERATE 35: CPT | Performed by: INTERNAL MEDICINE

## 2023-07-03 PROCEDURE — 82948 REAGENT STRIP/BLOOD GLUCOSE: CPT

## 2023-07-03 PROCEDURE — NC001 PR NO CHARGE: Performed by: INTERNAL MEDICINE

## 2023-07-03 PROCEDURE — 88184 FLOWCYTOMETRY/ TC 1 MARKER: CPT | Performed by: PHYSICIAN ASSISTANT

## 2023-07-03 RX ORDER — LORAZEPAM 0.5 MG/1
0.5 TABLET ORAL EVERY 8 HOURS PRN
Status: DISCONTINUED | OUTPATIENT
Start: 2023-07-03 | End: 2023-07-04 | Stop reason: HOSPADM

## 2023-07-03 RX ORDER — ALBUTEROL SULFATE 90 UG/1
2 AEROSOL, METERED RESPIRATORY (INHALATION) EVERY 6 HOURS PRN
Status: ON HOLD | COMMUNITY
Start: 2023-05-10

## 2023-07-03 RX ORDER — POTASSIUM CHLORIDE 20 MEQ/1
20 TABLET, EXTENDED RELEASE ORAL ONCE
Status: COMPLETED | OUTPATIENT
Start: 2023-07-03 | End: 2023-07-03

## 2023-07-03 RX ORDER — TEMAZEPAM 15 MG/1
15 CAPSULE ORAL
Status: DISCONTINUED | OUTPATIENT
Start: 2023-07-03 | End: 2023-07-04 | Stop reason: HOSPADM

## 2023-07-03 RX ORDER — ATORVASTATIN CALCIUM 10 MG/1
10 TABLET, FILM COATED ORAL
Status: ON HOLD | COMMUNITY
Start: 2023-04-07

## 2023-07-03 RX ORDER — MONTELUKAST SODIUM 10 MG/1
10 TABLET ORAL
Status: ON HOLD | COMMUNITY
Start: 2023-05-10 | End: 2024-05-09

## 2023-07-03 RX ORDER — FUROSEMIDE 10 MG/ML
40 INJECTION INTRAMUSCULAR; INTRAVENOUS ONCE
Status: COMPLETED | OUTPATIENT
Start: 2023-07-03 | End: 2023-07-03

## 2023-07-03 RX ADMIN — ACETAMINOPHEN 650 MG: 325 TABLET, FILM COATED ORAL at 23:58

## 2023-07-03 RX ADMIN — INSULIN LISPRO 5 UNITS: 100 INJECTION, SOLUTION INTRAVENOUS; SUBCUTANEOUS at 08:50

## 2023-07-03 RX ADMIN — OXYCODONE HYDROCHLORIDE 5 MG: 5 TABLET ORAL at 03:51

## 2023-07-03 RX ADMIN — METRONIDAZOLE 500 MG: 500 TABLET ORAL at 06:54

## 2023-07-03 RX ADMIN — ACETAMINOPHEN 650 MG: 325 TABLET, FILM COATED ORAL at 08:53

## 2023-07-03 RX ADMIN — METRONIDAZOLE 500 MG: 500 TABLET ORAL at 13:09

## 2023-07-03 RX ADMIN — APIXABAN 10 MG: 5 TABLET, FILM COATED ORAL at 13:09

## 2023-07-03 RX ADMIN — INSULIN LISPRO 2 UNITS: 100 INJECTION, SOLUTION INTRAVENOUS; SUBCUTANEOUS at 22:32

## 2023-07-03 RX ADMIN — CEFAZOLIN SODIUM 2000 MG: 2 SOLUTION INTRAVENOUS at 03:38

## 2023-07-03 RX ADMIN — ACETAMINOPHEN 650 MG: 325 TABLET, FILM COATED ORAL at 18:03

## 2023-07-03 RX ADMIN — FUROSEMIDE 40 MG: 10 INJECTION, SOLUTION INTRAMUSCULAR; INTRAVENOUS at 00:44

## 2023-07-03 RX ADMIN — INSULIN LISPRO 5 UNITS: 100 INJECTION, SOLUTION INTRAVENOUS; SUBCUTANEOUS at 18:01

## 2023-07-03 RX ADMIN — INSULIN LISPRO 1 UNITS: 100 INJECTION, SOLUTION INTRAVENOUS; SUBCUTANEOUS at 13:11

## 2023-07-03 RX ADMIN — INSULIN LISPRO 2 UNITS: 100 INJECTION, SOLUTION INTRAVENOUS; SUBCUTANEOUS at 18:01

## 2023-07-03 RX ADMIN — APIXABAN 10 MG: 5 TABLET, FILM COATED ORAL at 18:01

## 2023-07-03 RX ADMIN — TEMAZEPAM 15 MG: 15 CAPSULE ORAL at 22:32

## 2023-07-03 RX ADMIN — CEFAZOLIN SODIUM 2000 MG: 2 SOLUTION INTRAVENOUS at 21:30

## 2023-07-03 RX ADMIN — INSULIN LISPRO 1 UNITS: 100 INJECTION, SOLUTION INTRAVENOUS; SUBCUTANEOUS at 08:48

## 2023-07-03 RX ADMIN — GUAIFENESIN AND DEXTROMETHORPHAN 10 ML: 100; 10 SYRUP ORAL at 18:03

## 2023-07-03 RX ADMIN — INSULIN LISPRO 5 UNITS: 100 INJECTION, SOLUTION INTRAVENOUS; SUBCUTANEOUS at 13:11

## 2023-07-03 RX ADMIN — CEFAZOLIN SODIUM 2000 MG: 2 SOLUTION INTRAVENOUS at 10:52

## 2023-07-03 RX ADMIN — POTASSIUM CHLORIDE 20 MEQ: 1500 TABLET, EXTENDED RELEASE ORAL at 13:09

## 2023-07-03 RX ADMIN — INSULIN DETEMIR 14 UNITS: 100 INJECTION, SOLUTION SUBCUTANEOUS at 22:32

## 2023-07-03 NOTE — PROGRESS NOTES
Patient had eloped/went home due to "a misunderstanding."  After phone call to family he was brought back to the hospital.    The case was discussed with unit manager, charge nurse, and hospital supervisor who are okay with patient to continue treatment and does not need to go through the ER. Patient seen and examined. He is agreeable to stay. He did not touch or manipulate the IVs/drains while he was gone.   He is hoping for alternative instead of IV for duration of antibiotics for his bacteremia and discussed discussed with ID.

## 2023-07-03 NOTE — ASSESSMENT & PLAN NOTE
· Insulin-dependent diabetes with hyperglycemia now better.   · Patient left AGAINST MEDICAL ADVICE  · Continue prior insulin regimen    Results from last 7 days   Lab Units 06/26/23 2128   HEMOGLOBIN A1C % 13.3*     Results from last 7 days   Lab Units 07/03/23  1129 07/02/23  1550 07/02/23  1048 07/02/23  0726 07/01/23  2138 07/01/23  1942   POC GLUCOSE mg/dl 171* 195* 205* 176* 217* 191*

## 2023-07-03 NOTE — ASSESSMENT & PLAN NOTE
· Seen by hematology and vascular surgery. Was on heparin infusion.   · Was planning to send patient home on Eliquis, but patient eloped from the hospital

## 2023-07-03 NOTE — PROGRESS NOTES
[address]  Progress Note  Name: Alessia Rebolledo I  MRN: [id number]  Unit/Bed#: -01 I Date of Admission: 6/26/2023   Date of Service: 7/3/2023 I Hospital Day: 7    Assessment/Plan   * Bacteremia due to Klebsiella pneumoniae  Assessment & Plan  Background: History of diabetes mellitus presented to the hospital with febrile illness found to have sepsis secondary to klebsiella bacteremia and klebsiella liver abscess  · Sepsis secondary to bacteremia. · Status post IR drain. Repeat blood culture 6/28 cleared  · ID following currently on cefazolin/metronidazole  · False positive positive HIV  · Due to concerns for septic emboli including pulmonary findings and hepatic vein phlebitis, ID recommends 6-week duration of IV antibiotics    Thrombosis, hepatic vein (HCC)  Assessment & Plan  · Seen by hematology and vascular surgery. Currently on heparin infusion. · We will transition to eliquis starting today. Should be covered with the patient's [employer]    Hypokalemia  Assessment & Plan  · Continue to replete and recheck in a.m. Results from last 7 days   Lab Units 07/02/23  2025 07/02/23  0451 07/01/23  0448 06/30/23  0519   POTASSIUM mmol/L 3.4* 2.8* 3.4* 3.3*       Diabetes mellitus with hyperglycemia (HCC)  Assessment & Plan  · Insulin-dependent diabetes with hyperglycemia now better. · Elevated glucose secondary to sepsis and bacteremia  · Continue levemir 14 units with lispro 5 units 3 times daily    Results from last 7 days   Lab Units 06/26/23  2128   HEMOGLOBIN A1C % 13.3*     Results from last 7 days   Lab Units 07/03/23  1129 07/02/23  1550 07/02/23  1048 07/02/23  0726 07/01/23  2138 07/01/23  1942   POC GLUCOSE mg/dl 171* 195* 205* 176* 217* 191*     VTE Pharmacologic Prophylaxis: VTE Score: 5 High Risk (Score >/= 5) - Pharmacological DVT Prophylaxis Ordered: Switching heparin infusion to Eliquis. Sequential Compression Devices Ordered. Patient Centered Rounds:  I have performed bedside rounds with nursing staff today. Discussions with Specialists or Other Care Team Provider: Infectious disease and case management    Education and Discussions with Family / Patient:     Time Spent for Care: This time was spent on one or more of the following: performing physical exam; counseling and coordination of care; obtaining or reviewing history; documenting in the medical record; reviewing/ordering tests, medications or procedures; communicating with other healthcare professionals and discussing with patient's family/caregivers. Current Length of Stay: 7 day(s)  Current Patient Status: Inpatient   Certification Statement: The patient will continue to require additional inpatient hospital stay due to IV antibiotics  Discharge Plan: Anticipate discharge in 48-72 hrs to home. Code Status: Level 1 - Full Code      Subjective:   Patient seen and examined. Wants to go home. No other complaints. Feeling well. No pain. Objective:   Vitals: Blood pressure 118/73, pulse 94, temperature 98.6 °F (37 °C), resp. rate 18, height 5' 5" (1.651 m), weight 79.4 kg (175 lb), SpO2 98 %. Intake/Output Summary (Last 24 hours) at 7/3/2023 1203  Last data filed at 7/3/2023 0853  Gross per 24 hour   Intake 2100 ml   Output 3515 ml   Net -1415 ml       Physical Exam  Vitals reviewed. Constitutional:       General: He is not in acute distress. Appearance: Normal appearance. HENT:      Head: Atraumatic. Eyes:      Extraocular Movements: Extraocular movements intact. Cardiovascular:      Rate and Rhythm: Regular rhythm. Pulmonary:      Effort: Pulmonary effort is normal.      Breath sounds: Decreased breath sounds present. No wheezing. Abdominal:      General: Bowel sounds are normal.      Palpations: Abdomen is soft. Tenderness: There is no abdominal tenderness. There is no rebound.       Comments: Hepatic drain present   Musculoskeletal:         General: No swelling or tenderness. Skin:     General: Skin is warm and dry. Neurological:      Mental Status: He is alert. Cranial Nerves: No cranial nerve deficit. Psychiatric:         Mood and Affect: Mood normal.       Additional Data:   Labs:  Results from last 7 days   Lab Units 07/02/23 0451 07/01/23 0448 06/30/23  1244 06/30/23  0519 06/26/23  2128 06/26/23  1539   WBC Thousand/uL 16.22* 14.57*  --  13.06*   < > 11.15*   WHITE BLOOD CELL COUNT.   --   --   --   --    < >  --    HEMOGLOBIN g/dL 8.9* 9.5*  --  8.9*   < > 13.0   HEMOGLOBIN.   --   --   --   --    < >  --    PLATELETS Thousands/uL 336 271  --  224   < > 152   PLATELETS. --   --   --   --    < >  --    MCV fL 82 82  --  82   < > 85   MCV.   --   --   --   --    < >  --    TOTAL NEUT ABS Thousand/uL  --  11.36*  --  9.80*  --  8.59*   BANDS PCT %  --   --   --  2  --  8   INR   --   --  1.33*  --   --  1.25*    < > = values in this interval not displayed. Results from last 7 days   Lab Units 07/02/23 2025 07/02/23 0451 07/01/23 0448 06/30/23  0519 06/29/23  0437 06/28/23  0432 06/27/23  1141   SODIUM mmol/L 135 135 135 136   < > 134* 131*   POTASSIUM mmol/L 3.4* 2.8* 3.4* 3.3*   < > 4.1 4.6   CHLORIDE mmol/L 106 109* 108 110*   < > 108 105   CO2 mmol/L 24 20* 21 18*   < > 12* 11*   ANION GAP mmol/L 5 6 6 8   < > 14 15   BUN mg/dL 8 11 15 17   < > 18 20   CREATININE mg/dL 0.74 0.61 0.84 0.81   < > 1.00 1.16   CALCIUM mg/dL 7.2* 6.5* 7.3* 7.7*   < > 8.2* 8.3*   ALBUMIN g/dL  --   --   --  2.5*  --  3.0* 3.2*   TOTAL BILIRUBIN mg/dL  --   --   --  1.53*  --  1.90* 1.44*   ALK PHOS U/L  --   --   --  112*  --  130* 129*   ALT U/L  --   --   --  48  --  80* 110*   AST U/L  --   --   --  41*  --  33 37   EGFR ml/min/1.73sq m 113 123 107 109   < > 92 77   GLUCOSE RANDOM mg/dL 157* 153* 161* 201*   < > 214* 275*    < > = values in this interval not displayed.      Results from last 7 days   Lab Units 07/02/23  0451   MAGNESIUM mg/dL 2.3         Results from last 7 days   Lab Units 06/26/23  1936 06/26/23  1805 06/26/23  1539   HS TNI 0HR ng/L  --   --  55*   HS TNI 2HR ng/L  --  53*  --    HS TNI 4HR ng/L 52*  --   --           Results from last 7 days   Lab Units 07/02/23  0451 06/27/23  0445 06/26/23 2128 06/26/23  1936 06/26/23  1539   LACTIC ACID mmol/L  --   --  1.4 2.6* 2.7*   PROCALCITONIN ng/ml 5.04*   < >  --   --  14.64*    < > = values in this interval not displayed. Results from last 7 days   Lab Units 07/03/23  1129 07/02/23  1550 07/02/23  1048 07/02/23  0726 07/01/23  2138 07/01/23  1942 07/01/23  1532 07/01/23  1136 07/01/23  0745 06/30/23  2130 06/30/23  1611 06/30/23  1136   POC GLUCOSE mg/dl 171* 195* 205* 176* 217* 191* 213* 222* 149* 221* 258* 186*     Results from last 7 days   Lab Units 06/26/23 2128   HEMOGLOBIN A1C % 13.3*         * I Have Reviewed All Lab Data Listed Above. Cultures:   Results from last 7 days   Lab Units 06/29/23  0901 06/28/23  0448 06/28/23  0432 06/27/23  0825 06/26/23  2019 06/26/23  1551 06/26/23  1539   BLOOD CULTURE   --  No Growth After 5 Days. No Growth After 5 Days.   --   --   --  Klebsiella pneumoniae* Klebsiella pneumoniae*   GRAM STAIN RESULT  2+ Disintegrating polys*  3+ Gram negative rods*  --   --   --   --  Gram negative rods* Gram negative rods*   BODY FLUID CULTURE, STERILE  2+ Growth of Klebsiella pneumoniae*  --   --   --   --   --   --    INFLUENZA A PCR   --   --   --   --   --  Negative  --    LEGIONELLA URINARY ANTIGEN   --   --   --   --  Negative  --   --    STREP PNEUMONIAE ANTIGEN, URINE   --   --   --  Negative  --   --   --    RAPID HIV 1X2   --   --  Reactive*  --   --   --   --        Results from last 7 days   Lab Units 06/26/23  4571   SARS-COV-2  Negative   INFLUENZA A PCR  Negative   INFLUENZA B PCR  Negative   RSV PCR  Negative           Lines/Drains:  Invasive Devices     Peripheral Intravenous Line  Duration           Peripheral IV 06/30/23 Right Antecubital 3 days Long-Dwell Peripheral IV (Midline) 06/30/23 Left Brachial 2 days          Drain  Duration           Abscess Drain Abdomen 4 days              Telemetry:      Imaging:  Imaging Reports Reviewed Today Include:   MRI abdomen w wo contrast and mrcp    Result Date: 6/30/2023  Impression: 4.9 x 2.9 x 3.1 cm lesion in the right lobe of the liver. Imaging characteristics are compatible with hepatic abscess. This could be confirmed with image directed fine-needle aspiration. Thrombus extending from the abscess to a small accessory right hepatic vein. Although this may well be septic thrombosis, there are no imaging findings to suggest septic thrombus. Bibasilar pneumonia redemonstrated and better shown on recent CT scan however, there has been definite worsening in the posterior basilar segment of the right lower lobe. Significant findings were relayed to Dr. Jimmy Minor at the time of this dictation. Workstation performed: [id number]     IR biopsy liver mass    Result Date: 6/29/2023  Impression: Percutaneous placement of a 10 Scottish drainage catheter into segment 7 liver abscess, yielding 10 mL of purulent fluid. Plan: - Drainage catheter to bulb suction drainage. - Flush catheter with 10 mL normal saline daily. - Follow-up for drain check in 2 weeks. Workstation performed: [id number]     US right upper quadrant    Result Date: 6/27/2023  Impression: 1. Indeterminate 5.1 cm right hepatic dome mass as described above for which further evaluation/characterization is advised with contrast-enhanced MRI. 2. Trace pericholecystic fluid without other findings to suggest cholecystitis or biliary obstruction. The study was marked in [facility] for immediate notification. Workstation performed: [id number]     CTA ED chest PE study    Result Date: 6/26/2023  Impression: No pulmonary embolus. Moderate consolidation in the posterior segment right upper lobe and superior segment right lower lobe due to pneumonia.  Multiple bilateral ill-defined lung nodules, 1.1 cm and and smaller. The patient has an indeterminate 5.4 x 2.9 cm liver mass which will need to be further evaluated by nonemergent MRI. While the lung nodules are likely bronchopneumonia or septic emboli,  metastatic disease cannot be excluded. I personally discussed this study with Gillian Gore PA-C, on 6/26/2023 7:34 PM. Workstation performed: [id number]     XR chest 1 view portable    Result Date: 6/26/2023  Impression: Right upper lung infiltrate and/or atelectasis. Workstation performed: [id number]       Scheduled Meds:  Current Facility-Administered Medications   Medication Dose Route Frequency Provider Last Rate   • acetaminophen  650 mg Oral Q6H PRN Philip Cutler MD     • cefazolin  2,000 mg Intravenous Q8H Lauralee Hammans, MD 2,000 mg (07/03/23 1052)   • dextromethorphan-guaiFENesin  10 mL Oral Q4H PRN Philip Cutler MD     • heparin (porcine)  3-30 Units/kg/hr (Order-Specific) Intravenous Titrated Jack Jeffrey MD 18 Units/kg/hr (07/03/23 0410)   • insulin detemir  14 Units Subcutaneous HS Jack Jeffrey MD     • insulin lispro  1-5 Units Subcutaneous TID AC Philip Cutler MD     • insulin lispro  1-5 Units Subcutaneous HS Philip Cutler MD     • insulin lispro  5 Units Subcutaneous TID With Meals Jack Jeffrey MD     • metroNIDAZOLE  500 mg Oral [facility] Lauralee Hammans, MD     • oxyCODONE  5 mg Oral Q6H PRN Jack Jeffrey MD         Today, Patient Was Seen By: Azam Gamble DO    ** Please Note: Dictation voice to text software may have been used in the creation of this document.  **

## 2023-07-03 NOTE — QUICK NOTE
Contacted by nursing that patient requesting to leave the hospital. She also notes that patient with increased swelling in his feet and legs. Patient admitted for sepsis secondary to liver abscess, renal failure, hepatic vein thrombosis. Patient has been receiving IVF for the last week. His renal function is better. Appears to be volume overloaded. Will treat with IV lasix 40mg now. Discussed with patient the importance of staying in the hospital as he is receiving IV antibiotics and heparin for his thrombosis. Patient agreeable to stay overnight but would like to be [facility] so he can go home. I explained that I cannot promise that the provider will be okay with discharge tomorrow and he will have to discuss further with them about long term treatment plan. Patient agreeable to stay in the hospital for now.

## 2023-07-03 NOTE — ASSESSMENT & PLAN NOTE
· Continue to replete and recheck in a.m.     Results from last 7 days   Lab Units 07/02/23  2025 07/02/23  0451 07/01/23  0448 06/30/23  0519   POTASSIUM mmol/L 3.4* 2.8* 3.4* 3.3*

## 2023-07-03 NOTE — CASE MANAGEMENT
Case Management Assessment & Discharge Planning Note    Patient name Tsering Christianson  Location /-17 MRN [id number]  : 1980 Date 7/3/2023       Current Admission Date: 2023  Current Admission Diagnosis:Bacteremia due to Klebsiella pneumoniae   Patient Active Problem List    Diagnosis Date Noted   • Bacteremia due to Klebsiella pneumoniae 2023   • Hypokalemia 2023   • Thrombosis, hepatic vein ([address]) 2023   • Sepsis ([address]) 2023   • Liver abscess 2023   • Septic embolism ([address]) 2023   • Elevated troponin 2023   • Diabetes mellitus with hyperglycemia ([address]) 2023   • Acute renal failure (ARF) ([address]) 2023   • Hyponatremia 2023      LOS (days): 7  Geometric Mean LOS (GMLOS) (days):   Days to GMLOS:     OBJECTIVE:    Risk of Unplanned Readmission Score: 14.86         Current admission status: Inpatient       Preferred Pharmacy:   [facility] [address] [place] [address]  Phone: [phone] Fax: [phone]    Primary Care Provider: ANGELIQUE Valdez    Primary Insurance: [employer]  Secondary Insurance:     ASSESSMENT:  [place] Proxies    There are no active Health Care Proxies on file. Readmission Root Cause  30 Day Readmission: No    Patient Information  Admitted from[de-identified] Home  Mental Status: Alert  During Assessment patient was accompanied by: Not accompanied during assessment  Assessment information provided by[de-identified] Patient  Primary Caregiver: Self  Support Systems: Family members  [place] of Residence: [address] [place] [address] do you live in?: [facility] entry access options.  Select all that apply.: Stairs  Number of steps to enter home.: 5  Do the steps have railings?: Yes  Type of Current Residence: [address] [place] [address] home  Upon entering residence, is there a bedroom on the main floor (no further steps)?: No  A bedroom is located on the following floor levels of residence (select all that apply):: 2nd Floor  Upon entering residence, is there a bathroom on the main floor (no further steps)?: No  Indicate which floors of current residence have a bathroom (select all the apply):: 2nd Floor  Number of steps to 2nd floor from main floor: 5  In the last 12 months, was there a time when you were not able to pay the mortgage or rent on time?: No  In the last 12 months, was there a time when you did not have a steady place to sleep or slept in a shelter (including now)?: No  Homeless/housing insecurity resource given?: N/A  Living Arrangements: Lives w/ Spouse/significant other  Is patient a ?: No    Activities of Daily Living Prior to Admission  Functional Status: Independent  Completes ADLs independently?: Yes  Ambulates independently?: Yes  Does patient use assisted devices?: No  Does patient currently own DME?: No  Does patient have a history of Outpatient Therapy (PT/OT)?: No  Does the patient have a history of Short-Term Rehab?: No  Does patient have a history of HHC?: No  Does patient currently have [address]?: No         Patient Information Continued  Income Source: Employed  Does patient have prescription coverage?: Yes  Within the past 12 months, you worried that your food would run out before you got the money to buy more.: Never true  Within the past 12 months, the food you bought just didn't last and you didn't have money to get more.: Never true  Food insecurity resource given?: N/A  Does patient receive dialysis treatments?: No  Does patient have a history of substance abuse?: No  Does patient have a history of Mental Health Diagnosis?: No         Means of Transportation  Means of Transport to Appts[de-identified] Drives Self  In the past 12 months, has lack of transportation kept you from medical appointments or from getting medications?: No  In the past 12 months, has lack of transportation kept you from meetings, work, or from getting things needed for daily living?: No  Was application for public transport provided?: N/A        DISCHARGE DETAILS:    Discharge planning discussed with[de-identified] Patient at bedside. Freedom of Choice: Yes  Comments - Freedom of Choice: NO PT/OT consulted. PT fully independent prior to admission. CM contacted family/caregiver?: No- see comments (Pt fully alert and oriented and able to make own decisions.)  Were Treatment Team discharge recommendations reviewed with patient/caregiver?: Yes  Did patient/caregiver verbalize understanding of patient care needs?: Yes  Were patient/caregiver advised of the risks associated with not following Treatment Team discharge recommendations?: Yes    Contacts  Patient Contacts: Patient  Relationship to Patient[de-identified] Other (Comment)  Contact Method: In Person  Reason/Outcome: Continuity of Care, Discharge [facility]         Is the patient interested in [facility] at discharge?: Yes  [facility] requested[de-identified] [facility] Name[de-identified] Other (TBD)  [facility] Provider[de-identified] PCP  Home Health Services Needed[de-identified] Other (comment) (Liver drain care.)  Homebound Criteria Met[de-identified] Requires the Assistance of Another Person for Safe Ambulation or to Leave the Home  Supporting Clincal Findings[de-identified] Limited Endurance    DME Referral Provided  Referral made for DME?: No    Other Referral/Resources/Interventions Provided:  Interventions: C  Referral Comments: Referral sent for [facility] due to pt being discharged with liver drain.     Would you like to participate in our [facility] service program?  : No - Declined    Treatment Team Recommendation: Home  Discharge Destination Plan[de-identified] Home

## 2023-07-03 NOTE — DISCHARGE SUMMARY
[address]  Discharge- Ray Yo Mbow 1980, 43 y.o. male MRN: [id number]  Unit/Bed#: -01 Encounter: [id number]  Primary Care Provider: Stella Fernandez [address] [place] [address]   Date and time admitted to hospital: 6/26/2023  3:20 PM    **PATIENT ELOPED/ [address] **    Admitting Provider:  Chito Upton MD  Discharge Provider:  Aneesh Hankins DO  Admission Date: 6/26/2023       Discharge Date: 07/03/23   LOS: 7  Primary Care Physician at Discharge: Stella Fernandez, [address]    DISCHARGE DIAGNOSES  * Bacteremia due to Klebsiella pneumoniae  Assessment & Plan  Background: History of diabetes mellitus presented to the hospital with febrile illness found to have sepsis secondary to klebsiella bacteremia and klebsiella liver abscess  · Sepsis secondary to bacteremia. · Status post IR drain. Repeat blood culture 6/28 cleared  · ID following currently on cefazolin/metronidazole  · False positive positive HIV  · Due to concerns for septic emboli including pulmonary findings and hepatic vein phlebitis, ID recommends 6-week duration of IV antibiotics  · Unfortunately, patient eloped at time of this note and when called he was sitting at home with his wife. He is advised to come back given active drainage and IVs but will have to go through the ER for re-admission    Thrombosis, hepatic vein ([address])  Assessment & Plan  · Seen by hematology and vascular surgery. Was on heparin infusion. · Transitioned to eliquis starting today. Should be covered with the patient's erihealth    Hypokalemia  Assessment & Plan  · Continue to replete and recheck in a.m. Results from last 7 days   Lab Units 07/02/23 2025 07/02/23  0451 07/01/23  0448 06/30/23  0519   POTASSIUM mmol/L 3.4* 2.8* 3.4* 3.3*       Diabetes mellitus with hyperglycemia (HCC)  Assessment & Plan  · Insulin-dependent diabetes with hyperglycemia now better.   · Elevated glucose secondary to sepsis and bacteremia  · Now on levemir 14 units with lispro 5 units 3 times daily    Results from last 7 days   Lab Units 06/26/23  2128   HEMOGLOBIN A1C % 13.3*     Results from last 7 days   Lab Units 07/03/23  1129 07/02/23  1550 07/02/23  1048 07/02/23  0726 07/01/23  2138 07/01/23  1942   POC GLUCOSE mg/dl 171* 195* 205* 176* 217* 191*     REASON FOR ADMISSION  Asthma (Patient reports "I have had the flu for 5 days". Patient reports generalized body aches, chill and reports "my asthma is acting up". Patient reports shortness of breath  and is out of their inhaler medications at home. Visible work of breathing in [facility]:  Jose Alfredo Quiñonez is a 43 y.o. male with a history of diabetes mellitus who presented to the hospital with flulike symptoms. He was subsequently found to have bacteremia and liver abscess. Drain was placed into liver. He had uncontrolled diabetes necessitating insulin. He was recommended extended duration of IV antibiotics given pulmonary findings concerning for septic emboli. Unfortunately at this time he had left the hospital/eloped and could not be found. When called he was at home with his wife. He is advised to come back to the emergency department to continue treatment as he remains at high risk for worsening bacteremia/sepsis or death    Please see problem list listed above. CONSULTING PROVIDERS   IP CONSULT TO PULMONOLOGY  IP CONSULT TO INFECTIOUS DISEASES  IP CONSULT TO GASTROENTEROLOGY  INPATIENT CONSULT TO IR  IP CONSULT TO HEMATOLOGY  IP CONSULT TO VASCULAR SURGERY    PROCEDURES PERFORMED  IR biopsy liver mass    Result Date: 6/29/2023  Impression: Percutaneous placement of a 10 Japanese drainage catheter into segment 7 liver abscess, yielding 10 mL of purulent fluid. Plan: - Drainage catheter to bulb suction drainage. - Flush catheter with 10 mL normal saline daily. - Follow-up for drain check in 2 weeks.  Workstation performed: [id number]       ZGABBYU RESULTS  MRI abdomen w wo contrast and mrcp    Result Date: 6/30/2023  Impression: 4.9 x 2.9 x 3.1 cm lesion in the right lobe of the liver. Imaging characteristics are compatible with hepatic abscess. This could be confirmed with image directed fine-needle aspiration. Thrombus extending from the abscess to a small accessory right hepatic vein. Although this may well be septic thrombosis, there are no imaging findings to suggest septic thrombus. Bibasilar pneumonia redemonstrated and better shown on recent CT scan however, there has been definite worsening in the posterior basilar segment of the right lower lobe. Significant findings were relayed to Dr. Jada Lawrence at the time of this dictation. Workstation performed: [id number]       US right upper quadrant    Result Date: 6/27/2023  Impression: 1. Indeterminate 5.1 cm right hepatic dome mass as described above for which further evaluation/characterization is advised with contrast-enhanced MRI. 2. Trace pericholecystic fluid without other findings to suggest cholecystitis or biliary obstruction. The study was marked in [facility] for immediate notification. Workstation performed: [id number]     CTA ED chest PE study    Result Date: 6/26/2023  Impression: No pulmonary embolus. Moderate consolidation in the posterior segment right upper lobe and superior segment right lower lobe due to pneumonia. Multiple bilateral ill-defined lung nodules, 1.1 cm and and smaller. The patient has an indeterminate 5.4 x 2.9 cm liver mass which will need to be further evaluated by nonemergent MRI. While the lung nodules are likely bronchopneumonia or septic emboli,  metastatic disease cannot be excluded. I personally discussed this study with Delmar Neumann PA-C, on 6/26/2023 7:34 PM. Workstation performed: [id number]     XR chest 1 view portable    Result Date: 6/26/2023  Impression: Right upper lung infiltrate and/or atelectasis.  Workstation performed: [email or web address]  Results from last 7 days   Lab Units 07/02/23 0451 07/01/23 0448 06/30/23  1244 06/30/23  0519 06/29/23  0437 06/28/23  1135 06/28/23 0432 06/27/23  1141 06/26/23 2128 06/26/23  1539   WBC Thousand/uL 16.22* 14.57*  --  13.06* 17.86*  --  17.14* 15.03*  --  11.15*   WHITE BLOOD CELL COUNT. x10E3/uL  --   --   --   --   --  21.2*  --   --   --   --    HEMOGLOBIN g/dL 8.9* 9.5*  --  8.9* 10.9*  --  10.9* 11.9*  --  13.0   HEMOGLOBIN. g/dL  --   --   --   --   --  10.5*  --   --   --   --    HEMATOCRIT % 27.3* 28.0*  --  25.9* 32.8*  --  34.0* 36.2*  --  39.5   HEMATOCRIT. %  --   --   --   --   --  30.5*  --   --   --   --    MCV fL 82 82  --  82 83  --  85 86  --  85   MCV.  fL  --   --   --   --   --  81  --   --   --   --    TOTAL NEUT ABS Thousand/uL  --  11.36*  --  9.80*  --   --   --   --   --  8.59*   BANDS PCT %  --   --   --  2  --   --   --   --   --  8   PLATELETS Thousands/uL 336 271  --  224 197  --  174 138* 122* 152   PLATELETS. U72V2/EI  --   --   --   --   --  196  --   --   --   --    INR   --   --  1.33*  --   --   --   --   --   --  1.25*     Results from last 7 days   Lab Units 07/02/23 2025 07/02/23 0451 07/01/23 0448 06/30/23  0519 06/29/23  0437 06/28/23 0432 06/27/23  1141 06/26/23  1539   SODIUM mmol/L 135 135 135 136 134* 134* 131* 130*   POTASSIUM mmol/L 3.4* 2.8* 3.4* 3.3* 4.0 4.1 4.6 4.3   CHLORIDE mmol/L 106 109* 108 110* 108 108 105 95*   CO2 mmol/L 24 20* 21 18* 15* 12* 11* 18*   BUN mg/dL 8 11 15 17 16 18 20 24   CREATININE mg/dL 0.74 0.61 0.84 0.81 0.89 1.00 1.16 1.33*   CALCIUM mg/dL 7.2* 6.5* 7.3* 7.7* 8.1* 8.2* 8.3* 9.9   ALBUMIN g/dL  --   --   --  2.5*  --  3.0* 3.2* 3.9   TOTAL BILIRUBIN mg/dL  --   --   --  1.53*  --  1.90* 1.44* 1.64*   ALK PHOS U/L  --   --   --  112*  --  130* 129* 141*   ALT U/L  --   --   --  48  --  80* 110* 191*   AST U/L  --   --   --  41*  --  33 37 82*   EGFR ml/min/1.73sq m 113 123 107 109 105 92 77 65   GLUCOSE RANDOM mg/dL 157* 153* 161* 201* 192* 214* 275* 448*         Results from last 7 days   Lab Units 06/26/23  1936 06/26/23  1805 06/26/23  1539   HS TNI 0HR ng/L  --   --  55*   HS TNI 2HR ng/L  --  53*  --    HS TNI 4HR ng/L 52*  --   --           Results from last 7 days   Lab Units 06/26/23  1539   D-DIMER QUANTITATIVE ug/ml FEU 3.07*     Results from last 7 days   Lab Units 07/03/23  1129 07/02/23  1550 07/02/23  1048 07/02/23  0726 07/01/23  2138 07/01/23  1942 07/01/23  1532 07/01/23  1136 07/01/23  0745 06/30/23  2130   POC GLUCOSE mg/dl 171* 195* 205* 176* 217* 191* 213* 222* 149* 221*     Results from last 7 days   Lab Units 06/26/23 2128   HEMOGLOBIN A1C % 13.3*         Results from last 7 days   Lab Units 07/02/23  0451 06/27/23  0445 06/26/23 2128 06/26/23  1936 06/26/23  1539   LACTIC ACID mmol/L  --   --  1.4 2.6* 2.7*   PROCALCITONIN ng/ml 5.04*   < >  --   --  14.64*    < > = values in this interval not displayed. Cultures:   Results from last 7 days   Lab Units 06/26/23  1715   COLOR UA  Light Yellow   CLARITY UA  Clear   SPEC GRAV UA  1.035*   PH UA  5.0   LEUKOCYTES UA  Elevated glucose may cause decreased leukocyte values. See urine microscopic for UWBC result*   NITRITE UA  Negative   GLUCOSE UA mg/dl >=1000 (1%)*   KETONES UA mg/dl 100 (3+)*   BILIRUBIN UA  Negative   BLOOD UA  Small*      Results from last 7 days   Lab Units 06/26/23  1715   RBC UA /hpf 1-2   WBC UA /hpf None Seen   EPITHELIAL CELLS WET PREP /hpf None Seen   BACTERIA UA /hpf None Seen      Results from last 7 days   Lab Units 06/29/23  0901 06/28/23  0448 06/28/23  0432 06/27/23  0825 06/26/23  2019 06/26/23  1551 06/26/23  1539   BLOOD CULTURE   --  No Growth After 5 Days. No Growth After 5 Days.   --   --   --  Klebsiella pneumoniae* Klebsiella pneumoniae*   GRAM STAIN RESULT  2+ Disintegrating polys*  3+ Gram negative rods*  --   --   --   --  Gram negative rods* Gram negative rods*   BODY FLUID CULTURE, STERILE  2+ Growth of Klebsiella pneumoniae*  -- --   --   --   --   --    INFLUENZA A PCR   --   --   --   --   --  Negative  --    LEGIONELLA URINARY ANTIGEN   --   --   --   --  Negative  --   --    STREP PNEUMONIAE ANTIGEN, URINE   --   --   --  Negative  --   --   --    RAPID HIV 1X2   --   --  Reactive*  --   --   --   --      Results from last 7 days   Lab Units 06/26/23  1551   SARS-COV-2  Negative   INFLUENZA A PCR  Negative   INFLUENZA B PCR  Negative   RSV PCR  Negative             DISCHARGE DAY VISIT AND PHYSICAL EXAM:  Please refer to progress note dated earlier today. Planned Re-admission: Patient advised to be readmitted  Discharge Disposition: Patient eloped/left against advice    Test Results Pending at Discharge:   Pending Labs     Order Current Status    Beta-2 glycoprotein antibodies In process    Cardiolipin antibody In process    Factor 5 leiden In process    Leukemia/Lymphoma flow cytometry In process    Non-gynecologic cytology In process    Prothrombin gene mutation In process      Incidental findings: Multiple findings as outlined above. Patient left before finishing treatment      Code Status: Level 1 - Full Code  Discharge Statement   I spent 35 minutes discharging the patient. This time was spent on the day of discharge. Greater than 50% of total time was spent with the patient and / or family counseling and / or coordination of care. ** Please Note: This note has been constructed using a voice recognition system.  **

## 2023-07-03 NOTE — UTILIZATION REVIEW
Continued Stay Review    Date: 7/3                          Current Patient Class: IP   Current Level of Care: MS    HPI:42 y.o. male initially admitted on  6/26    Assessment/Plan: repeat BC cleared . ID following , on cefazolin / flagyl . rec 6 weeks of iv abx . Plan to transition off heparin gtt to eliquis . K improved to 3.4. dec breath sounds . Hepatic drain present . 7/3 ID Note   No further fevers  , chills, sweats . Severe sepsis cont cefazolin , DC flagyl . Recheck cbc . Supportive care . Plan 6 weeks of abx . Vital Signs:   07/03/23 06:51:56 98.6 °F (37 °C) 94 18 118/73 88 98 % -- --   07/03/23 0045 -- -- -- 140/81 101            Pertinent Labs/Diagnostic Results:   Results from last 7 days   Lab Units 06/26/23  1551   SARS-COV-2  Negative     Results from last 7 days   Lab Units 07/02/23  0451 07/01/23  0448 06/30/23  0519 06/29/23  0437 06/28/23  1135 06/28/23  0432 06/26/23  2128 06/26/23  1539   WBC Thousand/uL 16.22* 14.57* 13.06* 17.86*  --  17.14*   < > 11.15*   WHITE BLOOD CELL COUNT. x10E3/uL  --   --   --   --  21.2*  --   --   --    HEMOGLOBIN g/dL 8.9* 9.5* 8.9* 10.9*  --  10.9*   < > 13.0   HEMOGLOBIN. g/dL  --   --   --   --  10.5*  --   --   --    HEMATOCRIT % 27.3* 28.0* 25.9* 32.8*  --  34.0*   < > 39.5   HEMATOCRIT. %  --   --   --   --  30.5*  --   --   --    PLATELETS Thousands/uL 336 271 224 197  --  174   < > 152   PLATELETS. R39B3/NP  --   --   --   --  196  --   --   --    NEUTROS ABS Thousands/µL  --   --   --  11.84*  --  12.30*  --   --    NEUTROS ABS. x10E3/uL  --   --   --   --  15.5*  --   --   --    BANDS PCT %  --   --  2  --   --   --   --  8    < > = values in this interval not displayed.          Results from last 7 days   Lab Units 07/02/23 2025 07/02/23 0451 07/01/23  0448 06/30/23  0519 06/29/23  0437   SODIUM mmol/L 135 135 135 136 134*   POTASSIUM mmol/L 3.4* 2.8* 3.4* 3.3* 4.0   CHLORIDE mmol/L 106 109* 108 110* 108   CO2 mmol/L 24 20* 21 18* 15* ANION GAP mmol/L 5 6 6 8 11   BUN mg/dL 8 11 15 17 16   CREATININE mg/dL 0.74 0.61 0.84 0.81 0.89   EGFR ml/min/1.73sq m 113 123 107 109 105   CALCIUM mg/dL 7.2* 6.5* 7.3* 7.7* 8.1*   MAGNESIUM mg/dL  --  2.3  --   --   --      Results from last 7 days   Lab Units 06/30/23  0519 06/28/23  0432 06/27/23  1141 06/26/23  1539   AST U/L 41* 33 37 82*   ALT U/L 48 80* 110* 191*   ALK PHOS U/L 112* 130* 129* 141*   TOTAL PROTEIN g/dL 6.1* 6.9 6.8 8.1   ALBUMIN g/dL 2.5* 3.0* 3.2* 3.9   TOTAL BILIRUBIN mg/dL 1.53* 1.90* 1.44* 1.64*     Results from last 7 days   Lab Units 07/03/23  1129 07/02/23  1550 07/02/23  1048 07/02/23  0726 07/01/23  2138 07/01/23  1942 07/01/23  1532 07/01/23  1136 07/01/23  0745 06/30/23  2130 06/30/23  1611 06/30/23  1136   POC GLUCOSE mg/dl 171* 195* 205* 176* 217* 191* 213* 222* 149* 221* 258* 186*     Results from last 7 days   Lab Units 07/02/23  2025 07/02/23  0451 07/01/23  0448 06/30/23  0519 06/29/23  0437 06/28/23  0432 06/27/23  1141 06/26/23  1539   GLUCOSE RANDOM mg/dL 157* 153* 161* 201* 192* 214* 275* 448*     Results from last 7 days   Lab Units 06/26/23 2128   HEMOGLOBIN A1C % 13.3*   EAG mg/dl 335     Results from last 7 days   Lab Units 06/26/23  1539   PH CHINA  7.359   PCO2 CH[place] mm Hg 35.2*   PO2 [place] mm Hg 36.1   HCO3 [place] mmol/L 19.4*   BASE EXC [place] mmol/L -5.3   O2 CONTENT CHINA ml/dL 12.7   O2 HGB, VENOUS % 65.2         Results from last 7 days   Lab Units 06/26/23  1936 06/26/23  1805 06/26/23  1539   HS TNI 0HR ng/L  --   --  55*   HS TNI 2HR ng/L  --  53*  --    HSTNI D2 ng/L  --  -2  --    HS TNI 4HR ng/L 52*  --   --    HSTNI D4 ng/L -3  --   --      Results from last 7 days   Lab Units 06/26/23  1539   D-DIMER QUANTITATIVE ug/ml FEU 3.07*     Results from last 7 days   Lab Units 07/03/23  0334 07/02/23  2025 07/02/23  1307 06/30/23  1819 06/30/23  1244 06/26/23  1539   PROTIME seconds  --   --   --   --  16.3* 15.5*   INR   --   --   --   --  1.33* 1.25*   PTT seconds 63* 75* 59*   < > 39* 33    < > = values in this interval not displayed. Results from last 7 days   Lab Units 07/02/23  0451 06/30/23  0519 06/28/23  0432 06/27/23  0445 06/26/23  1539   PROCALCITONIN ng/ml 5.04* 8.26* 17.74* 24.42* 14.64*     Results from last 7 days   Lab Units 06/26/23  2128 06/26/23  1936 06/26/23  1539   LACTIC ACID mmol/L 1.4 2.6* 2.7*       Results from last 7 days   Lab Units 06/26/23  1539   BNP pg/mL 225*       Results from last 7 days   Lab Units 06/28/23  0432   HEP B S AG  Non-reactive   HEP C AB  Non-reactive   HEP B C IGM  Non-reactive   HEP B C TOTAL AB  Non-reactive       Results from last 7 days   Lab Units 06/26/23  1715   CLARITY UA  Clear   COLOR UA  Light Yellow   SPEC GRAV UA  1.035*   PH UA  5.0   GLUCOSE UA mg/dl >=1000 (1%)*   KETONES UA mg/dl 100 (3+)*   BLOOD UA  Small*   PROTEIN UA mg/dl 50 (1+)*   NITRITE UA  Negative   BILIRUBIN UA  Negative   UROBILINOGEN UA (BE) mg/dl <2.0   LEUKOCYTES UA  Elevated glucose may cause decreased leukocyte values. See urine microscopic for UWBC result*   WBC UA /hpf None Seen   RBC UA /hpf 1-2   BACTERIA UA /hpf None Seen   EPITHELIAL CELLS WET PREP /hpf None Seen     Results from last 7 days   Lab Units 06/27/23  0825 06/26/23  2019 06/26/23  1551   STREP PNEUMONIAE ANTIGEN, URINE  Negative  --   --    LEGIONELLA URINARY ANTIGEN   --  Negative  --    INFLUENZA A PCR   --   --  Negative   INFLUENZA B PCR   --   --  Negative   RSV PCR   --   --  Negative     Results from last 7 days   Lab Units 06/27/23  0825   AMPH/METH  Negative   BARBITURATE UR  Negative   BENZODIAZEPINE UR  Negative   COCAINE UR  Negative   METHADONE URINE  Negative   OPIATE UR  Negative   PCP UR  Negative   THC UR  Negative     Results from last 7 days   Lab Units 06/29/23  0901 06/28/23  0448 06/26/23  1551 06/26/23  1539   BLOOD CULTURE   --  No Growth After 5 Days. No Growth After 5 Days.  Klebsiella pneumoniae* Klebsiella pneumoniae*   GRAM STAIN RESULT  2+ Disintegrating polys*  3+ Gram negative rods*  --  Gram negative rods* Gram negative rods*   BODY FLUID CULTURE, STERILE  2+ Growth of Klebsiella pneumoniae*  --   --   --      Medications:   Scheduled Medications:  apixaban, 10 mg, Oral, BID   Followed by  Anna Walker ON 7/10/2023] apixaban, 5 mg, Oral, BID  cefazolin, 2,000 mg, Intravenous, Q8H  insulin detemir, 14 Units, Subcutaneous, HS  insulin lispro, 1-5 Units, Subcutaneous, TID AC  insulin lispro, 1-5 Units, Subcutaneous, HS  insulin lispro, 5 Units, Subcutaneous, TID With Meals  metroNIDAZOLE, 500 mg, Oral, Q8H [facility]      Continuous IV Infusions:     PRN Meds:  acetaminophen, 650 mg, Oral, Q6H PRN  dextromethorphan-guaiFENesin, 10 mL, Oral, Q4H PRN  oxyCODONE, 5 mg, Oral, Q6H PRN        Discharge Plan: D     Network Utilization Review Department  ATTENTION: Please call with any questions or concerns to [phone] and carefully listen to the prompts so that you are directed to the right person. All voicemails are confidential.  Zeke Luuub all requests for admission clinical reviews, approved or denied determinations and any other requests to dedicated fax number below belonging to the campus where the patient is receiving treatment.  List of dedicated fax numbers for the Facilities:  [place] DENIALS (Administrative/Medical Necessity) [phone] [address] [facility] (Maternity/NICU/Pediatrics) [phone]   [address] [place] [address] Drive [phone]   [facility] [address] [facility] [phone] [address] [place] [address] [address] [facility]   [address] [phone]   [address] [place] [address]  [facility] [phone]

## 2023-07-03 NOTE — PROGRESS NOTES
Progress Note - Infectious Disease   Beatrice Davisow 43 y.o. male MRN: [id number]  Unit/Bed#: -[facility] Encounter: [id number]      Impression/Plan:  1.  Severe sepsis.  Present on admission.  Leukocytosis, tachycardia, and fever soon after admission.  Appears to be secondary to Klebsiella pneumonia bacteremia.  Possibly secondary to pneumonia, however the radiographic findings are a bit atypical for an acute bacterial pneumonia, and concerned about the possibility of a hepatobiliary source with his elevated LFTs and chest pressure.  He appears to be quite ill although hemodynamically stable.  No resistant organisms or anaerobes isolated  -Continue cefazolin   -Discontinue Flagyl  -Source control measures as below  -Recheck CBC with differential and CMP  -Supportive care  -Plan 6 weeks of antibiotics total in the setting of possible septic hepatic vein phlebitis.  -Possibly ready for discharge soon  -We will discuss treatment options with patient     2.  Klebsiella pneumonia bacteremia.  Suspect related to the hepatobiliary infection. Less likely a pulmonary source. Concern for septic emboli based upon CT scan imaging but transthoracic echocardiogram without valvular vegetation appreciated. However possible septic phlebitis related to the hepatic vein clot. Repeat blood cultures are negative.  -Antibiotics as above  -Follow-up repeat blood cultures  -Additional work-up as needed     3.  Liver abscess.  Status post IR drainage with drain in place on 6/29/2023. Klebsiella is grown which is not a surprise.  Unclear primary etiology. MRI MRCP without biliary ductal dilatation or choledocholithiasis. Repeat CT scan on 7/3/2023 improved with decreased size of the collection.  -Antibiotics as above  -Drain management  -GI follow-up  -Recommend colonoscopy as an outpatient  -Additional work-up as needed  -Recheck CT abdomen pelvis in 4 weeks to confirm clearance    4. Septic embolus to the lung.   Suspect may be related to hepatic vein thrombosis noted on imaging. Possible septic thrombophlebitis equivalent with endovascular infection.  -Antibiotics as above for now  -Plan a 6-week course of antibiotic     5.  Diabetes mellitus. Type II. Poorly controlled with hemoglobin A1c of 13. This all is a risk factor for recurrent infection.  -Tighten diabetic control     6. False positive HIV screen.  With P24 antigen negative. HIV RNA and multi spot negative.  -No additional work-up needed    Discussed the above management plan with the primary service    Antibiotics:  Cefazolin 5  Flagyl 5  Antibiotics 8  Post procedure day 4    Subjective:  Patient has no fever, chills, sweats; no nausea, vomiting, diarrhea; no cough, shortness of breath; no pain. No new symptoms. He is feeling much better overall. Objective:  Vitals:  Temp:  [98.6 °F (37 °C)-99.3 °F (37.4 °C)] 98.6 °F (37 °C)  HR:  [89-94] 94  Resp:  [18] 18  BP: (110-140)/() 118/73  SpO2:  [96 %-99 %] 98 %  Temp (24hrs), Av °F (37.2 °C), Min:98.6 °F (37 °C), Max:99.3 °F (37.4 °C)  Current: Temperature: 98.6 °F (37 °C)    Physical Exam:   General Appearance:  Alert, interactive, nontoxic, no acute distress. Throat: Oropharynx moist without lesions. Lungs:   Clear to auscultation bilaterally; no wheezes, rhonchi or rales; respirations unlabored   Heart:  RRR; no murmur, rub or gallop   Abdomen:   Soft, non-tender, non-distended, positive bowel sounds. Extremities: No clubbing, cyanosis or edema   Skin: No new rashes or lesions. No draining wounds noted.        Labs, Imaging, & Other studies:   All pertinent labs and imaging studies were personally reviewed  Results from last 7 days   Lab Units 23  04523  0448 23  0519   WBC Thousand/uL 16.22* 14.57* 13.06*   HEMOGLOBIN g/dL 8.9* 9.5* 8.9*   PLATELETS Thousands/uL 336 271 224     Results from last 7 days   Lab Units 23  0451 23  0448 23  3508 06/29/23  0437 06/28/23  0432 06/27/23  1141   SODIUM mmol/L 135 135 135 136   < > 134* 131*   POTASSIUM mmol/L 3.4* 2.8* 3.4* 3.3*   < > 4.1 4.6   CHLORIDE mmol/L 106 109* 108 110*   < > 108 105   CO2 mmol/L 24 20* 21 18*   < > 12* 11*   BUN mg/dL 8 11 15 17   < > 18 20   CREATININE mg/dL 0.74 0.61 0.84 0.81   < > 1.00 1.16   EGFR ml/min/1.73sq m 113 123 107 109   < > 92 77   CALCIUM mg/dL 7.2* 6.5* 7.3* 7.7*   < > 8.2* 8.3*   AST U/L  --   --   --  41*  --  33 37   ALT U/L  --   --   --  48  --  80* 110*   ALK PHOS U/L  --   --   --  112*  --  130* 129*    < > = values in this interval not displayed. Results from last 7 days   Lab Units 06/29/23  0901 06/28/23  0448 06/26/23  2019 06/26/23  1551 06/26/23  1539   BLOOD CULTURE   --  No Growth After 5 Days. No Growth After 5 Days. --  Klebsiella pneumoniae* Klebsiella pneumoniae*   GRAM STAIN RESULT  2+ Disintegrating polys*  3+ Gram negative rods*  --   --  Gram negative rods* Gram negative rods*   BODY FLUID CULTURE, STERILE  2+ Growth of Klebsiella pneumoniae*  --   --   --   --    LEGIONELLA URINARY ANTIGEN   --   --  Negative  --   --      Results from last 7 days   Lab Units 07/02/23  0451 06/30/23  0519 06/28/23  0432 06/27/23  0445 06/26/23  1539   PROCALCITONIN ng/ml 5.04* 8.26* 17.74* 24.42* 14.64*             Results from last 7 days   Lab Units 06/26/23  1539   D-DIMER QUANTITATIVE ug/ml FEU 3.07*     CT abdomen pelvis. Septic emboli with some cavitation. Improved size of the liver abscess.     Images personally reviewed by me impact

## 2023-07-03 NOTE — NURSING NOTE
While rounding on patient, patient was not in room. Belongings were present at bedside (clothing and ). Unit was searched and patient was not found. Patient's cell phone was called. Patient's wife answered the cell phone and confirmed that the patient was home with her. Informed wife that patient is not discharged from the hospital, he needs to receive more IV antibiotics and that his IV lines are still in place. Patient's wife stated that she would bring him back to the hospital. SAQIB and LADONNA informed.

## 2023-07-03 NOTE — ASSESSMENT & PLAN NOTE
Background: History of diabetes mellitus presented to the hospital with febrile illness found to have sepsis secondary to klebsiella bacteremia and klebsiella liver abscess  · Sepsis secondary to bacteremia. · Status post IR drain. Repeat blood culture 6/28 cleared  · False positive positive HIV  · Due to concerns for septic emboli including pulmonary findings and hepatic vein phlebitis, ID recommends 6-week duration of IV antibiotics  · Patient was noted to elope on 7/3 yesterday and again on 7/4 today patient eloped again from the hospital.  At this point patient is discharged [facility]. Patient did not sign AMA paperwork although patient was made well aware of consequences of leaving the hospital including death given liver abscess and infection which will not be treated appropriately as he was not given antibiotics secondary to leaving prior to receiving full treatment or having appropriate treatment set up for home with antibiotics.

## 2023-07-03 NOTE — ASSESSMENT & PLAN NOTE
· Insulin-dependent diabetes with hyperglycemia now better.   · Elevated glucose secondary to sepsis and bacteremia  · Continue levemir 14 units with lispro 5 units 3 times daily    Results from last 7 days   Lab Units 06/26/23  2128   HEMOGLOBIN A1C % 13.3*     Results from last 7 days   Lab Units 07/03/23  1129 07/02/23  1550 07/02/23  1048 07/02/23  0726 07/01/23  2138 07/01/23  1942   POC GLUCOSE mg/dl 171* 195* 205* 176* 217* 191*

## 2023-07-03 NOTE — ASSESSMENT & PLAN NOTE
Background: History of diabetes mellitus presented to the hospital with febrile illness found to have sepsis secondary to klebsiella bacteremia and klebsiella liver abscess  · Sepsis secondary to bacteremia. · Status post IR drain.   Repeat blood culture 6/28 cleared  · ID following currently on cefazolin/metronidazole  · False positive positive HIV  · Due to concerns for septic emboli including pulmonary findings and hepatic vein phlebitis, ID recommends 6-week duration of IV antibiotics

## 2023-07-03 NOTE — ASSESSMENT & PLAN NOTE
· Seen by hematology and vascular surgery. Currently on heparin infusion. · We will transition to eliquis starting today.   Should be covered with the patient's [facility]

## 2023-07-04 VITALS
TEMPERATURE: 99.4 F | RESPIRATION RATE: 18 BRPM | HEART RATE: 96 BPM | BODY MASS INDEX: 29.16 KG/M2 | WEIGHT: 175 LBS | OXYGEN SATURATION: 99 % | SYSTOLIC BLOOD PRESSURE: 125 MMHG | DIASTOLIC BLOOD PRESSURE: 73 MMHG | HEIGHT: 65 IN

## 2023-07-04 LAB
ALBUMIN SERPL BCP-MCNC: 2.4 G/DL (ref 3.5–5)
ALP SERPL-CCNC: 184 U/L (ref 34–104)
ALT SERPL W P-5'-P-CCNC: 41 U/L (ref 7–52)
ANION GAP SERPL CALCULATED.3IONS-SCNC: 7 MMOL/L
AST SERPL W P-5'-P-CCNC: 69 U/L (ref 13–39)
BILIRUB SERPL-MCNC: 0.91 MG/DL (ref 0.2–1)
BUN SERPL-MCNC: 7 MG/DL (ref 5–25)
CALCIUM ALBUM COR SERPL-MCNC: 8.6 MG/DL (ref 8.3–10.1)
CALCIUM SERPL-MCNC: 7.3 MG/DL (ref 8.4–10.2)
CHLORIDE SERPL-SCNC: 103 MMOL/L (ref 96–108)
CO2 SERPL-SCNC: 26 MMOL/L (ref 21–32)
CREAT SERPL-MCNC: 0.76 MG/DL (ref 0.6–1.3)
ERYTHROCYTE [DISTWIDTH] IN BLOOD BY AUTOMATED COUNT: 15.2 % (ref 11.6–15.1)
GFR SERPL CREATININE-BSD FRML MDRD: 112 ML/MIN/1.73SQ M
GLUCOSE SERPL-MCNC: 180 MG/DL (ref 65–140)
HCT VFR BLD AUTO: 26 % (ref 36.5–49.3)
HGB BLD-MCNC: 8.7 G/DL (ref 12–17)
MCH RBC QN AUTO: 27.3 PG (ref 26.8–34.3)
MCHC RBC AUTO-ENTMCNC: 33.5 G/DL (ref 31.4–37.4)
MCV RBC AUTO: 82 FL (ref 82–98)
PLATELET # BLD AUTO: 488 THOUSANDS/UL (ref 149–390)
PMV BLD AUTO: 10.4 FL (ref 8.9–12.7)
POTASSIUM SERPL-SCNC: 3.2 MMOL/L (ref 3.5–5.3)
PROT SERPL-MCNC: 6.2 G/DL (ref 6.4–8.4)
RBC # BLD AUTO: 3.19 MILLION/UL (ref 3.88–5.62)
SODIUM SERPL-SCNC: 136 MMOL/L (ref 135–147)
WBC # BLD AUTO: 15.54 THOUSAND/UL (ref 4.31–10.16)

## 2023-07-04 PROCEDURE — 99239 HOSP IP/OBS DSCHRG MGMT >30: CPT | Performed by: INTERNAL MEDICINE

## 2023-07-04 PROCEDURE — 99233 SBSQ HOSP IP/OBS HIGH 50: CPT | Performed by: INTERNAL MEDICINE

## 2023-07-04 PROCEDURE — 93005 ELECTROCARDIOGRAM TRACING: CPT

## 2023-07-04 PROCEDURE — 80053 COMPREHEN METABOLIC PANEL: CPT | Performed by: INTERNAL MEDICINE

## 2023-07-04 PROCEDURE — 85027 COMPLETE CBC AUTOMATED: CPT | Performed by: INTERNAL MEDICINE

## 2023-07-04 RX ORDER — LEVOFLOXACIN 750 MG/1
750 TABLET ORAL EVERY 24 HOURS
Qty: 32 TABLET | Refills: 0 | Status: SHIPPED | OUTPATIENT
Start: 2023-07-05 | End: 2023-07-04

## 2023-07-04 RX ADMIN — ACETAMINOPHEN 650 MG: 325 TABLET, FILM COATED ORAL at 08:54

## 2023-07-04 RX ADMIN — INSULIN LISPRO 1 UNITS: 100 INJECTION, SOLUTION INTRAVENOUS; SUBCUTANEOUS at 08:42

## 2023-07-04 RX ADMIN — INSULIN LISPRO 2 UNITS: 100 INJECTION, SOLUTION INTRAVENOUS; SUBCUTANEOUS at 12:02

## 2023-07-04 RX ADMIN — GUAIFENESIN AND DEXTROMETHORPHAN 10 ML: 100; 10 SYRUP ORAL at 00:00

## 2023-07-04 RX ADMIN — INSULIN LISPRO 5 UNITS: 100 INJECTION, SOLUTION INTRAVENOUS; SUBCUTANEOUS at 08:42

## 2023-07-04 RX ADMIN — LEVOFLOXACIN 750 MG: 500 TABLET, FILM COATED ORAL at 10:49

## 2023-07-04 RX ADMIN — APIXABAN 10 MG: 5 TABLET, FILM COATED ORAL at 08:42

## 2023-07-04 RX ADMIN — GUAIFENESIN AND DEXTROMETHORPHAN 10 ML: 100; 10 SYRUP ORAL at 08:54

## 2023-07-04 RX ADMIN — INSULIN LISPRO 5 UNITS: 100 INJECTION, SOLUTION INTRAVENOUS; SUBCUTANEOUS at 12:03

## 2023-07-04 NOTE — NURSING NOTE
Patient pulled out midline while showering which he did on his own without notifying staff and removed Ward Moy. Patient refused antibiotic and refused to allow RN to reinsert IV. Explained the importance of the IV antibiotic several times. Patient was agreeable to do morning labs only and placed angela back on. Refused IV and medications. Slim notified. Will continue to monitor.

## 2023-07-04 NOTE — DISCHARGE SUMMARY
[address] [facility] ([place]  Discharge- Nori Grace Mbow 1980, 43 y.o. male MRN: [id number]  Unit/Bed#: -[facility] Encounter: [id number]  Primary Care Provider: Arsenio Morton [facility]   Date and time admitted to hospital: 6/26/2023  3:20 PM    * Bacteremia due to Klebsiella pneumoniae  Assessment & Plan  Background: History of diabetes mellitus presented to the hospital with febrile illness found to have sepsis secondary to klebsiella bacteremia and klebsiella liver abscess  · Sepsis secondary to bacteremia. · Status post IR drain. Repeat blood culture 6/28 cleared  · False positive positive HIV  · Due to concerns for septic emboli including pulmonary findings and hepatic vein phlebitis, ID recommends 6-week duration of IV antibiotics  · Patient was noted to elope on 7/3 yesterday and again on 7/4 today patient eloped again from the hospital.  At this point patient is discharged [facility]. Patient did not sign AMA paperwork although patient was made well aware of consequences of leaving the hospital including death given liver abscess and infection which will not be treated appropriately as he was not given antibiotics secondary to leaving prior to receiving full treatment or having appropriate treatment set up for home with antibiotics. Thrombosis, hepatic vein (HCC)  Assessment & Plan  · Seen by hematology and vascular surgery. Was on heparin infusion. · Was planning to send patient home on Eliquis, but patient eloped from the hospital    Hypokalemia  Assessment & Plan  · Continue to replete and recheck in a.m. Results from last 7 days   Lab Units 07/02/23 2025 07/02/23  0451 07/01/23  0448 06/30/23  0519   POTASSIUM mmol/L 3.4* 2.8* 3.4* 3.3*       Diabetes mellitus with hyperglycemia (HCC)  Assessment & Plan  · Insulin-dependent diabetes with hyperglycemia now better.   · Patient left AGAINST MEDICAL ADVICE  · Continue prior insulin regimen    Results from last 7 days Lab Units 06/26/23 2128   HEMOGLOBIN A1C % 13.3*     Results from last 7 days   Lab Units 07/03/23  1129 07/02/23  1550 07/02/23  1048 07/02/23  0726 07/01/23  2138 07/01/23 1942   POC GLUCOSE mg/dl 171* 195* 205* 176* 217* 191*       Medical Problems     Resolved Problems  Date Reviewed: 7/3/2023   None       Discharging Physician / Practitioner: Sae Mason DO  PCP: Gigi Clark, [address] [place] [address]  Admission Date:   Admission Orders (From admission, onward)     Ordered        06/26/23 1952  INPATIENT ADMISSION  Once                      Discharge Date: 07/04/23    Consultations During Hospital Stay:  · Infectious disease  · Interventional radiology  · Gastroenterology  · Pulmonology  · Hematology  · Vascular surgery    Procedures Performed:   · 6/29 IR biopsy liver mass    Reason for Admission: [facility] symptoms    Hospital Course: Kd Quiñonez is a 43 y.o. male patient who originally presented to the hospital on 6/26/2023 due to flulike symptoms. Patient noted to have liver abscess and was seen by interventional radiology team and had drain placed. Once fluid from drain is to be seated patient noted to have Klebsiella and was treated with IV antibiotics and seen by infectious disease team.  Patient also noted to have thrombosis of hepatic vein and seen by vascular surgery team and hematology who recommended anticoagulation. Patient was placed on Eliquis given thrombosis of hepatic vein. Also had lengthy discussion this morning with patient about need for continued hospitalization and plan to transition to oral antibiotics with possible discharge tomorrow. Prior to this patient had long discussion 7/3 with physician who is covering as patient had eloped and left the hospital at that time. Overnight patient also asked to leave [address] [place] [address] at that time.   Upon entering patient's room this morning he did asked to leave [address] [place] [address] again and discussed with him that if he would stay until tomorrow so we can come up with antibiotic plan and anticoagulation can likely be discharged then. Patient did appear to be in agreement with this, but was messaged by nurse as patient was not noted to be in his room and all of his belongings were gone. At this point patient has eloped from hospital and patient did not sign paperwork for leaving [address] [place] [address] although was made well aware prior to leaving the consequences. If patient were to come back he will need to go through the emergency department for evaluation prior to being admitted. Please see above list of diagnoses and related plan for additional information. Condition at Discharge: stable    Discharge Day Visit / Exam:   Subjective: Patient resting comfortably on examination. Patient had no overnight events or complaints on exam.  Vitals: Blood Pressure: 125/73 (07/04/23 0708)  Pulse: 96 (07/04/23 0708)  Temperature: 99.4 °F (37.4 °C) (07/04/23 0708)  Temp Source: Oral (07/01/23 0700)  Respirations: 18 (07/03/23 1624)  Height: 5' 5" (165.1 cm) (06/27/23 0900)  Weight - Scale: 79.4 kg (175 lb) (06/27/23 0900)  SpO2: 99 % (07/04/23 0708)  Exam:   Physical Exam  Vitals and nursing note reviewed. Constitutional:       General: He is not in acute distress. Appearance: He is well-developed. HENT:      Head: Normocephalic and atraumatic. Eyes:      General: No scleral icterus. Conjunctiva/sclera: Conjunctivae normal.   Cardiovascular:      Rate and Rhythm: Normal rate and regular rhythm. Heart sounds: Normal heart sounds. No murmur heard. No friction rub. No gallop. Pulmonary:      Effort: Pulmonary effort is normal. No respiratory distress. Breath sounds: Normal breath sounds. No wheezing or rales. Abdominal:      General: Bowel sounds are normal. There is no distension. Palpations: Abdomen is soft. Tenderness: There is no abdominal tenderness.       Comments: GENARO drain in place Musculoskeletal:         General: Normal range of motion. Skin:     General: Skin is warm. Findings: No rash. Neurological:      Mental Status: He is alert and oriented to person, place, and time. Discussion with Family: Updated  (wife) at bedside. Discharge instructions/Information to patient and family:   See after visit summary for information provided to patient and family. Provisions for Follow-Up Care:  See after visit summary for information related to follow-up care and any pertinent home health orders. Disposition:   Other: Eloped from the hospital    Planned Readmission: none     Discharge Statement:  I spent 38 minutes discharging the patient. This time was spent on the day of discharge. I had direct contact with the patient on the day of discharge. Greater than 50% of the total time was spent examining patient, answering all patient questions, arranging and discussing plan of care with patient as well as directly providing post-discharge instructions. Additional time then spent on discharge activities. Discharge Medications:  See after visit summary for reconciled discharge medications provided to patient and/or family.       **Please Note: This note may have been constructed using a voice recognition system**

## 2023-07-04 NOTE — NURSING NOTE
Patient returned to room. IV and midline intact. Informed CC and attending. Patient educated extensively at bedside regarding plan of care. Patient verbalizes understanding and wishes to remain in hospital for treatment.

## 2023-07-04 NOTE — NURSING NOTE
Patients states that he wants his IVs removed and to sign out AMA. Informed SLIM. Dr Mikey Jimenez came to beside to discuss risks vs benefits with patient. Patient stated he "needs more time to think about it" before signing AMA form.

## 2023-07-04 NOTE — PLAN OF CARE
Problem: MOBILITY - ADULT  Goal: Maintain or return to baseline ADL function  Description: INTERVENTIONS:  -  Assess patient's ability to carry out ADLs; assess patient's baseline for ADL function and identify physical deficits which impact ability to perform ADLs (bathing, care of mouth/teeth, toileting, grooming, dressing, etc.)  - Assess/evaluate cause of self-care deficits   - Assess range of motion  - Assess patient's mobility; develop plan if impaired  - Assess patient's need for assistive devices and provide as appropriate  - Encourage maximum independence but intervene and supervise when necessary  - Involve family in performance of ADLs  - Assess for home care needs following discharge   - Consider OT consult to assist with ADL evaluation and planning for discharge  - Provide patient education as appropriate  Outcome: Progressing     Problem: DISCHARGE PLANNING  Goal: Discharge to home or other facility with appropriate resources  Description: INTERVENTIONS:  - Identify barriers to discharge w/patient and caregiver  - Arrange for needed discharge resources and transportation as appropriate  - Identify discharge learning needs (meds, wound care, etc.)  - Arrange for interpretive services to assist at discharge as needed  - Refer to Case Management Department for coordinating discharge planning if the patient needs post-hospital services based on physician/advanced practitioner order or complex needs related to functional status, cognitive ability, or social support system  Outcome: Progressing

## 2023-07-04 NOTE — PLAN OF CARE
Problem: PAIN - ADULT  Goal: Verbalizes/displays adequate comfort level or baseline comfort level  Description: Interventions:  - Encourage patient to monitor pain and request assistance  - Assess pain using appropriate pain scale  - Administer analgesics based on type and severity of pain and evaluate response  - Implement non-pharmacological measures as appropriate and evaluate response  - Consider cultural and social influences on pain and pain management  - Notify physician/advanced practitioner if interventions unsuccessful or patient reports new pain  Outcome: Progressing     Problem: INFECTION - ADULT  Goal: Absence or prevention of progression during hospitalization  Description: INTERVENTIONS:  - Assess and monitor for signs and symptoms of infection  - Monitor lab/diagnostic results  - Monitor all insertion sites, i.e. indwelling lines, tubes, and drains  - Monitor endotracheal if appropriate and nasal secretions for changes in amount and color  - [place] appropriate cooling/warming therapies per order  - Administer medications as ordered  - Instruct and encourage patient and family to use good hand hygiene technique  - Identify and instruct in appropriate isolation precautions for identified infection/condition  Outcome: Progressing  Goal: Absence of fever/infection during neutropenic period  Description: INTERVENTIONS:  - Monitor WBC    Outcome: Progressing

## 2023-07-04 NOTE — PROGRESS NOTES
Progress Note - Infectious Disease   Beatrice Davisow 43 y.o. male MRN: [id number]  Unit/Bed#: -[facility] Encounter: [id number]      Impression/Plan:  1.  Severe sepsis.  Present on admission.  Leukocytosis, tachycardia, and fever soon after admission.  Appears to be secondary to Klebsiella pneumonia bacteremia.  Possibly secondary to pneumonia, however the radiographic findings are a bit atypical for an acute bacterial pneumonia, and concerned about the possibility of a hepatobiliary source with his elevated LFTs and chest pressure.  He appears to be quite ill although hemodynamically stable.  No resistant organisms or anaerobes isolated  -Antibiotics as below  -Source control measures as below  -Supportive care  -Plan 6 weeks of antibiotics total in the setting of possible septic hepatic vein phlebitis.  -Possibly ready for discharge soon  -We will discuss treatment options with patient     2.  Klebsiella pneumonia bacteremia.  Suspect related to the hepatobiliary infection. Less likely a pulmonary source. Concern for septic emboli based upon CT scan imaging but transthoracic echocardiogram without valvular vegetation appreciated. However possible septic phlebitis related to the hepatic vein clot.  Repeat blood cultures are negative.  -Discontinue cefazolin  -Begin levofloxacin 750 mg p.o. every 24 hours  -Discussed in detail with the patient the risk of irreversible tendinopathy and neuropathy with levofloxacin but the patient wishes to avoid intravenous antibiotics and go with the oral antibiotics and is going to accept the risk  -Follow-up repeat blood cultures  -Additional work-up as needed  -Plan antibiotics through at least 8/6/2023 to complete 6 weeks total treatment  -Check CBC with differential and BMP weekly while on the levofloxacin  -Check EKG to make sure no prolonged QT in the setting of a long course of levofloxacin     3.  Liver abscess.  Status post IR drainage with drain in place on 6/29/2023. Klebsiella is grown which is not a surprise.  Unclear primary etiology.   MRI MRCP without biliary ductal dilatation or choledocholithiasis. Repeat CT scan on 7/3/2023 improved with decreased size of the collection.  -Antibiotics as above  -Drain management  -GI follow-up  -Recommend colonoscopy as an outpatient  -Additional work-up as needed  -Recheck CT abdomen pelvis in 2-3 weeks to confirm clearance     4. Septic embolus to the lung. Suspect may be related to hepatic vein thrombosis noted on imaging. Possible septic thrombophlebitis equivalent with endovascular infection.  -Antibiotics as above  -Plan a 6-week course of antibiotic     5.  Diabetes mellitus.  Type II.  Poorly controlled with hemoglobin A1c of 13.  This all is a risk factor for recurrent infection.  -Tighten diabetic control     6. False positive HIV screen.  With P24 antigen negative. HIV RNA and multi spot negative.  -No additional work-up needed    Discussed the above management plan with the primary service    I spent 50 minutes on the unit floor of which 30 minutes was in counseling/coordination of care as outlined in my note including making arrangements for outpatient office follow-up, outpatient laboratory follow-up, and outpatient repeat CT scan    Antibiotics:  Cefazolin 6  Antibiotics 9  Post procedure day 5    Subjective:  Patient has no fever, chills, sweats; no nausea, vomiting, diarrhea; no cough, shortness of breath; no pain. No new symptoms. He is anxious to go home and is threatening to leave [address] [place] [address]. Objective:  Vitals:  Temp:  [99.3 °F (37.4 °C)-100 °F (37.8 °C)] 99.4 °F (37.4 °C)  HR:  [] 96  Resp:  [18] 18  BP: (117-125)/(73-74) 125/73  SpO2:  [97 %-99 %] 99 %  Temp (24hrs), Av.6 °F (37.6 °C), Min:99.3 °F (37.4 °C), Max:100 °F (37.8 °C)  Current: Temperature: 99.4 °F (37.4 °C)    Physical Exam:   General Appearance:  Alert, interactive, nontoxic, no acute distress.    Throat: Oropharynx moist without lesions. Lungs:   Clear to auscultation bilaterally; no wheezes, rhonchi or rales; respirations unlabored   Heart:  RRR; no murmur, rub or gallop   Abdomen:   Soft, non-tender, non-distended, positive bowel sounds. Extremities: No clubbing, cyanosis or edema   Skin: No new rashes or lesions. No draining wounds noted. Labs, Imaging, & Other studies:   All pertinent labs and imaging studies were personally reviewed  Results from last 7 days   Lab Units 07/04/23 0434 07/02/23 0451 07/01/23 0448   WBC Thousand/uL 15.54* 16.22* 14.57*   HEMOGLOBIN g/dL 8.7* 8.9* 9.5*   PLATELETS Thousands/uL 488* 336 271     Results from last 7 days   Lab Units 07/04/23 0434 07/02/23 2025 07/02/23 0451 07/01/23 0448 06/30/23  0519 06/29/23 0437 06/28/23 0432   SODIUM mmol/L 136 135 135   < > 136   < > 134*   POTASSIUM mmol/L 3.2* 3.4* 2.8*   < > 3.3*   < > 4.1   CHLORIDE mmol/L 103 106 109*   < > 110*   < > 108   CO2 mmol/L 26 24 20*   < > 18*   < > 12*   BUN mg/dL 7 8 11   < > 17   < > 18   CREATININE mg/dL 0.76 0.74 0.61   < > 0.81   < > 1.00   EGFR ml/min/1.73sq m 112 113 123   < > 109   < > 92   CALCIUM mg/dL 7.3* 7.2* 6.5*   < > 7.7*   < > 8.2*   AST U/L 69*  --   --   --  41*  --  33   ALT U/L 41  --   --   --  48  --  80*   ALK PHOS U/L 184*  --   --   --  112*  --  130*    < > = values in this interval not displayed. Results from last 7 days   Lab Units 06/29/23  0901 06/28/23 0448   BLOOD CULTURE   --  No Growth After 5 Days. No Growth After 5 Days.    GRAM STAIN RESULT  2+ Disintegrating polys*  3+ Gram negative rods*  --    BODY FLUID CULTURE, STERILE  2+ Growth of Klebsiella pneumoniae*  --      Results from last 7 days   Lab Units 07/02/23  0451 06/30/23  0519 06/28/23  0432   PROCALCITONIN ng/ml 5.04* 8.26* 17.74*

## 2023-07-04 NOTE — PROGRESS NOTES
At 12pm rounded and administered pt meds. Came back an hour later for hourly rounds and found empty room. Personal belonging gone. Charge nurse and Attending notified. Security and hospital supervisor also aware.

## 2023-07-04 NOTE — QUICK NOTE
Updated by nursing, patient requesting to leave AMA. Spoke with patient who reports he's just tired of staying in the hospital. Discussed risks of leaving hospital vs benefits of staying. Patient opting to remain in hospital for the time being.

## 2023-07-05 ENCOUNTER — TELEPHONE (OUTPATIENT)
Dept: HEMATOLOGY ONCOLOGY | Facility: CLINIC | Age: 43
End: 2023-07-05

## 2023-07-05 ENCOUNTER — TELEPHONE (OUTPATIENT)
Dept: INFECTIOUS DISEASES | Facility: CLINIC | Age: 43
End: 2023-07-05

## 2023-07-05 LAB
ATRIAL RATE: 95 BPM
ATRIAL RATE: 96 BPM
B2 GLYCOPROT1 IGA SERPL IA-ACNC: 6.7
B2 GLYCOPROT1 IGG SERPL IA-ACNC: 1.8
B2 GLYCOPROT1 IGM SERPL IA-ACNC: <2.4
CARDIOLIPIN IGA SER IA-ACNC: 3.4
CARDIOLIPIN IGG SER IA-ACNC: 1.3
CARDIOLIPIN IGM SER IA-ACNC: 2.7
P AXIS: 32 DEGREES
P AXIS: 44 DEGREES
PR INTERVAL: 144 MS
PR INTERVAL: 146 MS
QRS AXIS: 24 DEGREES
QRS AXIS: 25 DEGREES
QRSD INTERVAL: 76 MS
QRSD INTERVAL: 80 MS
QT INTERVAL: 354 MS
QT INTERVAL: 368 MS
QTC INTERVAL: 444 MS
QTC INTERVAL: 464 MS
T WAVE AXIS: 19 DEGREES
T WAVE AXIS: 23 DEGREES
VENTRICULAR RATE: 95 BPM
VENTRICULAR RATE: 96 BPM

## 2023-07-05 PROCEDURE — 93010 ELECTROCARDIOGRAM REPORT: CPT | Performed by: INTERNAL MEDICINE

## 2023-07-05 NOTE — TELEPHONE ENCOUNTER
I phoned the patient to review the details of his upcoming appointment with [facility] and, unfortunately, there was no answer and the VM was full. I then phoned the patient's spouse and she did not answer and her VM was also full.

## 2023-07-05 NOTE — TELEPHONE ENCOUNTER
Patient eloped from hospital.  Patient was to be discharged on pills until radiographic resolution of abscess, weekly labs, and CT scan. Patient is recommended to go back to emergency to continue care with safe discharge. We would not follow up with patient without re-establishment of care via hospital.    Contacted patient to let him know of this information. He states he didn't feel good last night so he will be in the emergency room in 10 minutes for medicine because he is on his way already.

## 2023-07-06 LAB — SCAN RESULT: NORMAL

## 2023-07-07 LAB
F2 GENE MUT ANL BLD/T: NORMAL
Lab: NORMAL

## 2023-07-10 LAB
F5 GENE MUT ANL BLD/T: NORMAL
Lab: NORMAL

## 2023-07-11 ENCOUNTER — OFFICE VISIT (OUTPATIENT)
Dept: GASTROENTEROLOGY | Facility: CLINIC | Age: 43
End: 2023-07-11

## 2023-07-11 ENCOUNTER — TELEPHONE (OUTPATIENT)
Dept: GASTROENTEROLOGY | Facility: CLINIC | Age: 43
End: 2023-07-11

## 2023-07-11 VITALS
DIASTOLIC BLOOD PRESSURE: 80 MMHG | BODY MASS INDEX: 27.39 KG/M2 | HEART RATE: 105 BPM | HEIGHT: 65 IN | WEIGHT: 164.4 LBS | OXYGEN SATURATION: 99 % | SYSTOLIC BLOOD PRESSURE: 138 MMHG

## 2023-07-11 DIAGNOSIS — K75.0 LIVER ABSCESS: Primary | ICD-10-CM

## 2023-07-11 DIAGNOSIS — Z12.11 ENCOUNTER FOR SCREENING COLONOSCOPY: ICD-10-CM

## 2023-07-11 PROCEDURE — 99214 OFFICE O/P EST MOD 30 MIN: CPT | Performed by: PHYSICIAN ASSISTANT

## 2023-07-11 NOTE — PATIENT INSTRUCTIONS
Scheduled date of colonoscopy (as of today):9/19/23  Physician performing colonoscopy:Efren  Location of colonoscopy:[place]  Bowel prep reviewed with patient:Mary/Miralax  Instructions reviewed with patient by:Zen carmona  Clearances:  none

## 2023-07-11 NOTE — TELEPHONE ENCOUNTER
Spoke with Dr. Catrachita Patricio. Patient has no insurance coverage, and the reason why antibiotics were not sent at the time was based on the need of continued discharge planning. Also, the patient would need serial blood work. Dr. Catrachita Patricio will set up follow-up and try sending a weeks worth of medication as long as patient follows up. Team, can we call the patient to explain that he needs to follow-up with Dr. Catrachita Patricio and IR. Otherwise, he may need to go back to the hospital to be admitted so that we can determine insurance options and continued safe monitoring of his infection. Thank you.

## 2023-07-11 NOTE — PROGRESS NOTES
[facility]  Jordan Davisow 43 y.o. male MRN: [id number]       CC: Liver abscess     HPI: Yissel Perez is a 43year old male with history of type 2 diabetes with a recent A1c of 12 and asthma. Patient was admitted to the [address] [place] [address] for fevers, chills and flulike symptoms. Patient was discovered to have Klebsiella bacteremia complicated by pneumonia, elevated creatinine, elevated LFTs and finding of a liver abscess measuring 5.4 x 2.9 cm on initial imaging study. While hospitalized, he did undergo an MRI of the abdomen again noting a 4.9 x 2.9 x 3.1 cm lesion compatible with a hepatic abscess. Patient underwent IR drainage, and still has a drain in place with yellow-colored purulence. He denies bleeding or pain around the site. The area appears clean and dry. He has no new complaints at this time. Unfortunately, he had eloped from the hospital twice, once on 4 July, and on another emergency room encounter. He denies fevers today, and has been eating well. We went over his recent medication changes, he reports that he does not recall having any scripts sent over to his local pharmacy. Patient was recommended Eliquis for septic emboli discovered secondary to venous compression from abscess in the liver and lung, as well as antibiotics to be taken until the beginning of August.      Review of Systems:    CONSTITUTIONAL: Denies any fever, chills, or rigors. Good appetite, and no recent weight loss. HEENT: No earache or tinnitus. Denies hearing loss or visual disturbances. CARDIOVASCULAR: No chest pain or palpitations. RESPIRATORY: Denies any cough, hemoptysis, shortness of breath or dyspnea on exertion. GASTROINTESTINAL: As noted in the History of Present Illness. GENITOURINARY: No problems with urination. Denies any hematuria or dysuria. NEUROLOGIC: No dizziness or vertigo, denies headaches. MUSCULOSKELETAL: Denies any muscle or joint pain.    SKIN: Denies skin rashes or itching. ENDOCRINE: Denies excessive thirst. Denies intolerance to heat or cold. PSYCHOSOCIAL: Denies depression or anxiety. Denies any recent memory loss. Current Outpatient Medications   Medication Sig Dispense Refill   • albuterol (PROVENTIL HFA,VENTOLIN HFA) 90 mcg/act inhaler Inhale 2 puffs every 6 (six) hours as needed     • apixaban (Eliquis) 5 mg Take 2 tablets (10 mg total) by mouth 2 (two) times a day for 7 days, THEN 1 tablet (5 mg total) 2 (two) times a day for 23 days. 74 tablet 0   • atorvastatin (LIPITOR) 10 mg tablet Take 10 mg by mouth     • cyanocobalamin (VITAMIN B-12) 100 MCG tablet Take 100 mcg by mouth daily     • Ertugliflozin L-PyroglutamicAc 5 MG TABS Take 5 mg by mouth daily     • metFORMIN (GLUCOPHAGE) 1000 MG tablet Take 1 tablet by mouth 2 (two) times a day with meals     • montelukast (SINGULAIR) 10 mg tablet Take 10 mg by mouth       No current facility-administered medications for this visit.      Past Medical History:   Diagnosis Date   • Asthma      Past Surgical History:   Procedure Laterality Date   • IR BIOPSY LIVER MASS  6/29/2023     Social History     Socioeconomic History   • Marital status: /Civil Union     Spouse name: None   • Number of children: None   • Years of education: None   • Highest education level: None   Occupational History   • None   Tobacco Use   • Smoking status: Never   • Smokeless tobacco: Never   Vaping Use   • Vaping Use: Never used   Substance and Sexual Activity   • Alcohol use: Not Currently   • Drug use: Never   • Sexual activity: None   Other Topics Concern   • None   Social History Narrative   • None     Social Determinants of Health     Financial Resource Strain: Not on file   Food Insecurity: No Food Insecurity (7/3/2023)    Hunger Vital Sign    • Worried About Running Out of Food in the Last Year: Never true    • Ran Out of Food in the Last Year: Never true   Transportation Needs: No Transportation Needs (7/3/2023)    PRAPARE - Transportation    • Lack of Transportation (Medical): No    • Lack of Transportation (Non-Medical): No   Physical Activity: Not on file   Stress: Not on file   Social Connections: Not on file   Intimate Partner Violence: Not on file   Housing Stability: Unknown (7/3/2023)    Housing Stability Vital Sign    • Unable to Pay for Housing in the Last Year: No    • Number of Places Lived in the Last Year: Not on file    • Unstable Housing in the Last Year: No     History reviewed. No pertinent family history. PHYSICAL EXAM:    Vitals:    07/11/23 1242   BP: 138/80   BP Location: Left arm   Patient Position: Sitting   Cuff Size: Standard   Pulse: 105   SpO2: 99%   Weight: 74.6 kg (164 lb 6.4 oz)   Height: 5' 5" (1.651 m)     General Appearance:   Alert and oriented x 3. Cooperative, and in no respiratory distress   HEENT:   Normocephalic, atraumatic, anicteric.      Neck:  Supple, symmetrical, trachea midline   Lungs:   Clear to auscultation bilaterally    Heart[de-identified]   Regular rate and rhythm   Abdomen:   Soft, non-tender, non-distended; normal bowel sounds; no masses, no organomegaly    Genitalia:   Deferred    Rectal:   Deferred    Extremities:  No cyanosis, clubbing or edema    Pulses:  2+ and symmetric all extremities    Skin:  Skin color, texture, turgor normal, no rashes or lesions    Lymph nodes:  No palpable cervical or supraclavicular lymphadenopathy        Lab Results:   Results from last 6 Months   Lab Units 07/04/23  0434 07/02/23  0451 07/01/23  0448 06/30/23  0519 06/29/23  0437   WBC Thousand/uL 15.54*   < > 14.57*   < > 17.86*   HEMOGLOBIN g/dL 8.7*   < > 9.5*   < > 10.9*   HEMATOCRIT % 26.0*   < > 28.0*   < > 32.8*   PLATELETS Thousands/uL 488*   < > 271   < > 197   NEUTROS PCT %  --   --   --   --  65   LYMPHS PCT %  --   --   --   --  11*   LYMPHO PCT %  --   --  3*   < >  --    MONOS PCT %  --   --   --   --  15*   MONO PCT %  --   --  9   < >  --    EOS PCT %  --   --  2   < > 0    < > = values in this interval not displayed. Results from last 6 Months   Lab Units 07/04/23  0434   POTASSIUM mmol/L 3.2*   CHLORIDE mmol/L 103   CO2 mmol/L 26   BUN mg/dL 7   CREATININE mg/dL 0.76   CALCIUM mg/dL 7.3*   ALK PHOS U/L 184*   ALT U/L 41   AST U/L 69*     Results from last 6 Months   Lab Units 06/30/23  1244   INR  1.33*           Imaging Studies:   No results found. ASSESSMENT and PLAN:      1) Klebsiella pneumonia bacteremia, liver abscess with septic emboli - Unfortunately, the patient left AMA twice on July 4 and July 5. When discussing his medications, he reports not having a prescription for Eliquis or levofloxacin. There is an order for Eliquis, but not for antibiotic. We will call patient's pharmacy to see whether or not they have these prescriptions in their system. We discussed the importance of medical compliance. During his hospital stay, infectious disease was requesting a colonoscopy to investigate to rule out diverticular disease as a underlying cause. Patient reports that prior to being diagnosed with liver abscess, he had no prior GI symptoms.   He also has history of uncontrolled diabetes which could all be a risk factor for recurrent infection.  - Written down upcoming appointments and times for patient, as well as medication that he should be taking after his hospital stay including Eliquis and levofloxacin  - We will message infectious disease to have them schedule follow-up, and to also send a prescription of levofloxacin according to her original treatment plan as detailed by Dr. Deb Cunningham on note dated 7/4  - He will follow-up with interventional radiology as well on the 20th for tube check, again provided patient with reminder to attend appointment as it is imperative   - Upcoming appointments for hematology scheduled  - We will tentatively schedule his colonoscopy for September after patient's abscess and septic emboli have improved, suspect he will need repeat imaging at some point  - As I explained to the patient, we could always modify his colonoscopy date based on how he is doing  - Glycemic control  - We discussed returning to the hospital if he has recurrence of symptoms, fevers, chills, new onset abdominal pain, or issues related to his drain       Portions of the record may have been created with voice recognition software. Occasional wrong word or "sound a like" substitutions may have occurred due to the inherent limitations of voice recognition software. Read the chart carefully and recognize, using context, where substitutions have occurred.

## 2023-07-12 ENCOUNTER — TELEPHONE (OUTPATIENT)
Dept: GASTROENTEROLOGY | Facility: CLINIC | Age: 43
End: 2023-07-12

## 2023-07-12 ENCOUNTER — HOSPITAL ENCOUNTER (INPATIENT)
Facility: HOSPITAL | Age: 43
LOS: 8 days | Discharge: HOME | DRG: 720 | End: 2023-07-20
Attending: EMERGENCY MEDICINE | Admitting: INTERNAL MEDICINE
Payer: COMMERCIAL

## 2023-07-12 ENCOUNTER — APPOINTMENT (EMERGENCY)
Dept: CT IMAGING | Facility: HOSPITAL | Age: 43
DRG: 720 | End: 2023-07-12
Payer: COMMERCIAL

## 2023-07-12 ENCOUNTER — TELEPHONE (OUTPATIENT)
Age: 43
End: 2023-07-12

## 2023-07-12 DIAGNOSIS — R78.81 BACTEREMIA DUE TO KLEBSIELLA PNEUMONIAE: ICD-10-CM

## 2023-07-12 DIAGNOSIS — I76 SEPTIC EMBOLISM (HCC): ICD-10-CM

## 2023-07-12 DIAGNOSIS — J18.9 CAVITARY PNEUMONIA: Primary | ICD-10-CM

## 2023-07-12 DIAGNOSIS — B96.1 BACTEREMIA DUE TO KLEBSIELLA PNEUMONIAE: ICD-10-CM

## 2023-07-12 DIAGNOSIS — R91.8 MULTILOBAR LUNG INFILTRATE: ICD-10-CM

## 2023-07-12 DIAGNOSIS — K75.0 LIVER ABSCESS: Primary | ICD-10-CM

## 2023-07-12 DIAGNOSIS — K75.0 LIVER ABSCESS: ICD-10-CM

## 2023-07-12 DIAGNOSIS — J98.4 CAVITARY PNEUMONIA: Primary | ICD-10-CM

## 2023-07-12 PROBLEM — D64.9 ANEMIA: Status: ACTIVE | Noted: 2023-07-12

## 2023-07-12 PROBLEM — E78.5 HYPERLIPIDEMIA: Status: ACTIVE | Noted: 2017-11-01

## 2023-07-12 PROBLEM — J45.30 MILD PERSISTENT ASTHMA WITHOUT COMPLICATION: Status: ACTIVE | Noted: 2017-11-01

## 2023-07-12 PROBLEM — E11.9 TYPE 2 DIABETES MELLITUS WITHOUT COMPLICATION, WITHOUT LONG-TERM CURRENT USE OF INSULIN (HCC): Status: ACTIVE | Noted: 2018-03-14

## 2023-07-12 PROBLEM — Z91.199 HISTORY OF NONCOMPLIANCE WITH MEDICAL TREATMENT: Status: ACTIVE | Noted: 2023-07-12

## 2023-07-12 LAB
2HR DELTA HS TROPONIN: 1 NG/L
4HR DELTA HS TROPONIN: 2 NG/L
ALBUMIN SERPL BCP-MCNC: 3.6 G/DL (ref 3.5–5)
ALP SERPL-CCNC: 152 U/L (ref 34–104)
ALT SERPL W P-5'-P-CCNC: 20 U/L (ref 7–52)
ANION GAP SERPL CALCULATED.3IONS-SCNC: 13 MMOL/L
APTT PPP: 36 SECONDS (ref 23–37)
APTT PPP: 37 SECONDS (ref 23–37)
AST SERPL W P-5'-P-CCNC: 16 U/L (ref 13–39)
BACTERIA UR QL AUTO: NORMAL /HPF
BASOPHILS # BLD AUTO: 0.12 THOUSANDS/ÂΜL (ref 0–0.1)
BASOPHILS NFR BLD AUTO: 1 % (ref 0–1)
BILIRUB SERPL-MCNC: 0.67 MG/DL (ref 0.2–1)
BILIRUB UR QL STRIP: NEGATIVE
BUN SERPL-MCNC: 12 MG/DL (ref 5–25)
CALCIUM SERPL-MCNC: 9.7 MG/DL (ref 8.4–10.2)
CARDIAC TROPONIN I PNL SERPL HS: 5 NG/L
CARDIAC TROPONIN I PNL SERPL HS: 6 NG/L
CARDIAC TROPONIN I PNL SERPL HS: 7 NG/L
CHLORIDE SERPL-SCNC: 94 MMOL/L (ref 96–108)
CLARITY UR: CLEAR
CO2 SERPL-SCNC: 21 MMOL/L (ref 21–32)
COLOR UR: ABNORMAL
CREAT SERPL-MCNC: 0.86 MG/DL (ref 0.6–1.3)
EOSINOPHIL # BLD AUTO: 0.06 THOUSAND/ÂΜL (ref 0–0.61)
EOSINOPHIL NFR BLD AUTO: 0 % (ref 0–6)
ERYTHROCYTE [DISTWIDTH] IN BLOOD BY AUTOMATED COUNT: 15.9 % (ref 11.6–15.1)
FLUAV RNA RESP QL NAA+PROBE: NEGATIVE
FLUBV RNA RESP QL NAA+PROBE: NEGATIVE
GFR SERPL CREATININE-BSD FRML MDRD: 106 ML/MIN/1.73SQ M
GLUCOSE SERPL-MCNC: 283 MG/DL (ref 65–140)
GLUCOSE SERPL-MCNC: 408 MG/DL (ref 65–140)
GLUCOSE UR STRIP-MCNC: ABNORMAL MG/DL
HCT VFR BLD AUTO: 30 % (ref 36.5–49.3)
HGB BLD-MCNC: 9.6 G/DL (ref 12–17)
HGB UR QL STRIP.AUTO: NEGATIVE
IMM GRANULOCYTES # BLD AUTO: 0.08 THOUSAND/UL (ref 0–0.2)
IMM GRANULOCYTES NFR BLD AUTO: 1 % (ref 0–2)
INR PPP: 1.19 (ref 0.84–1.19)
KETONES UR STRIP-MCNC: ABNORMAL MG/DL
LACTATE SERPL-SCNC: 1.1 MMOL/L (ref 0.5–2)
LEUKOCYTE ESTERASE UR QL STRIP: ABNORMAL
LYMPHOCYTES # BLD AUTO: 2.91 THOUSANDS/ÂΜL (ref 0.6–4.47)
LYMPHOCYTES NFR BLD AUTO: 19 % (ref 14–44)
MCH RBC QN AUTO: 27.2 PG (ref 26.8–34.3)
MCHC RBC AUTO-ENTMCNC: 32 G/DL (ref 31.4–37.4)
MCV RBC AUTO: 85 FL (ref 82–98)
MONOCYTES # BLD AUTO: 1.19 THOUSAND/ÂΜL (ref 0.17–1.22)
MONOCYTES NFR BLD AUTO: 8 % (ref 4–12)
NEUTROPHILS # BLD AUTO: 10.81 THOUSANDS/ÂΜL (ref 1.85–7.62)
NEUTS SEG NFR BLD AUTO: 71 % (ref 43–75)
NITRITE UR QL STRIP: NEGATIVE
NON-SQ EPI CELLS URNS QL MICRO: NORMAL /HPF
NRBC BLD AUTO-RTO: 0 /100 WBCS
PH UR STRIP.AUTO: 5.5 [PH]
PLATELET # BLD AUTO: 731 THOUSANDS/UL (ref 149–390)
PMV BLD AUTO: 9.2 FL (ref 8.9–12.7)
POTASSIUM SERPL-SCNC: 4.4 MMOL/L (ref 3.5–5.3)
PROCALCITONIN SERPL-MCNC: 0.35 NG/ML
PROT SERPL-MCNC: 9.9 G/DL (ref 6.4–8.4)
PROT UR STRIP-MCNC: NEGATIVE MG/DL
PROTHROMBIN TIME: 14.9 SECONDS (ref 11.6–14.5)
RBC # BLD AUTO: 3.53 MILLION/UL (ref 3.88–5.62)
RBC #/AREA URNS AUTO: NORMAL /HPF
RSV RNA RESP QL NAA+PROBE: NEGATIVE
SARS-COV-2 RNA RESP QL NAA+PROBE: NEGATIVE
SODIUM SERPL-SCNC: 128 MMOL/L (ref 135–147)
SP GR UR STRIP.AUTO: 1.02 (ref 1–1.03)
UROBILINOGEN UR STRIP-ACNC: <2 MG/DL
WBC # BLD AUTO: 15.17 THOUSAND/UL (ref 4.31–10.16)
WBC #/AREA URNS AUTO: NORMAL /HPF

## 2023-07-12 PROCEDURE — 96366 THER/PROPH/DIAG IV INF ADDON: CPT

## 2023-07-12 PROCEDURE — 71275 CT ANGIOGRAPHY CHEST: CPT

## 2023-07-12 PROCEDURE — 74177 CT ABD & PELVIS W/CONTRAST: CPT

## 2023-07-12 PROCEDURE — 99285 EMERGENCY DEPT VISIT HI MDM: CPT | Performed by: PHYSICIAN ASSISTANT

## 2023-07-12 PROCEDURE — 83605 ASSAY OF LACTIC ACID: CPT | Performed by: PHYSICIAN ASSISTANT

## 2023-07-12 PROCEDURE — 0241U HB NFCT DS VIR RESP RNA 4 TRGT: CPT | Performed by: PHYSICIAN ASSISTANT

## 2023-07-12 PROCEDURE — 96365 THER/PROPH/DIAG IV INF INIT: CPT

## 2023-07-12 PROCEDURE — 85730 THROMBOPLASTIN TIME PARTIAL: CPT | Performed by: PHYSICIAN ASSISTANT

## 2023-07-12 PROCEDURE — 85025 COMPLETE CBC W/AUTO DIFF WBC: CPT | Performed by: PHYSICIAN ASSISTANT

## 2023-07-12 PROCEDURE — 84145 PROCALCITONIN (PCT): CPT | Performed by: PHYSICIAN ASSISTANT

## 2023-07-12 PROCEDURE — 82948 REAGENT STRIP/BLOOD GLUCOSE: CPT

## 2023-07-12 PROCEDURE — 85610 PROTHROMBIN TIME: CPT | Performed by: PHYSICIAN ASSISTANT

## 2023-07-12 PROCEDURE — 93005 ELECTROCARDIOGRAM TRACING: CPT

## 2023-07-12 PROCEDURE — 81001 URINALYSIS AUTO W/SCOPE: CPT | Performed by: PHYSICIAN ASSISTANT

## 2023-07-12 PROCEDURE — 99223 1ST HOSP IP/OBS HIGH 75: CPT | Performed by: PHYSICIAN ASSISTANT

## 2023-07-12 PROCEDURE — 84484 ASSAY OF TROPONIN QUANT: CPT | Performed by: PHYSICIAN ASSISTANT

## 2023-07-12 PROCEDURE — 80053 COMPREHEN METABOLIC PANEL: CPT | Performed by: PHYSICIAN ASSISTANT

## 2023-07-12 PROCEDURE — 99285 EMERGENCY DEPT VISIT HI MDM: CPT

## 2023-07-12 PROCEDURE — 36415 COLL VENOUS BLD VENIPUNCTURE: CPT | Performed by: PHYSICIAN ASSISTANT

## 2023-07-12 PROCEDURE — 87040 BLOOD CULTURE FOR BACTERIA: CPT | Performed by: PHYSICIAN ASSISTANT

## 2023-07-12 PROCEDURE — G1004 CDSM NDSC: HCPCS

## 2023-07-12 RX ORDER — INSULIN LISPRO 100 [IU]/ML
1-5 INJECTION, SOLUTION INTRAVENOUS; SUBCUTANEOUS
Status: DISCONTINUED | OUTPATIENT
Start: 2023-07-12 | End: 2023-07-13

## 2023-07-12 RX ORDER — ACETAMINOPHEN 325 MG/1
650 TABLET ORAL EVERY 6 HOURS PRN
Status: DISCONTINUED | OUTPATIENT
Start: 2023-07-12 | End: 2023-07-13

## 2023-07-12 RX ORDER — HEPARIN SODIUM 1000 [USP'U]/ML
5600 INJECTION, SOLUTION INTRAVENOUS; SUBCUTANEOUS EVERY 6 HOURS PRN
Status: DISCONTINUED | OUTPATIENT
Start: 2023-07-12 | End: 2023-07-15

## 2023-07-12 RX ORDER — HEPARIN SODIUM 1000 [USP'U]/ML
2800 INJECTION, SOLUTION INTRAVENOUS; SUBCUTANEOUS EVERY 6 HOURS PRN
Status: DISCONTINUED | OUTPATIENT
Start: 2023-07-12 | End: 2023-07-15

## 2023-07-12 RX ORDER — MONTELUKAST SODIUM 10 MG/1
10 TABLET ORAL
Status: DISCONTINUED | OUTPATIENT
Start: 2023-07-12 | End: 2023-07-20 | Stop reason: HOSPADM

## 2023-07-12 RX ORDER — INSULIN LISPRO 100 [IU]/ML
1-5 INJECTION, SOLUTION INTRAVENOUS; SUBCUTANEOUS
Status: DISCONTINUED | OUTPATIENT
Start: 2023-07-13 | End: 2023-07-13

## 2023-07-12 RX ORDER — METRONIDAZOLE 500 MG/1
500 TABLET ORAL EVERY 8 HOURS
Status: DISCONTINUED | OUTPATIENT
Start: 2023-07-12 | End: 2023-07-13

## 2023-07-12 RX ORDER — ALBUTEROL SULFATE 90 UG/1
2 AEROSOL, METERED RESPIRATORY (INHALATION) EVERY 4 HOURS PRN
Status: DISCONTINUED | OUTPATIENT
Start: 2023-07-12 | End: 2023-07-20 | Stop reason: HOSPADM

## 2023-07-12 RX ORDER — LIDOCAINE 50 MG/G
1 PATCH TOPICAL DAILY PRN
Status: DISCONTINUED | OUTPATIENT
Start: 2023-07-12 | End: 2023-07-20 | Stop reason: HOSPADM

## 2023-07-12 RX ORDER — MAGNESIUM HYDROXIDE/ALUMINUM HYDROXICE/SIMETHICONE 120; 1200; 1200 MG/30ML; MG/30ML; MG/30ML
30 SUSPENSION ORAL EVERY 6 HOURS PRN
Status: DISCONTINUED | OUTPATIENT
Start: 2023-07-12 | End: 2023-07-20 | Stop reason: HOSPADM

## 2023-07-12 RX ORDER — ATORVASTATIN CALCIUM 10 MG/1
10 TABLET, FILM COATED ORAL
Status: DISCONTINUED | OUTPATIENT
Start: 2023-07-13 | End: 2023-07-20 | Stop reason: HOSPADM

## 2023-07-12 RX ORDER — ACETAMINOPHEN 325 MG/1
650 TABLET ORAL ONCE
Status: COMPLETED | OUTPATIENT
Start: 2023-07-12 | End: 2023-07-12

## 2023-07-12 RX ORDER — HEPARIN SODIUM 10000 [USP'U]/100ML
3-30 INJECTION, SOLUTION INTRAVENOUS
Status: DISCONTINUED | OUTPATIENT
Start: 2023-07-12 | End: 2023-07-15

## 2023-07-12 RX ORDER — BENZONATATE 100 MG/1
100 CAPSULE ORAL 3 TIMES DAILY PRN
Status: DISCONTINUED | OUTPATIENT
Start: 2023-07-12 | End: 2023-07-20 | Stop reason: HOSPADM

## 2023-07-12 RX ORDER — CEFTRIAXONE 1 G/50ML
1000 INJECTION, SOLUTION INTRAVENOUS EVERY 24 HOURS
Status: DISCONTINUED | OUTPATIENT
Start: 2023-07-13 | End: 2023-07-13

## 2023-07-12 RX ORDER — CEFAZOLIN SODIUM 2 G/50ML
2000 SOLUTION INTRAVENOUS ONCE
Status: COMPLETED | OUTPATIENT
Start: 2023-07-12 | End: 2023-07-12

## 2023-07-12 RX ORDER — SODIUM CHLORIDE 9 MG/ML
75 INJECTION, SOLUTION INTRAVENOUS CONTINUOUS
Status: DISCONTINUED | OUTPATIENT
Start: 2023-07-12 | End: 2023-07-20 | Stop reason: HOSPADM

## 2023-07-12 RX ADMIN — ACETAMINOPHEN 650 MG: 325 TABLET ORAL at 20:27

## 2023-07-12 RX ADMIN — MONTELUKAST 10 MG: 10 TABLET, FILM COATED ORAL at 22:43

## 2023-07-12 RX ADMIN — IOHEXOL 100 ML: 350 INJECTION, SOLUTION INTRAVENOUS at 18:32

## 2023-07-12 RX ADMIN — VANCOMYCIN HYDROCHLORIDE 1750 MG: 1 INJECTION, POWDER, LYOPHILIZED, FOR SOLUTION INTRAVENOUS at 22:30

## 2023-07-12 RX ADMIN — HEPARIN SODIUM 18 UNITS/KG/HR: 10000 INJECTION, SOLUTION INTRAVENOUS at 22:25

## 2023-07-12 RX ADMIN — SODIUM CHLORIDE 1000 ML: 0.9 INJECTION, SOLUTION INTRAVENOUS at 22:35

## 2023-07-12 RX ADMIN — METRONIDAZOLE 500 MG: 500 TABLET ORAL at 22:40

## 2023-07-12 RX ADMIN — CEFAZOLIN SODIUM 2000 MG: 2 SOLUTION INTRAVENOUS at 17:26

## 2023-07-12 RX ADMIN — INSULIN LISPRO 4 UNITS: 100 INJECTION, SOLUTION INTRAVENOUS; SUBCUTANEOUS at 22:39

## 2023-07-12 NOTE — TELEPHONE ENCOUNTER
Called and spoke with Jessenia Capone at the [facility] DR RUFUS BOURGEOIS. She stated pt came in yesterday and picked up 3 days worth of medications. He is going to contact his insurance and call the [employer] to see if they can help with the copay.

## 2023-07-12 NOTE — TELEPHONE ENCOUNTER
Patient called and wants to know where his antibiotics are he isn't feeling well today. Patient isn't breathing well  Patient informed me that he has insurance  [employer]. , he is in the process of getting the number on the card. Please call patient back this morning please.     CB: [phone]

## 2023-07-12 NOTE — TELEPHONE ENCOUNTER
Spoke with patient and he is not feeling well today. I advised going back to the ED, and he will present to the [address] [place] [address] for labs/continued treatment. Fortunately as per previous encounter, does have insurance. ADT order placed.

## 2023-07-12 NOTE — TELEPHONE ENCOUNTER
Caller: Edi Quiñonez    Doctor: Giles Leo    Reason for call: Called patient & let him know he needs to go to ED, he will be going to the [address] [place] [address] around 10:30am today.     Call back#: [phone]

## 2023-07-12 NOTE — TELEPHONE ENCOUNTER
----- Message from Vanessa Lancaster PA-C sent at 7/11/2023  1:13 PM EDT -----  Hi team, please call patient's pharmacy @ [facility] to see what happened with his Eliquis script.  He needs this, thanks!!

## 2023-07-12 NOTE — ED PROVIDER NOTES
History  Chief Complaint   Patient presents with   • Medical Problem     Pt eloped from hospital admitted for sepsis, CA PNA, elevated trop, septic embolism, liver mass/abscess/thrombosis, and history of HIV. Received call on 7/5 strongly urging pt to return to hospital for continuation of treatment. Patient then eloped from hospital on 7/5, arrives today for same medical problem. Sudhir Quiñonez is a 78-year-old male returning for treatment regarding recent admission through 6/26/23 for management of Klebsiella pneumonia bacteremia, septic emboli, pneumonia, liver abscess s/p IR drain. The patient had initially signed out against medical advice on 6/26/23, returning to the emergency department on both 7/4 and 7/5 though eloping prior to being seen and examined. Patient states that he was not able to retrieve his prescription for Eliquis until yesterday and has taken 3 doses in total.  He has not been taking antibiotics in the outpatient setting. He states that overall he feels that he is doing well. He has an dry cough. He denies fevers, chills, sweats, chest pain, shortness of breath, abdominal pain. He states he has a small amount of drainage from his IR tube which appears clear in color. He offers no other complaints or concerns at this time. Prior to Admission Medications   Prescriptions Last Dose Informant Patient Reported? Taking? Ertugliflozin L-PyroglutamicAc 5 MG TABS  Self Yes No   Sig: Take 5 mg by mouth daily   albuterol (PROVENTIL HFA,VENTOLIN HFA) 90 mcg/act inhaler  Self Yes No   Sig: Inhale 2 puffs every 6 (six) hours as needed   apixaban (Eliquis) 5 mg  Self No No   Sig: Take 2 tablets (10 mg total) by mouth 2 (two) times a day for 7 days, THEN 1 tablet (5 mg total) 2 (two) times a day for 23 days.    atorvastatin (LIPITOR) 10 mg tablet  Self Yes No   Sig: Take 10 mg by mouth   cyanocobalamin (VITAMIN B-12) 100 MCG tablet  Self Yes No   Sig: Take 100 mcg by mouth daily metFORMIN (GLUCOPHAGE) 1000 MG tablet  Self Yes No   Sig: Take 1 tablet by mouth 2 (two) times a day with meals   montelukast (SINGULAIR) 10 mg tablet  Self Yes No   Sig: Take 10 mg by mouth      Facility-Administered Medications: None       Past Medical History:   Diagnosis Date   • Asthma        Past Surgical History:   Procedure Laterality Date   • IR BIOPSY LIVER MASS  6/29/2023       No family history on file. I have reviewed and agree with the history as documented. E-Cigarette/Vaping   • E-Cigarette Use Never User      E-Cigarette/Vaping Substances     Social History     Tobacco Use   • Smoking status: Never   • Smokeless tobacco: Never   Vaping Use   • Vaping Use: Never used   Substance Use Topics   • Alcohol use: Not Currently   • Drug use: Never       Review of Systems   Constitutional: Negative for chills, diaphoresis and fever. Respiratory: Positive for cough. Negative for shortness of breath. Cardiovascular: Negative for chest pain. Gastrointestinal: Negative for abdominal pain, diarrhea, nausea and vomiting. Neurological: Negative for weakness. All other systems reviewed and are negative. Physical Exam  Physical Exam  Vitals and nursing note reviewed. Constitutional:       General: He is not in acute distress. Appearance: Normal appearance. He is well-developed. He is not ill-appearing, toxic-appearing or diaphoretic. HENT:      Head: Normocephalic and atraumatic. Right Ear: External ear normal.      Left Ear: External ear normal.   Eyes:      Conjunctiva/sclera: Conjunctivae normal.   Cardiovascular:      Rate and Rhythm: Normal rate and regular rhythm. Pulses: Normal pulses. Pulmonary:      Effort: Pulmonary effort is normal. No respiratory distress. Breath sounds: Normal breath sounds. No wheezing, rhonchi or rales. Chest:      Chest wall: No tenderness. Abdominal:      General: There is no distension. Palpations: Abdomen is soft. Tenderness: There is no abdominal tenderness. There is no guarding or rebound. Comments: IR tube in place. Small amount of clear fluid visualized in GENARO drain   Musculoskeletal:         General: Normal range of motion. Cervical back: Normal range of motion and neck supple. Right lower leg: No edema. Left lower leg: No edema. Skin:     General: Skin is warm and dry. Capillary Refill: Capillary refill takes less than 2 seconds. Neurological:      Mental Status: He is alert. Motor: Motor function is intact. No weakness. Gait: Gait is intact.    Psychiatric:         Mood and Affect: Mood normal.         Vital Signs  ED Triage Vitals [07/12/23 1331]   Temperature Pulse Respirations Blood Pressure SpO2   97.5 °F (36.4 °C) (!) 115 20 133/77 100 %      Temp Source Heart Rate Source Patient Position - Orthostatic VS BP Location FiO2 (%)   Temporal Monitor Sitting Left arm --      Pain Score       --           Vitals:    07/12/23 1331 07/12/23 1530   BP: 133/77 130/71   Pulse: (!) 115 (!) 107   Patient Position - Orthostatic VS: Sitting          Visual Acuity      ED Medications  Medications - No data to display    Diagnostic Studies  Results Reviewed     Procedure Component Value Units Date/Time    Fingerstick Glucose (POCT) [[id number]]  (Abnormal) Collected: 07/12/23 2239    Lab Status: Final result Updated: 07/12/23 2240     POC Glucose 408 mg/dl     APTT [[id number]]  (Normal) Collected: 07/12/23 2224    Lab Status: Final result Specimen: Blood from Arm, Right Updated: 07/12/23 2239     PTT 36 seconds     FLU/RSV/COVID - if FLU/RSV clinically relevant [[id number]]  (Normal) Collected: 07/12/23 2141    Lab Status: Final result Specimen: Nares from Nose Updated: 07/12/23 2224     SARS-CoV-2 Negative     INFLUENZA A PCR Negative     INFLUENZA B PCR Negative     RSV PCR Negative    Narrative:      FOR PEDIATRIC PATIENTS - copy/paste COVID Guidelines URL to browser: [email or web address]    SARS-CoV-2 assay is a Nucleic Acid Amplification assay intended for the  qualitative detection of nucleic acid from SARS-CoV-2 in nasopharyngeal  swabs. Results are for the presumptive identification of SARS-CoV-2 RNA. Positive results are indicative of infection with SARS-CoV-2, the virus  causing COVID-19, but do not rule out bacterial infection or co-infection  with other viruses. Laboratories within the [facility] and its  territories are required to report all positive results to the appropriate  public health authorities. Negative results do not preclude SARS-CoV-2  infection and should not be used as the sole basis for treatment or other  patient management decisions. Negative results must be combined with  clinical observations, patient history, and epidemiological information. This test has not been FDA cleared or approved. This test has been authorized by FDA under an Emergency Use Authorization  (EUA). This test is only authorized for the duration of time the  declaration that circumstances exist justifying the authorization of the  emergency use of an in vitro diagnostic tests for detection of SARS-CoV-2  virus and/or diagnosis of COVID-19 infection under section 564(b)(1) of  the Act, 21 U. S.C. 677TKB-5(Q)(6), unless the authorization is terminated  or revoked sooner. The test has been validated but independent review by FDA  and CLIA is pending. Test performed using [employer] GeneXpert: This RT-PCR assay targets N2,  a region unique to SARS-CoV-2. A conserved region in the E-gene was chosen  for pan-Sarbecovirus detection which includes SARS-CoV-2. According to CMS-2020-01-R, this platform meets the definition of high-throughput technology.     Sputum culture and Gram stain [[id number]]     Lab Status: No result Specimen: Sputum     Strep Pneumoniae, Urine [[id number]]     Lab Status: No result Specimen: Urine Legionella antigen, Urine [[id number]]     Lab Status: No result Specimen: Urine     MRSA culture [[id number]]     Lab Status: No result Specimen: Nares     HS Troponin I 4hr [[id number]]  (Normal) Collected: 07/12/23 2034    Lab Status: Final result Specimen: Blood from Arm, Right Updated: 07/12/23 2109     hs TnI 4hr 7 ng/L      Delta 4hr hsTnI 2 ng/L     HS Troponin I 2hr [[id number]]  (Normal) Collected: 07/12/23 1850    Lab Status: Final result Specimen: Blood from Arm, Right Updated: 07/12/23 1931     hs TnI 2hr 6 ng/L      Delta 2hr hsTnI 1 ng/L     Urine Microscopic [[id number]]  (Normal) Collected: 07/12/23 1729    Lab Status: Final result Specimen: Urine, Clean Catch Updated: 07/12/23 1753     RBC, UA None Seen /hpf      WBC, UA None Seen /hpf      Epithelial Cells None Seen /hpf      Bacteria, UA None Seen /hpf     UA w Reflex to Microscopic w Reflex to Culture [[id number]]  (Abnormal) Collected: 07/12/23 1729    Lab Status: Final result Specimen: Urine, Clean Catch Updated: 07/12/23 1747     Color, UA Light Yellow     Clarity, UA Clear     Specific Gravity, UA 1.025     pH, UA 5.5     Leukocytes, UA Elevated glucose may cause decreased leukocyte values.  See urine microscopic for [facility] [place]     Nitrite, UA Negative     Protein, UA Negative mg/dl      Glucose, UA >=1000 (1%) mg/dl      Ketones, UA Trace mg/dl      Urobilinogen, UA <2.0 mg/dl      Bilirubin, UA Negative     Occult Blood, UA Negative    Procalcitonin [[id number]]  (Abnormal) Collected: 07/12/23 1616    Lab Status: Final result Specimen: Blood from Arm, Left Updated: 07/12/23 1705     Procalcitonin 0.35 ng/ml     HS Troponin 0hr (reflex protocol) [[id number]]  (Normal) Collected: 07/12/23 1616    Lab Status: Final result Specimen: Blood from Arm, Left Updated: 07/12/23 1704     hs TnI 0hr 5 ng/L     Comprehensive metabolic panel [[id number]]  (Abnormal) Collected: 07/12/23 1616    Lab Status: Final result Specimen: Blood from Arm, Left Updated: 07/12/23 1649     Sodium 128 mmol/L      Potassium 4.4 mmol/L      Chloride 94 mmol/L      CO2 21 mmol/L      ANION GAP 13 mmol/L      BUN 12 mg/dL      Creatinine 0.86 mg/dL      Glucose 283 mg/dL      Calcium 9.7 mg/dL      AST 16 U/L      ALT 20 U/L      Alkaline Phosphatase 152 U/L      Total Protein 9.9 g/dL      Albumin 3.6 g/dL      Total Bilirubin 0.67 mg/dL      eGFR 106 ml/min/1.73sq m     Narrative:      [facility] (CKD):   •  Stage 1 with normal or high GFR (GFR > 90 mL/min/1.73 square meters)  •  Stage 2 Mild CKD (GFR = 60-89 mL/min/1.73 square meters)  •  Stage 3A Moderate CKD (GFR = 45-59 mL/min/1.73 square meters)  •  Stage 3B Moderate CKD (GFR = 30-44 mL/min/1.73 square meters)  •  Stage 4 Severe CKD (GFR = 15-29 mL/min/1.73 square meters)  •  Stage 5 End Stage CKD (GFR <15 mL/min/1.73 square meters)  Note: GFR calculation is accurate only with a steady state creatinine    Lactic acid [[id number]]  (Normal) Collected: 07/12/23 1616    Lab Status: Final result Specimen: Blood from Arm, Left Updated: 07/12/23 1647     LACTIC ACID 1.1 mmol/L     Narrative:      Result may be elevated if tourniquet was used during collection.     Protime-INR [[id number]]  (Abnormal) Collected: 07/12/23 1616    Lab Status: Final result Specimen: Blood from Arm, Left Updated: 07/12/23 1646     Protime 14.9 seconds      INR 1.19    APTT [[id number]]  (Normal) Collected: 07/12/23 1616    Lab Status: Final result Specimen: Blood from Arm, Left Updated: 07/12/23 1646     PTT 37 seconds     CBC and differential [[id number]]  (Abnormal) Collected: 07/12/23 1616    Lab Status: Final result Specimen: Blood from Arm, Left Updated: 07/12/23 1624     WBC 15.17 Thousand/uL      RBC 3.53 Million/uL      Hemoglobin 9.6 g/dL      Hematocrit 30.0 %      MCV 85 fL      MCH 27.2 pg      MCHC 32.0 g/dL      RDW 15.9 %      MPV 9.2 fL      Platelets 542 Thousands/uL      nRBC 0 /100 WBCs      Neutrophils Relative 71 %      Immat GRANS % 1 %      Lymphocytes Relative 19 %      Monocytes Relative 8 %      Eosinophils Relative 0 %      Basophils Relative 1 %      Neutrophils Absolute 10.81 Thousands/µL      Immature Grans Absolute 0.08 Thousand/uL      Lymphocytes Absolute 2.91 Thousands/µL      Monocytes Absolute 1.19 Thousand/µL      Eosinophils Absolute 0.06 Thousand/µL      Basophils Absolute 0.12 Thousands/µL     Blood culture #2 [[id number]] Collected: 07/12/23 1621    Lab Status: In process Specimen: Blood from Hand, Right Updated: 07/12/23 1624    Blood culture #1 [[id number]] Collected: 07/12/23 1616    Lab Status: In process Specimen: Blood from Arm, Left Updated: 07/12/23 1621                 PE Study with CT abdomen & pelvis with contrast   Final Result by Dnay Jose MD (07/12 2005)      No pulmonary embolism. Progression of multi lobar consolidation in the right lung with both increase in size and cavitation and multiple other scattered septic emboli. Reactive mediastinal and hilar nodes. No residual abscess in the liver status post drainage catheter placement. Workstation performed: [id number]                    Procedures  ECG 12 Lead Documentation Only    Date/Time: 7/12/2023 4:25 PM    Performed by: Mo Clarke PA-C  Authorized by: Mo Clarke PA-C    Indications / Diagnosis:  Sob  ECG reviewed by me, the ED Provider: yes    Patient location:  ED  Rate:     ECG rate:  105  Rhythm:     Rhythm: sinus tachycardia    Ectopy:     Ectopy: none    QRS:     QRS axis:  Normal    QRS intervals:  Normal  Comments:      No ischemic ST/T wave changes             ED Course  ED Course as of 07/13/23 0001 Wed Jul 12, 2023   1800 Patient's wife brought him Chick nisha A and CT tech found him eating upon entering the room. CT now needs to be delayed until 630. Patient instructed no further eating until CT is completed and resulted. SBIRT 22yo+    Flowsheet Row Most Recent Value   Initial Alcohol Screen: US AUDIT-C     1. How often do you have a drink containing alcohol? 0 Filed at: 07/12/2023 1331   2. How many drinks containing alcohol do you have on a typical day you are drinking? 0 Filed at: 07/12/2023 1331   3a. Male UNDER 65: How often do you have five or more drinks on one occasion? 0 Filed at: 07/12/2023 1331   3b. FEMALE Any Age, or MALE 65+: How often do you have 4 or more drinks on one occassion? 0 Filed at: 07/12/2023 1331   Audit-C Score 0 Filed at: 07/12/2023 1331                    Medical Decision Making  This is a 35yo male returning for treatment after leaving against medical advice on 6/26 at which time he was being treated for bacteremia, pneumonia, liver abscess and septic emboli. Differential diagnosis includes but is not limited to: PE, septic emboli, pneumonia, effusions, ACS/anginal equivalent, arrhythmia, electrolyte derangements, anemia, KIRSTEN    Initial ED plan: labs, imaging, admission    Final ED Assessment: Vital signs reviewed on ED presentation, demonstrating tachycardia, examination as above. All labs and imaging independently reviewed with imaging interpreted by the Radiologist.  Lab work demonstrates leukocytosis of 15.17, and elevated procalcitonin. CT reports no pulmonary embolism with progression of multilobar consolidation in the right lung with increase in size and cavitation, and multiple other scattered septic emboli. There is no residual abscess in the liver status post drainage catheter placement. Prior ID notes reviewed and the patient was receiving Ancef during his hospitalization, and this was resumed. All test results reviewed with the patient at bedside as well as plan for hospital admission for continued care and further management per the internal medicine service which he is understanding of and agreeable with.   Case discussed with akanksha Ferro, patient accepted to the medicine service. The patient has remained hemodynamically stable throughout ED course and is stable for admission, bridging orders placed. Multilobar lung infiltrate: acute illness or injury  Amount and/or Complexity of Data Reviewed  Labs: ordered. Radiology: ordered. ECG/medicine tests: ordered. Risk  OTC drugs. Prescription drug management. Decision regarding hospitalization. Disposition  Final diagnoses:   Multilobar lung infiltrate   Septic embolism ([address])     Time reflects when diagnosis was documented in both MDM as applicable and the Disposition within this note     Time User Action Codes Description Comment    7/12/2023  8:11 PM Ana Maria Venegas Add [R91.8] Multilobar lung infiltrate     7/12/2023  9:47 PM Camila Pastor Add [I82.0] Thrombosis, hepatic vein ([address])     7/12/2023  9:47 PM Camila Pastor Remove [I82.0] Thrombosis, hepatic vein ([address])     7/12/2023  9:47 PM Camila Pastor Add [I76] Septic embolism [facility])       ED Disposition     ED Disposition   Admit    Condition   Stable    Date/Time   Wed Jul 12, 2023  8:09 PM    Comment   Case was discussed with Liliya Feldman and the patient's admission status was agreed to be Admission Status: inpatient status to the service of Dr. Gasper Anne . Follow-up Information    None         Patient's Medications   Discharge Prescriptions    No medications on file       No discharge procedures on file.     PDMP Review     None          ED Provider  Electronically Signed by           Manuel Erazo PA-C  07/13/23 0009

## 2023-07-13 LAB
ANION GAP SERPL CALCULATED.3IONS-SCNC: 10 MMOL/L
APTT PPP: 36 SECONDS (ref 23–37)
APTT PPP: 45 SECONDS (ref 23–37)
APTT PPP: 49 SECONDS (ref 23–37)
ATRIAL RATE: 105 BPM
BUN SERPL-MCNC: 7 MG/DL (ref 5–25)
CALCIUM SERPL-MCNC: 8.1 MG/DL (ref 8.4–10.2)
CHLORIDE SERPL-SCNC: 102 MMOL/L (ref 96–108)
CO2 SERPL-SCNC: 19 MMOL/L (ref 21–32)
CREAT SERPL-MCNC: 0.75 MG/DL (ref 0.6–1.3)
ERYTHROCYTE [DISTWIDTH] IN BLOOD BY AUTOMATED COUNT: 15.7 % (ref 11.6–15.1)
GFR SERPL CREATININE-BSD FRML MDRD: 113 ML/MIN/1.73SQ M
GLUCOSE SERPL-MCNC: 112 MG/DL (ref 65–140)
GLUCOSE SERPL-MCNC: 219 MG/DL (ref 65–140)
GLUCOSE SERPL-MCNC: 273 MG/DL (ref 65–140)
GLUCOSE SERPL-MCNC: 289 MG/DL (ref 65–140)
GLUCOSE SERPL-MCNC: 294 MG/DL (ref 65–140)
GLUCOSE SERPL-MCNC: 296 MG/DL (ref 65–140)
GLUCOSE SERPL-MCNC: 299 MG/DL (ref 65–140)
GLUCOSE SERPL-MCNC: 341 MG/DL (ref 65–140)
HCT VFR BLD AUTO: 24.6 % (ref 36.5–49.3)
HGB BLD-MCNC: 8 G/DL (ref 12–17)
MCH RBC QN AUTO: 27.4 PG (ref 26.8–34.3)
MCHC RBC AUTO-ENTMCNC: 32.5 G/DL (ref 31.4–37.4)
MCV RBC AUTO: 84 FL (ref 82–98)
P AXIS: 51 DEGREES
PLATELET # BLD AUTO: 615 THOUSANDS/UL (ref 149–390)
PMV BLD AUTO: 8.7 FL (ref 8.9–12.7)
POTASSIUM SERPL-SCNC: 4 MMOL/L (ref 3.5–5.3)
PR INTERVAL: 138 MS
QRS AXIS: 47 DEGREES
QRSD INTERVAL: 80 MS
QT INTERVAL: 320 MS
QTC INTERVAL: 422 MS
RBC # BLD AUTO: 2.92 MILLION/UL (ref 3.88–5.62)
SODIUM SERPL-SCNC: 131 MMOL/L (ref 135–147)
T WAVE AXIS: 32 DEGREES
VENTRICULAR RATE: 105 BPM
WBC # BLD AUTO: 14.05 THOUSAND/UL (ref 4.31–10.16)

## 2023-07-13 PROCEDURE — 93005 ELECTROCARDIOGRAM TRACING: CPT

## 2023-07-13 PROCEDURE — 80048 BASIC METABOLIC PNL TOTAL CA: CPT | Performed by: PHYSICIAN ASSISTANT

## 2023-07-13 PROCEDURE — 82948 REAGENT STRIP/BLOOD GLUCOSE: CPT

## 2023-07-13 PROCEDURE — 93010 ELECTROCARDIOGRAM REPORT: CPT | Performed by: INTERNAL MEDICINE

## 2023-07-13 PROCEDURE — 36415 COLL VENOUS BLD VENIPUNCTURE: CPT | Performed by: PHYSICIAN ASSISTANT

## 2023-07-13 PROCEDURE — 99233 SBSQ HOSP IP/OBS HIGH 50: CPT | Performed by: STUDENT IN AN ORGANIZED HEALTH CARE EDUCATION/TRAINING PROGRAM

## 2023-07-13 PROCEDURE — 94664 DEMO&/EVAL PT USE INHALER: CPT

## 2023-07-13 PROCEDURE — 85730 THROMBOPLASTIN TIME PARTIAL: CPT | Performed by: PHYSICIAN ASSISTANT

## 2023-07-13 PROCEDURE — 85027 COMPLETE CBC AUTOMATED: CPT | Performed by: PHYSICIAN ASSISTANT

## 2023-07-13 PROCEDURE — 99255 IP/OBS CONSLTJ NEW/EST HI 80: CPT | Performed by: INTERNAL MEDICINE

## 2023-07-13 RX ORDER — HYDROMORPHONE HCL/PF 1 MG/ML
0.5 SYRINGE (ML) INJECTION EVERY 4 HOURS PRN
Status: DISCONTINUED | OUTPATIENT
Start: 2023-07-13 | End: 2023-07-20 | Stop reason: HOSPADM

## 2023-07-13 RX ORDER — VANCOMYCIN HYDROCHLORIDE 1 G/200ML
1000 INJECTION, SOLUTION INTRAVENOUS EVERY 12 HOURS
Status: DISCONTINUED | OUTPATIENT
Start: 2023-07-13 | End: 2023-07-13

## 2023-07-13 RX ORDER — INSULIN LISPRO 100 [IU]/ML
2-12 INJECTION, SOLUTION INTRAVENOUS; SUBCUTANEOUS
Status: DISCONTINUED | OUTPATIENT
Start: 2023-07-13 | End: 2023-07-20 | Stop reason: HOSPADM

## 2023-07-13 RX ORDER — CEFAZOLIN SODIUM 2 G/50ML
2000 SOLUTION INTRAVENOUS EVERY 8 HOURS
Status: DISCONTINUED | OUTPATIENT
Start: 2023-07-13 | End: 2023-07-17

## 2023-07-13 RX ORDER — GUAIFENESIN/DEXTROMETHORPHAN 100-10MG/5
10 SYRUP ORAL EVERY 4 HOURS PRN
Status: DISCONTINUED | OUTPATIENT
Start: 2023-07-13 | End: 2023-07-20 | Stop reason: HOSPADM

## 2023-07-13 RX ORDER — ACETAMINOPHEN 325 MG/1
975 TABLET ORAL EVERY 8 HOURS PRN
Status: DISCONTINUED | OUTPATIENT
Start: 2023-07-13 | End: 2023-07-13

## 2023-07-13 RX ORDER — ONDANSETRON 2 MG/ML
4 INJECTION INTRAMUSCULAR; INTRAVENOUS ONCE
Status: COMPLETED | OUTPATIENT
Start: 2023-07-13 | End: 2023-07-13

## 2023-07-13 RX ORDER — HYDROMORPHONE HCL/PF 1 MG/ML
0.5 SYRINGE (ML) INJECTION
Status: DISCONTINUED | OUTPATIENT
Start: 2023-07-13 | End: 2023-07-20 | Stop reason: HOSPADM

## 2023-07-13 RX ORDER — OXYCODONE HYDROCHLORIDE 5 MG/1
5 TABLET ORAL EVERY 6 HOURS PRN
Status: DISCONTINUED | OUTPATIENT
Start: 2023-07-13 | End: 2023-07-20 | Stop reason: HOSPADM

## 2023-07-13 RX ORDER — ACETAMINOPHEN 325 MG/1
650 TABLET ORAL EVERY 8 HOURS PRN
Status: DISCONTINUED | OUTPATIENT
Start: 2023-07-13 | End: 2023-07-20 | Stop reason: HOSPADM

## 2023-07-13 RX ORDER — INSULIN GLARGINE 100 [IU]/ML
5 INJECTION, SOLUTION SUBCUTANEOUS
Status: DISCONTINUED | OUTPATIENT
Start: 2023-07-13 | End: 2023-07-20 | Stop reason: HOSPADM

## 2023-07-13 RX ORDER — INSULIN LISPRO 100 [IU]/ML
5 INJECTION, SOLUTION INTRAVENOUS; SUBCUTANEOUS
Status: DISCONTINUED | OUTPATIENT
Start: 2023-07-13 | End: 2023-07-20 | Stop reason: HOSPADM

## 2023-07-13 RX ORDER — KETOROLAC TROMETHAMINE 30 MG/ML
15 INJECTION, SOLUTION INTRAMUSCULAR; INTRAVENOUS ONCE
Status: COMPLETED | OUTPATIENT
Start: 2023-07-13 | End: 2023-07-13

## 2023-07-13 RX ORDER — METOCLOPRAMIDE HYDROCHLORIDE 5 MG/ML
10 INJECTION INTRAMUSCULAR; INTRAVENOUS ONCE
Status: COMPLETED | OUTPATIENT
Start: 2023-07-13 | End: 2023-07-13

## 2023-07-13 RX ORDER — IBUPROFEN 600 MG/1
600 TABLET ORAL EVERY 6 HOURS PRN
Status: DISCONTINUED | OUTPATIENT
Start: 2023-07-13 | End: 2023-07-15

## 2023-07-13 RX ADMIN — HEPARIN SODIUM 5600 UNITS: 1000 INJECTION INTRAVENOUS; SUBCUTANEOUS at 20:37

## 2023-07-13 RX ADMIN — ATORVASTATIN CALCIUM 10 MG: 10 TABLET, FILM COATED ORAL at 18:33

## 2023-07-13 RX ADMIN — INSULIN LISPRO 6 UNITS: 100 INJECTION, SOLUTION INTRAVENOUS; SUBCUTANEOUS at 08:49

## 2023-07-13 RX ADMIN — ONDANSETRON 4 MG: 2 INJECTION INTRAMUSCULAR; INTRAVENOUS at 22:24

## 2023-07-13 RX ADMIN — CEFAZOLIN SODIUM 2000 MG: 2 SOLUTION INTRAVENOUS at 08:48

## 2023-07-13 RX ADMIN — CEFTRIAXONE 1000 MG: 1 INJECTION, SOLUTION INTRAVENOUS at 01:29

## 2023-07-13 RX ADMIN — INSULIN LISPRO 5 UNITS: 100 INJECTION, SOLUTION INTRAVENOUS; SUBCUTANEOUS at 18:32

## 2023-07-13 RX ADMIN — OXYCODONE HYDROCHLORIDE 5 MG: 5 TABLET ORAL at 09:52

## 2023-07-13 RX ADMIN — METOCLOPRAMIDE 10 MG: 5 INJECTION, SOLUTION INTRAMUSCULAR; INTRAVENOUS at 23:48

## 2023-07-13 RX ADMIN — SODIUM CHLORIDE 75 ML/HR: 0.9 INJECTION, SOLUTION INTRAVENOUS at 00:27

## 2023-07-13 RX ADMIN — INSULIN LISPRO 8 UNITS: 100 INJECTION, SOLUTION INTRAVENOUS; SUBCUTANEOUS at 18:27

## 2023-07-13 RX ADMIN — GUAIFENESIN AND DEXTROMETHORPHAN 10 ML: 100; 10 SYRUP ORAL at 12:14

## 2023-07-13 RX ADMIN — HEPARIN SODIUM 2800 UNITS: 1000 INJECTION INTRAVENOUS; SUBCUTANEOUS at 12:13

## 2023-07-13 RX ADMIN — HEPARIN SODIUM 26 UNITS/KG/HR: 10000 INJECTION, SOLUTION INTRAVENOUS at 20:59

## 2023-07-13 RX ADMIN — INSULIN LISPRO 2 UNITS: 100 INJECTION, SOLUTION INTRAVENOUS; SUBCUTANEOUS at 05:46

## 2023-07-13 RX ADMIN — ACETAMINOPHEN 650 MG: 325 TABLET, FILM COATED ORAL at 15:58

## 2023-07-13 RX ADMIN — METRONIDAZOLE 500 MG: 500 TABLET ORAL at 05:48

## 2023-07-13 RX ADMIN — KETOROLAC TROMETHAMINE 15 MG: 30 INJECTION, SOLUTION INTRAMUSCULAR; INTRAVENOUS at 23:49

## 2023-07-13 RX ADMIN — HEPARIN SODIUM 2800 UNITS: 1000 INJECTION INTRAVENOUS; SUBCUTANEOUS at 05:24

## 2023-07-13 RX ADMIN — CEFAZOLIN SODIUM 2000 MG: 2 SOLUTION INTRAVENOUS at 18:51

## 2023-07-13 RX ADMIN — VITAM B12 100 MCG: 100 TAB at 08:48

## 2023-07-13 RX ADMIN — HYDROMORPHONE HYDROCHLORIDE 0.5 MG: 1 INJECTION, SOLUTION INTRAMUSCULAR; INTRAVENOUS; SUBCUTANEOUS at 12:14

## 2023-07-13 RX ADMIN — INSULIN GLARGINE 5 UNITS: 100 INJECTION, SOLUTION SUBCUTANEOUS at 21:02

## 2023-07-13 RX ADMIN — ACETAMINOPHEN 975 MG: 325 TABLET ORAL at 03:06

## 2023-07-13 RX ADMIN — OXYCODONE HYDROCHLORIDE 5 MG: 5 TABLET ORAL at 18:58

## 2023-07-13 RX ADMIN — INSULIN LISPRO 4 UNITS: 100 INJECTION, SOLUTION INTRAVENOUS; SUBCUTANEOUS at 14:17

## 2023-07-13 RX ADMIN — INSULIN LISPRO 5 UNITS: 100 INJECTION, SOLUTION INTRAVENOUS; SUBCUTANEOUS at 14:21

## 2023-07-13 RX ADMIN — SODIUM CHLORIDE 75 ML/HR: 0.9 INJECTION, SOLUTION INTRAVENOUS at 10:36

## 2023-07-13 RX ADMIN — INSULIN LISPRO 5 UNITS: 100 INJECTION, SOLUTION INTRAVENOUS; SUBCUTANEOUS at 08:49

## 2023-07-13 NOTE — CONSULTS
Consultation - Infectious Disease   Vanesa Sher Mbow 43 y.o. male MRN: [id number]  Unit/Bed#: ED 10 Encounter: [id number]      IMPRESSION & RECOMMENDATIONS:   1. Sepsis, POA. Patient presented with tachycardia along with leukocytosis and intermittent tachypnea which is due to #2 and patient's noncompliance. He is fortunately otherwise hemodynamically stable. Blood cultures pending. Antibiotics adjusted as below  Continue to trend fever curve/vitals  Repeat CBC/chemistry tomorrow  Follow-up pending blood cultures  Recommend IR evaluation for #3  Counseled the patient and sister on need for compliance  We will adjust antibiotics further based on clinical progress  We will rearrange ID follow-up again closer to discharge  Supportive care as per primary    2. Septic pulmonary emboli with cavitation. This was felt to be due to #3 and #4. There is some slight progression on the imaging in terms of cavitation in size which I suspect are largely due to noncompliance. Some changes may also be radiographically expected. We will switch back to Ancef based on prior cultures  Continue to trend fever curve/vitals  Repeat CBC/chemistry tomorrow  Follow-up pending blood cultures  Monitor for improvement in respiratory symptoms  Low threshold for pulmonary evaluation  Patient will likely need prolonged antibiotic and follow-up imaging  Anticipate transition to p.o. therapy as prior given #7  Supportive care as per primary    3. Liver abscess with adjacent thrombosis. Was diagnosed with this on last admission in the setting of #4. Suspect that this contributed to #2. MRCP without other biliary abnormality. Essentially an occult abscess. Repeat imaging now showing resolution postdrainage. Recommend IR reevaluation of drain  Antibiotics as above  Monitor abdominal exam  Consider outpatient GI evaluation for colonoscopy  Trend fever curve/WBC    4. Recent Klebsiella bacteremia. Course complicated by the above.   We will continue antibiotics as above and follow-up pending blood cultures    5. Thrombocytosis. Suspect that this was an inflammatory response due to the above. We will repeat CBC tomorrow. Continue antibiotics as above    6. Poorly controlled diabetes. Hemoglobin A1c of 12.0. Likely risk factor for the above. Glucose management as per primary    7. Noncompliance. Patient unfortunately has eloped from several ER visits and recent hospitalization. Stressed to both him and his sister the need for compliance and prolonged therapy and follow-up. We will avoid PICC line at this time. Recommend case management evaluation. Above plan discussed in detail with the patient, his sister and primary service. ID consult service will continue to follow. HISTORY OF PRESENT ILLNESS:  Reason for Consult: Liver abscess    HPI: Annabel Quiñonez is a 43y.o. year old male who is without any significant past medical or surgical history. The patient was actually seen by our service on his admission at the end of June. He presented to the hospital at that time with fevers and chills and night sweats along with chest pressure. At baseline [place] [facility] [place] [place] 20 years [place]. He has been working in a [employer] business with his family and is  and has 3 children at home. On that admission he was found on CT chest to have areas of consolidation concerning for septic emboli. His blood cultures later returned positive with Klebsiella. He was suspected to have a hepatobiliary source as he was found to have liver abscess along with hepatic vein thrombosis. It was suspected that the lung findings were related and may have actually been septic emboli. It was also noted on that admission that he had a false positive HIV screen.   Patient was ultimately transitioned to oral levofloxacin and was recommended for at least 6 weeks of antibiotic with plan for repeat CT imaging as an outpatient. The patient unfortunately eloped from the hospital in our office contacted him to return. He then returned various times to the ER here as well as at outside hospital but then eloped/left [facility] each time. He reported to me today that he had been feeling unwell and then once he was receiving antibiotics he was feeling better and thought that he could leave. It seems that he did not understand the severity of his illness. His sister is present and she reports she is from [place] but she plans to stay here now to help to take care of the patient and assure that he is compliant. He reports overall having increasing cough when taking deep breath and discomfort with deep breaths. He reports that he has been emptying his drain on his own but has not been flushing it. Again he has been on and off antibiotics since 7/4. He overall just feels unwell currently. Patient was admitted and restarted on antibiotics. He is currently afebrile. He has mild leukocytosis at 15. COVID testing negative and repeat blood cultures pending. CT imaging reviewed and again there is multi lobar consolidations with some increase in size and cavitation again noted. He does not have a residual abscess in the liver post drainage. His vitals are otherwise stable other than intermittent tachycardia and tachypnea. Previous ID evaluations and cultures reviewed. His white count did not normalize prior to discharge. Thrombocytosis also noted. We are consulted at this time for further assistance with management. REVIEW OF SYSTEMS:  A complete 12 point system-based review of systems is negative other than that noted in the HPI.     PAST MEDICAL HISTORY:  Past Medical History:   Diagnosis Date   • Asthma      Past Surgical History:   Procedure Laterality Date   • IR BIOPSY LIVER MASS  6/29/2023       FAMILY HISTORY:  Non-contributory    SOCIAL HISTORY:  Social History   Social History     Substance and Sexual Activity   Alcohol Use Not Currently     Social History     Substance and Sexual Activity   Drug Use Never     Social History     Tobacco Use   Smoking Status Never   Smokeless Tobacco Never       ALLERGIES:  No Known Allergies    MEDICATIONS:  All current active medications have been reviewed. PHYSICAL EXAM:  Temp:  [97.5 °F (36.4 °C)] 97.5 °F (36.4 °C)  HR:  [] 93  Resp:  [18-35] 33  BP: (115-157)/(66-84) 129/73  SpO2:  [97 %-100 %] 99 %  Temp (24hrs), Av.5 °F (36.4 °C), Min:97.5 °F (36.4 °C), Max:97.5 °F (36.4 °C)  Current: Temperature: 97.5 °F (36.4 °C)    Intake/Output Summary (Last 24 hours) at 2023 0818  Last data filed at 2023 0306  Gross per 24 hour   Intake 1600 ml   Output 1400 ml   Net 200 ml       General Appearance:  Appearing well, nontoxic, and in no distress; he is able to provide fairly detailed history. Noticeably develops cough with deep breath. Head:  Normocephalic, without obvious abnormality, atraumatic   Eyes:  Conjunctiva pink and sclera anicteric, both eyes   Nose: Nares normal, mucosa normal, no drainage   Throat: Oropharynx moist without lesions   Neck: Supple, symmetrical, no adenopathy, no tenderness/mass/nodules   Back:   Symmetric, no curvature, ROM normal, no CVA tenderness; no spinal or paraspinal muscle tenderness to palpation. Lungs:    Decreased breath sounds throughout, no wheezes or rales. Chest Wall:  No tenderness or deformity   Heart:  RRR; no murmur, rub or gallop noted   Abdomen:   Soft, non-tender, non-distended, positive bowel sounds    Extremities: No cyanosis, clubbing or edema   Skin: No rashes or lesions. No draining wounds noted. IR drain remains in place and there is still purulent drainage in the tubing.    Lymph nodes: Cervical, supraclavicular nodes normal   Neurologic: Alert and oriented times 3, extremity strength 5/5 and symmetric       LABS, IMAGING, & OTHER STUDIES:  In completing this consult I have performed an extensive review of the medical records in epic including review of the notes, radiographs, and laboratory results as detailed below. Lab Results:  I have personally reviewed pertinent labs. Comments/Interpretations: Mild leukocytosis again noted along with thrombocytosis. Results from last 7 days   Lab Units 07/13/23  0423 07/12/23  1616   WBC Thousand/uL 14.05* 15.17*   HEMOGLOBIN g/dL 8.0* 9.6*   PLATELETS Thousands/uL 615* 731*     Results from last 7 days   Lab Units 07/13/23  0423 07/12/23  1616   POTASSIUM mmol/L 4.0 4.4   CHLORIDE mmol/L 102 94*   CO2 mmol/L 19* 21   BUN mg/dL 7 12   CREATININE mg/dL 0.75 0.86   EGFR ml/min/1.73sq m 113 106   CALCIUM mg/dL 8.1* 9.7   AST U/L  --  16   ALT U/L  --  20   ALK PHOS U/L  --  152*     Results from last 7 days   Lab Units 07/12/23  1621 07/12/23  1616   BLOOD CULTURE  Received in Microbiology Lab. Culture in Progress. Received in Microbiology Lab. Culture in Progress. Imaging Studies:   I have personally reviewed pertinent imaging study reports and images in PACS. Comments/Interpretations:  CT chest imaging with septic emboli with ongoing cavitation. Liver abscess has largely resolved. Other Studies:   I have personally reviewed other pertinent reports as below. Records in [address] [facility]: Recent ER visits reviewed in care everywhere    Nursing home/EMS Records: None    Current/Prior Cultures: Prior blood and body fluid cultures reviewed.

## 2023-07-13 NOTE — ASSESSMENT & PLAN NOTE
· CT abdomen pelvis revealing no residual abscess cavity and liver with drain in place  · Patient with upcoming appointment on 7/20 with IR for drain check, educated on importance of keeping appointment

## 2023-07-13 NOTE — ASSESSMENT & PLAN NOTE
Lab Results   Component Value Date    HGBA1C 12.0 (H) 07/05/2023       Recent Labs     07/13/23  0545 07/13/23  0706 07/13/23  0848 07/13/23  1151   POCGLU 294* 273* 296* 299*       Blood Sugar Average: Last 72 hrs:  · (P) 314   · Poorly controlled diabetes  · Start #4 sliding scale  · Add basal/prandial insulin  · Monitor

## 2023-07-13 NOTE — ASSESSMENT & PLAN NOTE
· Eloped twice during hospitalization 6/26 and 7/4 and has eloped 4 times since then from this campus's ED as well as [facility]  · Educated on risks of leaving AMA again and patient specifically asked what happened if he does not get treatment for worsening pneumonia and reviewed risks up to and including death and significance of continuing treatment with antibiotics and blood thinner which he agrees he will at this time  · Of note patient without insurance during last hospitalization, now has health insurance through [address] [place] [address]

## 2023-07-13 NOTE — ASSESSMENT & PLAN NOTE
· Meeting criteria due to leukocytosis, tachycardia in the setting of worsening progression pneumonia and septic emboli  · Lactic acid WNL, procalcitonin elevated but improving from previous on 7/4  · Blood culture x2 pending from Health system, during last hospitalization patient with noted Klebsiella pneumonia bacteremia  · Continue IV fluids and supportive care  · ID consulted, follow up recommendations

## 2023-07-13 NOTE — CONSULTS
The patient's vancomycin therapy has been completed/discontinued.  Thank you for this consult; pharmacy will sign-off now

## 2023-07-13 NOTE — ASSESSMENT & PLAN NOTE
· Meeting criteria due to leukocytosis, tachycardia in the setting of worsening progression pneumonia  · Lactic acid WNL, procalcitonin elevated but improving from previous on 7/4  · Blood culture x2 pending from [facility], during last hospitalization patient with noted Klebsiella pneumonia bacteremia  · We will give an IV fluid bolus now followed by continuous IV fluid hydration  · IV antibiotics

## 2023-07-13 NOTE — PROGRESS NOTES
Kenyon Quiñonez is a 43 y.o. male who is currently ordered Vancomycin IV with management by the Pharmacy Consult service. Relevant clinical data and objective / subjective history reviewed. Vancomycin Assessment:  Indication and Goal AUC/Trough: Pneumonia (goal -600, trough >10), -600, trough >10  Clinical Status: stable  Micro:   pending  Renal Function:  SCr: 0.86 mg/dL  CrCl: 104.9 mL/min  Renal replacement: Not on dialysis  Days of Therapy: 1  Current Dose: 1750mg loading dose  Vancomycin Plan:  New Dosinmg Q12H  Estimated AUC: 450 mcg*hr/mL  Estimated Trough: 13.4 mcg/mL  Next Level: 07/15/23  Renal Function Monitoring: Daily BMP and UOP  Pharmacy will continue to follow closely for s/sx of nephrotoxicity, infusion reactions and appropriateness of therapy. BMP and CBC will be ordered per protocol. We will continue to follow the patient’s culture results and clinical progress daily.     Sandy Arzate, Pharmacist

## 2023-07-13 NOTE — PROGRESS NOTES
[address]  Progress Note  Name: Fuad Pino I  MRN: [id number]  Unit/Bed#: ED 10 I Date of Admission: 7/12/2023   Date of Service: 7/13/2023 I Hospital Day: 1    Assessment/Plan   Sepsis [facility])  Assessment & Plan  · Meeting criteria due to leukocytosis, tachycardia in the setting of worsening progression pneumonia and septic emboli  · Lactic acid WNL, procalcitonin elevated but improving from previous on 7/4  · Blood culture x2 pending from [facility], during last hospitalization patient with noted Klebsiella pneumonia bacteremia  · Continue IV fluids and supportive care  · ID consulted, follow up recommendations     * Cavitary pneumonia  Assessment & Plan  · Patient left AMA during last hospitalization with pneumonia and did not complete his antibiotic course including antibiotic that was sent to the pharmacy for him levofloxacin  · CT chest abdomen pelvis now revealing significant progression of pneumonia in right lung with cavitation now in posterior right upper lobe and right lower lobe and small nodule cavitations  · Recheck sputum culture, strep pneumonia, Legionella  · COVID/flu/RSV negative  · Appreciate ID consult  · Continue IV antibiotics and supportive care    Anemia  Assessment & Plan  · Monitor hemoglobin  · Likely multifactorial in setting of iron deficiency and chronic disease    Type 2 diabetes mellitus without complication, without long-term current use of insulin [facility])  Assessment & Plan  Lab Results   Component Value Date    HGBA1C 12.0 (H) 07/05/2023       Recent Labs     07/13/23  0545 07/13/23  0706 07/13/23  0848 07/13/23  1151   POCGLU 294* 273* 296* 299*       Blood Sugar Average: Last 72 hrs:  · (P) 314   · Poorly controlled diabetes  · Start #4 sliding scale  · Add basal/prandial insulin  · Monitor     Hyponatremia  Assessment & Plan  · Corrected sodium decreased at 131  · Likely hypovolemic hyponatremia     Liver abscess  Assessment & Plan  · CT abdomen pelvis revealing no residual abscess cavity and liver with drain in place  · Patient with upcoming appointment on  with IR for drain check, educated on importance of keeping appointment             VTE Pharmacologic Prophylaxis: VTE Score: 3 Moderate Risk (Score 3-4) - Pharmacological DVT Prophylaxis Contraindicated. Sequential Compression Devices Ordered. Patient Centered Rounds: I performed bedside rounds with nursing staff today. Discussions with Specialists or Other Care Team Provider: bear    Education and Discussions with Family / Patient: Updated  (sister) at bedside. Total Time Spent on Date of Encounter in care of patient: 35 minutes This time was spent on one or more of the following: performing physical exam; counseling and coordination of care; obtaining or reviewing history; documenting in the medical record; reviewing/ordering tests, medications or procedures; communicating with other healthcare professionals and discussing with patient's family/caregivers. Current Length of Stay: 1 day(s)  Current Patient Status: Inpatient   Certification Statement: The patient will continue to require additional inpatient hospital stay due to IV antibiotics  Discharge Plan: Anticipate discharge in >72 hrs to discharge location to be determined pending rehab evaluations. Code Status: Level 1 - Full Code    Subjective:   Patient is short of breath with chest pain     Objective:     Vitals:   Temp (24hrs), Av.5 °F (36.4 °C), Min:97.5 °F (36.4 °C), Max:97.5 °F (36.4 °C)    Temp:  [97.5 °F (36.4 °C)] 97.5 °F (36.4 °C)  HR:  [] 109  Resp:  [18-35] 22  BP: (115-164)/(65-94) 139/65  SpO2:  [97 %-100 %] 99 %  There is no height or weight on file to calculate BMI. Input and Output Summary (last 24 hours):      Intake/Output Summary (Last 24 hours) at 2023 1221  Last data filed at 2023 0306  Gross per 24 hour   Intake 1600 ml   Output 1400 ml   Net 200 ml       Physical Exam: Physical Exam  Constitutional:       General: He is not in acute distress. Appearance: Normal appearance. He is ill-appearing. He is not toxic-appearing. Cardiovascular:      Rate and Rhythm: Regular rhythm. Tachycardia present. Heart sounds: Normal heart sounds. No murmur heard. Pulmonary:      Effort: No respiratory distress. Breath sounds: No wheezing. Comments: Decreased breath sounds   Abdominal:      General: Abdomen is flat. There is no distension. Palpations: Abdomen is soft. Tenderness: There is no abdominal tenderness. Neurological:      General: No focal deficit present. Mental Status: He is alert and oriented to person, place, and time. Mental status is at baseline. Motor: No weakness.           Additional Data:     Labs:  Results from last 7 days   Lab Units 07/13/23  0423 07/12/23  1616   WBC Thousand/uL 14.05* 15.17*   HEMOGLOBIN g/dL 8.0* 9.6*   HEMATOCRIT % 24.6* 30.0*   PLATELETS Thousands/uL 615* 731*   NEUTROS PCT %  --  71   LYMPHS PCT %  --  19   MONOS PCT %  --  8   EOS PCT %  --  0     Results from last 7 days   Lab Units 07/13/23  0423 07/12/23  1616   SODIUM mmol/L 131* 128*   POTASSIUM mmol/L 4.0 4.4   CHLORIDE mmol/L 102 94*   CO2 mmol/L 19* 21   BUN mg/dL 7 12   CREATININE mg/dL 0.75 0.86   ANION GAP mmol/L 10 13   CALCIUM mg/dL 8.1* 9.7   ALBUMIN g/dL  --  3.6   TOTAL BILIRUBIN mg/dL  --  0.67   ALK PHOS U/L  --  152*   ALT U/L  --  20   AST U/L  --  16   GLUCOSE RANDOM mg/dL 289* 283*     Results from last 7 days   Lab Units 07/12/23  1616   INR  1.19     Results from last 7 days   Lab Units 07/13/23  1151 07/13/23  0848 07/13/23  0706 07/13/23  0545 07/12/23  2239   POC GLUCOSE mg/dl 299* 296* 273* 294* 408*         Results from last 7 days   Lab Units 07/12/23  1616   LACTIC ACID mmol/L 1.1   PROCALCITONIN ng/ml 0.35*       Lines/Drains:  Invasive Devices     Peripheral Intravenous Line  Duration           Peripheral IV 07/12/23 Distal;Right;Upper;Ventral (anterior) Arm <1 day    Peripheral IV 07/12/23 Left Antecubital <1 day          Drain  Duration           Abscess Drain Abdomen 14 days                      Imaging: Reviewed radiology reports from this admission including: chest CT scan    Recent Cultures (last 7 days):   Results from last 7 days   Lab Units 07/12/23  1621 07/12/23  1616   BLOOD CULTURE  Received in Microbiology Lab. Culture in Progress. Received in Microbiology Lab. Culture in Progress.        Last 24 Hours Medication List:   Current Facility-Administered Medications   Medication Dose Route Frequency Provider Last Rate   • acetaminophen  650 mg Oral Q8H PRN Surinder Davidson MD     • albuterol  2 puff Inhalation Q4H PRN Katie Castro PA-C     • aluminum-magnesium hydroxide-simethicone  30 mL Oral Q6H PRN Katie Castro PA-C     • atorvastatin  10 mg Oral Daily With Dinner Katie Castro PA-C     • benzonatate  100 mg Oral TID PRN Katie Castro PA-C     • cefazolin  2,000 mg Intravenous Q8H Antonieta Sequeira MD Stopped (07/13/23 1036)   • cyanocobalamin  100 mcg Oral Daily Katie Castro PA-C     • dextromethorphan-guaiFENesin  10 mL Oral Q4H PRN Surinder Davidson MD     • heparin (porcine)  3-30 Units/kg/hr (Order-Specific) Intravenous Titrated Katie Castro PA-C 20 Units/kg/hr (07/13/23 0518)   • heparin (porcine)  2,800 Units Intravenous Q6H PRN Katie Castro PA-C     • heparin (porcine)  5,600 Units Intravenous Q6H PRN Katie Castro PA-C     • HYDROmorphone  0.5 mg Intravenous Q4H PRN Surinder Davidson MD     • HYDROmorphone  0.5 mg Intravenous Q3H PRN Surinder Davidson MD     • insulin glargine  5 Units Subcutaneous HS Surinder Davidson MD     • insulin lispro  2-12 Units Subcutaneous TID With Meals Surinder Davidson MD     • insulin lispro  2-12 Units Subcutaneous HS Surinder Davidson MD     • insulin lispro  5 Units Subcutaneous TID With Meals Surinder Davidson MD     • lidocaine  1 patch Topical Daily PRN Katie Castro PA-C     • montelukast  10 mg Oral HS Katie Castro PA-C     • oxyCODONE  5 mg Oral Q6H PRN Surinder Davidson MD     • sodium chloride  75 mL/hr Intravenous Continuous Katie Castro PA-C 75 mL/hr (07/13/23 1036)        Today, Patient Was Seen By: Ishmael Mulligan MD    **Please Note: This note may have been constructed using a voice recognition system. **

## 2023-07-13 NOTE — ASSESSMENT & PLAN NOTE
· Corrected sodium decreased at 131  · Provide with IV fluid hydration overnight  · Recheck BMP in a.m.

## 2023-07-13 NOTE — ASSESSMENT & PLAN NOTE
· Patient recently hospitalized 6/26 to 7/4 with noted thrombosis of hepatic vein, septic emboli, eloped/left AMA twice during that hospitalization and never returned to complete treatment and reports he only started the blood thinner Eliquis yesterday, last dose was this morning  · CT negative for PE, but revealing right lower lobe lingula consistent with septic emboli and multiple septic emboli  · For now will switch to IV heparin drip with VTE/PE protocol with multiple septic emboli noted  · Monitor CBC

## 2023-07-13 NOTE — RESPIRATORY THERAPY NOTE
RT Protocol Note  April Hudson Mbow 43 y.o. male MRN: [id number]  Unit/Bed#: VISH Encounter: [id number]    Assessment    Principal Problem:    Cavitary pneumonia  Active Problems:    Sepsis ([address])    Liver abscess    Septic embolism ([address])    Hyponatremia    Type 2 diabetes mellitus without complication, without long-term current use of insulin (HCC)    Anemia    History of noncompliance with medical treatment      Home Pulmonary Medications:     07/13/23 1500   Respiratory Protocol   Protocol Initiated? No   Protocol Selection Respiratory   Language Barrier? No   Medical & Social History Reviewed? Yes   Diagnostic Studies Reviewed? Yes   Physical Assessment Performed?  Yes   Respiratory Plan Home Bronchodilator Patient pathway   Respiratory Assessment   Assessment Type Assess only   Bilateral Breath Sounds Diminished   Resp Comments continue prn mdi   Additional Assessments   Pulse (!) 115   Respirations 22   SpO2 99 %            Past Medical History:   Diagnosis Date    Asthma      Social History     Socioeconomic History    Marital status: /Civil Union     Spouse name: Not on file    Number of children: Not on file    Years of education: Not on file    Highest education level: Not on file   Occupational History    Not on file   Tobacco Use    Smoking status: Never    Smokeless tobacco: Never   Vaping Use    Vaping Use: Never used   Substance and Sexual Activity    Alcohol use: Not Currently    Drug use: Never    Sexual activity: Not on file   Other Topics Concern    Not on file   Social History Narrative    Not on file     Social Determinants of Health     Financial Resource Strain: Not on file   Food Insecurity: No Food Insecurity (7/3/2023)    Hunger Vital Sign     Worried About Running Out of Food in the Last Year: Never true     Ran Out of Food in the Last Year: Never true   Transportation Needs: No Transportation Needs (7/3/2023)    PRAPARE - Transportation     Lack of Transportation (Medical): No     Lack of Transportation (Non-Medical): No   Physical Activity: Not on file   Stress: Not on file   Social Connections: Not on file   Intimate Partner Violence: Not on file   Housing Stability: Unknown (7/3/2023)    Housing Stability Vital Sign     Unable to Pay for Housing in the Last Year: No     Number of Places Lived in the Last Year: Not on file     Unstable Housing in the Last Year: No       Subjective         Objective    Physical Exam:   Assessment Type: Assess only  Bilateral Breath Sounds: Diminished    Vitals:  Blood pressure 130/75, pulse (!) 115, temperature 97.5 °F (36.4 °C), temperature source Temporal, resp. rate 22, SpO2 99 %. Imaging and other studies: I have personally reviewed pertinent reports.       O2 Device: RA     Plan    Respiratory Plan: Home Bronchodilator Patient pathway        Resp Comments: continue prn mdi

## 2023-07-13 NOTE — RESPIRATORY THERAPY NOTE
RT Protocol Note  Mack Tucker Mbow 43 y.o. male MRN: [id number]  Unit/Bed#: ED 10 Encounter: [id number]    Assessment    Principal Problem:    Cavitary pneumonia  Active Problems:    Sepsis ([address])    Liver abscess    Septic embolism ([address])    Hyponatremia    Type 2 diabetes mellitus without complication, without long-term current use of insulin (HCC)    Anemia    History of noncompliance with medical treatment      Home Pulmonary Medications:     07/12/23 2215   Incentive Spirometry Tx   IS level of assistance Assisted by respiratory care provider;Needs encouragement   Frequency q1hr W/A   Respiratory Effort Dyspnea with exertion   Treatment Tolerance Tolerated fairly well   Incentive Spirometry Goal (mL) 500 mL   Incentive Spirometry Achieved (mL) 250 mL   Chest Physiotherapy Tx   Cough None   Position Semi [employer]            Past Medical History:   Diagnosis Date    Asthma      Social History     Socioeconomic History    Marital status: /Civil Union     Spouse name: Not on file    Number of children: Not on file    Years of education: Not on file    Highest education level: Not on file   Occupational History    Not on file   Tobacco Use    Smoking status: Never    Smokeless tobacco: Never   Vaping Use    Vaping Use: Never used   Substance and Sexual Activity    Alcohol use: Not Currently    Drug use: Never    Sexual activity: Not on file   Other Topics Concern    Not on file   Social History Narrative    Not on file     Social Determinants of Health     Financial Resource Strain: Not on file   Food Insecurity: No Food Insecurity (7/3/2023)    Hunger Vital Sign     Worried About Running Out of Food in the Last Year: Never true     Ran Out of Food in the Last Year: Never true   Transportation Needs: No Transportation Needs (7/3/2023)    PRAPARE - Transportation     Lack of Transportation (Medical): No     Lack of Transportation (Non-Medical):  No   Physical Activity: Not on file   Stress: Not on file   Social Connections: Not on file   Intimate Partner Violence: Not on file   Housing Stability: Unknown (7/3/2023)    Housing Stability Vital Sign     Unable to Pay for Housing in the Last Year: No     Number of Places Lived in the Last Year: Not on file     Unstable Housing in the Last Year: No       Subjective         Objective    Physical Exam:   Assessment Type: Assess only  General Appearance: Alert, Awake  Respiratory Pattern: Dyspnea with exertion  Chest Assessment: Chest expansion symmetrical  Bilateral Breath Sounds: Diminished  Location Specific: No  Cough: None  O2 Device: RA    Vitals:  Blood pressure 119/69, pulse (!) 114, temperature 97.5 °F (36.4 °C), temperature source Temporal, resp. rate (!) 32, SpO2 100 %. Imaging and other studies: I have personally reviewed pertinent reports. O2 Device: RA     Plan    Respiratory Plan: Home Bronchodilator Patient pathway        Resp Comments: Resp protocol completed. Pt uses inhalers at home. Pt states he has a pain on his left side when coughing. Pt is not in respiratory distress. Will continue with current tx plan of inhalers Q4prn.

## 2023-07-13 NOTE — UTILIZATION REVIEW
Initial Clinical Review    Admission: Date/Time/Statement:   Admission Orders (From admission, onward)     Ordered        07/12/23 2040  INPATIENT ADMISSION  Once                      Orders Placed This Encounter   Procedures   • INPATIENT ADMISSION     Standing Status:   Standing     Number of Occurrences:   1     Order Specific Question:   Level of Care     Answer:   Med Surg [16]     Order Specific Question:   Estimated length of stay     Answer:   More than 2 Midnights     Order Specific Question:   Certification     Answer:   I certify that inpatient services are medically necessary for this patient for a duration of greater than two midnights. See H&P and MD Progress Notes for additional information about the patient's course of treatment. ED Arrival Information     Expected   7/12/2023     Arrival   7/12/2023 13:17    Acuity   Urgent            Means of arrival   Walk-In    Escorted by   Self    Service   Hospitalist    Admission type   Emergency            Arrival complaint   Liver abscess           Chief Complaint   Patient presents with   • Medical Problem     Pt eloped from hospital admitted for sepsis, CA PNA, elevated trop, septic embolism, liver mass/abscess/thrombosis, and history of HIV. Received call on 7/5 strongly urging pt to return to hospital for continuation of treatment. Patient then eloped from hospital on 7/5, arrives today for same medical problem. Initial Presentation: 43 y.o. male to ED from home w/ gradual worsening of generalized malaise, left thoracic wall pain, and told "I should come back to the hospital for treatment."  Of note patient eloped twice during his last hospitalization and multiple x4 in total from collectively from [facility] ED and [facility] ED. He reports he has been feeling very tired and weak but denies any fevers or chills.   He reports he has been having a pain near his left upper he did not take any antibiotic when he eloped from the hospital on 7/4 and he started Eliquis yesterday morning and so far took 3 doses with last dose this morning. abdomen/lower thoracic wall. PMH of diabetes mellitus type 2, asthma, hyperlipidemia . CT chest abdomen pelvis now revealing significant progression of pneumonia in right lung with cavitation now in posterior right upper lobe and right lower lobe and small nodule cavitations. Due to worsening cavitation of pneumonia we will start IV ceftriaxone ,  Flagyl 500 mg ,  IV vancomycin. Consult ID , recheck  sputum culture, strep pneumonia, Legionella. Anemia cont B12 qd. DM SSI and monitor . Na 131 give IVF and recheck BMP in am . Septic embolism switch to IV heparin and monitor cbc . Sepsis lactic acid wnl , pct elevated , cont IVF and iv abx .        ED Triage Vitals   Temperature Pulse Respirations Blood Pressure SpO2   07/12/23 1331 07/12/23 1331 07/12/23 1331 07/12/23 1331 07/12/23 1331   97.5 °F (36.4 °C) (!) 115 20 133/77 100 %      Temp Source Heart Rate Source Patient Position - Orthostatic VS BP Location FiO2 (%)   07/12/23 1331 07/12/23 1331 07/12/23 1331 07/12/23 1331 --   Temporal Monitor Sitting Left arm       Pain Score       07/12/23 2027       9          Wt Readings from Last 1 Encounters:   07/11/23 74.6 kg (164 lb 6.4 oz)     Additional Vital Signs:   07/13/23 1000 -- 105 30 Abnormal  164/94 122 99 % None (Room air) Sitting   07/13/23 0800 -- 93 33 Abnormal  129/73 95 99 % None (Room air) Sitting   07/13/23 0730 -- 91 22 131/66 92 99 % None (Room air) Sitting   07/13/23 0600 -- 111 Abnormal  35 Abnormal  123/72 91 99 % None (Room air) Sitting   07/13/23 0400 -- 98 20 157/74 -- 97 % None (Room air) Sitting   07/13/23 0145 -- 103 20 115/72 -- 100 % None (Room air) Sitting   07/12/23 2245 -- 107 Abnormal  20 122/77 -- 100 % None (Room air) Sitting   07/12/23 2215 -- 114 Abnormal  32 Abnormal  -- -- 100 % -- --   07/12/23 2115 -- 117 Abnormal  18 119/69 -- 99 % None (Room air) --   07/12/23 2043 -- -- -- -- -- 99 % None (Room air) --   07/12/23 1945 -- 109 Abnormal  20 130/84 -- 99 % None (Room air) Sitting   07/12/23 1800 -- 111 Abnormal  23 Abnormal  132/70 94 100 % -- --   07/12/23 1730 -- 116 Abnormal  22 151/82 107 100 % -- --   07/12/23 1700 -- 104 20 125/73 94 100 % -- --   07/12/23 1630 -- 103 24 Abnormal  148/75 102 100 % -- --   07/12/23 1530 -- 107 Abnormal  18 130/71 95 100 % --        Pertinent Labs/Diagnostic Test Results:   7/12 EKG ST  PE Study with CT abdomen & pelvis with contrast   Final Result by Zeina Young MD (07/12 2005)      No pulmonary embolism. Progression of multi lobar consolidation in the right lung with both increase in size and cavitation and multiple other scattered septic emboli. Reactive mediastinal and hilar nodes. No residual abscess in the liver status post drainage catheter placement.                Workstation performed: [id number]           Results from last 7 days   Lab Units 07/12/23  2141   SARS-COV-2  Negative     Results from last 7 days   Lab Units 07/13/23  0423 07/12/23  1616   WBC Thousand/uL 14.05* 15.17*   HEMOGLOBIN g/dL 8.0* 9.6*   HEMATOCRIT % 24.6* 30.0*   PLATELETS Thousands/uL 615* 731*   NEUTROS ABS Thousands/µL  --  10.81*     Results from last 7 days   Lab Units 07/13/23  0423 07/12/23  1616   SODIUM mmol/L 131* 128*   POTASSIUM mmol/L 4.0 4.4   CHLORIDE mmol/L 102 94*   CO2 mmol/L 19* 21   ANION GAP mmol/L 10 13   BUN mg/dL 7 12   CREATININE mg/dL 0.75 0.86   EGFR ml/min/1.73sq m 113 106   CALCIUM mg/dL 8.1* 9.7     Results from last 7 days   Lab Units 07/12/23  1616   AST U/L 16   ALT U/L 20   ALK PHOS U/L 152*   TOTAL PROTEIN g/dL 9.9*   ALBUMIN g/dL 3.6   TOTAL BILIRUBIN mg/dL 0.67     Results from last 7 days   Lab Units 07/13/23  0848 07/13/23  0706 07/13/23  0545 07/12/23  2239   POC GLUCOSE mg/dl 296* 273* 294* 408*     Results from last 7 days   Lab Units 07/13/23  0423 07/12/23  1616   GLUCOSE RANDOM mg/dL 289* 283*     Results from last 7 days   Lab Units 07/12/23  2034 07/12/23  1850 07/12/23  1616   HS TNI 0HR ng/L  --   --  5   HS TNI 2HR ng/L  --  6  --    HSTNI D2 ng/L  --  1  --    HS TNI 4HR ng/L 7  --   --    HSTNI D4 ng/L 2  --   --      Results from last 7 days   Lab Units 07/13/23  0423 07/12/23  2224 07/12/23  1616   PROTIME seconds  --   --  14.9*   INR   --   --  1.19   PTT seconds 49* 36 37     Results from last 7 days   Lab Units 07/12/23  1616   PROCALCITONIN ng/ml 0.35*     Results from last 7 days   Lab Units 07/12/23  1616   LACTIC ACID mmol/L 1.1     Results from last 7 days   Lab Units 07/12/23  1729   CLARITY UA  Clear   COLOR UA  Light Yellow   SPEC GRAV UA  1.025   PH UA  5.5   GLUCOSE UA mg/dl >=1000 (1%)*   KETONES UA mg/dl Trace*   BLOOD UA  Negative   PROTEIN UA mg/dl Negative   NITRITE UA  Negative   BILIRUBIN UA  Negative   UROBILINOGEN UA (BE) mg/dl <2.0   LEUKOCYTES UA  Elevated glucose may cause decreased leukocyte values. See urine microscopic for UWBC result*   WBC UA /hpf None Seen   RBC UA /hpf None Seen   BACTERIA UA /hpf None Seen   EPITHELIAL CELLS WET PREP /hpf None Seen     Results from last 7 days   Lab Units 07/12/23  2141   INFLUENZA A PCR  Negative   INFLUENZA B PCR  Negative   RSV PCR  Negative     Results from last 7 days   Lab Units 07/12/23  1621 07/12/23  1616   BLOOD CULTURE  Received in Microbiology Lab. Culture in Progress. Received in Microbiology Lab. Culture in Progress.      ED Treatment:   Medication Administration from 07/12/2023 1001 to 07/13/2023 1046       Date/Time Order Dose Route Action     07/12/2023 1726 EDT ceFAZolin (ANCEF) IVPB (premix in dextrose) 2,000 mg 50 mL 2,000 mg Intravenous New Bag     07/12/2023 2027 EDT acetaminophen (TYLENOL) tablet 650 mg 650 mg Oral Given     07/13/2023 0848 EDT cyanocobalamin (VITAMIN B-12) tablet 100 mcg 100 mcg Oral Given     07/12/2023 2243 EDT montelukast (SINGULAIR) tablet 10 mg 10 mg Oral Given     07/12/2023 2235 EDT sodium chloride 0.9 % bolus 1,000 mL 1,000 mL Intravenous New Bag     07/13/2023 1036 EDT sodium chloride 0.9 % infusion 75 mL/hr Intravenous New Bag     07/13/2023 0027 EDT sodium chloride 0.9 % infusion 75 mL/hr Intravenous New Bag     07/13/2023 0546 EDT insulin lispro (HumaLOG) 100 units/mL subcutaneous injection 1-5 Units 2 Units Subcutaneous Given     07/12/2023 2239 EDT insulin lispro (HumaLOG) 100 units/mL subcutaneous injection 1-5 Units 4 Units Subcutaneous Given     07/13/2023 0129 EDT cefTRIAXone (ROCEPHIN) IVPB (premix in dextrose) 1,000 mg 50 mL 1,000 mg Intravenous New Bag     07/13/2023 0548 EDT metroNIDAZOLE (FLAGYL) tablet 500 mg 500 mg Oral Given     07/12/2023 2240 EDT metroNIDAZOLE (FLAGYL) tablet 500 mg 500 mg Oral Given     07/12/2023 2230 EDT vancomycin (VANCOCIN) 1,750 mg in sodium chloride 0.9 % 500 mL IVPB 1,750 mg Intravenous New Bag     07/13/2023 0518 EDT heparin (porcine) 25,000 units in 0.45% NaCl 250 mL infusion (premix) 20 Units/kg/hr Intravenous Rate/Dose Change     07/12/2023 2225 EDT heparin (porcine) 25,000 units in 0.45% NaCl 250 mL infusion (premix) 18 Units/kg/hr Intravenous New Bag     07/13/2023 0524 EDT heparin (porcine) injection 2,800 Units 2,800 Units Intravenous Given     07/13/2023 0306 EDT acetaminophen (TYLENOL) tablet 975 mg 975 mg Oral Given     07/13/2023 0849 EDT insulin lispro (HumaLOG) 100 units/mL subcutaneous injection 5 Units 5 Units Subcutaneous Given     07/13/2023 0849 EDT insulin lispro (HumaLOG) 100 units/mL subcutaneous injection 2-12 Units 6 Units Subcutaneous Given     07/13/2023 0848 EDT ceFAZolin (ANCEF) IVPB (premix in dextrose) 2,000 mg 50 mL 2,000 mg Intravenous New Bag     07/13/2023 0952 EDT oxyCODONE (ROXICODONE) IR tablet 5 mg 5 mg Oral Given        Past Medical History:   Diagnosis Date   • Asthma      Present on Admission:  • Type 2 diabetes mellitus without complication, without long-term current use of insulin ([address])  • Septic embolism (HCC)  • Sepsis [facility])  • Liver abscess  • Hyponatremia  • Cavitary pneumonia  • Anemia      Admitting Diagnosis: Liver abscess  Age/Sex: 43 y.o. male  Admission Orders:  Scheduled Medications:  atorvastatin, 10 mg, Oral, Daily With Dinner  cefazolin, 2,000 mg, Intravenous, Q8H  cyanocobalamin, 100 mcg, Oral, Daily  insulin glargine, 5 Units, Subcutaneous, HS  insulin lispro, 2-12 Units, Subcutaneous, TID With Meals  insulin lispro, 2-12 Units, Subcutaneous, HS  insulin lispro, 5 Units, Subcutaneous, TID With Meals  montelukast, 10 mg, Oral, HS      Continuous IV Infusions:  heparin (porcine), 3-30 Units/kg/hr (Order-Specific), Intravenous, Titrated  sodium chloride, 75 mL/hr, Intravenous, Continuous      PRN Meds:  acetaminophen, 650 mg, Oral, Q8H PRN  albuterol, 2 puff, Inhalation, Q4H PRN  aluminum-magnesium hydroxide-simethicone, 30 mL, Oral, Q6H PRN  benzonatate, 100 mg, Oral, TID PRN  dextromethorphan-guaiFENesin, 10 mL, Oral, Q4H PRN  heparin (porcine), 2,800 Units, Intravenous, Q6H PRN  heparin (porcine), 5,600 Units, Intravenous, Q6H PRN  HYDROmorphone, 0.5 mg, Intravenous, Q4H PRN  HYDROmorphone, 0.5 mg, Intravenous, Q3H PRN  lidocaine, 1 patch, Topical, Daily PRN  oxyCODONE, 5 mg, Oral, Q6H PRN    Fingerstick ac and hs   Up and OOB       IP CONSULT TO INFECTIOUS DISEASES  IP CONSULT TO PHARMACY    Network Utilization Review Department  ATTENTION: Please call with any questions or concerns to [phone] and carefully listen to the prompts so that you are directed to the right person. All voicemails are confidential.  Barnie Smoke all requests for admission clinical reviews, approved or denied determinations and any other requests to dedicated fax number below belonging to the campus where the patient is receiving treatment.  List of dedicated fax numbers for the Facilities:  FACILITY NAME UR FAX NUMBER   ADMISSION DENIALS (Administrative/Medical Necessity) [phone]   [address] [place] [address] (Maternity/NICU/Pediatrics) [address] [facility] [address] [phone]   [address] [phone]   15[facility] [address] [address] [facility] [address] [phone]

## 2023-07-13 NOTE — ASSESSMENT & PLAN NOTE
· Patient left AMA during last hospitalization with pneumonia and did not complete his antibiotic course including antibiotic that was sent to the pharmacy for him levofloxacin  · CT chest abdomen pelvis now revealing significant progression of pneumonia in right lung with cavitation now in posterior right upper lobe and right lower lobe and small nodule cavitations  · Recheck sputum culture, strep pneumonia, Legionella  · Check COVID/flu/RSV  · Appreciate ID consult  · Due to worsening cavitation of pneumonia we will start IV ceftriaxone 1 g daily and p.o.  Flagyl 500 mg every 8 hours and add on IV vancomycin 50 mg/kg every 12 hours with pharmacy consult due to IV antibiotic use within the last 90 days

## 2023-07-13 NOTE — ASSESSMENT & PLAN NOTE
· Patient left AMA during last hospitalization with pneumonia and did not complete his antibiotic course including antibiotic that was sent to the pharmacy for him levofloxacin  · CT chest abdomen pelvis now revealing significant progression of pneumonia in right lung with cavitation now in posterior right upper lobe and right lower lobe and small nodule cavitations  · Recheck sputum culture, strep pneumonia, Legionella  · COVID/flu/RSV negative  · Appreciate ID consult  · Continue IV antibiotics and supportive care

## 2023-07-13 NOTE — ASSESSMENT & PLAN NOTE
Lab Results   Component Value Date    HGBA1C 12.0 (H) 07/05/2023       No results for input(s): "POCGLU" in the last 72 hours.     Blood Sugar Average: Last 72 hrs:  · Blood glucose checks 4 times daily, hypoglycemia protocol  · Hold p.o. diabetic meds  · Sliding scale insulin

## 2023-07-13 NOTE — ASSESSMENT & PLAN NOTE
· Hemoglobin currently 9.6, which has improved from 8.7 on 7/4  · No active bleeding noted  · Continue home B12 daily  · Under CBC

## 2023-07-13 NOTE — RESPIRATORY THERAPY NOTE
RT Protocol Note  [place] Breath Mbow 43 y.o. male MRN: [id number]  Unit/Bed#: ED 10 Encounter: [id number]    Assessment    Principal Problem:    Cavitary pneumonia  Active Problems:    Sepsis ([address])    Liver abscess    Septic embolism ([address])    Hyponatremia    Type 2 diabetes mellitus without complication, without long-term current use of insulin (HCC)    Anemia    History of noncompliance with medical treatment      Home Pulmonary Medications:     07/12/23 4587   Respiratory Protocol   Protocol Initiated? Yes   Protocol Selection Respiratory   Language Barrier? No   Medical & Social History Reviewed? Yes   Diagnostic Studies Reviewed? Yes   Physical Assessment Performed? Yes   Respiratory Plan Home Bronchodilator Patient pathway   Respiratory Assessment   Assessment Type Assess only   General Appearance Alert; Awake   Respiratory Pattern Dyspnea with exertion   Chest Assessment Chest expansion symmetrical   Bilateral Breath Sounds Diminished   Location Specific No   Cough None   Resp Comments Resp protocol completed. Pt uses inhalers at home. Pt states he has a pain on his left side when coughing. Pt is not in respiratory distress. Will continue with current tx plan of inhalers Q4prn.    O2 Device RA   Additional Assessments   Pulse (!) 114   Respirations (!) 32   SpO2 100 %            Past Medical History:   Diagnosis Date    Asthma      Social History     Socioeconomic History    Marital status: /Civil Union     Spouse name: Not on file    Number of children: Not on file    Years of education: Not on file    Highest education level: Not on file   Occupational History    Not on file   Tobacco Use    Smoking status: Never    Smokeless tobacco: Never   Vaping Use    Vaping Use: Never used   Substance and Sexual Activity    Alcohol use: Not Currently    Drug use: Never    Sexual activity: Not on file   Other Topics Concern    Not on file   Social History Narrative    Not on file     Social Determinants of Health Financial Resource Strain: Not on file   Food Insecurity: No Food Insecurity (7/3/2023)    Hunger Vital Sign     Worried About Running Out of Food in the Last Year: Never true     Ran Out of Food in the Last Year: Never true   Transportation Needs: No Transportation Needs (7/3/2023)    PRAPARE - Transportation     Lack of Transportation (Medical): No     Lack of Transportation (Non-Medical): No   Physical Activity: Not on file   Stress: Not on file   Social Connections: Not on file   Intimate Partner Violence: Not on file   Housing Stability: Unknown (7/3/2023)    Housing Stability Vital Sign     Unable to Pay for Housing in the Last Year: No     Number of Places Lived in the Last Year: Not on file     Unstable Housing in the Last Year: No       Subjective         Objective    Physical Exam:   Assessment Type: Assess only  General Appearance: Alert, Awake  Respiratory Pattern: Dyspnea with exertion  Chest Assessment: Chest expansion symmetrical  Bilateral Breath Sounds: Diminished  Location Specific: No  Cough: None  O2 Device: RA    Vitals:  Blood pressure 119/69, pulse (!) 114, temperature 97.5 °F (36.4 °C), temperature source Temporal, resp. rate (!) 32, SpO2 100 %. Imaging and other studies: I have personally reviewed pertinent reports. O2 Device: RA     Plan    Respiratory Plan: Home Bronchodilator Patient pathway        Resp Comments: Resp protocol completed. Pt uses inhalers at home. Pt states he has a pain on his left side when coughing. Pt is not in respiratory distress. Will continue with current tx plan of inhalers Q4prn.

## 2023-07-13 NOTE — H&P
[address]  H&P  Name: Josie Gr 43 y.o. male I MRN: [id number]  Unit/Bed#: ED 10 I Date of Admission: 7/12/2023   Date of Service: 7/12/2023 I Hospital Day: 0      Assessment/Plan   * Cavitary pneumonia  Assessment & Plan  · Patient left AMA during last hospitalization with pneumonia and did not complete his antibiotic course including antibiotic that was sent to the pharmacy for him levofloxacin  · CT chest abdomen pelvis now revealing significant progression of pneumonia in right lung with cavitation now in posterior right upper lobe and right lower lobe and small nodule cavitations  · Recheck sputum culture, strep pneumonia, Legionella  · Check COVID/flu/RSV  · Appreciate ID consult  · Due to worsening cavitation of pneumonia we will start IV ceftriaxone 1 g daily and p.o.  Flagyl 500 mg every 8 hours and add on IV vancomycin 50 mg/kg every 12 hours with pharmacy consult due to IV antibiotic use within the last 90 days    History of noncompliance with medical treatment  Assessment & Plan  · Eloped twice during hospitalization 6/26 and 7/4 and has eloped 4 times since then from this [facility] ED as well as [facility]  · Educated on risks of leaving AMA again and patient specifically asked what happened if he does not get treatment for worsening pneumonia and reviewed risks up to and including death and significance of continuing treatment with antibiotics and blood thinner which he agrees he will at this time  · Of note patient without insurance during last hospitalization, now has health insurance through [employer]    Anemia  Assessment & Plan  · Hemoglobin currently 9.6, which has improved from 8.7 on 7/4  · No active bleeding noted  · Continue home B12 daily  · Under CBC    Type 2 diabetes mellitus without complication, without long-term current use of insulin [facility])  Assessment & Plan  Lab Results   Component Value Date    HGBA1C 12.0 (H) 07/05/2023       No results for input(s): "POCGLU" in the last 72 hours. Blood Sugar Average: Last 72 hrs:  · Blood glucose checks 4 times daily, hypoglycemia protocol  · Hold p.o. diabetic meds  · Sliding scale insulin    Hyponatremia  Assessment & Plan  · Corrected sodium decreased at 131  · Provide with IV fluid hydration overnight  · Recheck BMP in a.m. Septic embolism ([address])  Assessment & Plan  · Patient recently hospitalized 6/26 to 7/4 with noted thrombosis of hepatic vein, septic emboli, eloped/left AMA twice during that hospitalization and never returned to complete treatment and reports he only started the blood thinner Eliquis yesterday, last dose was this morning  · CT negative for PE, but revealing right lower lobe lingula consistent with septic emboli and multiple septic emboli  · For now will switch to IV heparin drip with VTE/PE protocol with multiple septic emboli noted  · Monitor CBC    Liver abscess  Assessment & Plan  · CT abdomen pelvis revealing no residual abscess cavity and liver with drain in place  · Patient with upcoming appointment on 7/20 with IR for drain check, educated on importance of keeping appointment    Sepsis [facility])  Assessment & Plan  · Meeting criteria due to leukocytosis, tachycardia in the setting of worsening progression pneumonia  · Lactic acid WNL, procalcitonin elevated but improving from previous on 7/4  · Blood culture x2 pending from [facility], during last hospitalization patient with noted Klebsiella pneumonia bacteremia  · We will give an IV fluid bolus now followed by continuous IV fluid hydration  · IV antibiotics         VTE Pharmacologic Prophylaxis: VTE Score: 3 Moderate Risk (Score 3-4) - Pharmacological DVT Prophylaxis Ordered: heparin drip. Code Status: Level 1 - Full Code   Discussion with family: pt. Anticipated Length of Stay: Patient will be admitted on an inpatient basis with an anticipated length of stay of greater than 2 midnights secondary to see above.     Total Time Spent on Date of Encounter in care of patient: 75 minutes This time was spent on one or more of the following: performing physical exam; counseling and coordination of care; obtaining or reviewing history; documenting in the medical record; reviewing/ordering tests, medications or procedures; communicating with other healthcare professionals and discussing with patient's family/caregivers. Chief Complaint:    Chief Complaint   Patient presents with   • Medical Problem     Pt eloped from hospital admitted for sepsis, CA PNA, elevated trop, septic embolism, liver mass/abscess/thrombosis, and history of HIV. Received call on 7/5 strongly urging pt to return to hospital for continuation of treatment. Patient then eloped from hospital on 7/5, arrives today for same medical problem. History of Present Illness: Ronald Quiñonez is a 43 y.o. male with a PMH of diabetes mellitus type 2, asthma, hyperlipidemia who presents with complaint of gradual worsening of generalized malaise, left thoracic wall pain, and told "I should come back to the hospital for treatment."  Of note patient eloped twice during his last hospitalization and multiple x4 in total from collectively from [facility] ED and [facility] ED. He reports he has been feeling very tired and weak but denies any fevers or chills. He reports he has been having a pain near his left upper abdomen/lower thoracic wall, but denies any other pain including chest pain, shortness of breath worsening, worsening cough. Reports he did not take any antibiotic when he eloped from the hospital on 7/4 and he started Eliquis yesterday morning and so far took 3 doses with last dose this morning. Understands at this time that he is being admitted to the hospital and reports at this time he would like to stay to continue treatment. Review of Systems:  Review of Systems   Constitutional: Positive for fatigue. Negative for activity change, chills and fever. HENT: Negative for congestion. Respiratory: Negative for cough and shortness of breath. Cardiovascular: Negative for chest pain. Gastrointestinal: Positive for abdominal pain. Neurological: Positive for weakness. Negative for headaches. Past Medical and Surgical History:   Past Medical History:   Diagnosis Date   • Asthma        Past Surgical History:   Procedure Laterality Date   • IR BIOPSY LIVER MASS  6/29/2023       Meds/Allergies:  Prior to Admission medications    Medication Sig Start Date End Date Taking? Authorizing Provider   albuterol (PROVENTIL HFA,VENTOLIN HFA) 90 mcg/act inhaler Inhale 2 puffs every 6 (six) hours as needed 5/10/23   Historical Provider, MD   apixaban (Eliquis) 5 mg Take 2 tablets (10 mg total) by mouth 2 (two) times a day for 7 days, THEN 1 tablet (5 mg total) 2 (two) times a day for 23 days. 7/4/23 8/3/23  Robert Adhikari DO   atorvastatin (LIPITOR) 10 mg tablet Take 10 mg by mouth 4/7/23   Historical Provider, MD   cyanocobalamin (VITAMIN B-12) 100 MCG tablet Take 100 mcg by mouth daily 5/10/23   Historical Provider, MD   Ertugliflozin L-PyroglutamicAc 5 MG TABS Take 5 mg by mouth daily 4/7/23   Historical Provider, MD   metFORMIN (GLUCOPHAGE) 1000 MG tablet Take 1 tablet by mouth 2 (two) times a day with meals 4/7/23   Historical Provider, MD   montelukast (SINGULAIR) 10 mg tablet Take 10 mg by mouth 5/10/23 5/9/24  Historical Provider, MD MATHEW have reviewed her medications per review of chart.      Allergies: No Known Allergies    Social History:  Marital Status: /Civil Union     Patient Pre-hospital Living Situation: Home  Patient Pre-hospital Level of Mobility: walks  Patient Pre-hospital Diet Restrictions: diabetic  Substance Use History:   Social History     Substance and Sexual Activity   Alcohol Use Not Currently     Social History     Tobacco Use   Smoking Status Never   Smokeless Tobacco Never     Social History     Substance and Sexual Activity Drug Use Never       Family History:  No family history on file. Physical Exam:     Vitals:   Blood Pressure: 119/69 (07/12/23 2115)  Pulse: (!) 117 (07/12/23 2115)  Temperature: 97.5 °F (36.4 °C) (07/12/23 1331)  Temp Source: Temporal (07/12/23 1331)  Respirations: 18 (07/12/23 2115)  SpO2: 99 % (07/12/23 2115)    Physical Exam  Vitals and nursing note reviewed. Constitutional:       General: He is not in acute distress. Appearance: He is not diaphoretic. HENT:      Head: Normocephalic. Cardiovascular:      Rate and Rhythm: Regular rhythm. Tachycardia present. Pulmonary:      Effort: Pulmonary effort is normal. No respiratory distress. Breath sounds: Normal breath sounds. No stridor. No wheezing, rhonchi or rales. Abdominal:      General: Bowel sounds are normal. There is no distension. Palpations: Abdomen is soft. Tenderness: There is no abdominal tenderness. There is no guarding. Musculoskeletal:      Right lower leg: No edema. Left lower leg: No edema. Skin:     General: Skin is warm and dry. Neurological:      General: No focal deficit present. Mental Status: He is alert and oriented to person, place, and time.    Psychiatric:         Mood and Affect: Mood normal.         Behavior: Behavior normal.      Comments: Limited judgment          Additional Data:     Lab Results:  Results from last 7 days   Lab Units 07/12/23  1616   WBC Thousand/uL 15.17*   HEMOGLOBIN g/dL 9.6*   HEMATOCRIT % 30.0*   PLATELETS Thousands/uL 731*   NEUTROS PCT % 71   LYMPHS PCT % 19   MONOS PCT % 8   EOS PCT % 0     Results from last 7 days   Lab Units 07/12/23  1616   SODIUM mmol/L 128*   POTASSIUM mmol/L 4.4   CHLORIDE mmol/L 94*   CO2 mmol/L 21   BUN mg/dL 12   CREATININE mg/dL 0.86   ANION GAP mmol/L 13   CALCIUM mg/dL 9.7   ALBUMIN g/dL 3.6   TOTAL BILIRUBIN mg/dL 0.67   ALK PHOS U/L 152*   ALT U/L 20   AST U/L 16   GLUCOSE RANDOM mg/dL 283*     Results from last 7 days   Lab Units 07/12/23  1616   INR  1.19             Results from last 7 days   Lab Units 07/12/23  1616   LACTIC ACID mmol/L 1.1   PROCALCITONIN ng/ml 0.35*       Lines/Drains:  Invasive Devices     Peripheral Intravenous Line  Duration           Peripheral IV 07/12/23 Distal;Right;Upper;Ventral (anterior) Arm <1 day    Peripheral IV 07/12/23 Left Antecubital <1 day          Drain  Duration           Abscess Drain Abdomen 13 days                    Imaging: Reviewed radiology reports from this admission including: chest CT scan and abdominal/pelvic CT  PE Study with CT abdomen & pelvis with contrast   Final Result by Coley Seip, MD (07/12 2005)      No pulmonary embolism. Progression of multi lobar consolidation in the right lung with both increase in size and cavitation and multiple other scattered septic emboli. Reactive mediastinal and hilar nodes. No residual abscess in the liver status post drainage catheter placement. Workstation performed: [id number]             EKG and Other Studies Reviewed on Admission:   · EKG: Sinus tachycardia. ** Please Note: This note has been constructed using a voice recognition system.  **

## 2023-07-14 LAB
ALBUMIN SERPL BCP-MCNC: 2.9 G/DL (ref 3.5–5)
ALP SERPL-CCNC: 116 U/L (ref 34–104)
ALT SERPL W P-5'-P-CCNC: 13 U/L (ref 7–52)
ANION GAP SERPL CALCULATED.3IONS-SCNC: 10 MMOL/L
APTT PPP: 36 SECONDS (ref 23–37)
APTT PPP: 49 SECONDS (ref 23–37)
APTT PPP: 64 SECONDS (ref 23–37)
AST SERPL W P-5'-P-CCNC: 9 U/L (ref 13–39)
ATRIAL RATE: 118 BPM
ATRIAL RATE: 121 BPM
BASOPHILS # BLD AUTO: 0.16 THOUSANDS/ÂΜL (ref 0–0.1)
BASOPHILS NFR BLD AUTO: 1 % (ref 0–1)
BILIRUB SERPL-MCNC: 0.7 MG/DL (ref 0.2–1)
BUN SERPL-MCNC: 6 MG/DL (ref 5–25)
CALCIUM ALBUM COR SERPL-MCNC: 9.1 MG/DL (ref 8.3–10.1)
CALCIUM SERPL-MCNC: 8.2 MG/DL (ref 8.4–10.2)
CHLORIDE SERPL-SCNC: 100 MMOL/L (ref 96–108)
CO2 SERPL-SCNC: 21 MMOL/L (ref 21–32)
CREAT SERPL-MCNC: 0.81 MG/DL (ref 0.6–1.3)
EOSINOPHIL # BLD AUTO: 0.02 THOUSAND/ÂΜL (ref 0–0.61)
EOSINOPHIL NFR BLD AUTO: 0 % (ref 0–6)
ERYTHROCYTE [DISTWIDTH] IN BLOOD BY AUTOMATED COUNT: 15.6 % (ref 11.6–15.1)
GFR SERPL CREATININE-BSD FRML MDRD: 109 ML/MIN/1.73SQ M
GLUCOSE SERPL-MCNC: 193 MG/DL (ref 65–140)
GLUCOSE SERPL-MCNC: 193 MG/DL (ref 65–140)
GLUCOSE SERPL-MCNC: 205 MG/DL (ref 65–140)
GLUCOSE SERPL-MCNC: 284 MG/DL (ref 65–140)
GLUCOSE SERPL-MCNC: 285 MG/DL (ref 65–140)
HCT VFR BLD AUTO: 25 % (ref 36.5–49.3)
HGB BLD-MCNC: 8 G/DL (ref 12–17)
IMM GRANULOCYTES # BLD AUTO: 0.1 THOUSAND/UL (ref 0–0.2)
IMM GRANULOCYTES NFR BLD AUTO: 1 % (ref 0–2)
LYMPHOCYTES # BLD AUTO: 1.98 THOUSANDS/ÂΜL (ref 0.6–4.47)
LYMPHOCYTES NFR BLD AUTO: 12 % (ref 14–44)
MCH RBC QN AUTO: 27.2 PG (ref 26.8–34.3)
MCHC RBC AUTO-ENTMCNC: 32 G/DL (ref 31.4–37.4)
MCV RBC AUTO: 85 FL (ref 82–98)
MONOCYTES # BLD AUTO: 1.56 THOUSAND/ÂΜL (ref 0.17–1.22)
MONOCYTES NFR BLD AUTO: 9 % (ref 4–12)
NEUTROPHILS # BLD AUTO: 12.97 THOUSANDS/ÂΜL (ref 1.85–7.62)
NEUTS SEG NFR BLD AUTO: 77 % (ref 43–75)
NRBC BLD AUTO-RTO: 0 /100 WBCS
P AXIS: 43 DEGREES
P AXIS: 44 DEGREES
PLATELET # BLD AUTO: 581 THOUSANDS/UL (ref 149–390)
PMV BLD AUTO: 8.7 FL (ref 8.9–12.7)
POTASSIUM SERPL-SCNC: 3.8 MMOL/L (ref 3.5–5.3)
PR INTERVAL: 136 MS
PR INTERVAL: 144 MS
PROT SERPL-MCNC: 7.8 G/DL (ref 6.4–8.4)
QRS AXIS: 3 DEGREES
QRS AXIS: 6 DEGREES
QRSD INTERVAL: 80 MS
QRSD INTERVAL: 84 MS
QT INTERVAL: 284 MS
QT INTERVAL: 306 MS
QTC INTERVAL: 403 MS
QTC INTERVAL: 428 MS
RBC # BLD AUTO: 2.94 MILLION/UL (ref 3.88–5.62)
SODIUM SERPL-SCNC: 131 MMOL/L (ref 135–147)
T WAVE AXIS: 15 DEGREES
T WAVE AXIS: 36 DEGREES
VENTRICULAR RATE: 118 BPM
VENTRICULAR RATE: 121 BPM
WBC # BLD AUTO: 16.79 THOUSAND/UL (ref 4.31–10.16)

## 2023-07-14 PROCEDURE — 94664 DEMO&/EVAL PT USE INHALER: CPT

## 2023-07-14 PROCEDURE — 82948 REAGENT STRIP/BLOOD GLUCOSE: CPT

## 2023-07-14 PROCEDURE — 85025 COMPLETE CBC W/AUTO DIFF WBC: CPT | Performed by: INTERNAL MEDICINE

## 2023-07-14 PROCEDURE — 87040 BLOOD CULTURE FOR BACTERIA: CPT | Performed by: INTERNAL MEDICINE

## 2023-07-14 PROCEDURE — 99232 SBSQ HOSP IP/OBS MODERATE 35: CPT | Performed by: INTERNAL MEDICINE

## 2023-07-14 PROCEDURE — 80053 COMPREHEN METABOLIC PANEL: CPT | Performed by: INTERNAL MEDICINE

## 2023-07-14 PROCEDURE — 93010 ELECTROCARDIOGRAM REPORT: CPT | Performed by: INTERNAL MEDICINE

## 2023-07-14 PROCEDURE — 85730 THROMBOPLASTIN TIME PARTIAL: CPT | Performed by: PHYSICIAN ASSISTANT

## 2023-07-14 RX ADMIN — ACETAMINOPHEN 650 MG: 325 TABLET, FILM COATED ORAL at 03:01

## 2023-07-14 RX ADMIN — HEPARIN SODIUM 5600 UNITS: 1000 INJECTION INTRAVENOUS; SUBCUTANEOUS at 15:38

## 2023-07-14 RX ADMIN — HEPARIN SODIUM 32 UNITS/KG/HR: 10000 INJECTION, SOLUTION INTRAVENOUS at 19:13

## 2023-07-14 RX ADMIN — INSULIN LISPRO 5 UNITS: 100 INJECTION, SOLUTION INTRAVENOUS; SUBCUTANEOUS at 12:33

## 2023-07-14 RX ADMIN — VITAM B12 100 MCG: 100 TAB at 08:03

## 2023-07-14 RX ADMIN — HYDROMORPHONE HYDROCHLORIDE 0.5 MG: 1 INJECTION, SOLUTION INTRAMUSCULAR; INTRAVENOUS; SUBCUTANEOUS at 22:16

## 2023-07-14 RX ADMIN — ATORVASTATIN CALCIUM 10 MG: 10 TABLET, FILM COATED ORAL at 16:54

## 2023-07-14 RX ADMIN — INSULIN LISPRO 6 UNITS: 100 INJECTION, SOLUTION INTRAVENOUS; SUBCUTANEOUS at 22:08

## 2023-07-14 RX ADMIN — SODIUM CHLORIDE 75 ML/HR: 0.9 INJECTION, SOLUTION INTRAVENOUS at 14:32

## 2023-07-14 RX ADMIN — HEPARIN SODIUM 2800 UNITS: 1000 INJECTION INTRAVENOUS; SUBCUTANEOUS at 03:08

## 2023-07-14 RX ADMIN — GUAIFENESIN AND DEXTROMETHORPHAN 10 ML: 100; 10 SYRUP ORAL at 09:24

## 2023-07-14 RX ADMIN — INSULIN LISPRO 5 UNITS: 100 INJECTION, SOLUTION INTRAVENOUS; SUBCUTANEOUS at 09:24

## 2023-07-14 RX ADMIN — SODIUM CHLORIDE 75 ML/HR: 0.9 INJECTION, SOLUTION INTRAVENOUS at 00:55

## 2023-07-14 RX ADMIN — CEFAZOLIN SODIUM 2000 MG: 2 SOLUTION INTRAVENOUS at 02:56

## 2023-07-14 RX ADMIN — INSULIN LISPRO 5 UNITS: 100 INJECTION, SOLUTION INTRAVENOUS; SUBCUTANEOUS at 16:55

## 2023-07-14 RX ADMIN — INSULIN LISPRO 6 UNITS: 100 INJECTION, SOLUTION INTRAVENOUS; SUBCUTANEOUS at 16:55

## 2023-07-14 RX ADMIN — ACETAMINOPHEN 650 MG: 325 TABLET, FILM COATED ORAL at 14:37

## 2023-07-14 RX ADMIN — MONTELUKAST 10 MG: 10 TABLET, FILM COATED ORAL at 22:07

## 2023-07-14 RX ADMIN — INSULIN LISPRO 2 UNITS: 100 INJECTION, SOLUTION INTRAVENOUS; SUBCUTANEOUS at 07:52

## 2023-07-14 RX ADMIN — CEFAZOLIN SODIUM 2000 MG: 2 SOLUTION INTRAVENOUS at 18:19

## 2023-07-14 RX ADMIN — ACETAMINOPHEN 650 MG: 325 TABLET, FILM COATED ORAL at 22:16

## 2023-07-14 RX ADMIN — OXYCODONE HYDROCHLORIDE 5 MG: 5 TABLET ORAL at 18:26

## 2023-07-14 RX ADMIN — CEFAZOLIN SODIUM 2000 MG: 2 SOLUTION INTRAVENOUS at 11:33

## 2023-07-14 RX ADMIN — INSULIN GLARGINE 5 UNITS: 100 INJECTION, SOLUTION SUBCUTANEOUS at 22:07

## 2023-07-14 RX ADMIN — IBUPROFEN 600 MG: 600 TABLET, FILM COATED ORAL at 18:23

## 2023-07-14 RX ADMIN — INSULIN LISPRO 2 UNITS: 100 INJECTION, SOLUTION INTRAVENOUS; SUBCUTANEOUS at 12:32

## 2023-07-14 NOTE — PROGRESS NOTES
[address]  Progress Note  Name: Miriam Pinzon I  MRN: [id number]  Unit/Bed#: -01 I Date of Admission: 7/12/2023   Date of Service: 7/14/2023 I Hospital Day: 2    Assessment/Plan   Sepsis [facility])  Assessment & Plan  · Meeting criteria due to leukocytosis, tachycardia in the setting of worsening progression of pneumonia. Admitted previously with Klebsiella pneumonia and unfortunately left without completing treatment. · Blood cultures pending   · Appreciate infectious disease support -recommending IR drainage of liver abscess, continue Ancef based on previous culture data. Avoid PICC line placement due to compliance    Anemia  Assessment & Plan  · Stable, likely multifactorial    Type 2 diabetes mellitus without complication, without long-term current use of insulin [facility])  Assessment & Plan  Lab Results   Component Value Date    HGBA1C 12.0 (H) 07/05/2023       Recent Labs     07/13/23  1419 07/13/23  1556 07/13/23  2049 07/14/23  0717   POCGLU 219* 341* 112 193*       Blood Sugar Average: Last 72 hrs:  · (P) [id number]   · Poorly controlled diabetes, continue sliding scale, and basal and prandial insulin  · Carb controlled diet    Septic embolism (HCC)  Assessment & Plan  · Felt to be secondary to nonadherence to antibiotic therapy   · CT negative for PE   · Started on anticoagulation with IV heparin drip and transition to Eliquis planned. Liver abscess  Assessment & Plan  · CT abdomen and pelvis   · Status post IR drain placement with next check on July 20     * Cavitary pneumonia  Assessment & Plan  · Patient had recently left AGAINST MEDICAL ADVICE and nonadherent to completion of levofloxacin   · Infectious disease consulted recommending Ancef treatment   · Continue to trend blood cultures, CBC and fever             VTE Pharmacologic Prophylaxis: VTE Score: 3 Moderate Risk (Score 3-4) - Pharmacological DVT Prophylaxis Ordered: heparin drip.     Patient Centered Rounds: I performed bedside rounds with nursing staff today. Discussions with Specialists or Other Care Team Provider: ID    Education and Discussions with Family / Patient: Updated  (sister) at bedside. Total Time Spent on Date of Encounter in care of patient: 35 minutes This time was spent on one or more of the following: performing physical exam; counseling and coordination of care; obtaining or reviewing history; documenting in the medical record; reviewing/ordering tests, medications or procedures; communicating with other healthcare professionals and discussing with patient's family/caregivers. Current Length of Stay: 2 day(s)  Current Patient Status: Inpatient   Certification Statement: The patient will continue to require additional inpatient hospital stay due to pneumonia  Discharge Plan: Anticipate discharge in 48-72 hrs to home. Code Status: Level 1 - Full Code    Subjective:   Patient reports feeling improved. He does have a cough. He had a fever overnight. He denies cp/sob. He denies abdominal pain. He reports concern about obtaining his medication and insurance issues. Objective:     Vitals:   Temp (24hrs), Av.4 °F (38.6 °C), Min:98.8 °F (37.1 °C), Max:102.9 °F (39.4 °C)    Temp:  [98.8 °F (37.1 °C)-102.9 °F (39.4 °C)] 98.8 °F (37.1 °C)  HR:  [] 94  Resp:  [22-33] 22  BP: (129-164)/(65-94) 134/78  SpO2:  [95 %-100 %] 100 %  There is no height or weight on file to calculate BMI. Input and Output Summary (last 24 hours): Intake/Output Summary (Last 24 hours) at 2023 0737  Last data filed at 2023 0121  Gross per 24 hour   Intake 2075 ml   Output --   Net 2075 ml       Physical Exam:   Physical Exam  Vitals and nursing note reviewed. Constitutional:       Appearance: Normal appearance. He is not ill-appearing or diaphoretic. HENT:      Head: Normocephalic. Nose: Nose normal. No congestion.       Mouth/Throat:      Mouth: Mucous membranes are moist.      Pharynx: No oropharyngeal exudate. Eyes:      General: No scleral icterus. Conjunctiva/sclera: Conjunctivae normal.   Pulmonary:      Effort: No respiratory distress. Breath sounds: Rales present. Abdominal:      General: Bowel sounds are normal. There is no distension. Palpations: Abdomen is soft. Tenderness: There is no abdominal tenderness. Musculoskeletal:      Cervical back: Normal range of motion and neck supple. No rigidity. Skin:     General: Skin is warm. Coloration: Skin is not jaundiced. Neurological:      Mental Status: He is alert and oriented to person, place, and time.    Psychiatric:         Behavior: Behavior normal.          Additional Data:     Labs:  Results from last 7 days   Lab Units 07/14/23  0234   WBC Thousand/uL 16.79*   HEMOGLOBIN g/dL 8.0*   HEMATOCRIT % 25.0*   PLATELETS Thousands/uL 581*   NEUTROS PCT % 77*   LYMPHS PCT % 12*   MONOS PCT % 9   EOS PCT % 0     Results from last 7 days   Lab Units 07/14/23  0234   SODIUM mmol/L 131*   POTASSIUM mmol/L 3.8   CHLORIDE mmol/L 100   CO2 mmol/L 21   BUN mg/dL 6   CREATININE mg/dL 0.81   ANION GAP mmol/L 10   CALCIUM mg/dL 8.2*   ALBUMIN g/dL 2.9*   TOTAL BILIRUBIN mg/dL 0.70   ALK PHOS U/L 116*   ALT U/L 13   AST U/L 9*   GLUCOSE RANDOM mg/dL 205*     Results from last 7 days   Lab Units 07/12/23  1616   INR  1.19     Results from last 7 days   Lab Units 07/14/23  0717 07/13/23  2049 07/13/23  1556 07/13/23  1419 07/13/23  1151 07/13/23  0848 07/13/23  0706 07/13/23  0545 07/12/23  2239   POC GLUCOSE mg/dl 193* 112 341* 219* 299* 296* 273* 294* 408*         Results from last 7 days   Lab Units 07/12/23  1616   LACTIC ACID mmol/L 1.1   PROCALCITONIN ng/ml 0.35*       Lines/Drains:  Invasive Devices     Peripheral Intravenous Line  Duration           Peripheral IV 07/12/23 Distal;Right;Upper;Ventral (anterior) Arm 1 day    Peripheral IV 07/12/23 Left Antecubital 1 day          Drain  Duration Abscess Drain Abdomen 14 days                      Imaging: Personally reviewed the following imaging: chest CT scan    Recent Cultures (last 7 days):   Results from last 7 days   Lab Units 07/12/23  1621 07/12/23  1616   BLOOD CULTURE  No Growth at 24 hrs. No Growth at 24 hrs.        Last 24 Hours Medication List:   Current Facility-Administered Medications   Medication Dose Route Frequency Provider Last Rate   • acetaminophen  650 mg Oral Q8H PRN Jordan Mills MD     • albuterol  2 puff Inhalation Q4H PRN Kailyn Brown PA-C     • aluminum-magnesium hydroxide-simethicone  30 mL Oral Q6H PRN Kailyn Brown PA-C     • atorvastatin  10 mg Oral Daily With Dinner Kailyn Brown PA-C     • benzonatate  100 mg Oral TID PRN Kailyn Brown PA-C     • cefazolin  2,000 mg Intravenous Q8H Michael Thompson MD 2,000 mg (07/14/23 0256)   • cyanocobalamin  100 mcg Oral Daily Kailyn Brown PA-C     • dextromethorphan-guaiFENesin  10 mL Oral Q4H PRN Jordan Mills MD     • heparin (porcine)  3-30 Units/kg/hr (Order-Specific) Intravenous Titrated Kailyn Brown PA-C 28 Units/kg/hr (07/14/23 0309)   • heparin (porcine)  2,800 Units Intravenous Q6H PRN Kailyn Brown PA-C     • heparin (porcine)  5,600 Units Intravenous Q6H PRN Kailyn Brown PA-C     • HYDROmorphone  0.5 mg Intravenous Q4H PRN Jordan Mills MD     • HYDROmorphone  0.5 mg Intravenous Q3H PRN Jordan Mills MD     • ibuprofen  600 mg Oral Q6H PRN Jordan Mills MD     • insulin glargine  5 Units Subcutaneous HS Jordan Mills MD     • insulin lispro  2-12 Units Subcutaneous TID With Meals Jordan Mills MD     • insulin lispro  2-12 Units Subcutaneous HS Jordan Mills MD     • insulin lispro  5 Units Subcutaneous TID With Meals Jordan Mills MD     • lidocaine  1 patch Topical Daily PRN Kailyn Brown PA-C     • montelukast  10 mg Oral HS Kailyn Brown PA-C     • oxyCODONE  5 mg Oral Q6H PRN Jordan Mills MD • sodium chloride  75 mL/hr Intravenous Continuous Adriel Rico PA-C 75 mL/hr (07/14/23 0055)        Today, Patient Was Seen By: Sampson Robles MD    **Please Note: This note may have been constructed using a voice recognition system. **

## 2023-07-14 NOTE — ASSESSMENT & PLAN NOTE
Lab Results   Component Value Date    HGBA1C 12.0 (H) 07/05/2023       Recent Labs     07/13/23  1419 07/13/23  1556 07/13/23 2049 07/14/23  0717   POCGLU 219* 341* 112 193*       Blood Sugar Average: Last 72 hrs:  · (P) [id number]   · Poorly controlled diabetes, continue sliding scale, and basal and prandial insulin  · Carb controlled diet

## 2023-07-14 NOTE — PROGRESS NOTES
Progress Note - Infectious Disease   Beatrice Mbow 43 y.o. male MRN: [id number]  Unit/Bed#: -01 Encounter: [id number]      Impression/Plan:  1. Sepsis, POA. Patient presented with tachycardia along with leukocytosis and intermittent tachypnea which is due to #2 and patient's noncompliance. He is fortunately otherwise hemodynamically stable. Fevers overnight likely due to reinitiation of antibiotic and anticoagulation. Patient subjectively feeling better today. Repeat blood cultures so far without growth. Antibiotics as below  Continue to trend fever curve/vitals  Repeat CBC/chemistry tomorrow  Follow-up pending blood cultures  Additional blood cultures ordered for today given fever  Recommend IR evaluation for #3  Counseled the patient and sister on need for compliance  We will adjust antibiotics further based on clinical progress  We will rearrange ID follow-up again closer to discharge  Supportive care as per primary     2. Septic pulmonary emboli with cavitation. This was felt to be due to #3 and #4. There is some slight progression on the imaging in terms of cavitation in size which I suspect are largely due to noncompliance. Some changes may also be radiographically expected. Continue Ancef 2 g every 8 hours for now  Continue to trend fever curve/vitals  Repeat CBC/chemistry tomorrow  Follow-up pending blood cultures  Repeat blood cultures ordered for today  Monitor for improvement in respiratory symptoms  Low threshold for pulmonary evaluation  Patient will likely need prolonged antibiotic and follow-up imaging  Anticipate transition to p.o. therapy as prior given #7  Supportive care as per primary     3. Liver abscess with adjacent thrombosis. Was diagnosed with this on last admission in the setting of #4. Suspect that this contributed to #2. MRCP without other biliary abnormality. Essentially an occult abscess. Repeat imaging now showing resolution postdrainage.   Recommend IR reevaluation of drain  Antibiotics as above  Monitor abdominal exam  May need to consider GI evaluation on this admission  Trend fever curve/WBC     4.  Recent Klebsiella bacteremia. Course complicated by the above. We will continue antibiotics as above and follow-up pending blood cultures     5. Thrombocytosis. Suspect that this was an inflammatory response due to the above. We will repeat CBC tomorrow. Continue antibiotics as above     6. Poorly controlled diabetes. Hemoglobin A1c of 12.0. Likely risk factor for the above. Glucose management as per primary     7. Noncompliance. Patient unfortunately has eloped from several ER visits and recent hospitalization. Stressed to both him and his sister the need for compliance and prolonged therapy and follow-up. We will avoid PICC line at this time. Recommend case management evaluation.     Above plan discussed in detail with the patient, primary service and his sister    ID consult service will reevaluate this patient again on Monday unless new issues in the interim. Please contact ID attending on call if questions. Antibiotics:  Ancef 2    24 Hour events:  Yesterday and overnight notes reviewed and no acute events noted    Subjective:  Patient seen at bedside with his sister present. He subjectively reporting feeling better today. He is feels his cough is getting better. Tolerating diet without issue. We discussed recent fever and the need for monitoring. Again reinforced the need for prolonged antibiotic. I discussed with both him and his sister the need for following up blood cultures. Given his recurrent fever obtain repeat blood cultures today.     Objective:  Vitals:  Temp:  [98.8 °F (37.1 °C)-102.9 °F (39.4 °C)] 98.8 °F (37.1 °C)  HR:  [] 94  Resp:  [22] 22  BP: (130-163)/(75-81) 134/78  SpO2:  [95 %-100 %] 100 %  Temp (24hrs), Av.4 °F (38.6 °C), Min:98.8 °F (37.1 °C), Max:102.9 °F (39.4 °C)  Current: Temperature: 98.8 °F (37.1 °C)    Physical Exam:   General Appearance:  Alert, interactive, nontoxic, no acute distress. Throat: Oropharynx moist without lesions. Lungs:   Clear to auscultation bilaterally; no wheezes, rhonchi or rales; respirations unlabored on room air   Heart:  RRR; no murmur, rub or gallop noted   Abdomen:   Soft, non-tender, non-distended, positive bowel sounds. Extremities: No clubbing, cyanosis or edema   Skin: No new rashes or lesions. No new draining wounds noted. Labs, Imaging, & Other studies:   All pertinent labs and imaging studies in PACS were personally reviewed as below. Results from last 7 days   Lab Units 07/14/23  0234 07/13/23  0423 07/12/23  1616   WBC Thousand/uL 16.79* 14.05* 15.17*   HEMOGLOBIN g/dL 8.0* 8.0* 9.6*   PLATELETS Thousands/uL 581* 615* 731*     Results from last 7 days   Lab Units 07/14/23  0234 07/13/23  0423 07/12/23  1616   POTASSIUM mmol/L 3.8   < > 4.4   CHLORIDE mmol/L 100   < > 94*   CO2 mmol/L 21   < > 21   BUN mg/dL 6   < > 12   CREATININE mg/dL 0.81   < > 0.86   EGFR ml/min/1.73sq m 109   < > 106   CALCIUM mg/dL 8.2*   < > 9.7   AST U/L 9*  --  16   ALT U/L 13  --  20   ALK PHOS U/L 116*  --  152*    < > = values in this interval not displayed. Results from last 7 days   Lab Units 07/12/23  1621 07/12/23  1616   BLOOD CULTURE  No Growth at 24 hrs. No Growth at 24 hrs. Lab interpretation/comments: White count remains elevated currently. Platelets slowly downtrending. LFTs unremarkable.     Imaging interpretation/comments: No new images overnight    Culture data: Initial blood cultures so far without growth

## 2023-07-14 NOTE — PLAN OF CARE
Problem: Knowledge Deficit  Goal: Patient/family/caregiver demonstrates understanding of disease process, treatment plan, medications, and discharge instructions  Description: Complete learning assessment and assess knowledge base.   Interventions:  - Provide teaching at level of understanding  - Provide teaching via preferred learning methods  Outcome: Progressing     Problem: INFECTION - ADULT  Goal: Absence or prevention of progression during hospitalization  Description: INTERVENTIONS:  - Assess and monitor for signs and symptoms of infection  - Monitor lab/diagnostic results  - Monitor all insertion sites, i.e. indwelling lines, tubes, and drains  - Monitor endotracheal if appropriate and nasal secretions for changes in amount and color  - [place] appropriate cooling/warming therapies per order  - Administer medications as ordered  - Instruct and encourage patient and family to use good hand hygiene technique  - Identify and instruct in appropriate isolation precautions for identified infection/condition  Outcome: Not Progressing

## 2023-07-14 NOTE — NURSING NOTE
Obtained ptt results, subtherapeutic at 36. Rebolused per protocol and increased drip by 4 units per titration protocol, now at 22.4ml/hr, which is above limits of 2.1-21ml/hr. SLIM made aware.

## 2023-07-14 NOTE — ASSESSMENT & PLAN NOTE
· Felt to be secondary to nonadherence to antibiotic therapy   · CT negative for PE   · Started on anticoagulation with IV heparin drip and transition to Eliquis planned.

## 2023-07-14 NOTE — CASE MANAGEMENT
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   • Sepsis ([address]) 2023   • Liver abscess 2023   • Septic embolism ([address]) 2023   • Elevated troponin 2023   • Diabetes mellitus with hyperglycemia ([address]) 2023   • Acute renal failure (ARF) ([address]) 2023   • Hyponatremia 2023   • Type 2 diabetes mellitus without complication, without long-term current use of insulin ([address]) 2018   • Hyperlipidemia 2017   • Mild persistent asthma without complication       LOS (days): 2  Geometric Mean LOS (GMLOS) (days):   Days to GMLOS:     OBJECTIVE:  PATIENT READMITTED TO HOSPITAL  Risk of Unplanned Readmission Score: 15.55         Current admission status: Inpatient       Preferred Pharmacy:   [facility] [address] [place] [address]  Phone: [phone] Fax: [phone]    Primary Care Provider: ANGELIQUE Collazo    Primary Insurance:   Secondary Insurance:     ASSESSMENT:  [place] Proxies    There are no active Health Care Proxies on file.        Advance Directives  Does patient have a Health Care POA?: No  Was patient offered paperwork?: Yes (declined)  Does patient currently have a Health Care decision maker?: Yes, please see Health Care Proxy section  Does patient have Advance Directives?: No  Was patient offered paperwork?: Yes (declined)  Primary Contact: wife         Readmission Root Cause  30 Day Readmission: Yes  Who directed you to return to the hospital?: Self  Did you understand whom to contact if you had questions or problems?: Yes  Did you get your prescriptions before you left the hospital?: No  Reason[de-identified] Preference for own pharmacy  Were you able to get your prescriptions filled when you left the hospital?: Yes (day before admission)  Did you take your medications as prescribed?: No  Were you able to get to your follow-up appointments?: No  Reason[de-identified] Readmitted prior to appointment, Compliance  During previous admission, was a post-acute recommendation made?: No (Patient left AMA)  Patient was readmitted due to: Cavitary pneumonia    Patient Information  Admitted from[de-identified] Home  Mental Status: Alert  During Assessment patient was accompanied by: Sister  Assessment information provided by[de-identified] Patient, Sister  Primary Caregiver: Self  Support Systems: Spouse/significant other  [facility][1]: [address] [place] [address] do you live in?: [facility] entry access options.  Select all that apply.: Stairs  Number of steps to enter home.: 1  Type of Current Residence: 2 story home  Upon entering residence, is there a bedroom on the main floor (no further steps)?: No  A bedroom is located on the following floor levels of residence (select all that apply):: 2nd Floor  Upon entering residence, is there a bathroom on the main floor (no further steps)?: No  Indicate which floors of current residence have a bathroom (select all the apply):: 2nd Floor  Number of steps to 2nd floor from main floor: One Flight  In the last 12 months, was there a time when you were not able to pay the mortgage or rent on time?: No  In the last 12 months, how many places have you lived?: 1  In the last 12 months, was there a time when you did not have a steady place to sleep or slept in a shelter (including now)?: No  Homeless/housing insecurity resource given?: N/A  Living Arrangements: Lives w/ Spouse/significant other  Is patient a ?: No    Activities of Daily Living Prior to Admission  Functional Status: Assistance  Completes ADLs independently?: No  Level of ADL dependence: Assistance  Ambulates independently?: Yes  Does patient use assisted devices?: No  Does patient currently own DME?: No  Does patient have a history of Outpatient Therapy (PT/OT)?: No  Does the patient have a history of Short-Term Rehab?: No  Does patient have a history of HHC?: No  Does patient currently have [facility]?: No         Patient Information Continued  Income Source: Employed  Does patient have prescription coverage?: Yes  Within the past 12 months, you worried that your food would run out before you got the money to buy more.: Never true  Within the past 12 months, the food you bought just didn't last and you didn't have money to get more.: Never true  Food insecurity resource given?: N/A  Does patient receive dialysis treatments?: No  Does patient have a history of substance abuse?: No  Does patient have a history of Mental Health Diagnosis?: No    PHQ 2/9 Screening   Reviewed PHQ 2/9 Depression Screening Score?: No    Means of Transportation  Means of Transport to Appts[de-identified] Drives Self  In the past 12 months, has lack of transportation kept you from medical appointments or from getting medications?: No  In the past 12 months, has lack of transportation kept you from meetings, work, or from getting things needed for daily living?: No  Was application for public transport provided?: N/A        DISCHARGE DETAILS:    Discharge planning discussed with[de-identified] Patient at bedside and sister  Freedom of Choice: Yes  Comments - Freedom of Choice: Cm discussed freedom of choice as it pertains to discharge planning. Patient denied having any needs.   CM contacted family/caregiver?: Yes (sister at bedside)  Were Treatment Team discharge recommendations reviewed with patient/caregiver?: Yes  Did patient/caregiver verbalize understanding of patient care needs?: Yes  Were patient/caregiver advised of the risks associated with not following Treatment Team discharge recommendations?: Yes         Requested [address]         Is the patient interested in [facility] at discharge?: No    DME Referral Provided  Referral made for DME?: No    Other Referral/Resources/Interventions Provided:  Interventions: None Indicated    Would you like to participate in our [address] service program?  : Yes (Patient stated a dr spoke to patient about home star and that patient was agreeable.)    Treatment Team Recommendation: Home  Discharge Destination Plan[de-identified] Home  Transport at Discharge : Family         Additional Comments: CM asked patient about insurance due to no insurance being on file. Patient stated that he has [employer] family insurance. CM informed patient that the hospital does not have insurance on file. CM informed patient and sister with no insurance on file patient could get a bill in the mail. Patient stated that he has [employer] insurance. CM informed patient and sister to provide hospital with insurance information. Sister stated they will look for insurance information.

## 2023-07-14 NOTE — ASSESSMENT & PLAN NOTE
· Patient had recently left AGAINST MEDICAL ADVICE and nonadherent to completion of levofloxacin   · Infectious disease consulted recommending Ancef treatment   · Continue to trend blood cultures, CBC and fever

## 2023-07-14 NOTE — ASSESSMENT & PLAN NOTE
· Meeting criteria due to leukocytosis, tachycardia in the setting of worsening progression of pneumonia. Admitted previously with Klebsiella pneumonia and unfortunately left without completing treatment. · Blood cultures pending   · Appreciate infectious disease support -recommending IR drainage of liver abscess, continue Ancef based on previous culture data.   Avoid PICC line placement due to compliance

## 2023-07-15 LAB
ALBUMIN SERPL BCP-MCNC: 2.8 G/DL (ref 3.5–5)
ALP SERPL-CCNC: 129 U/L (ref 34–104)
ALT SERPL W P-5'-P-CCNC: 10 U/L (ref 7–52)
ANION GAP SERPL CALCULATED.3IONS-SCNC: 6 MMOL/L
APTT PPP: 97 SECONDS (ref 23–37)
AST SERPL W P-5'-P-CCNC: 9 U/L (ref 13–39)
BASOPHILS # BLD AUTO: 0.12 THOUSANDS/ÂΜL (ref 0–0.1)
BASOPHILS NFR BLD AUTO: 1 % (ref 0–1)
BILIRUB SERPL-MCNC: 0.5 MG/DL (ref 0.2–1)
BUN SERPL-MCNC: 5 MG/DL (ref 5–25)
CALCIUM ALBUM COR SERPL-MCNC: 9.2 MG/DL (ref 8.3–10.1)
CALCIUM SERPL-MCNC: 8.2 MG/DL (ref 8.4–10.2)
CHLORIDE SERPL-SCNC: 102 MMOL/L (ref 96–108)
CO2 SERPL-SCNC: 26 MMOL/L (ref 21–32)
CREAT SERPL-MCNC: 0.75 MG/DL (ref 0.6–1.3)
EOSINOPHIL # BLD AUTO: 0.13 THOUSAND/ÂΜL (ref 0–0.61)
EOSINOPHIL NFR BLD AUTO: 1 % (ref 0–6)
ERYTHROCYTE [DISTWIDTH] IN BLOOD BY AUTOMATED COUNT: 15.6 % (ref 11.6–15.1)
GFR SERPL CREATININE-BSD FRML MDRD: 113 ML/MIN/1.73SQ M
GLUCOSE SERPL-MCNC: 148 MG/DL (ref 65–140)
GLUCOSE SERPL-MCNC: 169 MG/DL (ref 65–140)
GLUCOSE SERPL-MCNC: 209 MG/DL (ref 65–140)
GLUCOSE SERPL-MCNC: 238 MG/DL (ref 65–140)
GLUCOSE SERPL-MCNC: 270 MG/DL (ref 65–140)
HCT VFR BLD AUTO: 24.1 % (ref 36.5–49.3)
HGB BLD-MCNC: 7.7 G/DL (ref 12–17)
IMM GRANULOCYTES # BLD AUTO: 0.06 THOUSAND/UL (ref 0–0.2)
IMM GRANULOCYTES NFR BLD AUTO: 1 % (ref 0–2)
LYMPHOCYTES # BLD AUTO: 2.03 THOUSANDS/ÂΜL (ref 0.6–4.47)
LYMPHOCYTES NFR BLD AUTO: 16 % (ref 14–44)
MCH RBC QN AUTO: 27.2 PG (ref 26.8–34.3)
MCHC RBC AUTO-ENTMCNC: 32 G/DL (ref 31.4–37.4)
MCV RBC AUTO: 85 FL (ref 82–98)
MONOCYTES # BLD AUTO: 1.19 THOUSAND/ÂΜL (ref 0.17–1.22)
MONOCYTES NFR BLD AUTO: 10 % (ref 4–12)
NEUTROPHILS # BLD AUTO: 9 THOUSANDS/ÂΜL (ref 1.85–7.62)
NEUTS SEG NFR BLD AUTO: 71 % (ref 43–75)
NRBC BLD AUTO-RTO: 0 /100 WBCS
PLATELET # BLD AUTO: 564 THOUSANDS/UL (ref 149–390)
PMV BLD AUTO: 9 FL (ref 8.9–12.7)
POTASSIUM SERPL-SCNC: 3.8 MMOL/L (ref 3.5–5.3)
PROT SERPL-MCNC: 7.4 G/DL (ref 6.4–8.4)
RBC # BLD AUTO: 2.83 MILLION/UL (ref 3.88–5.62)
SODIUM SERPL-SCNC: 134 MMOL/L (ref 135–147)
WBC # BLD AUTO: 12.53 THOUSAND/UL (ref 4.31–10.16)

## 2023-07-15 PROCEDURE — 85025 COMPLETE CBC W/AUTO DIFF WBC: CPT | Performed by: INTERNAL MEDICINE

## 2023-07-15 PROCEDURE — 80053 COMPREHEN METABOLIC PANEL: CPT | Performed by: INTERNAL MEDICINE

## 2023-07-15 PROCEDURE — 99232 SBSQ HOSP IP/OBS MODERATE 35: CPT | Performed by: INTERNAL MEDICINE

## 2023-07-15 PROCEDURE — 85730 THROMBOPLASTIN TIME PARTIAL: CPT | Performed by: PHYSICIAN ASSISTANT

## 2023-07-15 PROCEDURE — 82948 REAGENT STRIP/BLOOD GLUCOSE: CPT

## 2023-07-15 RX ADMIN — INSULIN LISPRO 5 UNITS: 100 INJECTION, SOLUTION INTRAVENOUS; SUBCUTANEOUS at 09:17

## 2023-07-15 RX ADMIN — SODIUM CHLORIDE 75 ML/HR: 0.9 INJECTION, SOLUTION INTRAVENOUS at 06:55

## 2023-07-15 RX ADMIN — INSULIN GLARGINE 5 UNITS: 100 INJECTION, SOLUTION SUBCUTANEOUS at 23:00

## 2023-07-15 RX ADMIN — HYDROMORPHONE HYDROCHLORIDE 0.5 MG: 1 INJECTION, SOLUTION INTRAMUSCULAR; INTRAVENOUS; SUBCUTANEOUS at 20:42

## 2023-07-15 RX ADMIN — INSULIN LISPRO 2 UNITS: 100 INJECTION, SOLUTION INTRAVENOUS; SUBCUTANEOUS at 23:00

## 2023-07-15 RX ADMIN — GUAIFENESIN AND DEXTROMETHORPHAN 10 ML: 100; 10 SYRUP ORAL at 18:54

## 2023-07-15 RX ADMIN — INSULIN LISPRO 5 UNITS: 100 INJECTION, SOLUTION INTRAVENOUS; SUBCUTANEOUS at 13:25

## 2023-07-15 RX ADMIN — APIXABAN 5 MG: 5 TABLET, FILM COATED ORAL at 10:44

## 2023-07-15 RX ADMIN — GUAIFENESIN AND DEXTROMETHORPHAN 10 ML: 100; 10 SYRUP ORAL at 04:55

## 2023-07-15 RX ADMIN — VITAM B12 100 MCG: 100 TAB at 09:15

## 2023-07-15 RX ADMIN — SODIUM CHLORIDE 75 ML/HR: 0.9 INJECTION, SOLUTION INTRAVENOUS at 20:42

## 2023-07-15 RX ADMIN — MONTELUKAST 10 MG: 10 TABLET, FILM COATED ORAL at 23:00

## 2023-07-15 RX ADMIN — ATORVASTATIN CALCIUM 10 MG: 10 TABLET, FILM COATED ORAL at 17:31

## 2023-07-15 RX ADMIN — HEPARIN SODIUM 30 UNITS/KG/HR: 10000 INJECTION, SOLUTION INTRAVENOUS at 09:15

## 2023-07-15 RX ADMIN — INSULIN LISPRO 5 UNITS: 100 INJECTION, SOLUTION INTRAVENOUS; SUBCUTANEOUS at 17:32

## 2023-07-15 RX ADMIN — APIXABAN 5 MG: 5 TABLET, FILM COATED ORAL at 17:31

## 2023-07-15 RX ADMIN — OXYCODONE HYDROCHLORIDE 5 MG: 5 TABLET ORAL at 13:24

## 2023-07-15 RX ADMIN — BENZONATATE 100 MG: 100 CAPSULE ORAL at 17:31

## 2023-07-15 RX ADMIN — INSULIN LISPRO 4 UNITS: 100 INJECTION, SOLUTION INTRAVENOUS; SUBCUTANEOUS at 09:17

## 2023-07-15 RX ADMIN — INSULIN LISPRO 6 UNITS: 100 INJECTION, SOLUTION INTRAVENOUS; SUBCUTANEOUS at 13:25

## 2023-07-15 RX ADMIN — CEFAZOLIN SODIUM 2000 MG: 2 SOLUTION INTRAVENOUS at 18:51

## 2023-07-15 RX ADMIN — CEFAZOLIN SODIUM 2000 MG: 2 SOLUTION INTRAVENOUS at 03:30

## 2023-07-15 RX ADMIN — CEFAZOLIN SODIUM 2000 MG: 2 SOLUTION INTRAVENOUS at 10:44

## 2023-07-15 NOTE — RESPIRATORY THERAPY NOTE
RT Protocol Note  Vanesa Sher Mbow 43 y.o. male MRN: [id number]  Unit/Bed#: -01 Encounter: [id number]    Assessment    Principal Problem:    Cavitary pneumonia  Active Problems:    Sepsis ([address])    Liver abscess    Septic embolism ([address])    Hyponatremia    Type 2 diabetes mellitus without complication, without long-term current use of insulin (HCC)    Anemia    History of noncompliance with medical treatment      Home Pulmonary Medications:       Objective    Physical Exam:   Assessment Type: Assess only  General Appearance: Awake  Respiratory Pattern: Normal  Chest Assessment: Chest expansion symmetrical  Bilateral Breath Sounds: Clear    Vitals:  Blood pressure 147/77, pulse 105, temperature (!) 100.8 °F (38.2 °C), temperature source Oral, resp. rate 17, SpO2 97 %. Imaging and other studies: O2 Device: RA     Plan    Respiratory Plan: No distress/Pulmonary history     07/14/23 2151   Respiratory Protocol   Protocol Initiated? No   Protocol Selection Respiratory   Language Barrier? No   Medical & Social History Reviewed? Yes   Diagnostic Studies Reviewed? Yes   Physical Assessment Performed?  Yes   Respiratory Plan No distress/Pulmonary history   Respiratory Assessment   Assessment Type Assess only   General Appearance Awake   Respiratory Pattern Normal   Chest Assessment Chest expansion symmetrical   Bilateral Breath Sounds Clear   Resp Comments Resp protocol completed, no need for MDI inpatent, BBS clear, D/C protocol             Resp Comments: Resp protocol completed, no need for MDI inpatent, BBS clear, D/C protocol

## 2023-07-15 NOTE — ASSESSMENT & PLAN NOTE
Lab Results   Component Value Date    HGBA1C 12.0 (H) 07/05/2023       Recent Labs     07/14/23  1100 07/14/23  1631 07/14/23  2051 07/15/23  0712   POCGLU 193* 284* 285* 238*       Blood Sugar Average: Last 72 hrs:  · (P) [id number]   · Poorly controlled diabetes, continue sliding scale, and basal and prandial insulin  · Most likely control is challenging due to continued infection  · Carb controlled diet

## 2023-07-15 NOTE — ASSESSMENT & PLAN NOTE
· Patient had recently left AGAINST MEDICAL ADVICE and nonadherent to completion of levofloxacin   · Infectious disease consulted recommending Ancef treatment   · Continue to trend blood cultures, CBC and fever-continued fevers reported

## 2023-07-15 NOTE — ASSESSMENT & PLAN NOTE
· Eloped twice during hospitalization 6/26 and 7/4 and has eloped 4 times since then from this campus's ED as well as [facility]  · Educated on risks of leaving AMA again and patient specifically asked what happened if he does not get treatment for worsening pneumonia and reviewed risks up to and including death and significance of continuing treatment with antibiotics and blood thinner which he agrees he will at this time  · Of note patient without insurance during last hospitalization, now has health insurance through [address] [place] [address]

## 2023-07-15 NOTE — ASSESSMENT & PLAN NOTE
· Meeting criteria due to leukocytosis, tachycardia in the setting of worsening progression of pneumonia. Admitted previously with Klebsiella pneumonia and unfortunately left without completing treatment. Continues to be febrile with last fever overnight. Feels his cough is improving but still there. · Blood cultures pending   · Appreciate infectious disease support -recommending continued IR drainage of liver abscess, continue Ancef based on previous culture data.   Avoid PICC line placement due to compliance

## 2023-07-15 NOTE — ASSESSMENT & PLAN NOTE
· Felt to be secondary to nonadherence to antibiotic therapy   · CT negative for PE   · We will transition to Eliquis this morning from heparin drip per pharmacy protocol

## 2023-07-15 NOTE — PLAN OF CARE
Problem: INFECTION - ADULT  Goal: Absence or prevention of progression during hospitalization  Description: INTERVENTIONS:  - Assess and monitor for signs and symptoms of infection  - Monitor lab/diagnostic results  - Monitor all insertion sites, i.e. indwelling lines, tubes, and drains  - Monitor endotracheal if appropriate and nasal secretions for changes in amount and color  - [place] appropriate cooling/warming therapies per order  - Administer medications as ordered  - Instruct and encourage patient and family to use good hand hygiene technique  - Identify and instruct in appropriate isolation precautions for identified infection/condition  Outcome: Progressing     Problem: SAFETY ADULT  Goal: Maintain or return to baseline ADL function  Description: INTERVENTIONS:  -  Assess patient's ability to carry out ADLs; assess patient's baseline for ADL function and identify physical deficits which impact ability to perform ADLs (bathing, care of mouth/teeth, toileting, grooming, dressing, etc.)  - Assess/evaluate cause of self-care deficits   - Assess range of motion  - Assess patient's mobility; develop plan if impaired  - Assess patient's need for assistive devices and provide as appropriate  - Encourage maximum independence but intervene and supervise when necessary  - Involve family in performance of ADLs  - Assess for home care needs following discharge   - Consider OT consult to assist with ADL evaluation and planning for discharge  - Provide patient education as appropriate  Outcome: Progressing     Problem: Knowledge Deficit  Goal: Patient/family/caregiver demonstrates understanding of disease process, treatment plan, medications, and discharge instructions  Description: Complete learning assessment and assess knowledge base.   Interventions:  - Provide teaching at level of understanding  - Provide teaching via preferred learning methods  Outcome: Progressing     Problem: MOBILITY - ADULT  Goal: Maintain or return to baseline ADL function  Description: INTERVENTIONS:  -  Assess patient's ability to carry out ADLs; assess patient's baseline for ADL function and identify physical deficits which impact ability to perform ADLs (bathing, care of mouth/teeth, toileting, grooming, dressing, etc.)  - Assess/evaluate cause of self-care deficits   - Assess range of motion  - Assess patient's mobility; develop plan if impaired  - Assess patient's need for assistive devices and provide as appropriate  - Encourage maximum independence but intervene and supervise when necessary  - Involve family in performance of ADLs  - Assess for home care needs following discharge   - Consider OT consult to assist with ADL evaluation and planning for discharge  - Provide patient education as appropriate  Outcome: Progressing

## 2023-07-16 LAB
ANION GAP SERPL CALCULATED.3IONS-SCNC: 6 MMOL/L
BASOPHILS # BLD AUTO: 0.08 THOUSANDS/ÂΜL (ref 0–0.1)
BASOPHILS NFR BLD AUTO: 1 % (ref 0–1)
BUN SERPL-MCNC: 4 MG/DL (ref 5–25)
CALCIUM SERPL-MCNC: 8.4 MG/DL (ref 8.4–10.2)
CHLORIDE SERPL-SCNC: 103 MMOL/L (ref 96–108)
CO2 SERPL-SCNC: 26 MMOL/L (ref 21–32)
CREAT SERPL-MCNC: 0.68 MG/DL (ref 0.6–1.3)
EOSINOPHIL # BLD AUTO: 0.13 THOUSAND/ÂΜL (ref 0–0.61)
EOSINOPHIL NFR BLD AUTO: 1 % (ref 0–6)
ERYTHROCYTE [DISTWIDTH] IN BLOOD BY AUTOMATED COUNT: 15.6 % (ref 11.6–15.1)
GFR SERPL CREATININE-BSD FRML MDRD: 117 ML/MIN/1.73SQ M
GLUCOSE SERPL-MCNC: 113 MG/DL (ref 65–140)
GLUCOSE SERPL-MCNC: 188 MG/DL (ref 65–140)
GLUCOSE SERPL-MCNC: 216 MG/DL (ref 65–140)
GLUCOSE SERPL-MCNC: 222 MG/DL (ref 65–140)
GLUCOSE SERPL-MCNC: 257 MG/DL (ref 65–140)
HCT VFR BLD AUTO: 24.8 % (ref 36.5–49.3)
HGB BLD-MCNC: 7.7 G/DL (ref 12–17)
IMM GRANULOCYTES # BLD AUTO: 0.03 THOUSAND/UL (ref 0–0.2)
IMM GRANULOCYTES NFR BLD AUTO: 0 % (ref 0–2)
LYMPHOCYTES # BLD AUTO: 2.43 THOUSANDS/ÂΜL (ref 0.6–4.47)
LYMPHOCYTES NFR BLD AUTO: 27 % (ref 14–44)
MCH RBC QN AUTO: 26.3 PG (ref 26.8–34.3)
MCHC RBC AUTO-ENTMCNC: 31 G/DL (ref 31.4–37.4)
MCV RBC AUTO: 85 FL (ref 82–98)
MONOCYTES # BLD AUTO: 1.01 THOUSAND/ÂΜL (ref 0.17–1.22)
MONOCYTES NFR BLD AUTO: 11 % (ref 4–12)
NEUTROPHILS # BLD AUTO: 5.31 THOUSANDS/ÂΜL (ref 1.85–7.62)
NEUTS SEG NFR BLD AUTO: 60 % (ref 43–75)
NRBC BLD AUTO-RTO: 0 /100 WBCS
PLATELET # BLD AUTO: 557 THOUSANDS/UL (ref 149–390)
PMV BLD AUTO: 8.8 FL (ref 8.9–12.7)
POTASSIUM SERPL-SCNC: 3.8 MMOL/L (ref 3.5–5.3)
RBC # BLD AUTO: 2.93 MILLION/UL (ref 3.88–5.62)
SODIUM SERPL-SCNC: 135 MMOL/L (ref 135–147)
WBC # BLD AUTO: 8.99 THOUSAND/UL (ref 4.31–10.16)

## 2023-07-16 PROCEDURE — 80048 BASIC METABOLIC PNL TOTAL CA: CPT | Performed by: INTERNAL MEDICINE

## 2023-07-16 PROCEDURE — 85025 COMPLETE CBC W/AUTO DIFF WBC: CPT | Performed by: INTERNAL MEDICINE

## 2023-07-16 PROCEDURE — 82948 REAGENT STRIP/BLOOD GLUCOSE: CPT

## 2023-07-16 PROCEDURE — 99232 SBSQ HOSP IP/OBS MODERATE 35: CPT | Performed by: FAMILY MEDICINE

## 2023-07-16 RX ADMIN — INSULIN GLARGINE 5 UNITS: 100 INJECTION, SOLUTION SUBCUTANEOUS at 22:53

## 2023-07-16 RX ADMIN — GUAIFENESIN AND DEXTROMETHORPHAN 10 ML: 100; 10 SYRUP ORAL at 09:42

## 2023-07-16 RX ADMIN — MONTELUKAST 10 MG: 10 TABLET, FILM COATED ORAL at 22:53

## 2023-07-16 RX ADMIN — INSULIN LISPRO 5 UNITS: 100 INJECTION, SOLUTION INTRAVENOUS; SUBCUTANEOUS at 09:33

## 2023-07-16 RX ADMIN — ATORVASTATIN CALCIUM 10 MG: 10 TABLET, FILM COATED ORAL at 18:07

## 2023-07-16 RX ADMIN — OXYCODONE HYDROCHLORIDE 5 MG: 5 TABLET ORAL at 18:09

## 2023-07-16 RX ADMIN — APIXABAN 5 MG: 5 TABLET, FILM COATED ORAL at 09:32

## 2023-07-16 RX ADMIN — CEFAZOLIN SODIUM 2000 MG: 2 SOLUTION INTRAVENOUS at 03:12

## 2023-07-16 RX ADMIN — CEFAZOLIN SODIUM 2000 MG: 2 SOLUTION INTRAVENOUS at 11:00

## 2023-07-16 RX ADMIN — SODIUM CHLORIDE 75 ML/HR: 0.9 INJECTION, SOLUTION INTRAVENOUS at 11:00

## 2023-07-16 RX ADMIN — INSULIN LISPRO 4 UNITS: 100 INJECTION, SOLUTION INTRAVENOUS; SUBCUTANEOUS at 22:54

## 2023-07-16 RX ADMIN — INSULIN LISPRO 5 UNITS: 100 INJECTION, SOLUTION INTRAVENOUS; SUBCUTANEOUS at 18:13

## 2023-07-16 RX ADMIN — BENZONATATE 100 MG: 100 CAPSULE ORAL at 18:09

## 2023-07-16 RX ADMIN — VITAM B12 100 MCG: 100 TAB at 09:32

## 2023-07-16 RX ADMIN — ACETAMINOPHEN 650 MG: 325 TABLET, FILM COATED ORAL at 19:46

## 2023-07-16 RX ADMIN — INSULIN LISPRO 5 UNITS: 100 INJECTION, SOLUTION INTRAVENOUS; SUBCUTANEOUS at 12:45

## 2023-07-16 RX ADMIN — INSULIN LISPRO 4 UNITS: 100 INJECTION, SOLUTION INTRAVENOUS; SUBCUTANEOUS at 09:34

## 2023-07-16 RX ADMIN — CEFAZOLIN SODIUM 2000 MG: 2 SOLUTION INTRAVENOUS at 18:07

## 2023-07-16 RX ADMIN — INSULIN LISPRO 6 UNITS: 100 INJECTION, SOLUTION INTRAVENOUS; SUBCUTANEOUS at 12:45

## 2023-07-16 RX ADMIN — HYDROMORPHONE HYDROCHLORIDE 0.5 MG: 1 INJECTION, SOLUTION INTRAMUSCULAR; INTRAVENOUS; SUBCUTANEOUS at 19:45

## 2023-07-16 RX ADMIN — APIXABAN 5 MG: 5 TABLET, FILM COATED ORAL at 18:07

## 2023-07-16 RX ADMIN — SODIUM CHLORIDE 75 ML/HR: 0.9 INJECTION, SOLUTION INTRAVENOUS at 09:43

## 2023-07-16 NOTE — PROGRESS NOTES
[address]  Progress Note  Name: Lisa Reardon I  MRN: [id number]  Unit/Bed#: -01 I Date of Admission: 7/12/2023   Date of Service: 7/16/2023 I Hospital Day: 4    Assessment/Plan   History of noncompliance with medical treatment  Assessment & Plan  · Eloped twice during hospitalization 6/26 and 7/4 and has eloped 4 times since then from this campus's ED as well as [facility]  · Educated on risks of leaving AMA again and patient specifically asked what happened if he does not get treatment for worsening pneumonia and reviewed risks up to and including death and significance of continuing treatment with antibiotics and blood thinner which he agrees he will at this time  · Of note patient without insurance during last hospitalization, now has health insurance through [employer]    [facility]  [id number] [facility]  · Stable, likely multifactorial    Type 2 diabetes mellitus without complication, without long-term current use of insulin [facility])  Assessment & Plan  Lab Results   Component Value Date    HGBA1C 12.0 (H) 07/05/2023       Recent Labs     07/15/23  1116 07/15/23  1609 07/15/23  2031 07/16/23  0758   POCGLU 270* 148* 169* 216*       Blood Sugar Average: Last 72 hrs:  · (P) 239.375   · Poorly controlled diabetes, continue sliding scale, and basal and prandial insulin  · Most likely control is challenging due to continued infection  · Carb controlled diet    Hyponatremia  Assessment & Plan  · Corrected sodium decreased at 131  · Likely hypovolemic hyponatremia   · Continue to trend    Septic embolism ([address])  Assessment & Plan  · Felt to be secondary to nonadherence to antibiotic therapy   · CT negative for PE   · Continue Eliquis    Liver abscess  Assessment & Plan  · Status post IR drain placement with next check on July 20     Sepsis [facility])  Assessment & Plan  · Meeting criteria due to leukocytosis, tachycardia in the setting of worsening progression of pneumonia. Admitted previously with Klebsiella pneumonia and unfortunately left without completing treatment. · Improved since yesterday. Continues to endorse a low-grade fever. · Blood cultures pending  · Appreciate infectious disease support -recommending continued IR drainage of liver abscess, continue Ancef based on previous culture data. Avoid PICC line placement due to compliance    * Cavitary pneumonia  Assessment & Plan  · Patient had recently left AGAINST MEDICAL ADVICE and nonadherent to completion of levofloxacin   · Infectious disease consulted recommending Ancef treatment   · Continue to trend blood cultures, CBC and fever-continued fevers reported             VTE Pharmacologic Prophylaxis: VTE Score: 3 Moderate Risk (Score 3-4) - Pharmacological DVT Prophylaxis Ordered: apixaban (Eliquis). Patient Centered Rounds: I performed bedside rounds with nursing staff today. Discussions with Specialists or Other Care Team Provider: None    Education and Discussions with Family / Patient: Patient declined call to . Total Time Spent on Date of Encounter in care of patient: 35 minutes This time was spent on one or more of the following: performing physical exam; counseling and coordination of care; obtaining or reviewing history; documenting in the medical record; reviewing/ordering tests, medications or procedures; communicating with other healthcare professionals and discussing with patient's family/caregivers. Current Length of Stay: 4 day(s)  Current Patient Status: Inpatient   Certification Statement: The patient will continue to require additional inpatient hospital stay due to Pneumonia  Discharge Plan: Anticipate discharge in 48-72 hrs to home. Code Status: Level 1 - Full Code    Subjective:   Patient continues to endorse a low-grade fever with slight cough. Feeling better than yesterday.     Objective:     Vitals:   Temp (24hrs), Av °F (37.8 °C), Min:99.2 °F (37.3 °C), Max:100.8 °F (38.2 °C)    Temp:  [99.2 °F (37.3 °C)-100.8 °F (38.2 °C)] 100 °F (37.8 °C)  HR:  [] 104  Resp:  [17] 17  BP: (113-144)/(63-74) 144/74  SpO2:  [98 %-99 %] 98 %  Body mass index is 27.36 kg/m². Input and Output Summary (last 24 hours): Intake/Output Summary (Last 24 hours) at 7/16/2023 0805  Last data filed at 7/16/2023 1369  Gross per 24 hour   Intake 2233.75 ml   Output 2525 ml   Net -291.25 ml       Physical Exam:   Physical Exam  Constitutional:       Appearance: Normal appearance. HENT:      Head: Normocephalic and atraumatic. Cardiovascular:      Rate and Rhythm: Normal rate and regular rhythm. Pulmonary:      Effort: No respiratory distress. Breath sounds: Rales present. Skin:     General: Skin is warm and dry. Neurological:      General: No focal deficit present. Mental Status: He is alert and oriented to person, place, and time.    Psychiatric:         Mood and Affect: Mood normal.         Behavior: Behavior normal.          Additional Data:     Labs:  Results from last 7 days   Lab Units 07/16/23  0626   WBC Thousand/uL 8.99   HEMOGLOBIN g/dL 7.7*   HEMATOCRIT % 24.8*   PLATELETS Thousands/uL 557*   NEUTROS PCT % 60   LYMPHS PCT % 27   MONOS PCT % 11   EOS PCT % 1     Results from last 7 days   Lab Units 07/16/23  0626 07/15/23  0446   SODIUM mmol/L 135 134*   POTASSIUM mmol/L 3.8 3.8   CHLORIDE mmol/L 103 102   CO2 mmol/L 26 26   BUN mg/dL 4* 5   CREATININE mg/dL 0.68 0.75   ANION GAP mmol/L 6 6   CALCIUM mg/dL 8.4 8.2*   ALBUMIN g/dL  --  2.8*   TOTAL BILIRUBIN mg/dL  --  0.50   ALK PHOS U/L  --  129*   ALT U/L  --  10   AST U/L  --  9*   GLUCOSE RANDOM mg/dL 188* 209*     Results from last 7 days   Lab Units 07/12/23  1616   INR  1.19     Results from last 7 days   Lab Units 07/16/23  0758 07/15/23  2031 07/15/23  1609 07/15/23  1116 07/15/23  0712 07/14/23  2051 07/14/23  1631 07/14/23  1100 07/14/23  0717 07/13/23  2049 07/13/23  1556 07/13/23  1419   POC GLUCOSE mg/dl 216* 169* 148* 270* 238* 285* 284* 193* 193* 112 341* 219*         Results from last 7 days   Lab Units 07/12/23  1616   LACTIC ACID mmol/L 1.1   PROCALCITONIN ng/ml 0.35*       Lines/Drains:  Invasive Devices     Peripheral Intravenous Line  Duration           Peripheral IV 07/12/23 Distal;Right;Upper;Ventral (anterior) Arm 3 days    Peripheral IV 07/12/23 Left Antecubital 3 days    Long-Dwell Peripheral IV (Midline) 07/14/23 Right Brachial 1 day          Drain  Duration           Abscess Drain Abdomen 16 days                      Imaging: Personally reviewed the following imaging:     Recent Cultures (last 7 days):   Results from last 7 days   Lab Units 07/14/23  2132 07/14/23  1741 07/12/23  1621 07/12/23  1616   BLOOD CULTURE  Received in Microbiology Lab. Culture in Progress. No Growth at 24 hrs. No Growth at 72 hrs. No Growth at 72 hrs.        Last 24 Hours Medication List:   Current Facility-Administered Medications   Medication Dose Route Frequency Provider Last Rate   • acetaminophen  650 mg Oral Q8H PRN Jodie Mcbride MD     • albuterol  2 puff Inhalation Q4H PRN Blair Monique PA-C     • aluminum-magnesium hydroxide-simethicone  30 mL Oral Q6H PRN Blair Monique PA-C     • apixaban  5 mg Oral BID Talon Solis MD     • atorvastatin  10 mg Oral Daily With Dinner Blair Monique PA-C     • benzonatate  100 mg Oral TID PRN Blair Monique PA-C     • cefazolin  2,000 mg Intravenous Q8H Roldan Ortiz MD 2,000 mg (07/16/23 8594)   • cyanocobalamin  100 mcg Oral Daily Blair Monique PA-C     • dextromethorphan-guaiFENesin  10 mL Oral Q4H PRN Jodie Mcbride MD     • HYDROmorphone  0.5 mg Intravenous Q4H PRN Jodie Mcbride MD     • HYDROmorphone  0.5 mg Intravenous Q3H PRN Jodie Mcbride MD     • insulin glargine  5 Units Subcutaneous HS Jodie Mcbride MD     • insulin lispro  2-12 Units Subcutaneous TID With Meals Jodie Mcbride MD     • insulin lispro  2-12 Units Subcutaneous HS Jason Mcdonald MD     • insulin lispro  5 Units Subcutaneous TID With Meals Jason Mcdonald MD     • lidocaine  1 patch Topical Daily PRN Svetlana Fernandez PA-C     • montelukast  10 mg Oral HS Svetlana Fernandez PA-C     • oxyCODONE  5 mg Oral Q6H PRN Jason Mcdonald MD     • sodium chloride  75 mL/hr Intravenous Continuous Svetlana Fernandez PA-C 75 mL/hr (07/15/23 2042)        Today, Patient Was Seen By: Tina Castillo MD    **Please Note: This note may have been constructed using a voice recognition system. **

## 2023-07-16 NOTE — ASSESSMENT & PLAN NOTE
· Eloped twice during hospitalization 6/26 and 7/4 and has eloped 4 times since then from this campus's ED as well as [facility]  · Educated on risks of leaving AMA again and patient specifically asked what happened if he does not get treatment for worsening pneumonia and reviewed risks up to and including death and significance of continuing treatment with antibiotics and blood thinner which he agrees he will at this time  · Of note patient without insurance during last hospitalization, now still apparently having issues.   Patient tells me that he is working with  and [place] [employer] through [address] [place] [address]

## 2023-07-16 NOTE — ASSESSMENT & PLAN NOTE
· Patient had recently left AGAINST MEDICAL ADVICE and nonadherent to completion of levofloxacin   · Infectious disease consulted recommending intravenous ceftriaxone 2 g IV till August 22, 2023

## 2023-07-16 NOTE — ASSESSMENT & PLAN NOTE
· Meeting criteria due to leukocytosis, tachycardia in the setting of worsening progression of pneumonia. Admitted previously with Klebsiella pneumonia and unfortunately left without completing treatment.     · Improved  · Needs long-term antibiotics for the cavitary pneumonia

## 2023-07-16 NOTE — PLAN OF CARE
Problem: PAIN - ADULT  Goal: Verbalizes/displays adequate comfort level or baseline comfort level  Description: Interventions:  - Encourage patient to monitor pain and request assistance  - Assess pain using appropriate pain scale  - Administer analgesics based on type and severity of pain and evaluate response  - Implement non-pharmacological measures as appropriate and evaluate response  - Consider cultural and social influences on pain and pain management  - Notify physician/advanced practitioner if interventions unsuccessful or patient reports new pain  Outcome: Progressing     Problem: INFECTION - ADULT  Goal: Absence or prevention of progression during hospitalization  Description: INTERVENTIONS:  - Assess and monitor for signs and symptoms of infection  - Monitor lab/diagnostic results  - Monitor all insertion sites, i.e. indwelling lines, tubes, and drains  - Monitor endotracheal if appropriate and nasal secretions for changes in amount and color  - [place] appropriate cooling/warming therapies per order  - Administer medications as ordered  - Instruct and encourage patient and family to use good hand hygiene technique  - Identify and instruct in appropriate isolation precautions for identified infection/condition  Outcome: Progressing     Problem: Knowledge Deficit  Goal: Patient/family/caregiver demonstrates understanding of disease process, treatment plan, medications, and discharge instructions  Description: Complete learning assessment and assess knowledge base.   Interventions:  - Provide teaching at level of understanding  - Provide teaching via preferred learning methods  Outcome: Progressing

## 2023-07-16 NOTE — ASSESSMENT & PLAN NOTE
· Stable, likely multifactorial-hemoglobin around 7.5; slightly decreasing likely secondary to chronic disease  · Monitor hemoglobin periodically and transfuse if less than 7

## 2023-07-16 NOTE — ASSESSMENT & PLAN NOTE
· Felt to be secondary to nonadherence to antibiotic therapy   · CT negative for PE, Continue Eliquis

## 2023-07-16 NOTE — ASSESSMENT & PLAN NOTE
Lab Results   Component Value Date    HGBA1C 12.0 (H) 07/05/2023       Recent Labs     07/15/23  1116 07/15/23  1609 07/15/23  2031 07/16/23  0758   POCGLU 270* 148* 169* 216*       Blood Sugar Average: Last 72 hrs:  · (P) 239.375   · Poorly controlled diabetes, continue sliding scale, and basal and prandial insulin  · Most likely control is challenging due to continued infection  · Carb controlled diet advised

## 2023-07-17 LAB
BACTERIA BLD CULT: NORMAL
BACTERIA BLD CULT: NORMAL
GLUCOSE SERPL-MCNC: 195 MG/DL (ref 65–140)
GLUCOSE SERPL-MCNC: 197 MG/DL (ref 65–140)
GLUCOSE SERPL-MCNC: 203 MG/DL (ref 65–140)
GLUCOSE SERPL-MCNC: 230 MG/DL (ref 65–140)

## 2023-07-17 PROCEDURE — 82948 REAGENT STRIP/BLOOD GLUCOSE: CPT

## 2023-07-17 PROCEDURE — 99232 SBSQ HOSP IP/OBS MODERATE 35: CPT | Performed by: INTERNAL MEDICINE

## 2023-07-17 RX ORDER — CEFTRIAXONE 2 G/50ML
2000 INJECTION, SOLUTION INTRAVENOUS EVERY 24 HOURS
Status: DISCONTINUED | OUTPATIENT
Start: 2023-07-17 | End: 2023-07-20 | Stop reason: HOSPADM

## 2023-07-17 RX ORDER — SODIUM CHLORIDE 9 MG/ML
20 INJECTION, SOLUTION INTRAVENOUS ONCE
Status: CANCELLED | OUTPATIENT
Start: 2023-07-21

## 2023-07-17 RX ORDER — CEFTRIAXONE 2 G/50ML
2000 INJECTION, SOLUTION INTRAVENOUS ONCE
Status: CANCELLED | OUTPATIENT
Start: 2023-07-21

## 2023-07-17 RX ADMIN — CEFTRIAXONE 2000 MG: 2 INJECTION, SOLUTION INTRAVENOUS at 11:33

## 2023-07-17 RX ADMIN — APIXABAN 5 MG: 5 TABLET, FILM COATED ORAL at 17:19

## 2023-07-17 RX ADMIN — ACETAMINOPHEN 650 MG: 325 TABLET, FILM COATED ORAL at 22:01

## 2023-07-17 RX ADMIN — VITAM B12 100 MCG: 100 TAB at 08:29

## 2023-07-17 RX ADMIN — INSULIN LISPRO 5 UNITS: 100 INJECTION, SOLUTION INTRAVENOUS; SUBCUTANEOUS at 11:34

## 2023-07-17 RX ADMIN — INSULIN LISPRO 2 UNITS: 100 INJECTION, SOLUTION INTRAVENOUS; SUBCUTANEOUS at 11:40

## 2023-07-17 RX ADMIN — SODIUM CHLORIDE 75 ML/HR: 0.9 INJECTION, SOLUTION INTRAVENOUS at 00:39

## 2023-07-17 RX ADMIN — INSULIN LISPRO 5 UNITS: 100 INJECTION, SOLUTION INTRAVENOUS; SUBCUTANEOUS at 08:29

## 2023-07-17 RX ADMIN — INSULIN LISPRO 2 UNITS: 100 INJECTION, SOLUTION INTRAVENOUS; SUBCUTANEOUS at 17:20

## 2023-07-17 RX ADMIN — MONTELUKAST 10 MG: 10 TABLET, FILM COATED ORAL at 22:01

## 2023-07-17 RX ADMIN — INSULIN LISPRO 4 UNITS: 100 INJECTION, SOLUTION INTRAVENOUS; SUBCUTANEOUS at 08:29

## 2023-07-17 RX ADMIN — INSULIN GLARGINE 5 UNITS: 100 INJECTION, SOLUTION SUBCUTANEOUS at 22:02

## 2023-07-17 RX ADMIN — APIXABAN 5 MG: 5 TABLET, FILM COATED ORAL at 08:29

## 2023-07-17 RX ADMIN — SODIUM CHLORIDE 75 ML/HR: 0.9 INJECTION, SOLUTION INTRAVENOUS at 15:21

## 2023-07-17 RX ADMIN — ATORVASTATIN CALCIUM 10 MG: 10 TABLET, FILM COATED ORAL at 17:19

## 2023-07-17 RX ADMIN — CEFAZOLIN SODIUM 2000 MG: 2 SOLUTION INTRAVENOUS at 02:21

## 2023-07-17 RX ADMIN — INSULIN LISPRO 4 UNITS: 100 INJECTION, SOLUTION INTRAVENOUS; SUBCUTANEOUS at 22:05

## 2023-07-17 RX ADMIN — INSULIN LISPRO 5 UNITS: 100 INJECTION, SOLUTION INTRAVENOUS; SUBCUTANEOUS at 17:20

## 2023-07-17 NOTE — CASE MANAGEMENT
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   • Sepsis ([address]) 2023   • Liver abscess 2023   • Septic embolism ([address]) 2023   • Elevated troponin 2023   • Diabetes mellitus with hyperglycemia ([address]) 2023   • Acute renal failure (ARF) ([address]) 2023   • Hyponatremia 2023   • Type 2 diabetes mellitus without complication, without long-term current use of insulin ([address]) 2018   • Hyperlipidemia 2017   • Mild persistent asthma without complication       LOS (days): 5  Geometric Mean LOS (GMLOS) (days):   Days to GMLOS:     OBJECTIVE:  Risk of Unplanned Readmission Score: 17.84         Current admission status: Inpatient   Preferred Pharmacy:   [facility] [address] [place] [address]  Phone: [phone] Fax: [phone]    Primary Care Provider: ANGELIQUE German    Primary Insurance:   Secondary Insurance:     DISCHARGE DETAILS:     Patient informed CM that he is currently working on his insurance. CM asked patient what insurance company he has. Patient said [employer]. CM called insurance and was informed that patient no longer has coverage as of 2033. CM sent and email to financial counselors to see if patient qualifies for paths as well as indigent infusions at infusion center.       Insurance # is [id number]

## 2023-07-17 NOTE — ASSESSMENT & PLAN NOTE
Lab Results   Component Value Date    HGBA1C 12.0 (H) 07/05/2023       Recent Labs     07/16/23  1601 07/16/23  2100 07/17/23  0828 07/17/23  1139   POCGLU 113 222* 230* 195*       Blood Sugar Average: Last 72 hrs:  (P) 881.5868360010402636     -Diabetes is poorly controlled  -Continue with sliding scale, and basal and prandial insulin and carbohydrate-controlled diet  -Continue glucose monitoring

## 2023-07-17 NOTE — ASSESSMENT & PLAN NOTE
-Patient had recently left AGAINST MEDICAL ADVICE during treatment for pneumonia and was nonadherent to completion of outpatient levofloxacin  -As per infectious disease, patient is being treated with IV ceftriaxone  -Continue monitoring blood cultures, WBC count, and fevers

## 2023-07-17 NOTE — PROGRESS NOTES
[address]  Progress Note  Name: Nayely Chavis I  MRN: [id number]  Unit/Bed#: -01 I Date of Admission: 7/12/2023   Date of Service: 7/17/2023  Hospital Day: 5    Assessment/Plan   Anemia  Assessment & Plan  -Hemoglobin has been stable around 7.7-8  -Continue home cobalamin  -Continue to monitor CBC    Type 2 diabetes mellitus without complication, without long-term current use of insulin [facility])  Assessment & Plan  Lab Results   Component Value Date    HGBA1C 12.0 (H) 07/05/2023       Recent Labs     07/16/23  1601 07/16/23  2100 07/17/23  0828 07/17/23  1139   POCGLU 113 222* 230* 195*       Blood Sugar Average: Last 72 hrs:  (P) 215.9029257894871045     -Diabetes is poorly controlled  -Continue with sliding scale, and basal and prandial insulin and carbohydrate-controlled diet  -Continue glucose monitoring        Liver abscess  Assessment & Plan  -IR drain in place without fluid leaking. -CT shows no residual abscess. -Appointment with IR on 7/20/23 for drain check. Sepsis [facility])  Assessment & Plan  -Meeting criteria due to leukocytosis, tachycardia in the setting of worsening progression of pneumonia. Admitted previously with Klebsiella pneumonia and unfortunately left against medical advice without completing treatment. -CT shows evidence of septic pulmonary emboli  -1st set of blood cultures showing no growth at 4 days. 2nd set of blood cultures showing no growth at 48 hours.  -Patient still having low-grade fevers but improved from admission. -WBC count within normal range  -Continue to monitor fevers and WBC count.  -As per infectious disease, Ancef now discontinued and patient receiving IV ceftriaxone. Avoid PICC line placement for now due to compliance.     * Cavitary pneumonia  Assessment & Plan  -Patient had recently left AGAINST MEDICAL ADVICE during treatment for pneumonia and was nonadherent to completion of outpatient levofloxacin  -As per infectious disease, patient is being treated with IV ceftriaxone  -Continue monitoring blood cultures, WBC count, and fevers         I personally discussed the case with infectious disease. The patient will need intravenous antibiotics for 6 weeks. Case management to follow-up and make arrangements for the same    VTE Pharmacologic Prophylaxis:   VTE Score: 3 Moderate Risk (Score 3-4) - Pharmacological DVT Prophylaxis Ordered: Heparin Drip. Mechanical VTE Prophylaxis in Place: No    Patient Centered Rounds: I have performed bedside rounds with nursing staff today. Discussions with Specialists or Other Care Team Provider: Infectious disease    Education and Discussions with Family / Patient: Patient    Current Length of Stay: 5 day(s)    Current Patient Status: Inpatient     Discharge Plan / Estimated Discharge Date: When case management can figure out discharge plan for the patient as the patient will need long-term intravenous antibiotics    Code Status: Level 1 - Full Code      Subjective:   Patient is a 43year old male with history of asthma and type 2 diabetes being treated for sepsis secondary to cavitary pneumonia. Today, he states that he is feeling much better. He no longer has chest pain or shortness of breath. His cough and sputum have also improved. He has had some low-grade fevers over the weekend. Objective:     Vitals:   Temp (24hrs), Av.8 °F (37.7 °C), Min:98.4 °F (36.9 °C), Max:100.4 °F (38 °C)    Temp:  [98.4 °F (36.9 °C)-100.4 °F (38 °C)] 98.4 °F (36.9 °C)  HR:  [] 95  Resp:  [17] 17  BP: (125-135)/(69-74) 135/74  SpO2:  [99 %] 99 %  Body mass index is 27.36 kg/m². Input and Output Summary (last 24 hours): Intake/Output Summary (Last 24 hours) at 2023 1348  Last data filed at 2023 2230  Gross per 24 hour   Intake --   Output 0 ml   Net 0 ml       Physical Exam:     Physical Exam  Constitutional:       General: He is not in acute distress. Appearance: Normal appearance. HENT:      Head: Normocephalic and atraumatic. Eyes:      Conjunctiva/sclera: Conjunctivae normal.      Pupils: Pupils are equal, round, and reactive to light. Cardiovascular:      Rate and Rhythm: Normal rate and regular rhythm. Pulmonary:      Breath sounds: Rales present. Abdominal:      General: Bowel sounds are normal. There is no distension. Palpations: Abdomen is soft. Tenderness: There is no abdominal tenderness. Neurological:      Mental Status: He is alert and oriented to person, place, and time.       General exam- looks a little weak  HEENT - atraumatic and normocephalic  Neck- supple  Skin - no fresh rash  Extremities no fresh focal deformities  Cardiovascular- S1-S2 heard  Respiratory- bilateral air entry present, no crackles or rhonchi  Skin - no fresh rash  Abdomen - normal bowel sounds present, no rebound tenderness  CNS- No fresh focal deficits  Psych- no acute psychosis      Additional Data:     Labs:  Results from last 7 days   Lab Units 07/16/23  0626   WBC Thousand/uL 8.99   HEMOGLOBIN g/dL 7.7*   HEMATOCRIT % 24.8*   PLATELETS Thousands/uL 557*   NEUTROS PCT % 60   LYMPHS PCT % 27   MONOS PCT % 11   EOS PCT % 1     Results from last 7 days   Lab Units 07/16/23  0626 07/15/23  0446   SODIUM mmol/L 135 134*   POTASSIUM mmol/L 3.8 3.8   CHLORIDE mmol/L 103 102   CO2 mmol/L 26 26   BUN mg/dL 4* 5   CREATININE mg/dL 0.68 0.75   ANION GAP mmol/L 6 6   CALCIUM mg/dL 8.4 8.2*   ALBUMIN g/dL  --  2.8*   TOTAL BILIRUBIN mg/dL  --  0.50   ALK PHOS U/L  --  129*   ALT U/L  --  10   AST U/L  --  9*   GLUCOSE RANDOM mg/dL 188* 209*     Results from last 7 days   Lab Units 07/12/23  1616   INR  1.19     Results from last 7 days   Lab Units 07/17/23  1139 07/17/23  0828 07/16/23  2100 07/16/23  1601 07/16/23  1132 07/16/23  0758 07/15/23  2031 07/15/23  1609 07/15/23  1116 07/15/23  0712 07/14/23  2051 07/14/23  1631   POC GLUCOSE mg/dl 195* 230* 222* 113 257* 216* 169* 148* 270* 238* 285* 284*         Results from last 7 days   Lab Units 07/12/23  1616   LACTIC ACID mmol/L 1.1   PROCALCITONIN ng/ml 0.35*       Imaging: Reviewed radiology reports from this admission including: abdominal/pelvic CT scan    CT abdomen and pelvis with contrast-   "-Worsening right lower lobe pneumonia with some cavitary changes which may reflect an element of necrotizing pneumonia. In light of patchy nodular opacities in the left lung, septic emboli also a consideration as well as multilobar pneumonia. -Decreasing right hepatic lobe abscess status post IR drain placement. No new hepatic lesions.  -Stable appearance of thrombus within right hepatic vein branch."    Recent Cultures (last 7 days):     Results from last 7 days   Lab Units 07/14/23  2132 07/14/23  1741 07/12/23  1621 07/12/23  1616   BLOOD CULTURE  No Growth at 24 hrs. No Growth at 48 hrs. No Growth After 4 Days. No Growth After 4 Days.        Lines/Drains:  Invasive Devices     Peripheral Intravenous Line  Duration           Long-Dwell Peripheral IV (Midline) 07/14/23 Right Brachial 2 days          Drain  Duration           Abscess Drain Abdomen 18 days                Telemetry:        Last 24 Hours Medication List:   Current Facility-Administered Medications   Medication Dose Route Frequency Provider Last Rate   • acetaminophen  650 mg Oral Q8H PRN Krissy Pereira MD     • albuterol  2 puff Inhalation Q4H PRN Herminia Flowers PA-C     • aluminum-magnesium hydroxide-simethicone  30 mL Oral Q6H PRN Herminia Flowers PA-C     • apixaban  5 mg Oral BID Domingo Sierra MD     • atorvastatin  10 mg Oral Daily With Dinner Herminia Flowers PA-C     • benzonatate  100 mg Oral TID PRN Herminia Flowers PA-C     • cefTRIAXone  2,000 mg Intravenous Q24H Ivana Perkins MD 2,000 mg (07/17/23 1133)   • cyanocobalamin  100 mcg Oral Daily Herminia Flowers PA-C     • dextromethorphan-guaiFENesin  10 mL Oral Q4H PRN Krissy Pereira MD     • HYDROmorphone  0.5 mg Intravenous Q4H PRN Maggie Brewer MD     • HYDROmorphone  0.5 mg Intravenous Q3H PRN Maggie Brewer MD     • insulin glargine  5 Units Subcutaneous HS Maggie Brewer MD     • insulin lispro  2-12 Units Subcutaneous TID With Meals Maggie Brewer MD     • insulin lispro  2-12 Units Subcutaneous HS Maggie Brewer MD     • insulin lispro  5 Units Subcutaneous TID With Meals Maggie Brewer MD     • lidocaine  1 patch Topical Daily PRN Nalini Tucker PA-C     • montelukast  10 mg Oral HS Nalini Tucker PA-C     • oxyCODONE  5 mg Oral Q6H PRN Maggie Brewer MD     • sodium chloride  75 mL/hr Intravenous Continuous Nalini Tucker PA-C 75 mL/hr (07/17/23 0039)        Today, Patient Was Seen By: Trinity Martinez MD    ** Please Note: This note has been constructed using a voice recognition system.  **

## 2023-07-17 NOTE — ASSESSMENT & PLAN NOTE
-IR drain in place without fluid leaking. -CT shows no residual abscess. -Appointment with IR on 7/20/23 for drain check.

## 2023-07-17 NOTE — PROGRESS NOTES
Progress Note - Infectious Disease   Beatrice Mbow 43 y.o. male MRN: [id number]  Unit/Bed#: -01 Encounter: [id number]      Impression/Plan:  1.  Sepsis, POA.  Patient presented with tachycardia along with leukocytosis and intermittent tachypnea which is due to #2 and patient's noncompliance. Clinical parameters have since improved and repeat cultures without growth. Patient more understanding of the severity of his illness and with the help of family plans to be compliant with therapy. Antibiotics adjusted as below  Continue to trend fever curve/vitals  Repeat CBC/chemistry tomorrow  Follow-up pending blood cultures  Patient will need IR follow-up for #3  Counseled the patient and sister on need for compliance  We will place preliminary beacon orders  We will rearrange ID follow-up again closer to discharge  If patient does not have coverage for infusion center, will plan for oral therapy  Supportive care as per primary     2.  Septic pulmonary emboli with cavitation.  This was felt to be due to #3 and #4.  There is some slight progression on the imaging in terms of cavitation in size which I suspect are largely due to noncompliance.  Some changes may also be radiographically expected. Patient and his sister would prefer infusion center treatment, if possible  Transition to ceftriaxone 2 g every 24 hour  Hold off on PICC line until coverage confirmed.   We will place preliminary beacon orders  Plan is for initial 6 weeks of therapy through 8/22  We will plan for repeat CT chest/abdomen/pelvis with contrast in 4 weeks, 8/8  We will extend antibiotics further, based on imaging, if needed  Patient will need follow-up in the ID office 2 weeks after discharge  If infusion center is not covered, will plan for oral Levaquin course  We will notify ID office staff of follow-up needs, close to discharge  Continue to trend fever curve/vitals  Repeat CBC/chemistry tomorrow  Follow-up pending blood cultures  Patient will need GI evaluation for #3 as outpatient  Supportive care as per primary     3.  Liver abscess with adjacent thrombosis.  Was diagnosed with this on last admission in the setting of #4.  Suspect that this contributed to #2.  MRCP without other biliary abnormality.  Essentially an occult abscess.  Repeat imaging now showing resolution postdrainage. Recommend IR reevaluation of drain as outpatient  Antibiotics as above  Monitor abdominal exam  Will need GI evaluation as outpatient  Trend fever curve/WBC     4.  Recent Klebsiella bacteremia.  Course complicated by the above. Veronica Herndon will continue antibiotics as above and follow-up pending blood cultures     5.  Thrombocytosis.  Suspect that this was an inflammatory response due to the above.  We will repeat CBC tomorrow.  Continue antibiotics as above     6.  Poorly controlled diabetes.  Hemoglobin A1c of 12.0.  Likely risk factor for the above.  Glucose management as per primary     7.  Noncompliance.  Patient unfortunately has eloped from several ER visits and recent hospitalization. Unfortunately there was a lack of understanding/medical literacy of the true severity of illness. Patient and his sister plan to comply to therapy and are aware of prolonged therapy and follow-up. They are aware of no-show policies. Possible infusion center as above. Above plan discussed in detail with the patient, his sister and primary service attending    ID consult service will continue to follow.     Antibiotics:  Ceftriaxone 1  Total antibiotics 6    24 Hour events:  Yesterday and overnight notes reviewed and no acute events noted    Subjective:  Patient overall feeling well and he denies having any nausea, vomiting, chest pain or shortness of breath. Had a detailed discussion with both him and his sister present about various antibiotic options available.   We reviewed oral kathi quinolones and side effects including tendinopathy, neuropathy which may be irreversible, arrhythmias and reviewed his recent EKG. We discussed administration as well. We then also discussed limitations of home infusion with PICC line and also the option of the infusion center. Patient is most interested in the infusion center as an option. His sister states that she can be present at least for the next 2 weeks until he is settled and is consistent with his follow-up and treatments. The patient states he does fully understand that he is significantly ill and needs to maintain on antibiotics. He did not understand the severity of his illness prior to. We discussed the 3 no-show policy at the infusion center. At this point they would like to trial to see if the infusion center is an option and then if not are looking at oral therapy with fluoroquinolone. Additional questions answered. Patient still has with IR drain in place and he was told that he will have reevaluation as outpatient. Objective:  Vitals:  Temp:  [98.4 °F (36.9 °C)-100.4 °F (38 °C)] 98.4 °F (36.9 °C)  HR:  [] 95  Resp:  [17] 17  BP: (125-135)/(69-74) 135/74  SpO2:  [99 %] 99 %  Temp (24hrs), Av.8 °F (37.7 °C), Min:98.4 °F (36.9 °C), Max:100.4 °F (38 °C)  Current: Temperature: 98.4 °F (36.9 °C)    Physical Exam:   General Appearance:  Alert, interactive, nontoxic, no acute distress. Throat: Oropharynx moist without lesions. Lungs:   Clear to auscultation bilaterally; no wheezes, rhonchi or rales; respirations unlabored on room air   Heart:  RRR; no murmur, rub or gallop noted   Abdomen:   Soft, non-tender, non-distended, positive bowel sounds. IR drain remains in place. Extremities: No clubbing, cyanosis or edema   Skin: No new rashes or lesions. No new draining wounds noted. Labs, Imaging, & Other studies:   All pertinent labs and imaging studies in PACS were personally reviewed as below.   Results from last 7 days   Lab Units 23  0626 07/15/23  0446 23  0234   WBC Thousand/uL 8.99 12.53* 16.79*   HEMOGLOBIN g/dL 7.7* 7.7* 8.0*   PLATELETS Thousands/uL 557* 564* 581*     Results from last 7 days   Lab Units 07/16/23  0626 07/15/23  0446 07/14/23  0234 07/13/23  0423 07/12/23  1616   POTASSIUM mmol/L 3.8 3.8 3.8   < > 4.4   CHLORIDE mmol/L 103 102 100   < > 94*   CO2 mmol/L 26 26 21   < > 21   BUN mg/dL 4* 5 6   < > 12   CREATININE mg/dL 0.68 0.75 0.81   < > 0.86   EGFR ml/min/1.73sq m 117 113 109   < > 106   CALCIUM mg/dL 8.4 8.2* 8.2*   < > 9.7   AST U/L  --  9* 9*  --  16   ALT U/L  --  10 13  --  20   ALK PHOS U/L  --  129* 116*  --  152*    < > = values in this interval not displayed. Results from last 7 days   Lab Units 07/14/23  2132 07/14/23  1741 07/12/23  1621 07/12/23  1616   BLOOD CULTURE  No Growth at 24 hrs. No Growth at 48 hrs. No Growth After 4 Days. No Growth After 4 Days. Lab interpretation/comments: White blood cell count has now normalized. Platelets continue to downtrend. LFTs unremarkable recently. Imaging interpretation/comments: No new images overnight    Culture data: Multiple repeat blood cultures remain without growth.

## 2023-07-17 NOTE — ASSESSMENT & PLAN NOTE
-Meeting criteria due to leukocytosis, tachycardia in the setting of worsening progression of pneumonia. Admitted previously with Klebsiella pneumonia and unfortunately left against medical advice without completing treatment. -CT shows evidence of septic pulmonary emboli  -1st set of blood cultures showing no growth at 4 days. 2nd set of blood cultures showing no growth at 48 hours.  -Patient still having low-grade fevers but improved from admission. -WBC count within normal range  -Continue to monitor fevers and WBC count.  -As per infectious disease, Ancef now discontinued and patient receiving IV ceftriaxone. Avoid PICC line placement for now due to compliance.

## 2023-07-18 LAB
ALBUMIN SERPL BCP-MCNC: 2.8 G/DL (ref 3.5–5)
ALP SERPL-CCNC: 104 U/L (ref 34–104)
ALT SERPL W P-5'-P-CCNC: 11 U/L (ref 7–52)
ANION GAP SERPL CALCULATED.3IONS-SCNC: 5 MMOL/L
AST SERPL W P-5'-P-CCNC: 14 U/L (ref 13–39)
BILIRUB SERPL-MCNC: 0.33 MG/DL (ref 0.2–1)
BUN SERPL-MCNC: 6 MG/DL (ref 5–25)
CALCIUM ALBUM COR SERPL-MCNC: 9.8 MG/DL (ref 8.3–10.1)
CALCIUM SERPL-MCNC: 8.8 MG/DL (ref 8.4–10.2)
CHLORIDE SERPL-SCNC: 106 MMOL/L (ref 96–108)
CO2 SERPL-SCNC: 28 MMOL/L (ref 21–32)
CREAT SERPL-MCNC: 0.75 MG/DL (ref 0.6–1.3)
ERYTHROCYTE [DISTWIDTH] IN BLOOD BY AUTOMATED COUNT: 15.9 % (ref 11.6–15.1)
GFR SERPL CREATININE-BSD FRML MDRD: 113 ML/MIN/1.73SQ M
GLUCOSE SERPL-MCNC: 170 MG/DL (ref 65–140)
GLUCOSE SERPL-MCNC: 189 MG/DL (ref 65–140)
GLUCOSE SERPL-MCNC: 195 MG/DL (ref 65–140)
GLUCOSE SERPL-MCNC: 243 MG/DL (ref 65–140)
GLUCOSE SERPL-MCNC: 275 MG/DL (ref 65–140)
HCT VFR BLD AUTO: 24.3 % (ref 36.5–49.3)
HGB BLD-MCNC: 7.5 G/DL (ref 12–17)
MCH RBC QN AUTO: 26.5 PG (ref 26.8–34.3)
MCHC RBC AUTO-ENTMCNC: 30.9 G/DL (ref 31.4–37.4)
MCV RBC AUTO: 86 FL (ref 82–98)
PLATELET # BLD AUTO: 620 THOUSANDS/UL (ref 149–390)
PMV BLD AUTO: 9.3 FL (ref 8.9–12.7)
POTASSIUM SERPL-SCNC: 4.1 MMOL/L (ref 3.5–5.3)
PROT SERPL-MCNC: 7.2 G/DL (ref 6.4–8.4)
RBC # BLD AUTO: 2.83 MILLION/UL (ref 3.88–5.62)
SODIUM SERPL-SCNC: 139 MMOL/L (ref 135–147)
WBC # BLD AUTO: 8.43 THOUSAND/UL (ref 4.31–10.16)

## 2023-07-18 PROCEDURE — 80053 COMPREHEN METABOLIC PANEL: CPT | Performed by: INTERNAL MEDICINE

## 2023-07-18 PROCEDURE — 85007 BL SMEAR W/DIFF WBC COUNT: CPT | Performed by: INTERNAL MEDICINE

## 2023-07-18 PROCEDURE — 85027 COMPLETE CBC AUTOMATED: CPT | Performed by: INTERNAL MEDICINE

## 2023-07-18 PROCEDURE — 99232 SBSQ HOSP IP/OBS MODERATE 35: CPT | Performed by: INTERNAL MEDICINE

## 2023-07-18 PROCEDURE — 82948 REAGENT STRIP/BLOOD GLUCOSE: CPT

## 2023-07-18 RX ADMIN — MONTELUKAST 10 MG: 10 TABLET, FILM COATED ORAL at 21:53

## 2023-07-18 RX ADMIN — APIXABAN 5 MG: 5 TABLET, FILM COATED ORAL at 09:08

## 2023-07-18 RX ADMIN — APIXABAN 5 MG: 5 TABLET, FILM COATED ORAL at 17:25

## 2023-07-18 RX ADMIN — SODIUM CHLORIDE 75 ML/HR: 0.9 INJECTION, SOLUTION INTRAVENOUS at 18:42

## 2023-07-18 RX ADMIN — SODIUM CHLORIDE 75 ML/HR: 0.9 INJECTION, SOLUTION INTRAVENOUS at 02:24

## 2023-07-18 RX ADMIN — INSULIN LISPRO 5 UNITS: 100 INJECTION, SOLUTION INTRAVENOUS; SUBCUTANEOUS at 12:03

## 2023-07-18 RX ADMIN — INSULIN LISPRO 5 UNITS: 100 INJECTION, SOLUTION INTRAVENOUS; SUBCUTANEOUS at 09:08

## 2023-07-18 RX ADMIN — ATORVASTATIN CALCIUM 10 MG: 10 TABLET, FILM COATED ORAL at 17:25

## 2023-07-18 RX ADMIN — INSULIN LISPRO 6 UNITS: 100 INJECTION, SOLUTION INTRAVENOUS; SUBCUTANEOUS at 21:53

## 2023-07-18 RX ADMIN — VITAM B12 100 MCG: 100 TAB at 09:08

## 2023-07-18 RX ADMIN — CEFTRIAXONE 2000 MG: 2 INJECTION, SOLUTION INTRAVENOUS at 11:01

## 2023-07-18 RX ADMIN — INSULIN LISPRO 5 UNITS: 100 INJECTION, SOLUTION INTRAVENOUS; SUBCUTANEOUS at 17:25

## 2023-07-18 RX ADMIN — INSULIN LISPRO 2 UNITS: 100 INJECTION, SOLUTION INTRAVENOUS; SUBCUTANEOUS at 09:08

## 2023-07-18 RX ADMIN — INSULIN GLARGINE 5 UNITS: 100 INJECTION, SOLUTION SUBCUTANEOUS at 21:53

## 2023-07-18 RX ADMIN — INSULIN LISPRO 4 UNITS: 100 INJECTION, SOLUTION INTRAVENOUS; SUBCUTANEOUS at 12:03

## 2023-07-18 RX ADMIN — INSULIN LISPRO 2 UNITS: 100 INJECTION, SOLUTION INTRAVENOUS; SUBCUTANEOUS at 17:26

## 2023-07-18 NOTE — CASE MANAGEMENT
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   • Sepsis ([address]) 2023   • Liver abscess 2023   • Septic embolism ([address]) 2023   • Elevated troponin 2023   • Diabetes mellitus with hyperglycemia ([address]) 2023   • Acute renal failure (ARF) ([address]) 2023   • Hyponatremia 2023   • Type 2 diabetes mellitus without complication, without long-term current use of insulin ([address]) 2018   • Hyperlipidemia 2017   • Mild persistent asthma without complication       LOS (days): 6  Geometric Mean LOS (GMLOS) (days):   Days to GMLOS:     OBJECTIVE:  Risk of Unplanned Readmission Score: 18.05         Current admission status: Inpatient   Preferred Pharmacy:   [facility] [address]OURGEO[address] [place] [address]  Phone: [phone] Fax: [phone]    Primary Care Provider: ANGELIQUE Serna    Primary Insurance:   Secondary Insurance:     DISCHARGE DETAILS:    Other Referral/Resources/Interventions Provided:  Financial Resources Provided: Financial Counselor (CHELY sent an email to the hospital financial counselor group asking if patient would qualify for indigent infusions from the infusion center. Response pending.)  Interventions:  Other (Specify)  Referral Comments: CHELY called [facility] at [phone] and spoke to the  Juan Malhotra who reported that she does not have any access to the financial piece of the infusions, and this information would have to come from the financial counselor group. However, patients that come in as self-pay typically get mailed a bill.

## 2023-07-18 NOTE — PLAN OF CARE
Problem: PAIN - ADULT  Goal: Verbalizes/displays adequate comfort level or baseline comfort level  Description: Interventions:  - Encourage patient to monitor pain and request assistance  - Assess pain using appropriate pain scale  - Administer analgesics based on type and severity of pain and evaluate response  - Implement non-pharmacological measures as appropriate and evaluate response  - Consider cultural and social influences on pain and pain management  - Notify physician/advanced practitioner if interventions unsuccessful or patient reports new pain  Outcome: Progressing     Problem: INFECTION - ADULT  Goal: Absence or prevention of progression during hospitalization  Description: INTERVENTIONS:  - Assess and monitor for signs and symptoms of infection  - Monitor lab/diagnostic results  - Monitor all insertion sites, i.e. indwelling lines, tubes, and drains  - Monitor endotracheal if appropriate and nasal secretions for changes in amount and color  - [place] appropriate cooling/warming therapies per order  - Administer medications as ordered  - Instruct and encourage patient and family to use good hand hygiene technique  - Identify and instruct in appropriate isolation precautions for identified infection/condition  Outcome: Progressing

## 2023-07-18 NOTE — PROGRESS NOTES
Progress Note - Infectious Disease   Beatrice Mbow 43 y.o. male MRN: [id number]  Unit/Bed#: -01 Encounter: [id number]      Impression/Plan:  1.  Sepsis, POA.  Patient presented with tachycardia along with leukocytosis and intermittent tachypnea which is due to #2 and patient's noncompliance. Clinical parameters have since improved and repeat cultures without growth. Patient more understanding of the severity of his illness and with the help of family plans to be compliant with therapy. Antibiotics as below  Continue to trend fever curve/vitals  Repeat CBC/chemistry tomorrow  Follow-up pending blood cultures  Patient will need IR follow-up for #3  Counseled the patient and sister on need for compliance  We will rearrange ID follow-up again closer to discharge  If patient does not have coverage for infusion center, will plan for oral therapy  Supportive care as per primary     2.  Septic pulmonary emboli with cavitation.  This was felt to be due to #3 and #4.  There is some slight progression on the imaging in terms of cavitation in size which I suspect are largely due to noncompliance.  Some changes may also be radiographically expected. Patient and his sister would prefer infusion center treatment, if possible  Continue ceftriaxone 2 g every 24 hour  Hold off on PICC line until coverage confirmed. Preliminary beacon orders placed.   Plan is for initial 6 weeks of therapy through 8/22  We will plan for repeat CT chest/abdomen/pelvis with contrast in 4 weeks, 8/8  We will extend antibiotics further, based on imaging, if needed  Patient will need follow-up in the ID office 2 weeks after discharge  If infusion center is not covered, will plan for oral Levaquin course  We will notify ID office staff of follow-up needs, close to discharge  Continue to trend fever curve/vitals  Repeat CBC/chemistry tomorrow  Follow-up pending blood cultures  Patient will need GI evaluation for #3 as outpatient  Supportive care as per primary     3.  Liver abscess with adjacent thrombosis.  Was diagnosed with this on last admission in the setting of #4.  Suspect that this contributed to #2.  MRCP without other biliary abnormality.  Essentially an occult abscess.  Repeat imaging now showing resolution postdrainage. Recommend IR reevaluation of drain as outpatient  Antibiotics as above  Monitor abdominal exam  Will need GI evaluation as outpatient  Trend fever curve/WBC     4.  Recent Klebsiella bacteremia.  Course complicated by the above. Andrew Rios will continue antibiotics as above and follow-up pending blood cultures     5.  Thrombocytosis.  Suspect that this was an inflammatory response due to the above.  We will repeat CBC tomorrow.  Continue antibiotics as above     6.  Poorly controlled diabetes.  Hemoglobin A1c of 12.0.  Likely risk factor for the above.  Glucose management as per primary     7.  Noncompliance.  Patient unfortunately has eloped from several ER visits and recent hospitalization. Unfortunately there was a lack of understanding/medical literacy of the true severity of illness. Patient and his sister plan to comply to therapy and are aware of prolonged therapy and follow-up. They are aware of no-show policies. Possible infusion center as above.     Above plan discussed in detail with the patient, his sister and case management and primary service.     ID consult service will continue to follow.     Antibiotics:  Ceftriaxone 2  Total antibiotics 7    24 Hour events:  Yesterday and overnight notes reviewed and no acute events noted    Subjective:  Patient currently denies having any nausea, vomiting, chest pain or shortness of breath. Unfortunately confirmed with case management and patient does not have insurance. There are more complicated issues also involving now his identity. Uncertain at this time plans for outpatient regimen but also patient's inability to follow-up and have follow-up imaging.   Discussed in detail with both the patient and his sister. Discussed in detail with case management and primary. Objective:  Vitals:  Temp:  [98.3 °F (36.8 °C)-99.3 °F (37.4 °C)] 98.3 °F (36.8 °C)  HR:  [87-95] 87  Resp:  [16-18] 16  BP: (130-135)/(55-77) 135/76  SpO2:  [98 %-100 %] 98 %  Temp (24hrs), Av.7 °F (37.1 °C), Min:98.3 °F (36.8 °C), Max:99.3 °F (37.4 °C)  Current: Temperature: 98.3 °F (36.8 °C)    Physical Exam:   General Appearance:  Alert, interactive, nontoxic, no acute distress. Throat: Oropharynx moist without lesions. Lungs:   Clear to auscultation bilaterally; no wheezes, rhonchi or rales; respirations unlabored on room air   Heart:  RRR; no murmur, rub or gallop noted   Abdomen:   Soft, non-tender, non-distended, positive bowel sounds. Extremities: No clubbing, cyanosis or edema   Skin: No new rashes or lesions. No new draining wounds noted. Labs, Imaging, & Other studies:   All pertinent labs and imaging studies in PACS were personally reviewed as below. Results from last 7 days   Lab Units 23  0626 23  0626 07/15/23  0446   WBC Thousand/uL 8.43 8.99 12.53*   HEMOGLOBIN g/dL 7.5* 7.7* 7.7*   PLATELETS Thousands/uL 620* 557* 564*     Results from last 7 days   Lab Units 23  0626 23  0626 07/15/23  0446 23  0234   POTASSIUM mmol/L 4.1   < > 3.8 3.8   CHLORIDE mmol/L 106   < > 102 100   CO2 mmol/L 28   < > 26 21   BUN mg/dL 6   < > 5 6   CREATININE mg/dL 0.75   < > 0.75 0.81   EGFR ml/min/1.73sq m 113   < > 113 109   CALCIUM mg/dL 8.8   < > 8.2* 8.2*   AST U/L 14  --  9* 9*   ALT U/L 11  --  10 13   ALK PHOS U/L 104  --  129* 116*    < > = values in this interval not displayed. Results from last 7 days   Lab Units 23  2132 23  1741 23  1621 23  1616   BLOOD CULTURE  No Growth at 48 hrs. No Growth at 72 hrs. No Growth After 5 Days. No Growth After 5 Days. Lab interpretation/comments: Platelets noted to be 620. No leukocytosis. Chemistry unremarkable    Imaging interpretation/comments: No new imaging    Culture data: Blood cultures remain without growth

## 2023-07-18 NOTE — CASE MANAGEMENT
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   • Sepsis ([address]) 2023   • Liver abscess 2023   • Septic embolism ([address]) 2023   • Elevated troponin 2023   • Diabetes mellitus with hyperglycemia ([address]) 2023   • Acute renal failure (ARF) ([address]) 2023   • Hyponatremia 2023   • Type 2 diabetes mellitus without complication, without long-term current use of insulin ([address]) 2018   • Hyperlipidemia 2017   • Mild persistent asthma without complication       LOS (days): 6  Geometric Mean LOS (GMLOS) (days):   Days to GMLOS:     OBJECTIVE:  Risk of Unplanned Readmission Score: 18.09         Current admission status: Inpatient   Preferred Pharmacy:   [facility] [address] [place] [address]  Phone: [phone] Fax: [phone]    Primary Care Provider: ANGELIQUE Collazo    Primary Insurance: JUDI GIBBS PENDING  Secondary Insurance:     DISCHARGE DETAILS:   CM was informed by patient that patients  is Ms Candelaria Gutierres, [phone]. CM contacted  and was informed that patient needs to provide multiple pay stubs. CM informed patient and offered to email pay stubs for patient.  informed CM that when social security number is looked up, it is showing that patient works for a company in [employer].   stated that he needs to contact facility and inform them of situation and that it is not patient that is working at facility. CHELY googled facility name that was given to CM and information is provided below. CHELY was able to get HR person information from a different [facility]. CM provided this information to patient and informed patient to get the assistance he needs, he has to contact this HR person and inform them of what is going on. CM provided email address of   to patient as well as informing patient that there is only 28 days left to get assistance  stated.        Ms Evelin Deboarh email: [email or web address]    [facility]  [address] [place], [address]  [phone],     HRCleon Child: [facility]

## 2023-07-18 NOTE — PROGRESS NOTES
[address]  Progress Note  Name: Noah Clark I  MRN: [id number]  Unit/Bed#: -01 I Date of Admission: 7/12/2023   Date of Service: 7/18/2023 I Hospital Day: 6    Assessment/Plan   * Cavitary pneumonia  Assessment & Plan  · Patient had recently left AGAINST MEDICAL ADVICE and nonadherent to completion of levofloxacin   · Infectious disease consulted recommending intravenous ceftriaxone 2 g IV till August 22, 2023    History of noncompliance with medical treatment  Assessment & Plan  · Eloped twice during hospitalization 6/26 and 7/4 and has eloped 4 times since then from this campus's ED as well as [facility]  · Educated on risks of leaving AMA again and patient specifically asked what happened if he does not get treatment for worsening pneumonia and reviewed risks up to and including death and significance of continuing treatment with antibiotics and blood thinner which he agrees he will at this time  · Of note patient without insurance during last hospitalization, now still apparently having issues.   Patient tells me that he is working with  and [place] for health insurance through [address] [place] [address]  · Stable, likely multifactorial-hemoglobin around 7.5; slightly decreasing likely secondary to chronic disease  · Monitor hemoglobin periodically and transfuse if less than 7    Type 2 diabetes mellitus without complication, without long-term current use of insulin [facility])  Assessment & Plan  Lab Results   Component Value Date    HGBA1C 12.0 (H) 07/05/2023       Recent Labs     07/15/23  1116 07/15/23  1609 07/15/23  2031 07/16/23  0758   POCGLU 270* 148* 169* 216*       Blood Sugar Average: Last 72 hrs:  · (P) 239.375   · Poorly controlled diabetes, continue sliding scale, and basal and prandial insulin  · Most likely control is challenging due to continued infection  · Carb controlled diet advised    Hyponatremia  Assessment & Plan  Sodium improved and stable at 139    Septic embolism [facility])  Assessment & Plan  · Felt to be secondary to nonadherence to antibiotic therapy   · CT negative for PE, Continue Eliquis    Liver abscess  Assessment & Plan  · Status post IR drain placement with next check on July 20 th 2023     Sepsis [facility])  Assessment & Plan  · Meeting criteria due to leukocytosis, tachycardia in the setting of worsening progression of pneumonia. Admitted previously with Klebsiella pneumonia and unfortunately left without completing treatment. · Improved  · Needs long-term antibiotics for the cavitary pneumonia           TE Pharmacologic Prophylaxis: Apixaban (Eliquis)    Patient Centered Rounds: I have performed bedside rounds with nursing staff today. Discussions with Specialists or Other Care Team Provider: Infectious disease  Education and Discussions with Family / Patient: Patient    Current Length of Stay: 6 day(s)  Current Patient Status: Inpatient     Certification Statement: The patient will continue to require additional inpatient hospital stay due to Cavitary pneumonia  Discharge Plan / Estimated Discharge Date: Whenever appropriate treatment plan can be found    Code Status: Level 1 - Full Code  ______________________________________________________________________________    Subjective:   Patient seen and examined by me. No chest pain, palpitations or diaphoresis. Patient does have weakness. Patient states that he does not have any fevers or chills right now. Denies any cough    Objective:   Vitals: Blood pressure 135/76, pulse 87, temperature 98.3 °F (36.8 °C), resp. rate 16, height 5' 5" (1.651 m), weight 74.2 kg (163 lb 9.3 oz), SpO2 98 %.     Physical Exam:   General appearance: alert, appears stated age and cooperative  Constitutional- looks a little weak  HEENT - atraumatic and normocephalic  Neck- supple  Skin - no fresh rash  Extremities no fresh focal deformities  Cardiovascular- S1-S2 heard  Respiratory- bilateral air entry present, no crackles or rhonchi. However does have some bronchial breath sounds on the right lung.   Skin - no fresh rash  Abdomen - normal bowel sounds present, no rebound tenderness  CNS- No fresh focal deficits  Psych- no acute psychosis     Additional Data:   Labs:  Results from last 7 days   Lab Units 07/18/23  0626 07/16/23  0626 07/15/23  0446 07/14/23 0234 07/13/23 0423 07/12/23  1616   WBC Thousand/uL 8.43 8.99 12.53* 16.79* 14.05* 15.17*   HEMOGLOBIN g/dL 7.5* 7.7* 7.7* 8.0* 8.0* 9.6*   HEMATOCRIT % 24.3* 24.8* 24.1* 25.0* 24.6* 30.0*   MCV fL 86 85 85 85 84 85   PLATELETS Thousands/uL 620* 557* 564* 581* 615* 731*   INR   --   --   --   --   --  1.19     Results from last 7 days   Lab Units 07/18/23  0626 07/16/23  0626 07/15/23  0446 07/14/23 0234 07/13/23 0423 07/12/23  1616   SODIUM mmol/L 139 135 134* 131* 131* 128*   POTASSIUM mmol/L 4.1 3.8 3.8 3.8 4.0 4.4   CHLORIDE mmol/L 106 103 102 100 102 94*   CO2 mmol/L 28 26 26 21 19* 21   ANION GAP mmol/L 5 6 6 10 10 13   BUN mg/dL 6 4* 5 6 7 12   CREATININE mg/dL 0.75 0.68 0.75 0.81 0.75 0.86   CALCIUM mg/dL 8.8 8.4 8.2* 8.2* 8.1* 9.7   ALBUMIN g/dL 2.8*  --  2.8* 2.9*  --  3.6   TOTAL BILIRUBIN mg/dL 0.33  --  0.50 0.70  --  0.67   ALK PHOS U/L 104  --  129* 116*  --  152*   ALT U/L 11  --  10 13  --  20   AST U/L 14  --  9* 9*  --  16   EGFR ml/min/1.73sq m 113 117 113 109 113 106   GLUCOSE RANDOM mg/dL 170* 188* 209* 205* 289* 283*                  Results from last 7 days   Lab Units 07/12/23  1616   LACTIC ACID mmol/L 1.1   PROCALCITONIN ng/ml 0.35*     Results from last 7 days   Lab Units 07/18/23  0755 07/17/23  2054 07/17/23  1603 07/17/23  1139 07/17/23  0828 07/16/23  2100 07/16/23  1601 07/16/23  1132 07/16/23  0758 07/15/23  2031 07/15/23  1609 07/15/23  1116   POC GLUCOSE mg/dl 189* 203* 197* 195* 230* 222* 113 257* 216* 169* 148* 270*             * I Have Reviewed All Lab Data Listed Above.    Cultures:   Results from last 7 days   Lab Units 07/14/23  2132 07/14/23  1741 07/12/23  2141 07/12/23  1621 07/12/23  1616   BLOOD CULTURE  No Growth at 48 hrs. No Growth at 72 hrs.  --  No Growth After 5 Days. No Growth After 5 Days. INFLUENZA A PCR   --   --  Negative  --   --      Results from last 7 days   Lab Units 07/12/23  2141   INFLUENZA A PCR  Negative   INFLUENZA B PCR  Negative   RSV PCR  Negative         Imaging:  Imaging Reports Reviewed Today Include:   PE Study with CT abdomen & pelvis with contrast    Result Date: 7/12/2023  Impression: No pulmonary embolism. Progression of multi lobar consolidation in the right lung with both increase in size and cavitation and multiple other scattered septic emboli. Reactive mediastinal and hilar nodes. No residual abscess in the liver status post drainage catheter placement.  Workstation performed: [id number]     Scheduled Meds:  Current Facility-Administered Medications   Medication Dose Route Frequency Provider Last Rate   • acetaminophen  650 mg Oral Q8H PRN Patience Tobin MD     • albuterol  2 puff Inhalation Q4H PRN Bria Sloan PA-C     • aluminum-magnesium hydroxide-simethicone  30 mL Oral Q6H PRN Bria Sloan PA-C     • apixaban  5 mg Oral BID Alonso Rodas MD     • atorvastatin  10 mg Oral Daily With Dinner Bria Sloan PA-C     • benzonatate  100 mg Oral TID PRN Bria Sloan PA-C     • cefTRIAXone  2,000 mg Intravenous Q24H Grace Hawkins MD 2,000 mg (07/17/23 1133)   • cyanocobalamin  100 mcg Oral Daily Bria Sloan PA-C     • dextromethorphan-guaiFENesin  10 mL Oral Q4H PRN Patience Tobin MD     • HYDROmorphone  0.5 mg Intravenous Q4H PRN Patience Tobin MD     • HYDROmorphone  0.5 mg Intravenous Q3H PRN Patience Tobin MD     • insulin glargine  5 Units Subcutaneous HS Patience Tobin MD     • insulin lispro  2-12 Units Subcutaneous TID With Meals Patience Tobin MD     • insulin lispro  2-12 Units Subcutaneous HS Michlele Fuentes MD     • insulin lispro  5 Units Subcutaneous TID With Meals Michelle Fuentes MD     • lidocaine  1 patch Topical Daily PRN Becky Lambert PA-C     • montelukast  10 mg Oral HS Becky Lambert PA-C     • oxyCODONE  5 mg Oral Q6H PRN Michelle Fuentes MD     • sodium chloride  75 mL/hr Intravenous Continuous Becky Lambert PA-C 75 mL/hr (07/18/23 0224)       Jose Mejia MD  [facility] Internal Medicine    ** Please Note: This note has been constructed using a voice recognition system.  **

## 2023-07-19 LAB
ALBUMIN SERPL BCP-MCNC: 2.9 G/DL (ref 3.5–5)
ALP SERPL-CCNC: 100 U/L (ref 34–104)
ALT SERPL W P-5'-P-CCNC: 12 U/L (ref 7–52)
ANION GAP SERPL CALCULATED.3IONS-SCNC: 6 MMOL/L
AST SERPL W P-5'-P-CCNC: 13 U/L (ref 13–39)
BASOPHILS # BLD AUTO: 0.09 THOUSANDS/ÂΜL (ref 0–0.1)
BASOPHILS NFR BLD AUTO: 1 % (ref 0–1)
BILIRUB SERPL-MCNC: 0.31 MG/DL (ref 0.2–1)
BUN SERPL-MCNC: 7 MG/DL (ref 5–25)
CALCIUM ALBUM COR SERPL-MCNC: 9.9 MG/DL (ref 8.3–10.1)
CALCIUM SERPL-MCNC: 9 MG/DL (ref 8.4–10.2)
CHLORIDE SERPL-SCNC: 104 MMOL/L (ref 96–108)
CO2 SERPL-SCNC: 27 MMOL/L (ref 21–32)
CREAT SERPL-MCNC: 0.73 MG/DL (ref 0.6–1.3)
EOSINOPHIL # BLD AUTO: 0.26 THOUSAND/ÂΜL (ref 0–0.61)
EOSINOPHIL NFR BLD AUTO: 3 % (ref 0–6)
ERYTHROCYTE [DISTWIDTH] IN BLOOD BY AUTOMATED COUNT: 16.4 % (ref 11.6–15.1)
GFR SERPL CREATININE-BSD FRML MDRD: 114 ML/MIN/1.73SQ M
GLUCOSE SERPL-MCNC: 117 MG/DL (ref 65–140)
GLUCOSE SERPL-MCNC: 187 MG/DL (ref 65–140)
GLUCOSE SERPL-MCNC: 190 MG/DL (ref 65–140)
GLUCOSE SERPL-MCNC: 209 MG/DL (ref 65–140)
GLUCOSE SERPL-MCNC: 274 MG/DL (ref 65–140)
HCT VFR BLD AUTO: 26.9 % (ref 36.5–49.3)
HGB BLD-MCNC: 8.3 G/DL (ref 12–17)
IMM GRANULOCYTES # BLD AUTO: 0.07 THOUSAND/UL (ref 0–0.2)
IMM GRANULOCYTES NFR BLD AUTO: 1 % (ref 0–2)
LYMPHOCYTES # BLD AUTO: 2.5 THOUSANDS/ÂΜL (ref 0.6–4.47)
LYMPHOCYTES NFR BLD AUTO: 31 % (ref 14–44)
MCH RBC QN AUTO: 26.5 PG (ref 26.8–34.3)
MCHC RBC AUTO-ENTMCNC: 30.9 G/DL (ref 31.4–37.4)
MCV RBC AUTO: 86 FL (ref 82–98)
MONOCYTES # BLD AUTO: 0.83 THOUSAND/ÂΜL (ref 0.17–1.22)
MONOCYTES NFR BLD AUTO: 10 % (ref 4–12)
NEUTROPHILS # BLD AUTO: 4.39 THOUSANDS/ÂΜL (ref 1.85–7.62)
NEUTS SEG NFR BLD AUTO: 54 % (ref 43–75)
NRBC BLD AUTO-RTO: 0 /100 WBCS
PLATELET # BLD AUTO: 578 THOUSANDS/UL (ref 149–390)
PMV BLD AUTO: 9.2 FL (ref 8.9–12.7)
POTASSIUM SERPL-SCNC: 4 MMOL/L (ref 3.5–5.3)
PROT SERPL-MCNC: 7.4 G/DL (ref 6.4–8.4)
RBC # BLD AUTO: 3.13 MILLION/UL (ref 3.88–5.62)
SODIUM SERPL-SCNC: 137 MMOL/L (ref 135–147)
WBC # BLD AUTO: 8.14 THOUSAND/UL (ref 4.31–10.16)

## 2023-07-19 PROCEDURE — 85025 COMPLETE CBC W/AUTO DIFF WBC: CPT | Performed by: INTERNAL MEDICINE

## 2023-07-19 PROCEDURE — 99232 SBSQ HOSP IP/OBS MODERATE 35: CPT | Performed by: INTERNAL MEDICINE

## 2023-07-19 PROCEDURE — 80053 COMPREHEN METABOLIC PANEL: CPT | Performed by: INTERNAL MEDICINE

## 2023-07-19 PROCEDURE — 82948 REAGENT STRIP/BLOOD GLUCOSE: CPT

## 2023-07-19 RX ADMIN — SODIUM CHLORIDE 75 ML/HR: 0.9 INJECTION, SOLUTION INTRAVENOUS at 05:39

## 2023-07-19 RX ADMIN — INSULIN LISPRO 2 UNITS: 100 INJECTION, SOLUTION INTRAVENOUS; SUBCUTANEOUS at 08:29

## 2023-07-19 RX ADMIN — INSULIN LISPRO 5 UNITS: 100 INJECTION, SOLUTION INTRAVENOUS; SUBCUTANEOUS at 12:02

## 2023-07-19 RX ADMIN — CEFTRIAXONE 2000 MG: 2 INJECTION, SOLUTION INTRAVENOUS at 10:07

## 2023-07-19 RX ADMIN — INSULIN LISPRO 5 UNITS: 100 INJECTION, SOLUTION INTRAVENOUS; SUBCUTANEOUS at 18:01

## 2023-07-19 RX ADMIN — APIXABAN 5 MG: 5 TABLET, FILM COATED ORAL at 08:28

## 2023-07-19 RX ADMIN — VITAM B12 100 MCG: 100 TAB at 08:28

## 2023-07-19 RX ADMIN — APIXABAN 5 MG: 5 TABLET, FILM COATED ORAL at 18:01

## 2023-07-19 RX ADMIN — ATORVASTATIN CALCIUM 10 MG: 10 TABLET, FILM COATED ORAL at 18:01

## 2023-07-19 RX ADMIN — MONTELUKAST 10 MG: 10 TABLET, FILM COATED ORAL at 21:01

## 2023-07-19 RX ADMIN — INSULIN GLARGINE 5 UNITS: 100 INJECTION, SOLUTION SUBCUTANEOUS at 21:01

## 2023-07-19 RX ADMIN — INSULIN LISPRO 5 UNITS: 100 INJECTION, SOLUTION INTRAVENOUS; SUBCUTANEOUS at 08:29

## 2023-07-19 RX ADMIN — INSULIN LISPRO 6 UNITS: 100 INJECTION, SOLUTION INTRAVENOUS; SUBCUTANEOUS at 12:02

## 2023-07-19 RX ADMIN — INSULIN LISPRO 4 UNITS: 100 INJECTION, SOLUTION INTRAVENOUS; SUBCUTANEOUS at 21:01

## 2023-07-19 NOTE — ASSESSMENT & PLAN NOTE
Lab Results   Component Value Date    HGBA1C 12.0 (H) 07/05/2023       Recent Labs     07/18/23  1632 07/18/23  2104 07/19/23  0731 07/19/23  1115   POCGLU 195* 275* 190* 274*       Blood Sugar Average: Last 72 hrs:  (P) [id number]     -Diabetes is poorly controlled  -Continue with sliding scale, and basal and prandial insulin and carbohydrate-controlled diet  -Continue glucose monitoring

## 2023-07-19 NOTE — ASSESSMENT & PLAN NOTE
-Meeting criteria due to leukocytosis, tachycardia in the setting of worsening progression of pneumonia. Admitted previously with Klebsiella pneumonia and unfortunately left against medical advice without completing treatment. -CT shows evidence of septic pulmonary emboli  -1st set of blood cultures showing no growth at 5 days.  2nd set of blood cultures showing no growth at 4 days.  -Patient has remained afebrile and WBC count within normal range  -Continue to monitor fevers and WBC count.  -Continue IV ceftriaxone   -PICC line placement pending until patient's coverage is confirmed by CM

## 2023-07-19 NOTE — PLAN OF CARE
Problem: PAIN - ADULT  Goal: Verbalizes/displays adequate comfort level or baseline comfort level  Description: Interventions:  - Encourage patient to monitor pain and request assistance  - Assess pain using appropriate pain scale  - Administer analgesics based on type and severity of pain and evaluate response  - Implement non-pharmacological measures as appropriate and evaluate response  - Consider cultural and social influences on pain and pain management  - Notify physician/advanced practitioner if interventions unsuccessful or patient reports new pain  Outcome: Progressing     Problem: INFECTION - ADULT  Goal: Absence or prevention of progression during hospitalization  Description: INTERVENTIONS:  - Assess and monitor for signs and symptoms of infection  - Monitor lab/diagnostic results  - Monitor all insertion sites, i.e. indwelling lines, tubes, and drains  - Monitor endotracheal if appropriate and nasal secretions for changes in amount and color  - [place] appropriate cooling/warming therapies per order  - Administer medications as ordered  - Instruct and encourage patient and family to use good hand hygiene technique  - Identify and instruct in appropriate isolation precautions for identified infection/condition  Outcome: Progressing

## 2023-07-19 NOTE — PLAN OF CARE
Problem: INFECTION - ADULT  Goal: Absence or prevention of progression during hospitalization  Description: INTERVENTIONS:  - Assess and monitor for signs and symptoms of infection  - Monitor lab/diagnostic results  - Monitor all insertion sites, i.e. indwelling lines, tubes, and drains  - Monitor endotracheal if appropriate and nasal secretions for changes in amount and color  - [place] appropriate cooling/warming therapies per order  - Administer medications as ordered  - Instruct and encourage patient and family to use good hand hygiene technique  - Identify and instruct in appropriate isolation precautions for identified infection/condition  Outcome: Progressing  Goal: Absence of fever/infection during neutropenic period  Description: INTERVENTIONS:  - Monitor WBC    Outcome: Progressing

## 2023-07-19 NOTE — ASSESSMENT & PLAN NOTE
-Eloped twice during hospitalization 6/26 and 7/4 and has eloped 4 times since then from this campus's ED as well as [address] on risks of leaving AMA again and patient specifically asked what happened if he does not get treatment for worsening pneumonia and reviewed risks up to and including death and significance of continuing treatment with antibiotics and blood thinner which he agrees he will at this time  -Of note patient without insurance during last hospitalization, now still apparently having issues.   Patient tells me that he is working with  and [place] for [employer] through [address] [place] [address]

## 2023-07-19 NOTE — CASE MANAGEMENT
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   • Sepsis ([address]) 2023   • Liver abscess 2023   • Septic embolism ([address]) 2023   • Elevated troponin 2023   • Diabetes mellitus with hyperglycemia ([address]) 2023   • Acute renal failure (ARF) ([address]) 2023   • Hyponatremia 2023   • Type 2 diabetes mellitus without complication, without long-term current use of insulin ([address]) 2018   • Hyperlipidemia 2017   • Mild persistent asthma without complication       LOS (days): 7  Geometric Mean LOS (GMLOS) (days):   Days to GMLOS:     OBJECTIVE:  Risk of Unplanned Readmission Score: 18.35         Current admission status: Inpatient   Preferred Pharmacy:   [facility] [address] [place] [address]  Phone: [phone] Fax: [phone]    Primary Care Provider: ANGELIQUE Koch    Primary Insurance: JUDI GIBBS PENDING  Secondary Insurance:     DISCHARGE DETAILS:    Discharge planning discussed with[de-identified] Patient and sister at bedside  Freedom of Choice: Yes  Comments - Freedom of Choice: Cm discussed freedom of choice as it pertains to discharge planning. Patient is agreeable to going to infusion center. Sister agreed as well.   CM contacted family/caregiver?: Yes  Were Treatment Team discharge recommendations reviewed with patient/caregiver?: Yes  Did patient/caregiver verbalize understanding of patient care needs?: Yes  Were patient/caregiver advised of the risks associated with not following Treatment Team discharge recommendations?: Yes    Contacts  Patient Contacts: Patient    Requested [address]          Is the patient interested in [facility] at discharge?: No    DME Referral Provided  Referral made for DME?: No    Other Referral/Resources/Interventions Provided:  Referral Comments: CM called [facility] and [facility] [place] [facility]. Patient was set up for outpatient infusions starting friday. Treatment Team Recommendation: Home  Discharge Destination Plan[de-identified] Home  Transport at Discharge : Family      Additional Comments: Patient was made aware of the importance of getting infusions and going to appointments. Patient was agreeable and stated he would drive there. CM was emailed a employment termination form by patients  MsJose Eusebiosiddharthcristobal Erich. CM printed form and gave it to patient. CM informed patient of the importance of getting form filled out and send to MsJose Eusebiosiddharthcristobal Erich. CM informed patient that they needed to continue contacting Employment agency that is stating he is currently working for him in [place]. SLIM provider informed in person that patient starts Friday.

## 2023-07-19 NOTE — PROGRESS NOTES
Progress Note - Infectious Disease   Beatrice Mbow 43 y.o. male MRN: [id number]  Unit/Bed#: -01 Encounter: [id number]      Impression/Plan:  1.  Sepsis, POA.  Patient presented with tachycardia along with leukocytosis and intermittent tachypnea which is due to #2 and patient's noncompliance.  Clinical parameters have since improved and repeat cultures without growth.  Patient more understanding of the severity of his illness and with the help of family plans to be compliant with therapy. Antibiotics as below  Continue to trend fever curve/vitals  Repeat CBC/chemistry tomorrow  Follow-up pending blood cultures  Patient will need IR follow-up for #3  Counseled the patient and sister on need for compliance  Supportive care as per primary     2.  Septic pulmonary emboli with cavitation.  This was felt to be due to #3 and #4.  There is some slight progression on the imaging in terms of cavitation in size which I suspect are largely due to noncompliance.  Some changes may also be radiographically expected.  Patient and his sister would prefer infusion center treatment, if possible. Case management following. Continue ceftriaxone 2 g every 24 hour  Recommend PICC line placement  [place] orders adjusted for first infusion on Friday  Plan is for initial 6 weeks of therapy through 8/22  We will plan for repeat CT chest/abdomen/pelvis with contrast in 4 weeks, 8/8  We will extend antibiotics further, based on imaging, if needed  Patient will need follow-up in the ID office 2 weeks after discharge  Office staff notified of follow-up needs  Continue to trend fever curve/vitals  Repeat CBC/chemistry tomorrow  Follow-up pending blood cultures  Patient will need GI evaluation for #3 as outpatient  Supportive care as per primary     3.  Liver abscess with adjacent thrombosis.  Was diagnosed with this on last admission in the setting of #4.  Suspect that this contributed to #2.  MRCP without other biliary abnormality.  Essentially an occult abscess.  Repeat imaging now showing resolution postdrainage. Recommend IR reevaluation of drain in patient  Antibiotics as above  Monitor abdominal exam  Will need GI evaluation as outpatient  Trend fever curve/WBC     4.  Recent Klebsiella bacteremia.  Course complicated by the above. Milena Garay will continue antibiotics as above and follow-up pending blood cultures     5.  Thrombocytosis.  Suspect that this was an inflammatory response due to the above.  We will repeat CBC tomorrow.  Continue antibiotics as above     6.  Poorly controlled diabetes.  Hemoglobin A1c of 12.0.  Likely risk factor for the above.  Glucose management as per primary     7.  Noncompliance.  Patient unfortunately has eloped from several ER visits and recent hospitalization.  Unfortunately there was a lack of understanding/medical literacy of the true severity of illness.  Patient and his sister plan to comply to therapy and are aware of prolonged therapy and follow-up. Carolina Harris are aware of no-show policies.  Possible infusion center as above.     Above plan discussed in detail with the patient, his sister, case management and primary service.     ID consult service will continue to follow.     Antibiotics:  Ceftriaxone 3  Total antibiotics 8    24 Hour events:  Yesterday and overnight notes reviewed and no acute events noted    Subjective:  Patient currently denies having any nausea, vomiting, chest pain or shortness of breath. Tolerating current antibiotics without issue. He reportedly provided information to case management. Discussed with case management later and patient now seems to be MA pending. He unfortunately needs to work out his further issues with the county once he is discharged. They are working on infusion with the infusion center. Stressed to both the patient and his sister the need for compliance. Sister provided contact phone number which is [phone].   We discussed the need for follow-up, subsequent imaging and labs. Objective:  Vitals:  Temp:  [97.4 °F (36.3 °C)-99.1 °F (37.3 °C)] 97.4 °F (36.3 °C)  HR:  [91-97] 91  Resp:  [15-18] 16  BP: (126-131)/(74-76) 126/74  SpO2:  [99 %-100 %] 100 %  Temp (24hrs), Av.3 °F (36.8 °C), Min:97.4 °F (36.3 °C), Max:99.1 °F (37.3 °C)  Current: Temperature: (!) 97.4 °F (36.3 °C)    Physical Exam:   General Appearance:  Alert, interactive, nontoxic, no acute distress. Throat: Oropharynx moist without lesions. Lungs:   Clear to auscultation bilaterally; no wheezes, rhonchi or rales; respirations unlabored on room air   Heart:  RRR; no murmur, rub or gallop noted   Abdomen:   Soft, non-tender, non-distended, positive bowel sounds. Extremities: No clubbing, cyanosis or edema   Skin: No new rashes or lesions. No new draining wounds noted. Labs, Imaging, & Other studies:   All pertinent labs and imaging studies in PACS were personally reviewed as below. Results from last 7 days   Lab Units 23  0545 23  0623  0626   WBC Thousand/uL 8.14 8.43 8.99   HEMOGLOBIN g/dL 8.3* 7.5* 7.7*   PLATELETS Thousands/uL 578* 620* 557*     Results from last 7 days   Lab Units 23  0545 23  0626 23  0626 07/15/23  0446   POTASSIUM mmol/L 4.0 4.1   < > 3.8   CHLORIDE mmol/L 104 106   < > 102   CO2 mmol/L 27 28   < > 26   BUN mg/dL 7 6   < > 5   CREATININE mg/dL 0.73 0.75   < > 0.75   EGFR ml/min/1.73sq m 114 113   < > 113   CALCIUM mg/dL 9.0 8.8   < > 8.2*   AST U/L 13 14  --  9*   ALT U/L 12 11  --  10   ALK PHOS U/L 100 104  --  129*    < > = values in this interval not displayed. Results from last 7 days   Lab Units 23  2132 23  1741 23  1621 23  1616   BLOOD CULTURE  No Growth at 72 hrs. No Growth After 4 Days. No Growth After 5 Days. No Growth After 5 Days.        Lab interpretation/comments: Platelets downtrending, chemistry unremarkable    Imaging interpretation/comments: No new images overnight    Culture data: Multiple cultures remain negative.

## 2023-07-19 NOTE — PROGRESS NOTES
[address]  Progress Note  Name: Claudia Barnard I  MRN: [id number]  Unit/Bed#: -01 I Date of Admission: 7/12/2023   Date of Service: 7/19/2023 I Hospital Day: 7    Assessment/Plan   History of noncompliance with medical treatment  Assessment & Plan  -Eloped twice during hospitalization 6/26 and 7/4 and has eloped 4 times since then from this campus's ED as well as [address] on risks of leaving AMA again and patient specifically asked what happened if he does not get treatment for worsening pneumonia and reviewed risks up to and including death and significance of continuing treatment with antibiotics and blood thinner which he agrees he will at this time  -Of note patient without insurance during last hospitalization, now still apparently having issues. Patient tells me that he is working with  and [place] for health insurance through [employer]  -Hemoglobin has been stable at 8.3  -Continue home cobalamin  -Continue to monitor CBC    Type 2 diabetes mellitus without complication, without long-term current use of insulin [facility])  Assessment & Plan  Lab Results   Component Value Date    HGBA1C 12.0 (H) 07/05/2023       Recent Labs     07/18/23  1632 07/18/23  2104 07/19/23  0731 07/19/23  1115   POCGLU 195* 275* 190* 274*       Blood Sugar Average: Last 72 hrs:  (P) 214.6080715404115904     -Diabetes is poorly controlled  -Continue with sliding scale, and basal and prandial insulin and carbohydrate-controlled diet  -Continue glucose monitoring        Hyponatremia  Assessment & Plan  -Sodium improved and stable at 137    Septic embolism (HCC)  Assessment & Plan  -Felt to be secondary to nonadherence to antibiotic therapy   -CT negative for PE, Continue Eliquis    Liver abscess  Assessment & Plan  -IR drain in place without fluid leaking. -CT shows no residual abscess.   -Appointment with IR on 7/20/23 for drain check. Sepsis [facility])  Assessment & Plan  -Meeting criteria due to leukocytosis, tachycardia in the setting of worsening progression of pneumonia. Admitted previously with Klebsiella pneumonia and unfortunately left against medical advice without completing treatment. -CT shows evidence of septic pulmonary emboli  -1st set of blood cultures showing no growth at 5 days. 2nd set of blood cultures showing no growth at 4 days.  -Patient has remained afebrile and WBC count within normal range  -Continue to monitor fevers and WBC count.  -Continue IV ceftriaxone   -PICC line placement pending until patient's coverage is confirmed by CM    * Cavitary pneumonia  Assessment & Plan  -Patient had recently left AGAINST MEDICAL ADVICE during treatment for pneumonia and was nonadherent to completion of outpatient levofloxacin  -As per infectious disease, patient is being treated with IV ceftriaxone  -Continue monitoring blood cultures, WBC count, and fevers                VTE Pharmacologic Prophylaxis:   VTE Score: 3 Moderate Risk (Score 3-4) - Pharmacological DVT Prophylaxis Ordered: Apixaban (Eliquis). Mechanical VTE Prophylaxis in Place: No    Patient Centered Rounds: I have performed bedside rounds with nursing staff today. Discussions with Specialists or Other Care Team Provider: Infectious disease    Education and Discussions with Family / Patient: patient and sister  Current Length of Stay: 7 day(s)    Current Patient Status: Inpatient     Discharge Plan / Estimated Discharge Date: Anticipate discharge tomorrow to home. Code Status: Level 1 - Full Code      Subjective:   Patient says he is feeling much better today. He denies fever, chills, chest pain, and shortness of breath. He has no acute complaints at this time.     Objective:     Vitals:   Temp (24hrs), Av.3 °F (36.8 °C), Min:97.4 °F (36.3 °C), Max:99.1 °F (37.3 °C)    Temp:  [97.4 °F (36.3 °C)-99.1 °F (37.3 °C)] 97.4 °F (36.3 °C)  HR:  [91-97] 91  Resp:  [15-18] 16  BP: (126-131)/(74-76) 126/74  SpO2:  [99 %-100 %] 100 %  Body mass index is 27.22 kg/m². Input and Output Summary (last 24 hours): Intake/Output Summary (Last 24 hours) at 7/19/2023 1307  Last data filed at 7/19/2023 0740  Gross per 24 hour   Intake 2826.25 ml   Output 1000 ml   Net 1826.25 ml       Physical Exam:     Physical Exam  Constitutional:       General: He is not in acute distress. Appearance: Normal appearance. HENT:      Head: Normocephalic and atraumatic. Eyes:      General: No scleral icterus. Conjunctiva/sclera: Conjunctivae normal.   Cardiovascular:      Rate and Rhythm: Normal rate and regular rhythm. Pulmonary:      Effort: Pulmonary effort is normal. No respiratory distress. Comments: Some bronchial breath sounds on right lung  Abdominal:      General: Bowel sounds are normal.      Palpations: Abdomen is soft. Neurological:      Mental Status: He is alert and oriented to person, place, and time.          Additional Data:     Labs:  Results from last 7 days   Lab Units 07/19/23  0545   WBC Thousand/uL 8.14   HEMOGLOBIN g/dL 8.3*   HEMATOCRIT % 26.9*   PLATELETS Thousands/uL 578*   NEUTROS PCT % 54   LYMPHS PCT % 31   MONOS PCT % 10   EOS PCT % 3     Results from last 7 days   Lab Units 07/19/23  0545   SODIUM mmol/L 137   POTASSIUM mmol/L 4.0   CHLORIDE mmol/L 104   CO2 mmol/L 27   BUN mg/dL 7   CREATININE mg/dL 0.73   ANION GAP mmol/L 6   CALCIUM mg/dL 9.0   ALBUMIN g/dL 2.9*   TOTAL BILIRUBIN mg/dL 0.31   ALK PHOS U/L 100   ALT U/L 12   AST U/L 13   GLUCOSE RANDOM mg/dL 187*     Results from last 7 days   Lab Units 07/12/23  1616   INR  1.19     Results from last 7 days   Lab Units 07/19/23  1115 07/19/23  0731 07/18/23  2104 07/18/23  1632 07/18/23  1130 07/18/23  0755 07/17/23  2054 07/17/23  1603 07/17/23  1139 07/17/23  0828 07/16/23  2100 07/16/23  1601   POC GLUCOSE mg/dl 274* 190* 275* 195* 243* 189* 203* 197* 195* 230* 222* 113 Results from last 7 days   Lab Units 07/12/23  1616   LACTIC ACID mmol/L 1.1   PROCALCITONIN ng/ml 0.35*       Imaging:   Imaging Reports Reviewed Today Include:   PE Study with CT abdomen & pelvis with contrast     Result Date: 7/12/2023  Impression: No pulmonary embolism. Progression of multi lobar consolidation in the right lung with both increase in size and cavitation and multiple other scattered septic emboli. Reactive mediastinal and hilar nodes. No residual abscess in the liver status post drainage catheter placement. Workstation performed: [id number]     Recent Cultures (last 7 days):     Results from last 7 days   Lab Units 07/14/23  2132 07/14/23  1741 07/12/23  1621 07/12/23  1616   BLOOD CULTURE  No Growth at 72 hrs. No Growth After 4 Days. No Growth After 5 Days. No Growth After 5 Days.        Lines/Drains:  Invasive Devices     Peripheral Intravenous Line  Duration           Long-Dwell Peripheral IV (Midline) 07/14/23 Right Brachial 4 days          Drain  Duration           Abscess Drain Abdomen 20 days                Telemetry:        Last 24 Hours Medication List:   Current Facility-Administered Medications   Medication Dose Route Frequency Provider Last Rate   • acetaminophen  650 mg Oral Q8H PRN Francisco J Mooney MD     • albuterol  2 puff Inhalation Q4H PRN Blanca Hussein PA-C     • aluminum-magnesium hydroxide-simethicone  30 mL Oral Q6H PRN Blanca Hussein PA-C     • apixaban  5 mg Oral BID Lorraine Hicks MD     • atorvastatin  10 mg Oral Daily With Dinner Blanca Hussein PA-C     • benzonatate  100 mg Oral TID PRN Blanca Hussein PA-C     • cefTRIAXone  2,000 mg Intravenous Q24H Fanny Triana MD 2,000 mg (07/19/23 1007)   • cyanocobalamin  100 mcg Oral Daily Blanca Hussein PA-C     • dextromethorphan-guaiFENesin  10 mL Oral Q4H PRN Francisco J Mooney MD     • HYDROmorphone  0.5 mg Intravenous Q4H PRN Francisco J Mooney MD     • HYDROmorphone  0.5 mg Intravenous Q3H PRN Flakita Narvaez Jefm Halsted, MD     • insulin glargine  5 Units Subcutaneous HS Keyon Weber MD     • insulin lispro  2-12 Units Subcutaneous TID With Meals Keyon Weber MD     • insulin lispro  2-12 Units Subcutaneous HS Keyon Weber MD     • insulin lispro  5 Units Subcutaneous TID With Meals Keyon Weber MD     • lidocaine  1 patch Topical Daily PRN Canelo Latasha PA-C     • montelukast  10 mg Oral HS JUDI Hernandez-C     • oxyCODONE  5 mg Oral Q6H PRN Keyon Weber MD     • sodium chloride  75 mL/hr Intravenous Continuous Canelo Latasha PA-C 75 mL/hr (07/19/23 0539)        Today, Patient Was Seen By: Karina Ahn MD    ** Please Note: This note has been constructed using a voice recognition system.  **

## 2023-07-20 ENCOUNTER — APPOINTMENT (INPATIENT)
Dept: RADIOLOGY | Facility: HOSPITAL | Age: 43
DRG: 720 | End: 2023-07-20
Payer: COMMERCIAL

## 2023-07-20 ENCOUNTER — TELEPHONE (OUTPATIENT)
Dept: INFUSION CENTER | Facility: CLINIC | Age: 43
End: 2023-07-20

## 2023-07-20 ENCOUNTER — APPOINTMENT (OUTPATIENT)
Dept: NON INVASIVE DIAGNOSTICS | Facility: HOSPITAL | Age: 43
DRG: 720 | End: 2023-07-20
Attending: INTERNAL MEDICINE
Payer: COMMERCIAL

## 2023-07-20 VITALS
RESPIRATION RATE: 14 BRPM | HEIGHT: 65 IN | HEART RATE: 88 BPM | BODY MASS INDEX: 27.25 KG/M2 | DIASTOLIC BLOOD PRESSURE: 81 MMHG | OXYGEN SATURATION: 98 % | SYSTOLIC BLOOD PRESSURE: 134 MMHG | WEIGHT: 163.58 LBS | TEMPERATURE: 99.2 F

## 2023-07-20 LAB
ALBUMIN SERPL BCP-MCNC: 3 G/DL (ref 3.5–5)
ALP SERPL-CCNC: 101 U/L (ref 34–104)
ALT SERPL W P-5'-P-CCNC: 15 U/L (ref 7–52)
ANION GAP SERPL CALCULATED.3IONS-SCNC: 6 MMOL/L
ANISOCYTOSIS BLD QL SMEAR: PRESENT
AST SERPL W P-5'-P-CCNC: 15 U/L (ref 13–39)
BACTERIA BLD CULT: NORMAL
BACTERIA BLD CULT: NORMAL
BASOPHILS # BLD AUTO: 0.08 THOUSANDS/ÂΜL (ref 0–0.1)
BASOPHILS # BLD MANUAL: 0.25 THOUSAND/UL (ref 0–0.1)
BASOPHILS NFR BLD AUTO: 1 % (ref 0–1)
BASOPHILS NFR MAR MANUAL: 3 % (ref 0–1)
BILIRUB SERPL-MCNC: 0.31 MG/DL (ref 0.2–1)
BUN SERPL-MCNC: 8 MG/DL (ref 5–25)
CALCIUM ALBUM COR SERPL-MCNC: 10 MG/DL (ref 8.3–10.1)
CALCIUM SERPL-MCNC: 9.2 MG/DL (ref 8.4–10.2)
CHLORIDE SERPL-SCNC: 103 MMOL/L (ref 96–108)
CO2 SERPL-SCNC: 28 MMOL/L (ref 21–32)
CREAT SERPL-MCNC: 0.73 MG/DL (ref 0.6–1.3)
EOSINOPHIL # BLD AUTO: 0.29 THOUSAND/ÂΜL (ref 0–0.61)
EOSINOPHIL # BLD MANUAL: 0.25 THOUSAND/UL (ref 0–0.4)
EOSINOPHIL NFR BLD AUTO: 3 % (ref 0–6)
EOSINOPHIL NFR BLD MANUAL: 3 % (ref 0–6)
ERYTHROCYTE [DISTWIDTH] IN BLOOD BY AUTOMATED COUNT: 16.7 % (ref 11.6–15.1)
GFR SERPL CREATININE-BSD FRML MDRD: 114 ML/MIN/1.73SQ M
GLUCOSE SERPL-MCNC: 132 MG/DL (ref 65–140)
GLUCOSE SERPL-MCNC: 157 MG/DL (ref 65–140)
GLUCOSE SERPL-MCNC: 165 MG/DL (ref 65–140)
GLUCOSE SERPL-MCNC: 175 MG/DL (ref 65–140)
GLUCOSE SERPL-MCNC: 176 MG/DL (ref 65–140)
GLUCOSE SERPL-MCNC: 181 MG/DL (ref 65–140)
GLUCOSE SERPL-MCNC: 186 MG/DL (ref 65–140)
GLUCOSE SERPL-MCNC: 215 MG/DL (ref 65–140)
GLUCOSE SERPL-MCNC: 249 MG/DL (ref 65–140)
GLUCOSE SERPL-MCNC: 267 MG/DL (ref 65–140)
HCT VFR BLD AUTO: 25.3 % (ref 36.5–49.3)
HGB BLD-MCNC: 7.7 G/DL (ref 12–17)
HYPERCHROMIA BLD QL SMEAR: PRESENT
IMM GRANULOCYTES # BLD AUTO: 0.07 THOUSAND/UL (ref 0–0.2)
IMM GRANULOCYTES NFR BLD AUTO: 1 % (ref 0–2)
INR PPP: 1.12 (ref 0.84–1.19)
LYMPHOCYTES # BLD AUTO: 2.36 THOUSAND/UL (ref 0.6–4.47)
LYMPHOCYTES # BLD AUTO: 2.78 THOUSANDS/ÂΜL (ref 0.6–4.47)
LYMPHOCYTES # BLD AUTO: 28 % (ref 14–44)
LYMPHOCYTES NFR BLD AUTO: 33 % (ref 14–44)
MCH RBC QN AUTO: 26.1 PG (ref 26.8–34.3)
MCHC RBC AUTO-ENTMCNC: 30.4 G/DL (ref 31.4–37.4)
MCV RBC AUTO: 86 FL (ref 82–98)
MONOCYTES # BLD AUTO: 0.84 THOUSAND/UL (ref 0–1.22)
MONOCYTES # BLD AUTO: 0.93 THOUSAND/ÂΜL (ref 0.17–1.22)
MONOCYTES NFR BLD AUTO: 11 % (ref 4–12)
MONOCYTES NFR BLD: 10 % (ref 4–12)
NEUTROPHILS # BLD AUTO: 4.41 THOUSANDS/ÂΜL (ref 1.85–7.62)
NEUTROPHILS # BLD MANUAL: 4.64 THOUSAND/UL (ref 1.85–7.62)
NEUTS SEG NFR BLD AUTO: 51 % (ref 43–75)
NEUTS SEG NFR BLD AUTO: 55 % (ref 43–75)
NRBC BLD AUTO-RTO: 0 /100 WBCS
PATHOLOGY REVIEW: YES
PLATELET # BLD AUTO: 646 THOUSANDS/UL (ref 149–390)
PLATELET BLD QL SMEAR: ABNORMAL
PMV BLD AUTO: 8.9 FL (ref 8.9–12.7)
POLYCHROMASIA BLD QL SMEAR: PRESENT
POTASSIUM SERPL-SCNC: 4 MMOL/L (ref 3.5–5.3)
PROT SERPL-MCNC: 7.6 G/DL (ref 6.4–8.4)
PROTHROMBIN TIME: 14.2 SECONDS (ref 11.6–14.5)
RBC # BLD AUTO: 2.95 MILLION/UL (ref 3.88–5.62)
SODIUM SERPL-SCNC: 137 MMOL/L (ref 135–147)
VARIANT LYMPHS # BLD AUTO: 1 %
WBC # BLD AUTO: 8.56 THOUSAND/UL (ref 4.31–10.16)

## 2023-07-20 PROCEDURE — BF151ZZ FLUOROSCOPY OF LIVER USING LOW OSMOLAR CONTRAST: ICD-10-PCS | Performed by: INTERNAL MEDICINE

## 2023-07-20 PROCEDURE — 02HV33Z INSERTION OF INFUSION DEVICE INTO SUPERIOR VENA CAVA, PERCUTANEOUS APPROACH: ICD-10-PCS | Performed by: INTERNAL MEDICINE

## 2023-07-20 PROCEDURE — 71045 X-RAY EXAM CHEST 1 VIEW: CPT

## 2023-07-20 PROCEDURE — 85025 COMPLETE CBC W/AUTO DIFF WBC: CPT | Performed by: INTERNAL MEDICINE

## 2023-07-20 PROCEDURE — 82948 REAGENT STRIP/BLOOD GLUCOSE: CPT

## 2023-07-20 PROCEDURE — 99239 HOSP IP/OBS DSCHRG MGMT >30: CPT | Performed by: INTERNAL MEDICINE

## 2023-07-20 PROCEDURE — 76080 X-RAY EXAM OF FISTULA: CPT

## 2023-07-20 PROCEDURE — 99232 SBSQ HOSP IP/OBS MODERATE 35: CPT | Performed by: INTERNAL MEDICINE

## 2023-07-20 PROCEDURE — NC001 PR NO CHARGE: Performed by: RADIOLOGY

## 2023-07-20 PROCEDURE — 49424 ASSESS CYST CONTRAST INJECT: CPT

## 2023-07-20 PROCEDURE — 0FP0X0Z REMOVAL OF DRAINAGE DEVICE FROM LIVER, EXTERNAL APPROACH: ICD-10-PCS | Performed by: INTERNAL MEDICINE

## 2023-07-20 PROCEDURE — 36569 INSJ PICC 5 YR+ W/O IMAGING: CPT

## 2023-07-20 PROCEDURE — C1751 CATH, INF, PER/CENT/MIDLINE: HCPCS

## 2023-07-20 PROCEDURE — 85610 PROTHROMBIN TIME: CPT | Performed by: INTERNAL MEDICINE

## 2023-07-20 PROCEDURE — 80053 COMPREHEN METABOLIC PANEL: CPT | Performed by: INTERNAL MEDICINE

## 2023-07-20 RX ORDER — ACETAMINOPHEN 325 MG/1
650 TABLET ORAL EVERY 8 HOURS PRN
Refills: 0
Start: 2023-07-20

## 2023-07-20 RX ORDER — GUAIFENESIN/DEXTROMETHORPHAN 100-10MG/5
10 SYRUP ORAL EVERY 4 HOURS PRN
Qty: 237 ML | Refills: 0 | Status: SHIPPED | OUTPATIENT
Start: 2023-07-20

## 2023-07-20 RX ORDER — CEFTRIAXONE 2 G/50ML
2000 INJECTION, SOLUTION INTRAVENOUS EVERY 24 HOURS
Refills: 0
Start: 2023-07-21 | End: 2023-08-22

## 2023-07-20 RX ADMIN — IOHEXOL 3 ML: 350 INJECTION, SOLUTION INTRAVENOUS at 13:21

## 2023-07-20 RX ADMIN — INSULIN LISPRO 2 UNITS: 100 INJECTION, SOLUTION INTRAVENOUS; SUBCUTANEOUS at 08:49

## 2023-07-20 RX ADMIN — GUAIFENESIN AND DEXTROMETHORPHAN 10 ML: 100; 10 SYRUP ORAL at 06:23

## 2023-07-20 RX ADMIN — CEFTRIAXONE 2000 MG: 2 INJECTION, SOLUTION INTRAVENOUS at 12:49

## 2023-07-20 RX ADMIN — INSULIN LISPRO 5 UNITS: 100 INJECTION, SOLUTION INTRAVENOUS; SUBCUTANEOUS at 08:49

## 2023-07-20 RX ADMIN — ATORVASTATIN CALCIUM 10 MG: 10 TABLET, FILM COATED ORAL at 18:14

## 2023-07-20 RX ADMIN — APIXABAN 5 MG: 5 TABLET, FILM COATED ORAL at 18:14

## 2023-07-20 RX ADMIN — INSULIN LISPRO 2 UNITS: 100 INJECTION, SOLUTION INTRAVENOUS; SUBCUTANEOUS at 18:17

## 2023-07-20 RX ADMIN — INSULIN LISPRO 5 UNITS: 100 INJECTION, SOLUTION INTRAVENOUS; SUBCUTANEOUS at 18:17

## 2023-07-20 RX ADMIN — VITAM B12 100 MCG: 100 TAB at 08:44

## 2023-07-20 RX ADMIN — APIXABAN 5 MG: 5 TABLET, FILM COATED ORAL at 08:44

## 2023-07-20 NOTE — SEDATION DOCUMENTATION
Contrast injected and images taken of drain at liver area and reviewed by Dr. Ellis Babinski, removing tube, patient tolerated well

## 2023-07-20 NOTE — ASSESSMENT & PLAN NOTE
· Stable, likely multifactorial-hemoglobin around 7.5; slightly decreasing likely secondary to chronic disease  · Monitor hemoglobin as outpatient and follow-up with PCP

## 2023-07-20 NOTE — PROCEDURES
Insert Complex Venous Access Line    Date/Time: 7/20/2023 3:24 PM    Performed by: Melodie Tristan RN  Authorized by: Joselyn Hudson MD    Patient location:  Bedside  Other Assisting Provider: No    Consent:     Consent obtained:  Written    Consent given by:  Patient  Universal protocol:     Procedure explained and questions answered to patient or proxy's satisfaction: yes      Relevant documents present and verified: yes      Test results available and properly labeled: yes      Required blood products, implants, devices, and special equipment available: yes      Site/side marked: yes      Immediately prior to procedure, a time out was called: yes      Patient identity confirmed:  Verbally with patient and arm band  Pre-procedure details:     Hand hygiene: Hand hygiene performed prior to insertion      Sterile barrier technique: All elements of maximal sterile technique followed      Skin preparation:  ChloraPrep    Skin preparation agent: Skin preparation agent completely dried prior to procedure    Indications:     PICC line indications: long term antibiotics    Anesthesia (see MAR for exact dosages):      Anesthesia method:  Local infiltration    Local anesthetic:  Lidocaine 1% w/o epi  Procedure details:     Location:  Brachial    Vessel type: vein      Laterality:  Right    Approach: percutaneous technique used      Patient position:  Flat    Procedural supplies:  Single lumen    Catheter size:  4 Fr    Landmarks identified: yes      Ultrasound guidance: yes      Ultrasound image availability:  Not saved    Sterile ultrasound techniques: Sterile gel and sterile probe covers were used      Number of attempts:  1    Successful placement: yes      Vessel of catheter tip end:  Chest Xray needed to confirm placement    Total catheter length (cm):  41    Catheter out on skin (cm):  1    Max flow rate:  5ml/sec    Arm circumference:  32  Post-procedure details:     Post-procedure:  Securement device placed Assessment:  Blood return through all ports and placement verification pending x-ray result    Post-procedure complications: none      Patient tolerance of procedure:   Tolerated well, no immediate complications    Observer: Yes      Observer name:  Ricci Rodriguez RN

## 2023-07-20 NOTE — ASSESSMENT & PLAN NOTE
· Patient had recently left against medical advice and nonadherent to completion of levofloxacin   · Infectious disease consulted recommending intravenous ceftriaxone 2 g IV till August 22, 2023  · PICC line today and possible removal of liver abscess drain by IR  · Discharge plan in progress

## 2023-07-20 NOTE — ASSESSMENT & PLAN NOTE
· Felt to be secondary to nonadherence to antibiotic therapy   · CT negative for PE, Continue Eliquis for 7 more days

## 2023-07-20 NOTE — ASSESSMENT & PLAN NOTE
Lab Results   Component Value Date    HGBA1C 12.0 (H) 07/05/2023       Recent Labs     07/19/23  1547 07/19/23  2047 07/20/23  1051 07/20/23  1557   POCGLU 117 209* 132 181*       Blood Sugar Average: Last 72 hrs:  · (P) [id number]   · Most likely control is challenging due to continued infection  · Carb controlled diet advised  · Patient verbalized understanding of plan

## 2023-07-20 NOTE — TELEPHONE ENCOUNTER
Pt called with a reminder of appt date and times, department policies/ procedures reviewed with pt. No further questions at this time.

## 2023-07-20 NOTE — ASSESSMENT & PLAN NOTE
· Meeting criteria due to leukocytosis, tachycardia in the setting of worsening progression of pneumonia. Admitted previously with Klebsiella pneumonia and unfortunately left without completing treatment. · Improved  · Needs long-term antibiotics for the cavitary pneumonia.

## 2023-07-20 NOTE — NURSING NOTE
Pt refusing IV fluids this evening during med pass. Explained to pt that this is a doctor's order to have the fluids running continuously. Pt still refusing IV fluids. IV fluids stopped and disconnected.

## 2023-07-20 NOTE — ASSESSMENT & PLAN NOTE
· Eloped twice during hospitalization 6/26 and 7/4 and has eloped 4 times since then from this campus's ED as well as [facility]  · Educated on risks of leaving AMA again and patient specifically asked what happened if he does not get treatment for worsening pneumonia and reviewed risks up to and including death and significance of continuing treatment with antibiotics and blood thinner which he agrees he will at this time  · Patient has been advised to very importantly about compliance with the medication treatment plan

## 2023-07-20 NOTE — TELEMEDICINE
e-Consult ([facility])  - Interventional Radiology  Judah Quiñonez 43 y.o. male MRN: [id number]  Unit/Bed#: -01 Encounter: [id number]          Interventional Radiology has been consulted to evaluate Judah Quiñonez        Inpatient Consult to IR  Consult performed by: Lucía Azul MD  Consult ordered by: Claudene Comer, MD        07/20/23    Assessment/Recommendation:   Pt wit IR drain for liver abscess. IR consulted for tube check. Order placed. >5 min, >50% time spent reviewing/analyzing record, written report will be generated in the EMR. Thank you for allowing Interventional Radiology to participate in the care of Judah Quiñonez. Please don't hesitate to call or TigerText us with any questions.      Lucía Azul MD

## 2023-07-20 NOTE — PLAN OF CARE
Problem: PAIN - ADULT  Goal: Verbalizes/displays adequate comfort level or baseline comfort level  Description: Interventions:  - Encourage patient to monitor pain and request assistance  - Assess pain using appropriate pain scale  - Administer analgesics based on type and severity of pain and evaluate response  - Implement non-pharmacological measures as appropriate and evaluate response  - Consider cultural and social influences on pain and pain management  - Notify physician/advanced practitioner if interventions unsuccessful or patient reports new pain  Outcome: Progressing     Problem: INFECTION - ADULT  Goal: Absence or prevention of progression during hospitalization  Description: INTERVENTIONS:  - Assess and monitor for signs and symptoms of infection  - Monitor lab/diagnostic results  - Monitor all insertion sites, i.e. indwelling lines, tubes, and drains  - Monitor endotracheal if appropriate and nasal secretions for changes in amount and color  - [place] appropriate cooling/warming therapies per order  - Administer medications as ordered  - Instruct and encourage patient and family to use good hand hygiene technique  - Identify and instruct in appropriate isolation precautions for identified infection/condition  Outcome: Progressing  Goal: Absence of fever/infection during neutropenic period  Description: INTERVENTIONS:  - Monitor WBC    Outcome: Progressing  Goal: Maintains/Returns to pre admission functional level  Description: INTERVENTIONS:  Problem: DISCHARGE PLANNING  Goal: Discharge to home or other facility with appropriate resources  Description: INTERVENTIONS:  - Identify barriers to discharge w/patient and caregiver  - Arrange for needed discharge resources and transportation as appropriate  - Identify discharge learning needs (meds, wound care, etc.)  - Arrange for interpretive services to assist at discharge as needed  - Refer to Case Management Department for coordinating discharge planning if the patient needs post-hospital services based on physician/advanced practitioner order or complex needs related to functional status, cognitive ability, or social support system  Outcome: Progressing     Problem: Knowledge Deficit  Goal: Patient/family/caregiver demonstrates understanding of disease process, treatment plan, medications, and discharge instructions  Description: Complete learning assessment and assess knowledge base.   Interventions:  - Provide teaching at level of understanding  - Provide teaching via preferred learning methods  Outcome: Progressing     -  Assess patient's ability to carry out ADLs; assess patient's baseline for ADL function and identify physical deficits which impact ability to perform ADLs (bathing, care of mouth/teeth, toileting, grooming, dressing, etc.)  - Assess/evaluate cause of self-care deficits   - Assess range of motion  - Assess patient's mobility; develop plan if impaired  - Assess patient's need for assistive devices and provide as appropriate  - Encourage maximum independence but intervene and supervise when necessary  - Involve family in performance of ADLs  - Assess for home care needs following discharge   - Consider OT consult to assist with ADL evaluation and planning for discharge  - Provide patient education as appropriate  Outcome: Progressing

## 2023-07-20 NOTE — PLAN OF CARE
Problem: Potential for Falls  Goal: Patient will remain free of falls  Description: INTERVENTIONS:  - Educate patient/family on patient safety including physical limitations  - Instruct patient to call for assistance with activity   - Consult OT/PT to assist with strengthening/mobility   - Keep Call bell within reach  - Keep bed low and locked with side rails adjusted as appropriate  - Keep care items and personal belongings within reach  - Initiate and maintain comfort rounds  - Make Fall Risk Sign visible to staff  - Apply yellow socks and bracelet for high fall risk patients  - Consider moving patient to room near nurses station  Outcome: Progressing     Problem: PAIN - ADULT  Goal: Verbalizes/displays adequate comfort level or baseline comfort level  Description: Interventions:  - Encourage patient to monitor pain and request assistance  - Assess pain using appropriate pain scale  - Administer analgesics based on type and severity of pain and evaluate response  - Implement non-pharmacological measures as appropriate and evaluate response  - Consider cultural and social influences on pain and pain management  - Notify physician/advanced practitioner if interventions unsuccessful or patient reports new pain  Outcome: Progressing     Problem: INFECTION - ADULT  Goal: Absence or prevention of progression during hospitalization  Description: INTERVENTIONS:  - Assess and monitor for signs and symptoms of infection  - Monitor lab/diagnostic results  - Monitor all insertion sites, i.e. indwelling lines, tubes, and drains  - Monitor endotracheal if appropriate and nasal secretions for changes in amount and color  - [place] appropriate cooling/warming therapies per order  - Administer medications as ordered  - Instruct and encourage patient and family to use good hand hygiene technique  - Identify and instruct in appropriate isolation precautions for identified infection/condition  Outcome: Progressing  Goal: Absence of fever/infection during neutropenic period  Description: INTERVENTIONS:  - Monitor WBC    Outcome: Progressing     Problem: SAFETY ADULT  Goal: Patient will remain free of falls  Description: INTERVENTIONS:  - Educate patient/family on patient safety including physical limitations  - Instruct patient to call for assistance with activity   - Consult OT/PT to assist with strengthening/mobility   - Keep Call bell within reach  - Keep bed low and locked with side rails adjusted as appropriate  - Keep care items and personal belongings within reach  - Initiate and maintain comfort rounds  - Make Fall Risk Sign visible to staff  - Apply yellow socks and bracelet for high fall risk patients  - Consider moving patient to room near nurses station  Outcome: Progressing  Goal: Maintain or return to baseline ADL function  Description: INTERVENTIONS:  -  Assess patient's ability to carry out ADLs; assess patient's baseline for ADL function and identify physical deficits which impact ability to perform ADLs (bathing, care of mouth/teeth, toileting, grooming, dressing, etc.)  - Assess/evaluate cause of self-care deficits   - Assess range of motion  - Assess patient's mobility; develop plan if impaired  - Assess patient's need for assistive devices and provide as appropriate  - Encourage maximum independence but intervene and supervise when necessary  - Involve family in performance of ADLs  - Assess for home care needs following discharge   - Consider OT consult to assist with ADL evaluation and planning for discharge  - Provide patient education as appropriate  Outcome: Progressing  Goal: Maintains/Returns to pre admission functional level  Description: INTERVENTIONS:  - Perform BMAT or MOVE assessment daily.   - Set and communicate daily mobility goal to care team and patient/family/caregiver.    - Collaborate with rehabilitation services on mobility goals if consulted  - Out of bed for toileting  - Record patient progress and toleration of activity level   Outcome: Progressing     Problem: DISCHARGE PLANNING  Goal: Discharge to home or other facility with appropriate resources  Description: INTERVENTIONS:  - Identify barriers to discharge w/patient and caregiver  - Arrange for needed discharge resources and transportation as appropriate  - Identify discharge learning needs (meds, wound care, etc.)  - Arrange for interpretive services to assist at discharge as needed  - Refer to Case Management Department for coordinating discharge planning if the patient needs post-hospital services based on physician/advanced practitioner order or complex needs related to functional status, cognitive ability, or social support system  Outcome: Progressing     Problem: Knowledge Deficit  Goal: Patient/family/caregiver demonstrates understanding of disease process, treatment plan, medications, and discharge instructions  Description: Complete learning assessment and assess knowledge base.   Interventions:  - Provide teaching at level of understanding  - Provide teaching via preferred learning methods  Outcome: Progressing     Problem: MOBILITY - ADULT  Goal: Maintain or return to baseline ADL function  Description: INTERVENTIONS:  -  Assess patient's ability to carry out ADLs; assess patient's baseline for ADL function and identify physical deficits which impact ability to perform ADLs (bathing, care of mouth/teeth, toileting, grooming, dressing, etc.)  - Assess/evaluate cause of self-care deficits   - Assess range of motion  - Assess patient's mobility; develop plan if impaired  - Assess patient's need for assistive devices and provide as appropriate  - Encourage maximum independence but intervene and supervise when necessary  - Involve family in performance of ADLs  - Assess for home care needs following discharge   - Consider OT consult to assist with ADL evaluation and planning for discharge  - Provide patient education as appropriate  Outcome: Progressing  Goal: Maintains/Returns to pre admission functional level  Description: INTERVENTIONS:  - Perform BMAT or MOVE assessment daily.   - Set and communicate daily mobility goal to care team and patient/family/caregiver.    - Collaborate with rehabilitation services on mobility goals if consulted  - Out of bed for toileting  - Record patient progress and toleration of activity level   Outcome: Progressing

## 2023-07-20 NOTE — PROGRESS NOTES
Progress Note - Infectious Disease   Beatrice Mbow 43 y.o. male MRN: [id number]  Unit/Bed#: -01 Encounter: [id number]      Impression/Plan:  1.  Sepsis, POA.  Patient presented with tachycardia along with leukocytosis and intermittent tachypnea which is due to #2 and patient's noncompliance.  Clinical parameters have since improved and repeat cultures without growth.  Patient more understanding of the severity of his illness and with the help of family plans to be compliant with therapy. Antibiotics as below  Continue to trend fever curve/vitals  Repeat CBC/chemistry tomorrow  Follow-up pending blood cultures  Patient will need IR follow-up for #3  Counseled the patient and sister on need for compliance  Supportive care as per primary  Patient can be discharged once services arranged, PICC line placed and IR evaluation completed.     2.  Septic pulmonary emboli with cavitation.  This was felt to be due to #3 and #4.  There is some slight progression on the imaging in terms of cavitation in size which I suspect are largely due to noncompliance.  Some changes may also be radiographically expected.  Patient and his sister would prefer infusion center treatment, if possible. Case management following.   Continue ceftriaxone 2 g every 24 hour  Recommend PICC line placement  [place] orders adjusted for first infusion on Friday  Plan is for initial 6 weeks of therapy through 8/22  We will plan for repeat CT chest/abdomen/pelvis with contrast in 4 weeks, 8/8  We will extend antibiotics further, based on imaging, if needed  Patient will need follow-up in the ID office 2 weeks after discharge  ID office staff notified of follow-up needs today  Continue to trend fever curve/vitals  Repeat CBC/chemistry tomorrow  Follow-up pending blood cultures while admitted  Patient will need GI evaluation for #3 as outpatient  Supportive care as per primary     3.  Liver abscess with adjacent thrombosis.  Was diagnosed with this on last admission in the setting of #4.  Suspect that this contributed to #2. [facility] without other biliary abnormality.  Essentially an occult abscess.  Repeat imaging now showing resolution postdrainage. Recommend IR reevaluation of drain inpatient  Antibiotics as above  Monitor abdominal exam  Will need GI evaluation as outpatient  Trend fever curve/WBC     4.  Recent Klebsiella bacteremia.  Course complicated by the above. Jessica Figueroa will continue antibiotics as above and follow-up pending blood cultures     5.  Thrombocytosis.  Suspect that this was an inflammatory response due to the above.  We will repeat CBC tomorrow.  Continue antibiotics as above     6.  Poorly controlled diabetes.  Hemoglobin A1c of 12.0.  Likely risk factor for the above.  Glucose management as per primary     7.  Noncompliance.  Patient unfortunately has eloped from several ER visits and recent hospitalization.  Unfortunately there was a lack of understanding/medical literacy of the true severity of illness.  Patient and his sister plan to comply to therapy and are aware of prolonged therapy and follow-up. Wesley Razo are aware of no-show policies.  Possible infusion center as above.     Above plan discussed in detail with the patient and with primary service student.     ID consult service will continue to follow.     Antibiotics:  Ceftriaxone 4  Total antibiotics 9    24 Hour events:  Yesterday and overnight notes reviewed and no acute events noted    Subjective:  Patient currently denies having any nausea, vomiting, chest pain or shortness of breath. Tolerating current antibiotics without issue. He is excited at the possibility of starting home infusion. We again reviewed the need for compliance. And no-show policy. I also stressed to the patient about completing issues that are pending with the county.     Objective:  Vitals:  Temp:  [98.4 °F (36.9 °C)-100.5 °F (38.1 °C)] 100.5 °F (38.1 °C)  HR:  [88-95] 95  BP: (122-129)/(67-93) 122/74  SpO2: [97 %-100 %] 99 %  Temp (24hrs), Av.5 °F (37.5 °C), Min:98.4 °F (36.9 °C), Max:100.5 °F (38.1 °C)  Current: Temperature: 100.5 °F (38.1 °C)    Physical Exam:   General Appearance:  Alert, interactive, nontoxic, no acute distress. Throat: Oropharynx moist without lesions. Lungs:   Clear to auscultation bilaterally; no wheezes, rhonchi or rales; respirations unlabored on room air   Heart:  RRR; no murmur, rub or gallop noted   Abdomen:   Soft, non-tender, non-distended, positive bowel sounds. Extremities: No clubbing, cyanosis or edema   Skin: No new rashes or lesions. No new draining wounds noted. Labs, Imaging, & Other studies:   All pertinent labs and imaging studies in PACS were personally reviewed as below. Results from last 7 days   Lab Units 23  0516 23  0545 23  0626   WBC Thousand/uL 8.56 8.14 8.43   HEMOGLOBIN g/dL 7.7* 8.3* 7.5*   PLATELETS Thousands/uL 646* 578* 620*     Results from last 7 days   Lab Units 23  0516 23  0545 23  0626   POTASSIUM mmol/L 4.0 4.0 4.1   CHLORIDE mmol/L 103 104 106   CO2 mmol/L 28 27 28   BUN mg/dL 8 7 6   CREATININE mg/dL 0.73 0.73 0.75   EGFR ml/min/1.73sq m 114 114 113   CALCIUM mg/dL 9.2 9.0 8.8   AST U/L 15 13 14   ALT U/L 15 12 11   ALK PHOS U/L 101 100 104     Results from last 7 days   Lab Units 23  2132 23  1741   BLOOD CULTURE  No Growth After 4 Days. No Growth After 5 Days. Lab interpretation/comments: No leukocytosis, thrombocytosis ongoing    Imaging interpretation/comments: No new images    Culture data: No new cultures.

## 2023-07-20 NOTE — DISCHARGE SUMMARY
[address]  Discharge- Ruby Median Mbow 1980, 43 y.o. male MRN: [id number]  Unit/Bed#: -Yaneli Encounter: [id number]  Primary Care Provider: ANGELIQUE Walls   Date and time admitted to hospital: 7/12/2023  3:21 PM    Admitting Provider:  Cammy Betancourt DO  Discharge Provider:  Joel Song MD  Admission Date: 7/12/2023       Discharge Date: 07/20/23   LOS: 8  Primary Care Physician at Discharge: Casey Aguirre, [address] [place] COURSE:  Donna Sawyer is a 43 y.o. male who presented with sepsis secondary to septic pulmonary emboli with cavitation and liver abscess. Patient was treated for the same and is being discharged home on PICC line with intravenous antibiotics. Patient to get antibiotics till 8/22/2023. Given the history of noncompliance, patient has been strictly instructed about the need for compliance. Will need follow-up with infectious disease and primary care physician as outpatient.   Patient advised about healthy diet    REASON FOR ADMISSION/ ADMISSION DIAGNOSES    DISCHARGE DIAGNOSES  * Cavitary pneumonia  Assessment & Plan  · Patient had recently left against medical advice and nonadherent to completion of levofloxacin   · Infectious disease consulted recommending intravenous ceftriaxone 2 g IV till August 22, 2023  · PICC line today and possible removal of liver abscess drain by IR  · Discharge plan in progress    History of noncompliance with medical treatment  Assessment & Plan  · Eloped twice during hospitalization 6/26 and 7/4 and has eloped 4 times since then from this campus's ED as well as [facility]  · Educated on risks of leaving AMA again and patient specifically asked what happened if he does not get treatment for worsening pneumonia and reviewed risks up to and including death and significance of continuing treatment with antibiotics and blood thinner which he agrees he will at this time  · Patient has been advised to very importantly about compliance with the medication treatment plan    Anemia  Assessment & Plan  · Stable, likely multifactorial-hemoglobin around 7.5; slightly decreasing likely secondary to chronic disease  · Monitor hemoglobin as outpatient and follow-up with PCP    Type 2 diabetes mellitus without complication, without long-term current use of insulin [facility])  Assessment & Plan  Lab Results   Component Value Date    HGBA1C 12.0 (H) 07/05/2023       Recent Labs     07/19/23  1547 07/19/23  2047 07/20/23  1051 07/20/23  1557   POCGLU 117 209* 132 181*       Blood Sugar Average: Last 72 hrs:  · (P) [id number]   · Most likely control is challenging due to continued infection  · Carb controlled diet advised  · Patient verbalized understanding of plan    Hyponatremia  Assessment & Plan  Sodium improved and stable    Septic embolism (HCC)  Assessment & Plan  · Felt to be secondary to nonadherence to antibiotic therapy   · CT negative for PE, Continue Eliquis for 7 more days    Liver abscess  Assessment & Plan  · IR drain removed on July 20, 2023 prior to discharge    Sepsis [facility])  Assessment & Plan  · Meeting criteria due to leukocytosis, tachycardia in the setting of worsening progression of pneumonia. Admitted previously with Klebsiella pneumonia and unfortunately left without completing treatment. · Improved  · Needs long-term antibiotics for the cavitary pneumonia. CONSULTING PROVIDERS   IP CONSULT TO INFECTIOUS DISEASES  IP CONSULT TO PHARMACY  INPATIENT CONSULT TO IR  IP CONSULT TO PICC TEAM    PROCEDURES PERFORMED  * No surgery found *    RADIOLOGY RESULTS  PE Study with CT abdomen & pelvis with contrast    Result Date: 7/12/2023  Impression: No pulmonary embolism. Progression of multi lobar consolidation in the right lung with both increase in size and cavitation and multiple other scattered septic emboli. Reactive mediastinal and hilar nodes.  No residual abscess in the liver status post drainage catheter placement. Workstation performed: [id number]       LABS  Results from last 7 days   Lab Units 07/20/23  0516 07/19/23  0545 07/18/23  0626 07/16/23  0626 07/15/23  0446 07/14/23  0234   WBC Thousand/uL 8.56 8.14 8.43 8.99 12.53* 16.79*   HEMOGLOBIN g/dL 7.7* 8.3* 7.5* 7.7* 7.7* 8.0*   HEMATOCRIT % 25.3* 26.9* 24.3* 24.8* 24.1* 25.0*   MCV fL 86 86 86 85 85 85   TOTAL NEUT ABS Thousand/uL  --   --  4.64  --   --   --    PLATELETS Thousands/uL 646* 578* 620* 557* 564* 581*   INR  1.12  --   --   --   --   --      Results from last 7 days   Lab Units 07/20/23  0516 07/19/23 0545 07/18/23  0626 07/16/23  0626 07/15/23  0446 07/14/23  0234   SODIUM mmol/L 137 137 139 135 134* 131*   POTASSIUM mmol/L 4.0 4.0 4.1 3.8 3.8 3.8   CHLORIDE mmol/L 103 104 106 103 102 100   CO2 mmol/L 28 27 28 26 26 21   BUN mg/dL 8 7 6 4* 5 6   CREATININE mg/dL 0.73 0.73 0.75 0.68 0.75 0.81   CALCIUM mg/dL 9.2 9.0 8.8 8.4 8.2* 8.2*   ALBUMIN g/dL 3.0* 2.9* 2.8*  --  2.8* 2.9*   TOTAL BILIRUBIN mg/dL 0.31 0.31 0.33  --  0.50 0.70   ALK PHOS U/L 101 100 104  --  129* 116*   ALT U/L 15 12 11  --  10 13   AST U/L 15 13 14  --  9* 9*   EGFR ml/min/1.73sq m 114 114 113 117 113 109   GLUCOSE RANDOM mg/dL 175* 187* 170* 188* 209* 205*                  Results from last 7 days   Lab Units 07/20/23  1557 07/20/23  1051 07/19/23  2047 07/19/23  1547 07/19/23  1115 07/19/23  0731 07/18/23  2104 07/18/23  1632 07/18/23  1130 07/18/23  0755   POC GLUCOSE mg/dl 181* 132 209* 117 274* 190* 275* 195* 243* 189*                       Cultures:         Invalid input(s): "URIBILINOGEN"        Results from last 7 days   Lab Units 07/14/23  2132 07/14/23  1741   BLOOD CULTURE  No Growth After 5 Days. No Growth After 5 Days.        PHYSICAL EXAM:  Vitals:   Blood Pressure: 134/81 (07/20/23 1323)  Pulse: 88 (07/20/23 1555)  Temperature: 99.2 °F (37.3 °C) (07/20/23 1555)  Temp Source: Oral (07/14/23 2004)  Respirations: 14 (07/20/23 1555)  Height: 5' 5" (165.1 cm) (07/15/23 0700)  Weight - Scale: 74.2 kg (163 lb 9.3 oz) (07/17/23 2055)  SpO2: 98 % (07/20/23 1555)    General appearance: alert, appears stated age and cooperative  HEENT - atraumatic and normocephalic  Neck- supple  Skin - no fresh rash  Extremities no fresh focal deformities  Cardiovascular- S1-S2 heard  Respiratory- bilateral air entry present, no crackles or rhonchi  Skin - no fresh rash  Abdomen - normal bowel sounds present, no rebound tenderness  CNS- No fresh focal deficits  Psych- no acute psychosis     Planned Re-admission: No  Discharge Disposition: Home with instructions to come to outpatient infusion center    Test Results Pending at Discharge:   Incidental findings: None    Medications   · Summary of Medication Adjustments made as a result of this hospitalization: Intravenous ceftriaxone for a total of 6 weeks  · Medication Dosing Tapers - Please refer to Discharge Medication List for details on any medication dosing tapers (if applicable to patient). · Discharge Medication List: See after visit summary for reconciled discharge medications. Diet restrictions: Heart healthy diet with low carbohydrate       Diet Orders   (From admission, onward)             Start     Ordered    07/12/23 2158  Diet Erik/CHO Controlled; Consistent Carbohydrate Diet Level 2 (5 carb servings/75 grams CHO/meal)  Diet effective now        References:    Adult Nutrition Support Algorithm    RD Therapeutic Diet Order Protocol   Question Answer Comment   Diet Type Erik/CHO Controlled    Erik/CHO Controlled Consistent Carbohydrate Diet Level 2 (5 carb servings/75 grams CHO/meal)    RD to adjust diet per protocol? Yes        07/12/23 2157              Activity restrictions: No strenuous activity  Discharge Condition: stable    Outpatient Follow-Up and Discharge Instructions  See after visit summary section titled Discharge Instructions for information provided to patient and family. Code Status: Level 1 - Full Code  Discharge Statement   I spent 45 minutes discharging the patient. This time was spent on the day of discharge. Greater than 50% of total time was spent with the patient and / or family counseling and / or coordination of care. Dominic Jewell MD  [facility]    ** Please Note: This note has been constructed using a voice recognition system.  **

## 2023-07-21 ENCOUNTER — HOSPITAL ENCOUNTER (OUTPATIENT)
Dept: INFUSION CENTER | Facility: CLINIC | Age: 43
End: 2023-07-21
Payer: COMMERCIAL

## 2023-07-21 VITALS
DIASTOLIC BLOOD PRESSURE: 66 MMHG | SYSTOLIC BLOOD PRESSURE: 113 MMHG | TEMPERATURE: 98.6 F | RESPIRATION RATE: 17 BRPM | HEART RATE: 88 BPM

## 2023-07-21 DIAGNOSIS — J18.9 CAVITARY PNEUMONIA: ICD-10-CM

## 2023-07-21 DIAGNOSIS — B96.1 BACTEREMIA DUE TO KLEBSIELLA PNEUMONIAE: ICD-10-CM

## 2023-07-21 DIAGNOSIS — R78.81 BACTEREMIA DUE TO KLEBSIELLA PNEUMONIAE: ICD-10-CM

## 2023-07-21 DIAGNOSIS — J98.4 CAVITARY PNEUMONIA: ICD-10-CM

## 2023-07-21 DIAGNOSIS — K75.0 LIVER ABSCESS: Primary | ICD-10-CM

## 2023-07-21 PROCEDURE — 96365 THER/PROPH/DIAG IV INF INIT: CPT

## 2023-07-21 RX ORDER — CEFTRIAXONE 2 G/50ML
2000 INJECTION, SOLUTION INTRAVENOUS ONCE
Status: COMPLETED | OUTPATIENT
Start: 2023-07-21 | End: 2023-07-21

## 2023-07-21 RX ORDER — SODIUM CHLORIDE 9 MG/ML
20 INJECTION, SOLUTION INTRAVENOUS ONCE
Status: COMPLETED | OUTPATIENT
Start: 2023-07-21 | End: 2023-07-21

## 2023-07-21 RX ORDER — SODIUM CHLORIDE 9 MG/ML
20 INJECTION, SOLUTION INTRAVENOUS ONCE
Status: CANCELLED | OUTPATIENT
Start: 2023-07-22

## 2023-07-21 RX ADMIN — CEFTRIAXONE 2000 MG: 2 INJECTION, SOLUTION INTRAVENOUS at 08:35

## 2023-07-21 RX ADMIN — SODIUM CHLORIDE 20 ML/HR: 0.9 INJECTION, SOLUTION INTRAVENOUS at 08:32

## 2023-07-21 NOTE — NURSING NOTE
Lesia SALGUERO notified this writer that she witnessed patient returning to his room wearing regular clothing. Patient left the unit to buy coffee in the cafeteria without notifying staff. Patient was notified by this writer that once admitted patients cannot leave the unit unless accompanied by staff member. Patient verbalized understanding. Will continue to monitor. Frequent comfort rounds initiated.

## 2023-07-21 NOTE — PROGRESS NOTES
Pt reports to unit feeling well with no complaints. Patient receiving IV Rocephin today. Patient R PICC utilized for antibiotic use, tolerated infusions well without any adverse events, positive blood return, NS locked and passive disinfecting cap applied. Patient aware of next appointment, AVS declined.

## 2023-07-22 ENCOUNTER — HOSPITAL ENCOUNTER (OUTPATIENT)
Dept: INFUSION CENTER | Facility: CLINIC | Age: 43
Discharge: HOME | End: 2023-07-22
Payer: COMMERCIAL

## 2023-07-22 VITALS
DIASTOLIC BLOOD PRESSURE: 85 MMHG | TEMPERATURE: 97.5 F | SYSTOLIC BLOOD PRESSURE: 137 MMHG | OXYGEN SATURATION: 98 % | HEART RATE: 99 BPM | RESPIRATION RATE: 18 BRPM

## 2023-07-22 DIAGNOSIS — K75.0 LIVER ABSCESS: Primary | ICD-10-CM

## 2023-07-22 DIAGNOSIS — J98.4 CAVITARY PNEUMONIA: ICD-10-CM

## 2023-07-22 DIAGNOSIS — R78.81 BACTEREMIA DUE TO KLEBSIELLA PNEUMONIAE: ICD-10-CM

## 2023-07-22 DIAGNOSIS — J18.9 CAVITARY PNEUMONIA: ICD-10-CM

## 2023-07-22 DIAGNOSIS — B96.1 BACTEREMIA DUE TO KLEBSIELLA PNEUMONIAE: ICD-10-CM

## 2023-07-22 PROCEDURE — 96365 THER/PROPH/DIAG IV INF INIT: CPT

## 2023-07-22 RX ORDER — SODIUM CHLORIDE 9 MG/ML
20 INJECTION, SOLUTION INTRAVENOUS ONCE
Status: CANCELLED | OUTPATIENT
Start: 2023-07-23

## 2023-07-22 RX ORDER — SODIUM CHLORIDE 9 MG/ML
20 INJECTION, SOLUTION INTRAVENOUS ONCE
Status: COMPLETED | OUTPATIENT
Start: 2023-07-22 | End: 2023-07-22

## 2023-07-22 RX ADMIN — CEFTRIAXONE SODIUM 2000 MG: 2 INJECTION, POWDER, FOR SOLUTION INTRAMUSCULAR; INTRAVENOUS at 09:32

## 2023-07-22 RX ADMIN — SODIUM CHLORIDE 20 ML/HR: 0.9 INJECTION, SOLUTION INTRAVENOUS at 09:32

## 2023-07-22 NOTE — PROGRESS NOTES
Patient presents today for IV antibiotic infusion. Patient offers no complaints. VSS. PICC line intact with excellent blood return noted. Antibiotic infusing as per order.

## 2023-07-22 NOTE — PROGRESS NOTES
Patient tolerated his treatment without any adverse reactions.  Next appointment confirmed and avs given

## 2023-07-23 ENCOUNTER — HOSPITAL ENCOUNTER (OUTPATIENT)
Dept: INFUSION CENTER | Facility: CLINIC | Age: 43
Discharge: HOME | End: 2023-07-23
Payer: COMMERCIAL

## 2023-07-23 VITALS
TEMPERATURE: 97.1 F | RESPIRATION RATE: 20 BRPM | HEART RATE: 75 BPM | SYSTOLIC BLOOD PRESSURE: 120 MMHG | DIASTOLIC BLOOD PRESSURE: 76 MMHG

## 2023-07-23 DIAGNOSIS — R78.81 BACTEREMIA DUE TO KLEBSIELLA PNEUMONIAE: ICD-10-CM

## 2023-07-23 DIAGNOSIS — K75.0 LIVER ABSCESS: Primary | ICD-10-CM

## 2023-07-23 DIAGNOSIS — J18.9 CAVITARY PNEUMONIA: ICD-10-CM

## 2023-07-23 DIAGNOSIS — B96.1 BACTEREMIA DUE TO KLEBSIELLA PNEUMONIAE: ICD-10-CM

## 2023-07-23 DIAGNOSIS — J98.4 CAVITARY PNEUMONIA: ICD-10-CM

## 2023-07-23 PROCEDURE — 96365 THER/PROPH/DIAG IV INF INIT: CPT

## 2023-07-23 RX ORDER — SODIUM CHLORIDE 9 MG/ML
20 INJECTION, SOLUTION INTRAVENOUS ONCE
Status: CANCELLED | OUTPATIENT
Start: 2023-07-24

## 2023-07-23 RX ORDER — CEFTRIAXONE 2 G/50ML
2000 INJECTION, SOLUTION INTRAVENOUS ONCE
Status: CANCELLED | OUTPATIENT
Start: 2023-07-24

## 2023-07-23 RX ORDER — SODIUM CHLORIDE 9 MG/ML
20 INJECTION, SOLUTION INTRAVENOUS ONCE
Status: COMPLETED | OUTPATIENT
Start: 2023-07-23 | End: 2023-07-23

## 2023-07-23 RX ADMIN — SODIUM CHLORIDE 20 ML/HR: 0.9 INJECTION, SOLUTION INTRAVENOUS at 08:50

## 2023-07-23 RX ADMIN — CEFTRIAXONE SODIUM 2000 MG: 10 INJECTION, POWDER, FOR SOLUTION INTRAVENOUS at 08:50

## 2023-07-23 NOTE — PROGRESS NOTES
Patient here for antibiotic infusion. PICC line flushes without issue. Blood return noted. Call bell within reach.

## 2023-07-24 ENCOUNTER — TELEPHONE (OUTPATIENT)
Dept: INFECTIOUS DISEASES | Facility: CLINIC | Age: 43
End: 2023-07-24

## 2023-07-24 ENCOUNTER — HOSPITAL ENCOUNTER (OUTPATIENT)
Dept: INFUSION CENTER | Facility: CLINIC | Age: 43
Discharge: HOME | End: 2023-07-24
Payer: COMMERCIAL

## 2023-07-24 VITALS
RESPIRATION RATE: 18 BRPM | HEART RATE: 89 BPM | SYSTOLIC BLOOD PRESSURE: 112 MMHG | TEMPERATURE: 98.5 F | DIASTOLIC BLOOD PRESSURE: 67 MMHG

## 2023-07-24 DIAGNOSIS — I76 SEPTIC EMBOLISM (HCC): ICD-10-CM

## 2023-07-24 DIAGNOSIS — K75.0 LIVER ABSCESS: Primary | ICD-10-CM

## 2023-07-24 DIAGNOSIS — J98.4 CAVITARY PNEUMONIA: ICD-10-CM

## 2023-07-24 DIAGNOSIS — J18.9 CAVITARY PNEUMONIA: ICD-10-CM

## 2023-07-24 DIAGNOSIS — R78.81 BACTEREMIA DUE TO KLEBSIELLA PNEUMONIAE: ICD-10-CM

## 2023-07-24 DIAGNOSIS — B96.1 BACTEREMIA DUE TO KLEBSIELLA PNEUMONIAE: ICD-10-CM

## 2023-07-24 PROCEDURE — 96365 THER/PROPH/DIAG IV INF INIT: CPT

## 2023-07-24 RX ORDER — SODIUM CHLORIDE 9 MG/ML
20 INJECTION, SOLUTION INTRAVENOUS ONCE
Status: COMPLETED | OUTPATIENT
Start: 2023-07-24 | End: 2023-07-24

## 2023-07-24 RX ORDER — CEFTRIAXONE 2 G/50ML
2000 INJECTION, SOLUTION INTRAVENOUS ONCE
Status: COMPLETED | OUTPATIENT
Start: 2023-07-24 | End: 2023-07-24

## 2023-07-24 RX ORDER — CEFTRIAXONE 2 G/50ML
2000 INJECTION, SOLUTION INTRAVENOUS ONCE
Status: CANCELLED | OUTPATIENT
Start: 2023-07-25

## 2023-07-24 RX ORDER — SODIUM CHLORIDE 9 MG/ML
20 INJECTION, SOLUTION INTRAVENOUS ONCE
Status: CANCELLED | OUTPATIENT
Start: 2023-07-25

## 2023-07-24 RX ADMIN — CEFTRIAXONE 2000 MG: 2 INJECTION, SOLUTION INTRAVENOUS at 13:15

## 2023-07-24 RX ADMIN — SODIUM CHLORIDE 20 ML/HR: 0.9 INJECTION, SOLUTION INTRAVENOUS at 13:14

## 2023-07-24 NOTE — PROGRESS NOTES
OPAT NOTE    Supervising/Discharge physician: Bridgette Redding pulmonary emboli w/cavitation, Lliver abscess w/adjacent thrombosis    Drug: Ceftriaxone     Dose/Frequency: 2gQ24    Labs/Frequency: CMP Weekly     End Date: CT resolution     Infusion/VNA contact: [facility]    Next appointment:8/3        MA assigned:

## 2023-07-24 NOTE — TELEPHONE ENCOUNTER
Called [facility] and spoke with Fahad. Informed Fahad I was calling to get patient scheduled for a CT scan. Fahad states they have no available appointments at [facility]. Fahad has patient scheduled 8/8/23 at 11AM at [facility]. Per Fahad nothing to eat 3 hours prior, clear liquids only prior to appointment. Called and spoke with patient. Patient currently receiving antibiotics at [facility]. Weekly CMP will also be done at [address] [place] [address]. Reminded patient of his follow up appointment 8/3/23 at 10:30AM with Deirdre Ramirez at the [facility] office. Informed patient Dr. Vielka Campbell did order a repeat CT scan. This is scheduled 8/8/23 at 11AM at [facility]. Informed patient if there are any further questions or concerns to call the office. Patient verbalizes understanding at this time. Called and spoke with patients sister Usha who is helping him keep track of appointment. Informed Usha antibiotics and weekly labs will be done at [facility]. Informed Usha patient has a follow up appointment 8/3/23 at 10:30AM with Deirdre Ramirez at the [facility] office. Also informed Usha patient needs repeat CT scan. This is scheduled 8/8/23 at 11AM at [facility]. Informed Usha if there are any further questions or concerns to call the office. Usha verbalizes understanding at this time.

## 2023-07-24 NOTE — PROGRESS NOTES
Pt reports to unit feeling well with no complaints. Patient receiving IV Rocephin today. Patient R PICC utilized for antibiotic use, tolerated infusions well without any adverse events, positive blood return throughout, passive disinfecting cap applied prior to exit. Patient aware of next appointment, AVS declined.

## 2023-07-25 ENCOUNTER — HOSPITAL ENCOUNTER (OUTPATIENT)
Dept: INFUSION CENTER | Facility: CLINIC | Age: 43
Discharge: HOME | End: 2023-07-25
Payer: COMMERCIAL

## 2023-07-25 VITALS
TEMPERATURE: 98.6 F | RESPIRATION RATE: 18 BRPM | SYSTOLIC BLOOD PRESSURE: 122 MMHG | HEART RATE: 89 BPM | DIASTOLIC BLOOD PRESSURE: 68 MMHG

## 2023-07-25 DIAGNOSIS — K75.0 LIVER ABSCESS: Primary | ICD-10-CM

## 2023-07-25 DIAGNOSIS — R78.81 BACTEREMIA DUE TO KLEBSIELLA PNEUMONIAE: ICD-10-CM

## 2023-07-25 DIAGNOSIS — J18.9 CAVITARY PNEUMONIA: ICD-10-CM

## 2023-07-25 DIAGNOSIS — B96.1 BACTEREMIA DUE TO KLEBSIELLA PNEUMONIAE: ICD-10-CM

## 2023-07-25 DIAGNOSIS — J98.4 CAVITARY PNEUMONIA: ICD-10-CM

## 2023-07-25 LAB
ALBUMIN SERPL BCP-MCNC: 3.8 G/DL (ref 3.5–5)
ALP SERPL-CCNC: 113 U/L (ref 34–104)
ALT SERPL W P-5'-P-CCNC: 22 U/L (ref 7–52)
ANION GAP SERPL CALCULATED.3IONS-SCNC: 8 MMOL/L
AST SERPL W P-5'-P-CCNC: 18 U/L (ref 13–39)
BASOPHILS # BLD AUTO: 0.05 THOUSANDS/ÂΜL (ref 0–0.1)
BASOPHILS NFR BLD AUTO: 1 % (ref 0–1)
BILIRUB SERPL-MCNC: 0.42 MG/DL (ref 0.2–1)
BUN SERPL-MCNC: 18 MG/DL (ref 5–25)
CALCIUM SERPL-MCNC: 9.7 MG/DL (ref 8.4–10.2)
CHLORIDE SERPL-SCNC: 99 MMOL/L (ref 96–108)
CO2 SERPL-SCNC: 26 MMOL/L (ref 21–32)
CREAT SERPL-MCNC: 0.93 MG/DL (ref 0.6–1.3)
EOSINOPHIL # BLD AUTO: 0.22 THOUSAND/ÂΜL (ref 0–0.61)
EOSINOPHIL NFR BLD AUTO: 3 % (ref 0–6)
ERYTHROCYTE [DISTWIDTH] IN BLOOD BY AUTOMATED COUNT: 16.7 % (ref 11.6–15.1)
GFR SERPL CREATININE-BSD FRML MDRD: 100 ML/MIN/1.73SQ M
GLUCOSE SERPL-MCNC: 281 MG/DL (ref 65–140)
HCT VFR BLD AUTO: 28.7 % (ref 36.5–49.3)
HGB BLD-MCNC: 9.1 G/DL (ref 12–17)
IMM GRANULOCYTES # BLD AUTO: 0.03 THOUSAND/UL (ref 0–0.2)
IMM GRANULOCYTES NFR BLD AUTO: 0 % (ref 0–2)
LYMPHOCYTES # BLD AUTO: 2.84 THOUSANDS/ÂΜL (ref 0.6–4.47)
LYMPHOCYTES NFR BLD AUTO: 36 % (ref 14–44)
MCH RBC QN AUTO: 27.5 PG (ref 26.8–34.3)
MCHC RBC AUTO-ENTMCNC: 31.7 G/DL (ref 31.4–37.4)
MCV RBC AUTO: 87 FL (ref 82–98)
MONOCYTES # BLD AUTO: 0.58 THOUSAND/ÂΜL (ref 0.17–1.22)
MONOCYTES NFR BLD AUTO: 7 % (ref 4–12)
NEUTROPHILS # BLD AUTO: 4.26 THOUSANDS/ÂΜL (ref 1.85–7.62)
NEUTS SEG NFR BLD AUTO: 53 % (ref 43–75)
NRBC BLD AUTO-RTO: 0 /100 WBCS
PLATELET # BLD AUTO: 813 THOUSANDS/UL (ref 149–390)
PMV BLD AUTO: 9.6 FL (ref 8.9–12.7)
POTASSIUM SERPL-SCNC: 4 MMOL/L (ref 3.5–5.3)
PROT SERPL-MCNC: 8.6 G/DL (ref 6.4–8.4)
RBC # BLD AUTO: 3.31 MILLION/UL (ref 3.88–5.62)
SODIUM SERPL-SCNC: 133 MMOL/L (ref 135–147)
WBC # BLD AUTO: 7.98 THOUSAND/UL (ref 4.31–10.16)

## 2023-07-25 PROCEDURE — 80053 COMPREHEN METABOLIC PANEL: CPT | Performed by: INTERNAL MEDICINE

## 2023-07-25 PROCEDURE — 85025 COMPLETE CBC W/AUTO DIFF WBC: CPT | Performed by: INTERNAL MEDICINE

## 2023-07-25 RX ORDER — CEFTRIAXONE 2 G/50ML
2000 INJECTION, SOLUTION INTRAVENOUS ONCE
Status: COMPLETED | OUTPATIENT
Start: 2023-07-25 | End: 2023-07-25

## 2023-07-25 RX ORDER — CEFTRIAXONE 2 G/50ML
2000 INJECTION, SOLUTION INTRAVENOUS ONCE
Status: CANCELLED | OUTPATIENT
Start: 2023-07-26

## 2023-07-25 RX ORDER — SODIUM CHLORIDE 9 MG/ML
20 INJECTION, SOLUTION INTRAVENOUS ONCE
Status: COMPLETED | OUTPATIENT
Start: 2023-07-25 | End: 2023-07-25

## 2023-07-25 RX ORDER — SODIUM CHLORIDE 9 MG/ML
20 INJECTION, SOLUTION INTRAVENOUS ONCE
Status: CANCELLED | OUTPATIENT
Start: 2023-07-26

## 2023-07-25 RX ADMIN — SODIUM CHLORIDE 20 ML/HR: 0.9 INJECTION, SOLUTION INTRAVENOUS at 13:18

## 2023-07-25 RX ADMIN — CEFTRIAXONE 2000 MG: 2 INJECTION, SOLUTION INTRAVENOUS at 13:24

## 2023-07-25 NOTE — PROGRESS NOTES
Pt reports to unit feeling well, states last night after his shower he started feeling itchy from his chest down, with small bumpy rash on b/l legs, patient states he put vicks vapor rub on his legs and it dissipated within 2 hours. Lucio Courtney MD aware, as per MD quesada with treatment and monitor for symptoms, patient made aware. Patient aware to go to ED should he have a delayed reaction. Patient receiving IV Rocephin today. Patient R PICC utilized for antibiotic use, tolerated infusions well without any adverse events, positive blood return throughout, passive disinfecting cap applied to cap. Patient aware of next appointment, AVS declined.

## 2023-07-26 ENCOUNTER — HOSPITAL ENCOUNTER (OUTPATIENT)
Dept: INFUSION CENTER | Facility: CLINIC | Age: 43
Discharge: HOME | End: 2023-07-26
Payer: COMMERCIAL

## 2023-07-26 VITALS
RESPIRATION RATE: 18 BRPM | HEART RATE: 85 BPM | TEMPERATURE: 97.6 F | DIASTOLIC BLOOD PRESSURE: 60 MMHG | SYSTOLIC BLOOD PRESSURE: 119 MMHG

## 2023-07-26 DIAGNOSIS — K75.0 LIVER ABSCESS: Primary | ICD-10-CM

## 2023-07-26 DIAGNOSIS — B96.1 BACTEREMIA DUE TO KLEBSIELLA PNEUMONIAE: ICD-10-CM

## 2023-07-26 DIAGNOSIS — J18.9 CAVITARY PNEUMONIA: ICD-10-CM

## 2023-07-26 DIAGNOSIS — J98.4 CAVITARY PNEUMONIA: ICD-10-CM

## 2023-07-26 DIAGNOSIS — R78.81 BACTEREMIA DUE TO KLEBSIELLA PNEUMONIAE: ICD-10-CM

## 2023-07-26 PROCEDURE — 96365 THER/PROPH/DIAG IV INF INIT: CPT

## 2023-07-26 RX ORDER — CEFTRIAXONE 2 G/50ML
2000 INJECTION, SOLUTION INTRAVENOUS ONCE
Status: COMPLETED | OUTPATIENT
Start: 2023-07-26 | End: 2023-07-26

## 2023-07-26 RX ORDER — CEFTRIAXONE 2 G/50ML
2000 INJECTION, SOLUTION INTRAVENOUS ONCE
Status: CANCELLED | OUTPATIENT
Start: 2023-07-27

## 2023-07-26 RX ORDER — SODIUM CHLORIDE 9 MG/ML
20 INJECTION, SOLUTION INTRAVENOUS ONCE
Status: CANCELLED | OUTPATIENT
Start: 2023-07-27

## 2023-07-26 RX ORDER — SODIUM CHLORIDE 9 MG/ML
20 INJECTION, SOLUTION INTRAVENOUS ONCE
Status: COMPLETED | OUTPATIENT
Start: 2023-07-26 | End: 2023-07-26

## 2023-07-26 RX ADMIN — CEFTRIAXONE 2000 MG: 2 INJECTION, SOLUTION INTRAVENOUS at 11:54

## 2023-07-26 RX ADMIN — SODIUM CHLORIDE 20 ML/HR: 0.9 INJECTION, SOLUTION INTRAVENOUS at 11:49

## 2023-07-26 NOTE — PROGRESS NOTES
Pt presents for Rocephin infusion offering no complaints. Pt tolerated treatment without incident. AVS declined. Next appointment reviewed.

## 2023-07-27 ENCOUNTER — HOSPITAL ENCOUNTER (OUTPATIENT)
Dept: INFUSION CENTER | Facility: CLINIC | Age: 43
Discharge: HOME | End: 2023-07-27
Payer: COMMERCIAL

## 2023-07-27 ENCOUNTER — TELEPHONE (OUTPATIENT)
Dept: HEMATOLOGY ONCOLOGY | Facility: CLINIC | Age: 43
End: 2023-07-27

## 2023-07-27 VITALS
SYSTOLIC BLOOD PRESSURE: 118 MMHG | DIASTOLIC BLOOD PRESSURE: 66 MMHG | TEMPERATURE: 97.3 F | RESPIRATION RATE: 17 BRPM | HEART RATE: 89 BPM

## 2023-07-27 DIAGNOSIS — R78.81 BACTEREMIA DUE TO KLEBSIELLA PNEUMONIAE: ICD-10-CM

## 2023-07-27 DIAGNOSIS — J18.9 CAVITARY PNEUMONIA: ICD-10-CM

## 2023-07-27 DIAGNOSIS — B96.1 BACTEREMIA DUE TO KLEBSIELLA PNEUMONIAE: ICD-10-CM

## 2023-07-27 DIAGNOSIS — J98.4 CAVITARY PNEUMONIA: ICD-10-CM

## 2023-07-27 DIAGNOSIS — I76 SEPTIC EMBOLISM (HCC): ICD-10-CM

## 2023-07-27 DIAGNOSIS — K75.0 LIVER ABSCESS: Primary | ICD-10-CM

## 2023-07-27 DIAGNOSIS — I82.0 THROMBOSIS, HEPATIC VEIN (HCC): Primary | ICD-10-CM

## 2023-07-27 PROCEDURE — 96365 THER/PROPH/DIAG IV INF INIT: CPT

## 2023-07-27 RX ORDER — SODIUM CHLORIDE 9 MG/ML
20 INJECTION, SOLUTION INTRAVENOUS ONCE
Status: CANCELLED | OUTPATIENT
Start: 2023-07-28

## 2023-07-27 RX ORDER — CEFTRIAXONE 2 G/50ML
2000 INJECTION, SOLUTION INTRAVENOUS ONCE
Status: COMPLETED | OUTPATIENT
Start: 2023-07-27 | End: 2023-07-27

## 2023-07-27 RX ORDER — SODIUM CHLORIDE 9 MG/ML
20 INJECTION, SOLUTION INTRAVENOUS ONCE
Status: COMPLETED | OUTPATIENT
Start: 2023-07-27 | End: 2023-07-27

## 2023-07-27 RX ORDER — CEFTRIAXONE 2 G/50ML
2000 INJECTION, SOLUTION INTRAVENOUS ONCE
Status: CANCELLED | OUTPATIENT
Start: 2023-07-28

## 2023-07-27 RX ADMIN — CEFTRIAXONE 2000 MG: 2 INJECTION, SOLUTION INTRAVENOUS at 12:44

## 2023-07-27 RX ADMIN — SODIUM CHLORIDE 20 ML/HR: 0.9 INJECTION, SOLUTION INTRAVENOUS at 12:42

## 2023-07-27 NOTE — PROGRESS NOTES
Pt here for antibiotics, offering no complaints. Right PICC line  lumen flushed with positive blood return noted. Tolerated infusion without incident. PICC line flushed and NSL. PICC dsg changed today. AVS declined. Walked out in stable condition.

## 2023-07-27 NOTE — TELEPHONE ENCOUNTER
Patient was scheduled to see me today in clinic for hospital follow-up, patient had issues with insurance and cannot self-pay. I will place a CBC and a CMP for patient to have done prior to follow-up appointment with us as outpatient with Dr. Nadia Mckinney on 8/21/2023. Please call patient to inform him to have these labs completed prior to next office visit if he is able.

## 2023-07-28 ENCOUNTER — HOSPITAL ENCOUNTER (OUTPATIENT)
Dept: INFUSION CENTER | Facility: CLINIC | Age: 43
End: 2023-07-28
Payer: COMMERCIAL

## 2023-07-28 VITALS
TEMPERATURE: 98 F | RESPIRATION RATE: 18 BRPM | DIASTOLIC BLOOD PRESSURE: 64 MMHG | HEART RATE: 90 BPM | SYSTOLIC BLOOD PRESSURE: 119 MMHG

## 2023-07-28 DIAGNOSIS — B96.1 BACTEREMIA DUE TO KLEBSIELLA PNEUMONIAE: ICD-10-CM

## 2023-07-28 DIAGNOSIS — K75.0 LIVER ABSCESS: Primary | ICD-10-CM

## 2023-07-28 DIAGNOSIS — J18.9 CAVITARY PNEUMONIA: ICD-10-CM

## 2023-07-28 DIAGNOSIS — R78.81 BACTEREMIA DUE TO KLEBSIELLA PNEUMONIAE: ICD-10-CM

## 2023-07-28 DIAGNOSIS — J98.4 CAVITARY PNEUMONIA: ICD-10-CM

## 2023-07-28 PROCEDURE — 96365 THER/PROPH/DIAG IV INF INIT: CPT

## 2023-07-28 RX ORDER — SODIUM CHLORIDE 9 MG/ML
20 INJECTION, SOLUTION INTRAVENOUS ONCE
Status: COMPLETED | OUTPATIENT
Start: 2023-07-28 | End: 2023-07-28

## 2023-07-28 RX ORDER — SODIUM CHLORIDE 9 MG/ML
20 INJECTION, SOLUTION INTRAVENOUS ONCE
Status: CANCELLED | OUTPATIENT
Start: 2023-07-29

## 2023-07-28 RX ORDER — CEFTRIAXONE 2 G/50ML
2000 INJECTION, SOLUTION INTRAVENOUS ONCE
Status: COMPLETED | OUTPATIENT
Start: 2023-07-28 | End: 2023-07-28

## 2023-07-28 RX ADMIN — SODIUM CHLORIDE 20 ML/HR: 0.9 INJECTION, SOLUTION INTRAVENOUS at 08:23

## 2023-07-28 RX ADMIN — CEFTRIAXONE 2000 MG: 2 INJECTION, SOLUTION INTRAVENOUS at 08:45

## 2023-07-28 NOTE — PROGRESS NOTES
Pt to clinic for Rocephin. Offers no complaints today. Right PICC line lumen flushed with positive blood return noted. Tolerated infusion without complications. PICC line flushed with NS green passive disinfecting cap applied. Aware of next appointment. AVS given.

## 2023-07-29 ENCOUNTER — HOSPITAL ENCOUNTER (OUTPATIENT)
Dept: INFUSION CENTER | Facility: CLINIC | Age: 43
Discharge: HOME | End: 2023-07-29
Payer: COMMERCIAL

## 2023-07-29 VITALS
RESPIRATION RATE: 16 BRPM | TEMPERATURE: 97 F | SYSTOLIC BLOOD PRESSURE: 132 MMHG | HEART RATE: 85 BPM | DIASTOLIC BLOOD PRESSURE: 67 MMHG | OXYGEN SATURATION: 100 %

## 2023-07-29 DIAGNOSIS — R78.81 BACTEREMIA DUE TO KLEBSIELLA PNEUMONIAE: ICD-10-CM

## 2023-07-29 DIAGNOSIS — K75.0 LIVER ABSCESS: Primary | ICD-10-CM

## 2023-07-29 DIAGNOSIS — J18.9 CAVITARY PNEUMONIA: ICD-10-CM

## 2023-07-29 DIAGNOSIS — B96.1 BACTEREMIA DUE TO KLEBSIELLA PNEUMONIAE: ICD-10-CM

## 2023-07-29 DIAGNOSIS — J98.4 CAVITARY PNEUMONIA: ICD-10-CM

## 2023-07-29 PROCEDURE — 96365 THER/PROPH/DIAG IV INF INIT: CPT

## 2023-07-29 RX ORDER — SODIUM CHLORIDE 9 MG/ML
20 INJECTION, SOLUTION INTRAVENOUS ONCE
Status: CANCELLED | OUTPATIENT
Start: 2023-07-30

## 2023-07-29 RX ORDER — SODIUM CHLORIDE 9 MG/ML
20 INJECTION, SOLUTION INTRAVENOUS ONCE
Status: COMPLETED | OUTPATIENT
Start: 2023-07-29 | End: 2023-07-29

## 2023-07-29 RX ADMIN — SODIUM CHLORIDE 20 ML/HR: 0.9 INJECTION, SOLUTION INTRAVENOUS at 08:18

## 2023-07-29 RX ADMIN — CEFTRIAXONE SODIUM 2000 MG: 2 INJECTION, POWDER, FOR SOLUTION INTRAMUSCULAR; INTRAVENOUS at 08:19

## 2023-07-30 ENCOUNTER — HOSPITAL ENCOUNTER (OUTPATIENT)
Dept: INFUSION CENTER | Facility: CLINIC | Age: 43
Discharge: HOME | End: 2023-07-30
Payer: COMMERCIAL

## 2023-07-30 VITALS
RESPIRATION RATE: 18 BRPM | HEART RATE: 81 BPM | TEMPERATURE: 97.5 F | SYSTOLIC BLOOD PRESSURE: 123 MMHG | DIASTOLIC BLOOD PRESSURE: 79 MMHG

## 2023-07-30 DIAGNOSIS — R78.81 BACTEREMIA DUE TO KLEBSIELLA PNEUMONIAE: ICD-10-CM

## 2023-07-30 DIAGNOSIS — B96.1 BACTEREMIA DUE TO KLEBSIELLA PNEUMONIAE: ICD-10-CM

## 2023-07-30 DIAGNOSIS — K75.0 LIVER ABSCESS: Primary | ICD-10-CM

## 2023-07-30 DIAGNOSIS — J98.4 CAVITARY PNEUMONIA: ICD-10-CM

## 2023-07-30 DIAGNOSIS — J18.9 CAVITARY PNEUMONIA: ICD-10-CM

## 2023-07-30 PROCEDURE — 96365 THER/PROPH/DIAG IV INF INIT: CPT

## 2023-07-30 RX ORDER — SODIUM CHLORIDE 9 MG/ML
20 INJECTION, SOLUTION INTRAVENOUS ONCE
Status: COMPLETED | OUTPATIENT
Start: 2023-07-30 | End: 2023-07-30

## 2023-07-30 RX ORDER — SODIUM CHLORIDE 9 MG/ML
20 INJECTION, SOLUTION INTRAVENOUS ONCE
Status: CANCELLED | OUTPATIENT
Start: 2023-07-31

## 2023-07-30 RX ADMIN — CEFTRIAXONE SODIUM 2000 MG: 10 INJECTION, POWDER, FOR SOLUTION INTRAVENOUS at 08:45

## 2023-07-30 RX ADMIN — SODIUM CHLORIDE 20 ML/HR: 0.9 INJECTION, SOLUTION INTRAVENOUS at 08:40

## 2023-07-30 NOTE — PROGRESS NOTES
Patient to [address] for Rocephin: Offers no complaints at present time: Lab work ( 07/25/23 ) reviewed:  Within parameters to treat: Right UA Single Lumen PICC Line noted: Good blood return: Flushes without difficulty

## 2023-07-31 ENCOUNTER — HOSPITAL ENCOUNTER (OUTPATIENT)
Dept: INFUSION CENTER | Facility: CLINIC | Age: 43
Discharge: HOME | End: 2023-07-31
Payer: COMMERCIAL

## 2023-07-31 VITALS
HEART RATE: 86 BPM | DIASTOLIC BLOOD PRESSURE: 61 MMHG | SYSTOLIC BLOOD PRESSURE: 126 MMHG | TEMPERATURE: 96.9 F | RESPIRATION RATE: 18 BRPM

## 2023-07-31 DIAGNOSIS — J98.4 CAVITARY PNEUMONIA: ICD-10-CM

## 2023-07-31 DIAGNOSIS — R78.81 BACTEREMIA DUE TO KLEBSIELLA PNEUMONIAE: ICD-10-CM

## 2023-07-31 DIAGNOSIS — K75.0 LIVER ABSCESS: Primary | ICD-10-CM

## 2023-07-31 DIAGNOSIS — B96.1 BACTEREMIA DUE TO KLEBSIELLA PNEUMONIAE: ICD-10-CM

## 2023-07-31 DIAGNOSIS — J18.9 CAVITARY PNEUMONIA: ICD-10-CM

## 2023-07-31 PROCEDURE — 96365 THER/PROPH/DIAG IV INF INIT: CPT

## 2023-07-31 RX ORDER — CEFTRIAXONE 2 G/50ML
2000 INJECTION, SOLUTION INTRAVENOUS ONCE
Status: COMPLETED | OUTPATIENT
Start: 2023-07-31 | End: 2023-07-31

## 2023-07-31 RX ORDER — SODIUM CHLORIDE 9 MG/ML
20 INJECTION, SOLUTION INTRAVENOUS ONCE
Status: COMPLETED | OUTPATIENT
Start: 2023-07-31 | End: 2023-07-31

## 2023-07-31 RX ORDER — CEFTRIAXONE 2 G/50ML
2000 INJECTION, SOLUTION INTRAVENOUS ONCE
Status: CANCELLED | OUTPATIENT
Start: 2023-08-01

## 2023-07-31 RX ORDER — SODIUM CHLORIDE 9 MG/ML
20 INJECTION, SOLUTION INTRAVENOUS ONCE
Status: CANCELLED | OUTPATIENT
Start: 2023-08-01

## 2023-07-31 RX ADMIN — SODIUM CHLORIDE 20 ML/HR: 0.9 INJECTION, SOLUTION INTRAVENOUS at 08:35

## 2023-07-31 RX ADMIN — CEFTRIAXONE 2000 MG: 2 INJECTION, SOLUTION INTRAVENOUS at 08:35

## 2023-07-31 NOTE — PROGRESS NOTES
Pt here for antibiotics, offering no complaints. Right PICC line purple lumen flushed with positive blood return noted. Tolerated infusion without incident. PICC line flushed and NS, green cap placed. AVS declined. Walked out in stable condition.

## 2023-08-01 ENCOUNTER — HOSPITAL ENCOUNTER (OUTPATIENT)
Dept: INFUSION CENTER | Facility: CLINIC | Age: 43
Discharge: HOME | End: 2023-08-01

## 2023-08-01 VITALS
HEART RATE: 87 BPM | SYSTOLIC BLOOD PRESSURE: 116 MMHG | TEMPERATURE: 97.1 F | DIASTOLIC BLOOD PRESSURE: 68 MMHG | RESPIRATION RATE: 18 BRPM

## 2023-08-01 DIAGNOSIS — R78.81 BACTEREMIA DUE TO KLEBSIELLA PNEUMONIAE: ICD-10-CM

## 2023-08-01 DIAGNOSIS — J18.9 CAVITARY PNEUMONIA: ICD-10-CM

## 2023-08-01 DIAGNOSIS — B96.1 BACTEREMIA DUE TO KLEBSIELLA PNEUMONIAE: ICD-10-CM

## 2023-08-01 DIAGNOSIS — J98.4 CAVITARY PNEUMONIA: ICD-10-CM

## 2023-08-01 DIAGNOSIS — K75.0 LIVER ABSCESS: Primary | ICD-10-CM

## 2023-08-01 LAB
ALBUMIN SERPL BCP-MCNC: 3.9 G/DL (ref 3.5–5)
ALP SERPL-CCNC: 76 U/L (ref 34–104)
ALT SERPL W P-5'-P-CCNC: 27 U/L (ref 7–52)
ANION GAP SERPL CALCULATED.3IONS-SCNC: 7 MMOL/L
AST SERPL W P-5'-P-CCNC: 20 U/L (ref 13–39)
BASOPHILS # BLD AUTO: 0.11 THOUSANDS/ÂΜL (ref 0–0.1)
BASOPHILS NFR BLD AUTO: 2 % (ref 0–1)
BILIRUB SERPL-MCNC: 0.51 MG/DL (ref 0.2–1)
BUN SERPL-MCNC: 19 MG/DL (ref 5–25)
CALCIUM SERPL-MCNC: 9.9 MG/DL (ref 8.4–10.2)
CHLORIDE SERPL-SCNC: 104 MMOL/L (ref 96–108)
CO2 SERPL-SCNC: 25 MMOL/L (ref 21–32)
CREAT SERPL-MCNC: 0.91 MG/DL (ref 0.6–1.3)
EOSINOPHIL # BLD AUTO: 0.18 THOUSAND/ÂΜL (ref 0–0.61)
EOSINOPHIL NFR BLD AUTO: 3 % (ref 0–6)
ERYTHROCYTE [DISTWIDTH] IN BLOOD BY AUTOMATED COUNT: 16.6 % (ref 11.6–15.1)
GFR SERPL CREATININE-BSD FRML MDRD: 103 ML/MIN/1.73SQ M
GLUCOSE SERPL-MCNC: 251 MG/DL (ref 65–140)
HCT VFR BLD AUTO: 32.6 % (ref 36.5–49.3)
HGB BLD-MCNC: 10.3 G/DL (ref 12–17)
IMM GRANULOCYTES # BLD AUTO: 0.02 THOUSAND/UL (ref 0–0.2)
IMM GRANULOCYTES NFR BLD AUTO: 0 % (ref 0–2)
LYMPHOCYTES # BLD AUTO: 2.81 THOUSANDS/ÂΜL (ref 0.6–4.47)
LYMPHOCYTES NFR BLD AUTO: 41 % (ref 14–44)
MCH RBC QN AUTO: 27 PG (ref 26.8–34.3)
MCHC RBC AUTO-ENTMCNC: 31.6 G/DL (ref 31.4–37.4)
MCV RBC AUTO: 86 FL (ref 82–98)
MONOCYTES # BLD AUTO: 0.64 THOUSAND/ÂΜL (ref 0.17–1.22)
MONOCYTES NFR BLD AUTO: 9 % (ref 4–12)
NEUTROPHILS # BLD AUTO: 3.06 THOUSANDS/ÂΜL (ref 1.85–7.62)
NEUTS SEG NFR BLD AUTO: 45 % (ref 43–75)
NRBC BLD AUTO-RTO: 0 /100 WBCS
PLATELET # BLD AUTO: 613 THOUSANDS/UL (ref 149–390)
PMV BLD AUTO: 10.1 FL (ref 8.9–12.7)
POTASSIUM SERPL-SCNC: 4 MMOL/L (ref 3.5–5.3)
PROT SERPL-MCNC: 8.7 G/DL (ref 6.4–8.4)
RBC # BLD AUTO: 3.81 MILLION/UL (ref 3.88–5.62)
SODIUM SERPL-SCNC: 136 MMOL/L (ref 135–147)
WBC # BLD AUTO: 6.82 THOUSAND/UL (ref 4.31–10.16)

## 2023-08-01 PROCEDURE — 85025 COMPLETE CBC W/AUTO DIFF WBC: CPT | Performed by: INTERNAL MEDICINE

## 2023-08-01 PROCEDURE — 80053 COMPREHEN METABOLIC PANEL: CPT | Performed by: INTERNAL MEDICINE

## 2023-08-01 RX ORDER — SODIUM CHLORIDE 9 MG/ML
20 INJECTION, SOLUTION INTRAVENOUS ONCE
Status: CANCELLED | OUTPATIENT
Start: 2023-08-02

## 2023-08-01 RX ORDER — SODIUM CHLORIDE 9 MG/ML
20 INJECTION, SOLUTION INTRAVENOUS ONCE
Status: COMPLETED | OUTPATIENT
Start: 2023-08-01 | End: 2023-08-01

## 2023-08-01 RX ORDER — CEFTRIAXONE 2 G/50ML
2000 INJECTION, SOLUTION INTRAVENOUS ONCE
Status: COMPLETED | OUTPATIENT
Start: 2023-08-01 | End: 2023-08-01

## 2023-08-01 RX ORDER — CEFTRIAXONE 2 G/50ML
2000 INJECTION, SOLUTION INTRAVENOUS ONCE
Status: CANCELLED | OUTPATIENT
Start: 2023-08-02

## 2023-08-01 RX ADMIN — CEFTRIAXONE 2000 MG: 2 INJECTION, SOLUTION INTRAVENOUS at 08:58

## 2023-08-01 RX ADMIN — SODIUM CHLORIDE 20 ML/HR: 0.9 INJECTION, SOLUTION INTRAVENOUS at 08:52

## 2023-08-01 NOTE — PROGRESS NOTES
Pt to clinic for IV antibiotics, offers no complaints today, tolerated infusion via PICC without complications, PICC dressing and cap changed per pt request d/t dressing loosening on one side, pt tolerated dressing and cap change without complications, pt aware of when to return for treatment tomorrow, PICC flushed and saline locked prior to discharge, avs declined.

## 2023-08-02 ENCOUNTER — HOSPITAL ENCOUNTER (OUTPATIENT)
Dept: INFUSION CENTER | Facility: CLINIC | Age: 43
Discharge: HOME | End: 2023-08-02

## 2023-08-02 VITALS
HEART RATE: 95 BPM | SYSTOLIC BLOOD PRESSURE: 133 MMHG | RESPIRATION RATE: 18 BRPM | TEMPERATURE: 98 F | DIASTOLIC BLOOD PRESSURE: 72 MMHG

## 2023-08-02 DIAGNOSIS — K75.0 LIVER ABSCESS: Primary | ICD-10-CM

## 2023-08-02 DIAGNOSIS — J18.9 CAVITARY PNEUMONIA: ICD-10-CM

## 2023-08-02 DIAGNOSIS — B96.1 BACTEREMIA DUE TO KLEBSIELLA PNEUMONIAE: ICD-10-CM

## 2023-08-02 DIAGNOSIS — J98.4 CAVITARY PNEUMONIA: ICD-10-CM

## 2023-08-02 DIAGNOSIS — R78.81 BACTEREMIA DUE TO KLEBSIELLA PNEUMONIAE: ICD-10-CM

## 2023-08-02 RX ORDER — CEFTRIAXONE 2 G/50ML
2000 INJECTION, SOLUTION INTRAVENOUS ONCE
Status: CANCELLED | OUTPATIENT
Start: 2023-08-03

## 2023-08-02 RX ORDER — SODIUM CHLORIDE 9 MG/ML
20 INJECTION, SOLUTION INTRAVENOUS ONCE
Status: CANCELLED | OUTPATIENT
Start: 2023-08-03

## 2023-08-02 RX ORDER — SODIUM CHLORIDE 9 MG/ML
20 INJECTION, SOLUTION INTRAVENOUS ONCE
Status: COMPLETED | OUTPATIENT
Start: 2023-08-02 | End: 2023-08-02

## 2023-08-02 RX ORDER — CEFTRIAXONE 2 G/50ML
2000 INJECTION, SOLUTION INTRAVENOUS ONCE
Status: COMPLETED | OUTPATIENT
Start: 2023-08-02 | End: 2023-08-02

## 2023-08-02 RX ADMIN — SODIUM CHLORIDE 20 ML/HR: 0.9 INJECTION, SOLUTION INTRAVENOUS at 13:29

## 2023-08-02 RX ADMIN — CEFTRIAXONE 2000 MG: 2 INJECTION, SOLUTION INTRAVENOUS at 13:38

## 2023-08-02 NOTE — PROGRESS NOTES
Pt to clinic for IV antibiotics, offers no complaints today, tolerated infusion via PICC without complications, aware of when to return for treatment tomorrow, PICC flushed and saline locked and a passive disinfecting cap applied prior to discharge, avs declined.

## 2023-08-03 ENCOUNTER — TELEPHONE (OUTPATIENT)
Dept: INFUSION CENTER | Facility: CLINIC | Age: 43
End: 2023-08-03

## 2023-08-03 ENCOUNTER — OFFICE VISIT (OUTPATIENT)
Dept: INFECTIOUS DISEASES | Facility: CLINIC | Age: 43
End: 2023-08-03

## 2023-08-03 VITALS
DIASTOLIC BLOOD PRESSURE: 70 MMHG | TEMPERATURE: 97.1 F | SYSTOLIC BLOOD PRESSURE: 112 MMHG | RESPIRATION RATE: 14 BRPM | HEART RATE: 78 BPM | OXYGEN SATURATION: 99 %

## 2023-08-03 DIAGNOSIS — R78.81 BACTEREMIA DUE TO KLEBSIELLA PNEUMONIAE: ICD-10-CM

## 2023-08-03 DIAGNOSIS — E11.65 DIABETES MELLITUS WITH HYPERGLYCEMIA (HCC): ICD-10-CM

## 2023-08-03 DIAGNOSIS — B96.1 BACTEREMIA DUE TO KLEBSIELLA PNEUMONIAE: ICD-10-CM

## 2023-08-03 DIAGNOSIS — K75.0 LIVER ABSCESS: ICD-10-CM

## 2023-08-03 DIAGNOSIS — I82.0 THROMBOSIS, HEPATIC VEIN (HCC): ICD-10-CM

## 2023-08-03 DIAGNOSIS — I76 SEPTIC EMBOLISM (HCC): Primary | ICD-10-CM

## 2023-08-03 PROCEDURE — 99212 OFFICE O/P EST SF 10 MIN: CPT | Performed by: PHYSICIAN ASSISTANT

## 2023-08-03 NOTE — PATIENT INSTRUCTIONS
Continue the antbiotic at the infusion center once daily through 8/22/23   Continue weekly labs   Go to appt on 8/8 for CAT scan   Return to ID office in about 2 weeks to reviewed CAT scan results and determine plan for any additional antibiotic

## 2023-08-03 NOTE — PROGRESS NOTES
Outpatient Progress Note - [facility] Infectious Disease   Madelyn Rowell Khang 43 y.o. male MRN: [id number]  Encounter: [id number]    Assessment/Plan:  1. Septic pulmonary emboli with cavitation. Admitted to [facility] July 2023 with sepsis. Found to have septic emboli which was felt to be due to #2 and #3. There is some slight progression on the imaging in terms of cavitation in size which is likely due to prior noncompliance. Some changes may also be radiographically expected. he is on IV ceftriaxone to complete 6 week course through 8/22. He is tolerating this medication at infusion center, but missed dose today. He is aware of no show policy. Recent labs reviewed 8/1 and stable. He has repeat CT C/A/P due that is scheduled next week 8/8.    Continue ceftriaxone 2g q24 hour @ infusion center  Plan is for initial 6 weeks of therapy through 8/22  We will plan for repeat CT chest/abdomen/pelvis with contrast on 8/8  We will extend antibiotics further, based on imaging, if needed  RTC in 2 weeks after discharge  Weekly CBC/chemistry   Remove PICC after last dose of abx      2.  Liver abscess with adjacent thrombosis.  Was diagnosed with this on prior admission in the setting of #3.  Suspect that this contributed to #1.  MRCP without other biliary abnormality. Essentially an occult abscess. Repeat imaging now showing resolution postdrainage. IR drain removed. Antibiotics as above  Will need GI evaluation as outpatient     3. Recent Klebsiella bacteremia.  Course complicated by the above.     4. Thrombocytosis.  Suspect that this is an inflammatory response due to the above. We will continue to trend CBC while on abx.     5.  Poorly controlled diabetes.  Hemoglobin A1c of 12.0.  Likely risk factor for the above.        6. Noncompliance.  Patient unfortunately has eloped from several ER visits and recent hospitalization.  Unfortunately there was a lack of understanding/medical literacy of the true severity of illness.  Patient and his sister plan to comply to therapy and are aware of prolonged therapy and follow-up. I again discussed no-show policies at infusion center with patient today.        7. PICC Line. RUE. Functioning well. Will d/c after completion of abx. Above assessment and plan discussed in detail with patient during examination. Antibiotics:  IV ceftriaxone at infusion center      Subjective:  Patient presents to outpatient ID office for management of his pulmonary cavitary lesions and liver abscess. He is tolerating the IV abx at infusion center btu appears he missed dose this AM. We reviewed the no show policy again today. He denies fevers chills, n/v/d. He is not having any abdominal pain. He has no cough or SOB. No new issues with PICC. Objective:    Vitals:   Vitals:    08/03/23 1030   BP: 112/70   Pulse: 78   Resp: 14   Temp: (!) 97.1 °F (36.2 °C)   SpO2: 99%     Physical Exam:     General Appearance:  Alert, interactive, nontoxic, no acute distress. HEENT: Oropharynx moist without lesions. Lungs:   Clear to auscultation bilaterally; no wheezes, rhonchi or rales; respirations unlabored   Heart:  RRR; no murmur   Abdomen:   Soft, non-tender, non-distended, positive bowel sounds. No palpable organomegaly. Extremities: No clubbing, cyanosis or edema   Vascular: PICC line appears to be in proper position in RUE. Clean, dry and intact without evidence of surrounding erythema, drainage. Skin: No new rashes or lesions. No draining wounds noted. Labs, Imaging, & Other studies:   All pertinent labs and imaging studies were personally reviewed by me from 8/1.     Results from last 7 days   Lab Units 08/01/23  0843   WBC Thousand/uL 6.82   HEMOGLOBIN g/dL 10.3*   PLATELETS Thousands/uL 613*     Results from last 7 days   Lab Units 08/01/23  0843   SODIUM mmol/L 136   POTASSIUM mmol/L 4.0   CHLORIDE mmol/L 104   CO2 mmol/L 25   BUN mg/dL 19   CREATININE mg/dL 0.91   EGFR ml/min/1.73sq m 103   CALCIUM mg/dL 9.9   AST U/L 20   ALT U/L 27   ALK PHOS U/L 76                       The following portions of the patient's history were reviewed and updated as appropriate: allergies, current medications, past family history, past medical history, past social history, past surgical history and problem list.

## 2023-08-04 ENCOUNTER — HOSPITAL ENCOUNTER (OUTPATIENT)
Dept: INFUSION CENTER | Facility: CLINIC | Age: 43
End: 2023-08-04

## 2023-08-04 ENCOUNTER — TELEPHONE (OUTPATIENT)
Dept: INFECTIOUS DISEASES | Facility: CLINIC | Age: 43
End: 2023-08-04

## 2023-08-04 VITALS
TEMPERATURE: 98.2 F | DIASTOLIC BLOOD PRESSURE: 59 MMHG | HEART RATE: 76 BPM | SYSTOLIC BLOOD PRESSURE: 113 MMHG | RESPIRATION RATE: 18 BRPM

## 2023-08-04 DIAGNOSIS — R78.81 BACTEREMIA DUE TO KLEBSIELLA PNEUMONIAE: ICD-10-CM

## 2023-08-04 DIAGNOSIS — B96.1 BACTEREMIA DUE TO KLEBSIELLA PNEUMONIAE: ICD-10-CM

## 2023-08-04 DIAGNOSIS — J18.9 CAVITARY PNEUMONIA: ICD-10-CM

## 2023-08-04 DIAGNOSIS — K75.0 LIVER ABSCESS: Primary | ICD-10-CM

## 2023-08-04 DIAGNOSIS — J98.4 CAVITARY PNEUMONIA: ICD-10-CM

## 2023-08-04 RX ORDER — SODIUM CHLORIDE 9 MG/ML
20 INJECTION, SOLUTION INTRAVENOUS ONCE
Status: CANCELLED | OUTPATIENT
Start: 2023-08-05

## 2023-08-04 RX ORDER — SODIUM CHLORIDE 9 MG/ML
20 INJECTION, SOLUTION INTRAVENOUS ONCE
Status: COMPLETED | OUTPATIENT
Start: 2023-08-04 | End: 2023-08-04

## 2023-08-04 RX ORDER — CEFTRIAXONE 2 G/50ML
2000 INJECTION, SOLUTION INTRAVENOUS ONCE
Status: COMPLETED | OUTPATIENT
Start: 2023-08-04 | End: 2023-08-04

## 2023-08-04 RX ADMIN — CEFTRIAXONE 2000 MG: 2 INJECTION, SOLUTION INTRAVENOUS at 09:38

## 2023-08-04 RX ADMIN — SODIUM CHLORIDE 20 ML/HR: 0.9 INJECTION, SOLUTION INTRAVENOUS at 09:38

## 2023-08-04 NOTE — TELEPHONE ENCOUNTER
Allegra from [facility] infusion calls office to inform us that has missed one dose of abx ceftriaxone and wanted to know if they should can continue or if any changes should be made.

## 2023-08-04 NOTE — TELEPHONE ENCOUNTER
Contacted marquis guillen spoke with Jerry Shelton to inform her per   No changes  continue as normal .  Allegra verbalizes understanding and gives thanks

## 2023-08-04 NOTE — PROGRESS NOTES
Pt reports to unit feeling well with no complaints. Patient receiving IV Rocephin today. Patient states he wasn't feeling well yesterday, notified covering Dr. Lynn Blackwell about missed appointment, as per MD okay to proceed with treatment without any additional measures, patient made aware. No show policy discussed with patient as well as importance of adherence to daily antibiotic/PICC flushed. Patient R PICC utilized for antibiotic use, tolerated infusions well without any adverse events. Patient aware of next appointment, AVS declined, schedule printed.

## 2023-08-06 ENCOUNTER — HOSPITAL ENCOUNTER (OUTPATIENT)
Dept: INFUSION CENTER | Facility: CLINIC | Age: 43
Discharge: HOME | End: 2023-08-06
Payer: COMMERCIAL

## 2023-08-06 VITALS
TEMPERATURE: 97.5 F | HEART RATE: 75 BPM | DIASTOLIC BLOOD PRESSURE: 71 MMHG | RESPIRATION RATE: 18 BRPM | SYSTOLIC BLOOD PRESSURE: 128 MMHG

## 2023-08-06 DIAGNOSIS — J18.9 CAVITARY PNEUMONIA: Primary | ICD-10-CM

## 2023-08-06 DIAGNOSIS — R78.81 BACTEREMIA DUE TO KLEBSIELLA PNEUMONIAE: ICD-10-CM

## 2023-08-06 DIAGNOSIS — B96.1 BACTEREMIA DUE TO KLEBSIELLA PNEUMONIAE: ICD-10-CM

## 2023-08-06 DIAGNOSIS — J98.4 CAVITARY PNEUMONIA: Primary | ICD-10-CM

## 2023-08-06 DIAGNOSIS — K75.0 LIVER ABSCESS: ICD-10-CM

## 2023-08-06 RX ORDER — SODIUM CHLORIDE 9 MG/ML
20 INJECTION, SOLUTION INTRAVENOUS ONCE
Status: CANCELLED | OUTPATIENT
Start: 2023-08-07

## 2023-08-06 RX ORDER — CEFTRIAXONE 2 G/50ML
2000 INJECTION, SOLUTION INTRAVENOUS ONCE
Status: CANCELLED | OUTPATIENT
Start: 2023-08-07

## 2023-08-06 RX ORDER — SODIUM CHLORIDE 9 MG/ML
20 INJECTION, SOLUTION INTRAVENOUS ONCE
Status: COMPLETED | OUTPATIENT
Start: 2023-08-06 | End: 2023-08-06

## 2023-08-06 RX ADMIN — CEFTRIAXONE SODIUM 2000 MG: 10 INJECTION, POWDER, FOR SOLUTION INTRAVENOUS at 08:56

## 2023-08-06 RX ADMIN — SODIUM CHLORIDE 20 ML/HR: 0.9 INJECTION, SOLUTION INTRAVENOUS at 08:56

## 2023-08-06 NOTE — PROGRESS NOTES
Patient arrives for Rocephin today. RIGHT sided PICC line appears intact, purple lumen flushes easily, brisk blood return noted. Patient offers no acute complaints. Patient resting on relciner chair, call bell within reach.

## 2023-08-06 NOTE — PROGRESS NOTES
Patient tolerated treatment today without issue. R PICC remains with brisk blood return, flushed well, clamp secured. Passive disinfecting cap applied, PICC sleeve in place. Patient aware of appt time tomorrow, declines AVS. Patient ambulatory upon DC.

## 2023-08-07 ENCOUNTER — HOSPITAL ENCOUNTER (OUTPATIENT)
Dept: INFUSION CENTER | Facility: CLINIC | Age: 43
Discharge: HOME | End: 2023-08-07
Payer: COMMERCIAL

## 2023-08-07 VITALS
DIASTOLIC BLOOD PRESSURE: 67 MMHG | SYSTOLIC BLOOD PRESSURE: 110 MMHG | HEART RATE: 87 BPM | TEMPERATURE: 96.9 F | RESPIRATION RATE: 18 BRPM

## 2023-08-07 DIAGNOSIS — B96.1 BACTEREMIA DUE TO KLEBSIELLA PNEUMONIAE: ICD-10-CM

## 2023-08-07 DIAGNOSIS — K75.0 LIVER ABSCESS: Primary | ICD-10-CM

## 2023-08-07 DIAGNOSIS — R78.81 BACTEREMIA DUE TO KLEBSIELLA PNEUMONIAE: ICD-10-CM

## 2023-08-07 DIAGNOSIS — J98.4 CAVITARY PNEUMONIA: ICD-10-CM

## 2023-08-07 DIAGNOSIS — J18.9 CAVITARY PNEUMONIA: ICD-10-CM

## 2023-08-07 RX ORDER — SODIUM CHLORIDE 9 MG/ML
20 INJECTION, SOLUTION INTRAVENOUS ONCE
Status: CANCELLED | OUTPATIENT
Start: 2023-08-08

## 2023-08-07 RX ORDER — SODIUM CHLORIDE 9 MG/ML
20 INJECTION, SOLUTION INTRAVENOUS ONCE
Status: COMPLETED | OUTPATIENT
Start: 2023-08-07 | End: 2023-08-07

## 2023-08-07 RX ORDER — CEFTRIAXONE 2 G/50ML
2000 INJECTION, SOLUTION INTRAVENOUS ONCE
Status: COMPLETED | OUTPATIENT
Start: 2023-08-07 | End: 2023-08-07

## 2023-08-07 RX ORDER — CEFTRIAXONE 2 G/50ML
2000 INJECTION, SOLUTION INTRAVENOUS ONCE
Status: CANCELLED | OUTPATIENT
Start: 2023-08-08

## 2023-08-07 RX ADMIN — SODIUM CHLORIDE 20 ML/HR: 9 INJECTION, SOLUTION INTRAVENOUS at 08:15

## 2023-08-07 RX ADMIN — CEFTRIAXONE 2000 MG: 2 INJECTION, SOLUTION INTRAVENOUS at 08:20

## 2023-08-07 NOTE — PROGRESS NOTES
Pt presents for IV ABX offering no compliants. Pt tolerated treatment without incident. AVS declined. Next appointment reviewed.

## 2023-08-08 ENCOUNTER — HOSPITAL ENCOUNTER (OUTPATIENT)
Dept: INFUSION CENTER | Facility: CLINIC | Age: 43
Discharge: HOME | End: 2023-08-08
Payer: COMMERCIAL

## 2023-08-08 VITALS
SYSTOLIC BLOOD PRESSURE: 122 MMHG | TEMPERATURE: 97.1 F | RESPIRATION RATE: 18 BRPM | DIASTOLIC BLOOD PRESSURE: 67 MMHG | HEART RATE: 89 BPM

## 2023-08-08 DIAGNOSIS — R78.81 BACTEREMIA DUE TO KLEBSIELLA PNEUMONIAE: ICD-10-CM

## 2023-08-08 DIAGNOSIS — B96.1 BACTEREMIA DUE TO KLEBSIELLA PNEUMONIAE: ICD-10-CM

## 2023-08-08 DIAGNOSIS — J18.9 CAVITARY PNEUMONIA: ICD-10-CM

## 2023-08-08 DIAGNOSIS — K75.0 LIVER ABSCESS: Primary | ICD-10-CM

## 2023-08-08 DIAGNOSIS — J98.4 CAVITARY PNEUMONIA: ICD-10-CM

## 2023-08-08 LAB
ALBUMIN SERPL BCP-MCNC: 3.9 G/DL (ref 3.5–5)
ALP SERPL-CCNC: 65 U/L (ref 34–104)
ALT SERPL W P-5'-P-CCNC: 26 U/L (ref 7–52)
ANION GAP SERPL CALCULATED.3IONS-SCNC: 5 MMOL/L
AST SERPL W P-5'-P-CCNC: 17 U/L (ref 13–39)
BASOPHILS # BLD AUTO: 0.09 THOUSANDS/ÂΜL (ref 0–0.1)
BASOPHILS NFR BLD AUTO: 2 % (ref 0–1)
BILIRUB SERPL-MCNC: 0.42 MG/DL (ref 0.2–1)
BUN SERPL-MCNC: 16 MG/DL (ref 5–25)
CALCIUM SERPL-MCNC: 9.8 MG/DL (ref 8.4–10.2)
CHLORIDE SERPL-SCNC: 104 MMOL/L (ref 96–108)
CO2 SERPL-SCNC: 26 MMOL/L (ref 21–32)
CREAT SERPL-MCNC: 0.9 MG/DL (ref 0.6–1.3)
EOSINOPHIL # BLD AUTO: 0.3 THOUSAND/ÂΜL (ref 0–0.61)
EOSINOPHIL NFR BLD AUTO: 5 % (ref 0–6)
ERYTHROCYTE [DISTWIDTH] IN BLOOD BY AUTOMATED COUNT: 16.1 % (ref 11.6–15.1)
GFR SERPL CREATININE-BSD FRML MDRD: 104 ML/MIN/1.73SQ M
GLUCOSE SERPL-MCNC: 222 MG/DL (ref 65–140)
HCT VFR BLD AUTO: 34.9 % (ref 36.5–49.3)
HGB BLD-MCNC: 10.7 G/DL (ref 12–17)
IMM GRANULOCYTES # BLD AUTO: 0.01 THOUSAND/UL (ref 0–0.2)
IMM GRANULOCYTES NFR BLD AUTO: 0 % (ref 0–2)
LYMPHOCYTES # BLD AUTO: 2.75 THOUSANDS/ÂΜL (ref 0.6–4.47)
LYMPHOCYTES NFR BLD AUTO: 45 % (ref 14–44)
MCH RBC QN AUTO: 26.4 PG (ref 26.8–34.3)
MCHC RBC AUTO-ENTMCNC: 30.7 G/DL (ref 31.4–37.4)
MCV RBC AUTO: 86 FL (ref 82–98)
MONOCYTES # BLD AUTO: 0.57 THOUSAND/ÂΜL (ref 0.17–1.22)
MONOCYTES NFR BLD AUTO: 9 % (ref 4–12)
NEUTROPHILS # BLD AUTO: 2.42 THOUSANDS/ÂΜL (ref 1.85–7.62)
NEUTS SEG NFR BLD AUTO: 39 % (ref 43–75)
NRBC BLD AUTO-RTO: 0 /100 WBCS
PLATELET # BLD AUTO: 366 THOUSANDS/UL (ref 149–390)
PMV BLD AUTO: 10.6 FL (ref 8.9–12.7)
POTASSIUM SERPL-SCNC: 4 MMOL/L (ref 3.5–5.3)
PROT SERPL-MCNC: 7.9 G/DL (ref 6.4–8.4)
RBC # BLD AUTO: 4.05 MILLION/UL (ref 3.88–5.62)
SODIUM SERPL-SCNC: 135 MMOL/L (ref 135–147)
WBC # BLD AUTO: 6.14 THOUSAND/UL (ref 4.31–10.16)

## 2023-08-08 PROCEDURE — 85025 COMPLETE CBC W/AUTO DIFF WBC: CPT | Performed by: INTERNAL MEDICINE

## 2023-08-08 PROCEDURE — 80053 COMPREHEN METABOLIC PANEL: CPT | Performed by: INTERNAL MEDICINE

## 2023-08-08 RX ORDER — CEFTRIAXONE 2 G/50ML
2000 INJECTION, SOLUTION INTRAVENOUS ONCE
Status: COMPLETED | OUTPATIENT
Start: 2023-08-08 | End: 2023-08-08

## 2023-08-08 RX ORDER — CEFTRIAXONE 2 G/50ML
2000 INJECTION, SOLUTION INTRAVENOUS ONCE
Status: CANCELLED | OUTPATIENT
Start: 2023-08-09

## 2023-08-08 RX ORDER — SODIUM CHLORIDE 9 MG/ML
20 INJECTION, SOLUTION INTRAVENOUS ONCE
Status: CANCELLED | OUTPATIENT
Start: 2023-08-09

## 2023-08-08 RX ORDER — SODIUM CHLORIDE 9 MG/ML
20 INJECTION, SOLUTION INTRAVENOUS ONCE
Status: COMPLETED | OUTPATIENT
Start: 2023-08-08 | End: 2023-08-08

## 2023-08-08 RX ADMIN — CEFTRIAXONE 2000 MG: 2 INJECTION, SOLUTION INTRAVENOUS at 09:28

## 2023-08-08 RX ADMIN — SODIUM CHLORIDE 20 ML/HR: 0.9 INJECTION, SOLUTION INTRAVENOUS at 09:28

## 2023-08-08 NOTE — PROGRESS NOTES
Pt presents to clinic for antibiotic infusion. Pt offers no complaints. Tolerated infusion without complications. PICC flushed well before and after transfusion. Passive disinfecting cap applied at completion of abx infusion. Pt aware of next appointment. AVS declined.

## 2023-08-09 ENCOUNTER — HOSPITAL ENCOUNTER (OUTPATIENT)
Dept: INFUSION CENTER | Facility: CLINIC | Age: 43
Discharge: HOME | End: 2023-08-09

## 2023-08-09 VITALS
DIASTOLIC BLOOD PRESSURE: 63 MMHG | RESPIRATION RATE: 18 BRPM | SYSTOLIC BLOOD PRESSURE: 122 MMHG | TEMPERATURE: 96.7 F | HEART RATE: 81 BPM

## 2023-08-09 DIAGNOSIS — J18.9 CAVITARY PNEUMONIA: ICD-10-CM

## 2023-08-09 DIAGNOSIS — J98.4 CAVITARY PNEUMONIA: ICD-10-CM

## 2023-08-09 DIAGNOSIS — R78.81 BACTEREMIA DUE TO KLEBSIELLA PNEUMONIAE: ICD-10-CM

## 2023-08-09 DIAGNOSIS — B96.1 BACTEREMIA DUE TO KLEBSIELLA PNEUMONIAE: ICD-10-CM

## 2023-08-09 DIAGNOSIS — K75.0 LIVER ABSCESS: Primary | ICD-10-CM

## 2023-08-09 RX ORDER — CEFTRIAXONE 2 G/50ML
2000 INJECTION, SOLUTION INTRAVENOUS ONCE
Status: CANCELLED | OUTPATIENT
Start: 2023-08-10

## 2023-08-09 RX ORDER — SODIUM CHLORIDE 9 MG/ML
20 INJECTION, SOLUTION INTRAVENOUS ONCE
Status: COMPLETED | OUTPATIENT
Start: 2023-08-09 | End: 2023-08-09

## 2023-08-09 RX ORDER — SODIUM CHLORIDE 9 MG/ML
20 INJECTION, SOLUTION INTRAVENOUS ONCE
Status: CANCELLED | OUTPATIENT
Start: 2023-08-10

## 2023-08-09 RX ORDER — CEFTRIAXONE 2 G/50ML
2000 INJECTION, SOLUTION INTRAVENOUS ONCE
Status: COMPLETED | OUTPATIENT
Start: 2023-08-09 | End: 2023-08-09

## 2023-08-09 RX ADMIN — CEFTRIAXONE 2000 MG: 2 INJECTION, SOLUTION INTRAVENOUS at 08:26

## 2023-08-09 RX ADMIN — SODIUM CHLORIDE 20 ML/HR: 0.9 INJECTION, SOLUTION INTRAVENOUS at 08:26

## 2023-08-10 ENCOUNTER — TELEPHONE (OUTPATIENT)
Dept: INFECTIOUS DISEASES | Facility: CLINIC | Age: 43
End: 2023-08-10

## 2023-08-10 ENCOUNTER — TELEPHONE (OUTPATIENT)
Dept: INFUSION CENTER | Facility: CLINIC | Age: 43
End: 2023-08-10

## 2023-08-10 NOTE — TELEPHONE ENCOUNTER
Contacted pt in regards to missed appt today at infusion center. Informed him of infusions center 3 no show policy and if he misses one more infusion appt Infusion center will discharge him also the importance of him completing his IV therapy. Pt states he did not want to travel in rain with traffic being that he is coming from [address] [place] [address]. He apologizes and confirms he will be at infusion center for appt  Tomorrow.

## 2023-08-10 NOTE — TELEPHONE ENCOUNTER
If patient no shows a 4th time he will be discharged from the infusion center per our no show policy.

## 2023-08-11 ENCOUNTER — TELEPHONE (OUTPATIENT)
Dept: INFECTIOUS DISEASES | Facility: CLINIC | Age: 43
End: 2023-08-11

## 2023-08-11 ENCOUNTER — HOSPITAL ENCOUNTER (OUTPATIENT)
Dept: INFUSION CENTER | Facility: CLINIC | Age: 43
Discharge: HOME | End: 2023-08-11
Payer: COMMERCIAL

## 2023-08-11 VITALS
HEART RATE: 80 BPM | SYSTOLIC BLOOD PRESSURE: 124 MMHG | OXYGEN SATURATION: 99 % | TEMPERATURE: 97.2 F | DIASTOLIC BLOOD PRESSURE: 68 MMHG | RESPIRATION RATE: 16 BRPM

## 2023-08-11 DIAGNOSIS — B96.1 BACTEREMIA DUE TO KLEBSIELLA PNEUMONIAE: ICD-10-CM

## 2023-08-11 DIAGNOSIS — R78.81 BACTEREMIA DUE TO KLEBSIELLA PNEUMONIAE: ICD-10-CM

## 2023-08-11 DIAGNOSIS — J18.9 CAVITARY PNEUMONIA: ICD-10-CM

## 2023-08-11 DIAGNOSIS — K75.0 LIVER ABSCESS: Primary | ICD-10-CM

## 2023-08-11 DIAGNOSIS — J98.4 CAVITARY PNEUMONIA: ICD-10-CM

## 2023-08-11 PROCEDURE — 96365 THER/PROPH/DIAG IV INF INIT: CPT

## 2023-08-11 RX ORDER — SODIUM CHLORIDE 9 MG/ML
20 INJECTION, SOLUTION INTRAVENOUS ONCE
Status: CANCELLED | OUTPATIENT
Start: 2023-08-12

## 2023-08-11 RX ORDER — SODIUM CHLORIDE 9 MG/ML
20 INJECTION, SOLUTION INTRAVENOUS ONCE
Status: COMPLETED | OUTPATIENT
Start: 2023-08-11 | End: 2023-08-11

## 2023-08-11 RX ORDER — CEFTRIAXONE 2 G/50ML
2000 INJECTION, SOLUTION INTRAVENOUS ONCE
Status: COMPLETED | OUTPATIENT
Start: 2023-08-11 | End: 2023-08-11

## 2023-08-11 RX ADMIN — SODIUM CHLORIDE 20 ML/HR: 0.9 INJECTION, SOLUTION INTRAVENOUS at 10:14

## 2023-08-11 RX ADMIN — CEFTRIAXONE 2000 MG: 2 INJECTION, SOLUTION INTRAVENOUS at 10:14

## 2023-08-11 NOTE — PROGRESS NOTES
Pt presents for IV antibiotics offering no complaints. Pt tolerated treatment without incident. AVS declined. Next appointment reviewed.

## 2023-08-11 NOTE — TELEPHONE ENCOUNTER
[employer] and spoke with Jayne. Informed Steven I was calling to reschedule patient for a CT scan as he did not show for this last appointment. Steven scheduled patient 8/15/23 at 2:30PM at [facility]. Per steven nothing to eat 3 hours prior, clear liquids only prior to appointment. Called patient unable to leave a message as mailbox is full. Called patients spouse John Padron unable to leave a message as the mailbox is not set up. Called patients sister Usha. Informed Usha I was calling as patient did not show for his CT appointment scheduled 8/8/23. Usha states patient had two appointments at the same time and that is why he did not go. Informed Usha patient is scheduled 8/15/23 at 2:30PM at [facility] for CT scan. Informed Usha patient cannot eat 3 hours prior, clear liquids only prior to appointment. Also informed Usha patient has a follow up appointment 8/17/23 at 10AM with Dr. Kristy Barriga at the [facility] office. Informed Usha I tried to call patient but could not reach him. Usha states she will call patient.

## 2023-08-12 ENCOUNTER — HOSPITAL ENCOUNTER (OUTPATIENT)
Dept: INFUSION CENTER | Facility: CLINIC | Age: 43
Discharge: HOME | End: 2023-08-12
Payer: COMMERCIAL

## 2023-08-12 VITALS
OXYGEN SATURATION: 100 % | HEART RATE: 69 BPM | TEMPERATURE: 97.6 F | DIASTOLIC BLOOD PRESSURE: 66 MMHG | SYSTOLIC BLOOD PRESSURE: 110 MMHG | RESPIRATION RATE: 16 BRPM

## 2023-08-12 DIAGNOSIS — R78.81 BACTEREMIA DUE TO KLEBSIELLA PNEUMONIAE: ICD-10-CM

## 2023-08-12 DIAGNOSIS — B96.1 BACTEREMIA DUE TO KLEBSIELLA PNEUMONIAE: ICD-10-CM

## 2023-08-12 DIAGNOSIS — K75.0 LIVER ABSCESS: ICD-10-CM

## 2023-08-12 DIAGNOSIS — J98.4 CAVITARY PNEUMONIA: Primary | ICD-10-CM

## 2023-08-12 DIAGNOSIS — J18.9 CAVITARY PNEUMONIA: Primary | ICD-10-CM

## 2023-08-12 PROCEDURE — 96365 THER/PROPH/DIAG IV INF INIT: CPT

## 2023-08-12 RX ORDER — SODIUM CHLORIDE 9 MG/ML
20 INJECTION, SOLUTION INTRAVENOUS ONCE
Status: COMPLETED | OUTPATIENT
Start: 2023-08-12 | End: 2023-08-12

## 2023-08-12 RX ORDER — SODIUM CHLORIDE 9 MG/ML
20 INJECTION, SOLUTION INTRAVENOUS ONCE
Status: CANCELLED | OUTPATIENT
Start: 2023-08-13

## 2023-08-12 RX ADMIN — SODIUM CHLORIDE 20 ML/HR: 0.9 INJECTION, SOLUTION INTRAVENOUS at 08:29

## 2023-08-12 RX ADMIN — CEFTRIAXONE SODIUM 2000 MG: 2 INJECTION, POWDER, FOR SOLUTION INTRAMUSCULAR; INTRAVENOUS at 08:27

## 2023-08-12 NOTE — PROGRESS NOTES
Patient here for IV abx, offers no complaints. Tolerated his infusion without incident.  Confirmed appointment time tomorrow, declined AVS.

## 2023-08-13 ENCOUNTER — HOSPITAL ENCOUNTER (OUTPATIENT)
Dept: INFUSION CENTER | Facility: CLINIC | Age: 43
Discharge: HOME | End: 2023-08-13
Payer: COMMERCIAL

## 2023-08-13 VITALS
HEART RATE: 71 BPM | DIASTOLIC BLOOD PRESSURE: 61 MMHG | SYSTOLIC BLOOD PRESSURE: 113 MMHG | RESPIRATION RATE: 18 BRPM | OXYGEN SATURATION: 99 %

## 2023-08-13 DIAGNOSIS — K75.0 LIVER ABSCESS: ICD-10-CM

## 2023-08-13 DIAGNOSIS — J18.9 CAVITARY PNEUMONIA: Primary | ICD-10-CM

## 2023-08-13 DIAGNOSIS — R78.81 BACTEREMIA DUE TO KLEBSIELLA PNEUMONIAE: ICD-10-CM

## 2023-08-13 DIAGNOSIS — B96.1 BACTEREMIA DUE TO KLEBSIELLA PNEUMONIAE: ICD-10-CM

## 2023-08-13 DIAGNOSIS — J98.4 CAVITARY PNEUMONIA: Primary | ICD-10-CM

## 2023-08-13 PROCEDURE — 96365 THER/PROPH/DIAG IV INF INIT: CPT

## 2023-08-13 RX ORDER — SODIUM CHLORIDE 9 MG/ML
20 INJECTION, SOLUTION INTRAVENOUS ONCE
Status: COMPLETED | OUTPATIENT
Start: 2023-08-13 | End: 2023-08-13

## 2023-08-13 RX ORDER — SODIUM CHLORIDE 9 MG/ML
20 INJECTION, SOLUTION INTRAVENOUS ONCE
Status: CANCELLED | OUTPATIENT
Start: 2023-08-14

## 2023-08-13 RX ORDER — CEFTRIAXONE 2 G/50ML
2000 INJECTION, SOLUTION INTRAVENOUS ONCE
Status: CANCELLED | OUTPATIENT
Start: 2023-08-14

## 2023-08-13 RX ADMIN — CEFTRIAXONE SODIUM 2000 MG: 10 INJECTION, POWDER, FOR SOLUTION INTRAVENOUS at 08:54

## 2023-08-13 RX ADMIN — SODIUM CHLORIDE 20 ML/HR: 0.9 INJECTION, SOLUTION INTRAVENOUS at 08:54

## 2023-08-13 NOTE — PROGRESS NOTES
Patient here for antibiotic infusion. PICC line flushes without issue. Blood return noted. Vitals stable. Call bell within reach. Patient tolerated treatment without complications. Patient declined AVS. Reviewed upcoming appointments with patient.

## 2023-08-14 ENCOUNTER — HOSPITAL ENCOUNTER (OUTPATIENT)
Dept: INFUSION CENTER | Facility: CLINIC | Age: 43
Discharge: HOME | End: 2023-08-14
Payer: COMMERCIAL

## 2023-08-14 VITALS
HEART RATE: 72 BPM | TEMPERATURE: 97.6 F | SYSTOLIC BLOOD PRESSURE: 122 MMHG | DIASTOLIC BLOOD PRESSURE: 68 MMHG | RESPIRATION RATE: 18 BRPM

## 2023-08-14 DIAGNOSIS — B96.1 BACTEREMIA DUE TO KLEBSIELLA PNEUMONIAE: ICD-10-CM

## 2023-08-14 DIAGNOSIS — K75.0 LIVER ABSCESS: Primary | ICD-10-CM

## 2023-08-14 DIAGNOSIS — J18.9 CAVITARY PNEUMONIA: ICD-10-CM

## 2023-08-14 DIAGNOSIS — J98.4 CAVITARY PNEUMONIA: ICD-10-CM

## 2023-08-14 DIAGNOSIS — R78.81 BACTEREMIA DUE TO KLEBSIELLA PNEUMONIAE: ICD-10-CM

## 2023-08-14 RX ORDER — CEFTRIAXONE 2 G/50ML
2000 INJECTION, SOLUTION INTRAVENOUS ONCE
Status: COMPLETED | OUTPATIENT
Start: 2023-08-14 | End: 2023-08-14

## 2023-08-14 RX ORDER — SODIUM CHLORIDE 9 MG/ML
20 INJECTION, SOLUTION INTRAVENOUS ONCE
Status: CANCELLED | OUTPATIENT
Start: 2023-08-15

## 2023-08-14 RX ORDER — SODIUM CHLORIDE 9 MG/ML
20 INJECTION, SOLUTION INTRAVENOUS ONCE
Status: COMPLETED | OUTPATIENT
Start: 2023-08-14 | End: 2023-08-14

## 2023-08-14 RX ORDER — CEFTRIAXONE 2 G/50ML
2000 INJECTION, SOLUTION INTRAVENOUS ONCE
Status: CANCELLED | OUTPATIENT
Start: 2023-08-15

## 2023-08-14 RX ADMIN — SODIUM CHLORIDE 20 ML/HR: 0.9 INJECTION, SOLUTION INTRAVENOUS at 11:27

## 2023-08-14 RX ADMIN — CEFTRIAXONE 2000 MG: 2 INJECTION, SOLUTION INTRAVENOUS at 11:29

## 2023-08-14 NOTE — TELEPHONE ENCOUNTER
Called and spoke with patient today. Informed patient I was calling as he is scheduled for a CT scan on 8/15/23 at 2:30PM at [facility]. Address given to patient at this time. Also reminded patient of his follow up appointment 8/17/23 at 10AM with Dr. Edith Espinosa at the [facility] office. Patient verbalizes understanding at this time.

## 2023-08-14 NOTE — PROGRESS NOTES
Pt presents for IV abx offering no complaints. Pt tolerated treatment without incident. AVS declined. Next appointment reviewed.

## 2023-08-15 ENCOUNTER — HOSPITAL ENCOUNTER (OUTPATIENT)
Dept: INFUSION CENTER | Facility: CLINIC | Age: 43
Discharge: HOME | End: 2023-08-15
Payer: COMMERCIAL

## 2023-08-15 VITALS
RESPIRATION RATE: 18 BRPM | SYSTOLIC BLOOD PRESSURE: 105 MMHG | HEART RATE: 73 BPM | TEMPERATURE: 97.6 F | DIASTOLIC BLOOD PRESSURE: 57 MMHG

## 2023-08-15 DIAGNOSIS — K75.0 LIVER ABSCESS: Primary | ICD-10-CM

## 2023-08-15 DIAGNOSIS — J18.9 CAVITARY PNEUMONIA: ICD-10-CM

## 2023-08-15 DIAGNOSIS — J98.4 CAVITARY PNEUMONIA: ICD-10-CM

## 2023-08-15 DIAGNOSIS — B96.1 BACTEREMIA DUE TO KLEBSIELLA PNEUMONIAE: ICD-10-CM

## 2023-08-15 DIAGNOSIS — R78.81 BACTEREMIA DUE TO KLEBSIELLA PNEUMONIAE: ICD-10-CM

## 2023-08-15 LAB
ALBUMIN SERPL BCP-MCNC: 4.2 G/DL (ref 3.5–5)
ALP SERPL-CCNC: 69 U/L (ref 34–104)
ALT SERPL W P-5'-P-CCNC: 24 U/L (ref 7–52)
ANION GAP SERPL CALCULATED.3IONS-SCNC: 6 MMOL/L
AST SERPL W P-5'-P-CCNC: 23 U/L (ref 13–39)
BASOPHILS # BLD AUTO: 0.08 THOUSANDS/ÂΜL (ref 0–0.1)
BASOPHILS NFR BLD AUTO: 1 % (ref 0–1)
BILIRUB SERPL-MCNC: 0.5 MG/DL (ref 0.2–1)
BUN SERPL-MCNC: 20 MG/DL (ref 5–25)
CALCIUM SERPL-MCNC: 9.5 MG/DL (ref 8.4–10.2)
CHLORIDE SERPL-SCNC: 104 MMOL/L (ref 96–108)
CO2 SERPL-SCNC: 25 MMOL/L (ref 21–32)
CREAT SERPL-MCNC: 0.92 MG/DL (ref 0.6–1.3)
EOSINOPHIL # BLD AUTO: 0.33 THOUSAND/ÂΜL (ref 0–0.61)
EOSINOPHIL NFR BLD AUTO: 5 % (ref 0–6)
ERYTHROCYTE [DISTWIDTH] IN BLOOD BY AUTOMATED COUNT: 15.9 % (ref 11.6–15.1)
GFR SERPL CREATININE-BSD FRML MDRD: 102 ML/MIN/1.73SQ M
GLUCOSE SERPL-MCNC: 225 MG/DL (ref 65–140)
HCT VFR BLD AUTO: 38.1 % (ref 36.5–49.3)
HGB BLD-MCNC: 11.8 G/DL (ref 12–17)
IMM GRANULOCYTES # BLD AUTO: 0.01 THOUSAND/UL (ref 0–0.2)
IMM GRANULOCYTES NFR BLD AUTO: 0 % (ref 0–2)
LYMPHOCYTES # BLD AUTO: 2.95 THOUSANDS/ÂΜL (ref 0.6–4.47)
LYMPHOCYTES NFR BLD AUTO: 47 % (ref 14–44)
MCH RBC QN AUTO: 26.6 PG (ref 26.8–34.3)
MCHC RBC AUTO-ENTMCNC: 31 G/DL (ref 31.4–37.4)
MCV RBC AUTO: 86 FL (ref 82–98)
MONOCYTES # BLD AUTO: 0.62 THOUSAND/ÂΜL (ref 0.17–1.22)
MONOCYTES NFR BLD AUTO: 10 % (ref 4–12)
NEUTROPHILS # BLD AUTO: 2.36 THOUSANDS/ÂΜL (ref 1.85–7.62)
NEUTS SEG NFR BLD AUTO: 37 % (ref 43–75)
NRBC BLD AUTO-RTO: 0 /100 WBCS
PLATELET # BLD AUTO: 358 THOUSANDS/UL (ref 149–390)
PMV BLD AUTO: 10.9 FL (ref 8.9–12.7)
POTASSIUM SERPL-SCNC: 4 MMOL/L (ref 3.5–5.3)
PROT SERPL-MCNC: 8.2 G/DL (ref 6.4–8.4)
RBC # BLD AUTO: 4.44 MILLION/UL (ref 3.88–5.62)
SODIUM SERPL-SCNC: 135 MMOL/L (ref 135–147)
WBC # BLD AUTO: 6.35 THOUSAND/UL (ref 4.31–10.16)

## 2023-08-15 PROCEDURE — 85025 COMPLETE CBC W/AUTO DIFF WBC: CPT | Performed by: INTERNAL MEDICINE

## 2023-08-15 PROCEDURE — 80053 COMPREHEN METABOLIC PANEL: CPT | Performed by: INTERNAL MEDICINE

## 2023-08-15 RX ORDER — SODIUM CHLORIDE 9 MG/ML
20 INJECTION, SOLUTION INTRAVENOUS ONCE
Status: CANCELLED | OUTPATIENT
Start: 2023-08-16

## 2023-08-15 RX ORDER — SODIUM CHLORIDE 9 MG/ML
20 INJECTION, SOLUTION INTRAVENOUS ONCE
Status: COMPLETED | OUTPATIENT
Start: 2023-08-15 | End: 2023-08-15

## 2023-08-15 RX ORDER — CEFTRIAXONE 2 G/50ML
2000 INJECTION, SOLUTION INTRAVENOUS ONCE
Status: COMPLETED | OUTPATIENT
Start: 2023-08-15 | End: 2023-08-15

## 2023-08-15 RX ORDER — CEFTRIAXONE 2 G/50ML
2000 INJECTION, SOLUTION INTRAVENOUS ONCE
Status: CANCELLED | OUTPATIENT
Start: 2023-08-16

## 2023-08-15 RX ADMIN — SODIUM CHLORIDE 20 ML/HR: 0.9 INJECTION, SOLUTION INTRAVENOUS at 08:20

## 2023-08-15 RX ADMIN — CEFTRIAXONE 2000 MG: 2 INJECTION, SOLUTION INTRAVENOUS at 08:19

## 2023-08-15 NOTE — PROGRESS NOTES
Pt reports to unit feeling well with no complaints. Patient receiving IV Rocephin today. Patient R PICC utilized for antibiotic use and lab specimen collection, tolerated infusions well without any adverse events, positive blood return throughout. PICC dressing changed post infusion, cap changed and passive disinfecting cap applied. Patient aware of next appointment, AVS declined.

## 2023-08-16 ENCOUNTER — HOSPITAL ENCOUNTER (OUTPATIENT)
Dept: INFUSION CENTER | Facility: CLINIC | Age: 43
Discharge: HOME | End: 2023-08-16
Payer: COMMERCIAL

## 2023-08-16 VITALS
OXYGEN SATURATION: 97 % | HEART RATE: 79 BPM | DIASTOLIC BLOOD PRESSURE: 62 MMHG | TEMPERATURE: 97.9 F | SYSTOLIC BLOOD PRESSURE: 121 MMHG | RESPIRATION RATE: 18 BRPM

## 2023-08-16 DIAGNOSIS — J98.4 CAVITARY PNEUMONIA: ICD-10-CM

## 2023-08-16 DIAGNOSIS — K75.0 LIVER ABSCESS: Primary | ICD-10-CM

## 2023-08-16 DIAGNOSIS — B96.1 BACTEREMIA DUE TO KLEBSIELLA PNEUMONIAE: ICD-10-CM

## 2023-08-16 DIAGNOSIS — J18.9 CAVITARY PNEUMONIA: ICD-10-CM

## 2023-08-16 DIAGNOSIS — R78.81 BACTEREMIA DUE TO KLEBSIELLA PNEUMONIAE: ICD-10-CM

## 2023-08-16 RX ORDER — CEFTRIAXONE 2 G/50ML
2000 INJECTION, SOLUTION INTRAVENOUS ONCE
Status: CANCELLED | OUTPATIENT
Start: 2023-08-17

## 2023-08-16 RX ORDER — SODIUM CHLORIDE 9 MG/ML
20 INJECTION, SOLUTION INTRAVENOUS ONCE
Status: CANCELLED | OUTPATIENT
Start: 2023-08-17

## 2023-08-16 RX ORDER — SODIUM CHLORIDE 9 MG/ML
20 INJECTION, SOLUTION INTRAVENOUS ONCE
Status: COMPLETED | OUTPATIENT
Start: 2023-08-16 | End: 2023-08-16

## 2023-08-16 RX ORDER — CEFTRIAXONE 2 G/50ML
2000 INJECTION, SOLUTION INTRAVENOUS ONCE
Status: COMPLETED | OUTPATIENT
Start: 2023-08-16 | End: 2023-08-16

## 2023-08-16 RX ADMIN — SODIUM CHLORIDE 20 ML/HR: 0.9 INJECTION, SOLUTION INTRAVENOUS at 08:17

## 2023-08-16 RX ADMIN — CEFTRIAXONE 2000 MG: 2 INJECTION, SOLUTION INTRAVENOUS at 08:17

## 2023-08-16 NOTE — TELEPHONE ENCOUNTER
Called patient today. Unable to leave message for patient as mailbox is full. Was calling patient as he was scheduled for a CT scan on 8/15/23 at 2:30PM at [facility]. Per patient chart patient did not show for the appointment. Called and spoke with patients sister Usha. Informed Usha patient did not show up for his CT that was scheduled 8/15/23 at 2:30PM at [facility]. Usha states she spoke with patient regarding the appointment and she is not sure why he did not show up.

## 2023-08-16 NOTE — PROGRESS NOTES
Pt presents for Rocephin offering no complaints. Pt tolerated treatment without incident. AVS declined. Next appointment reviewed.

## 2023-08-17 ENCOUNTER — HOSPITAL ENCOUNTER (OUTPATIENT)
Dept: INFUSION CENTER | Facility: CLINIC | Age: 43
Discharge: HOME | End: 2023-08-17
Payer: COMMERCIAL

## 2023-08-17 VITALS
HEART RATE: 74 BPM | RESPIRATION RATE: 18 BRPM | SYSTOLIC BLOOD PRESSURE: 109 MMHG | DIASTOLIC BLOOD PRESSURE: 59 MMHG | TEMPERATURE: 97.3 F

## 2023-08-17 DIAGNOSIS — J98.4 CAVITARY PNEUMONIA: ICD-10-CM

## 2023-08-17 DIAGNOSIS — K75.0 LIVER ABSCESS: Primary | ICD-10-CM

## 2023-08-17 DIAGNOSIS — I76 SEPTIC EMBOLISM (HCC): ICD-10-CM

## 2023-08-17 DIAGNOSIS — B96.1 BACTEREMIA DUE TO KLEBSIELLA PNEUMONIAE: ICD-10-CM

## 2023-08-17 DIAGNOSIS — R78.81 BACTEREMIA DUE TO KLEBSIELLA PNEUMONIAE: ICD-10-CM

## 2023-08-17 DIAGNOSIS — J18.9 CAVITARY PNEUMONIA: ICD-10-CM

## 2023-08-17 PROCEDURE — 96365 THER/PROPH/DIAG IV INF INIT: CPT

## 2023-08-17 RX ORDER — CEFTRIAXONE 2 G/50ML
2000 INJECTION, SOLUTION INTRAVENOUS ONCE
Status: CANCELLED | OUTPATIENT
Start: 2023-08-18

## 2023-08-17 RX ORDER — CEPHALEXIN 500 MG/1
500 CAPSULE ORAL EVERY 6 HOURS SCHEDULED
Qty: 56 CAPSULE | Refills: 0 | Status: SHIPPED | OUTPATIENT
Start: 2023-08-23 | End: 2023-09-06

## 2023-08-17 RX ORDER — CEFTRIAXONE 2 G/50ML
2000 INJECTION, SOLUTION INTRAVENOUS ONCE
Status: COMPLETED | OUTPATIENT
Start: 2023-08-17 | End: 2023-08-17

## 2023-08-17 RX ORDER — SODIUM CHLORIDE 9 MG/ML
20 INJECTION, SOLUTION INTRAVENOUS ONCE
Status: CANCELLED | OUTPATIENT
Start: 2023-08-18

## 2023-08-17 RX ORDER — SODIUM CHLORIDE 9 MG/ML
20 INJECTION, SOLUTION INTRAVENOUS ONCE
Status: COMPLETED | OUTPATIENT
Start: 2023-08-17 | End: 2023-08-17

## 2023-08-17 RX ADMIN — SODIUM CHLORIDE 20 ML/HR: 0.9 INJECTION, SOLUTION INTRAVENOUS at 08:32

## 2023-08-17 RX ADMIN — CEFTRIAXONE 2000 MG: 2 INJECTION, SOLUTION INTRAVENOUS at 08:34

## 2023-08-17 NOTE — TELEPHONE ENCOUNTER
Called and spoke with patient today. Informed patient I was calling as he was scheduled for a follow up appointment to day at 10AM with Dr. Anisha Barnett at the [place] office. Patient states he did not know he was scheduled for an appointment today. Also informed patient he was scheduled for a CT scan 8/15/23 at 2:30PM at [facility] that he did not show for. Patient states he did not have a ride to get to that appointment. Informed patient will follow up with him once provider is consulted. Patient verbalizes understanding at this time.

## 2023-08-17 NOTE — PROGRESS NOTES
Pt to clinic for IV antibiotics, offers no complaints today, tolerated infusion via PICC without complications, aware of when to return for treatment tomorrow, PICC flushed and saline locked prior to discharge, avs declined.

## 2023-08-17 NOTE — TELEPHONE ENCOUNTER
Called [facility] scheduling today. Patient is scheduled for CT 8/21/23 at 2:30PM at [facility]. Called and spoke with patient today. Informed patient I was calling regarding appointment and antibiotic plan. Informed patient he is scheduled for CT scan on 8/21/23 at 2:30PM at [facility]. Provided patient with CT instructions. Also provided patient with Address. Also informed patient he has a follow up 8/24/23 at 10:30AM with Deirdre Verdin at the [facility] office. Informed patient we are able to provide lyft to these appointments. Patient states he does not need lyft. Informed patient if something were to change to call the office for arrangements. Informed patient per Dr. Jeffry Cota he will continue to the infusion center until 8/22/23 for IV antibiotics. Then on 8/23/23 patient should begin to take  Keflex 500mg Q6. Confirmed via TT with Dr. Jeffry Cota to send 2 weeks worth to the pharmacy. Informed patient he will need to take the Keflex until he is advised at his next follow up appointment. Patient pharmacy confirmed at this time. Informed patient the importance of compliance of CT scan, antibiotics and follow up appointments. Informed patient if he is not compliant he will be dismissed from the practice. Informed patient I will contact his sister Usha to inform he of the current plan. Patient ask that I do not contact her. Informed patient if there are any further questions or concerns to call the office. Patient verbalizes understanding at this time.

## 2023-08-18 ENCOUNTER — HOSPITAL ENCOUNTER (OUTPATIENT)
Dept: INFUSION CENTER | Facility: CLINIC | Age: 43
End: 2023-08-18
Payer: COMMERCIAL

## 2023-08-18 VITALS
RESPIRATION RATE: 18 BRPM | TEMPERATURE: 96.9 F | DIASTOLIC BLOOD PRESSURE: 64 MMHG | SYSTOLIC BLOOD PRESSURE: 127 MMHG | HEART RATE: 79 BPM

## 2023-08-18 DIAGNOSIS — J18.9 CAVITARY PNEUMONIA: ICD-10-CM

## 2023-08-18 DIAGNOSIS — R78.81 BACTEREMIA DUE TO KLEBSIELLA PNEUMONIAE: ICD-10-CM

## 2023-08-18 DIAGNOSIS — B96.1 BACTEREMIA DUE TO KLEBSIELLA PNEUMONIAE: ICD-10-CM

## 2023-08-18 DIAGNOSIS — J98.4 CAVITARY PNEUMONIA: ICD-10-CM

## 2023-08-18 DIAGNOSIS — K75.0 LIVER ABSCESS: Primary | ICD-10-CM

## 2023-08-18 PROCEDURE — 96365 THER/PROPH/DIAG IV INF INIT: CPT

## 2023-08-18 RX ORDER — SODIUM CHLORIDE 9 MG/ML
20 INJECTION, SOLUTION INTRAVENOUS ONCE
Status: CANCELLED | OUTPATIENT
Start: 2023-08-19

## 2023-08-18 RX ORDER — CEFTRIAXONE 2 G/50ML
2000 INJECTION, SOLUTION INTRAVENOUS ONCE
Status: COMPLETED | OUTPATIENT
Start: 2023-08-18 | End: 2023-08-18

## 2023-08-18 RX ORDER — SODIUM CHLORIDE 9 MG/ML
20 INJECTION, SOLUTION INTRAVENOUS ONCE
Status: COMPLETED | OUTPATIENT
Start: 2023-08-18 | End: 2023-08-18

## 2023-08-18 RX ADMIN — CEFTRIAXONE 2000 MG: 2 INJECTION, SOLUTION INTRAVENOUS at 08:26

## 2023-08-18 RX ADMIN — SODIUM CHLORIDE 20 ML/HR: 0.9 INJECTION, SOLUTION INTRAVENOUS at 08:26

## 2023-08-18 NOTE — PROGRESS NOTES
Pt presents for IV ABX offering no compliants. Pt tolerated treatment without incident. Right PICC line utilized with positive blood return pre and post infusion. AVS declined. Next appointment reviewed.

## 2023-08-19 ENCOUNTER — HOSPITAL ENCOUNTER (OUTPATIENT)
Dept: INFUSION CENTER | Facility: CLINIC | Age: 43
Discharge: HOME | End: 2023-08-19
Payer: COMMERCIAL

## 2023-08-19 VITALS
DIASTOLIC BLOOD PRESSURE: 73 MMHG | RESPIRATION RATE: 16 BRPM | TEMPERATURE: 97.2 F | HEART RATE: 72 BPM | OXYGEN SATURATION: 100 % | SYSTOLIC BLOOD PRESSURE: 114 MMHG

## 2023-08-19 DIAGNOSIS — J98.4 CAVITARY PNEUMONIA: ICD-10-CM

## 2023-08-19 DIAGNOSIS — J18.9 CAVITARY PNEUMONIA: ICD-10-CM

## 2023-08-19 DIAGNOSIS — B96.1 BACTEREMIA DUE TO KLEBSIELLA PNEUMONIAE: ICD-10-CM

## 2023-08-19 DIAGNOSIS — K75.0 LIVER ABSCESS: Primary | ICD-10-CM

## 2023-08-19 DIAGNOSIS — R78.81 BACTEREMIA DUE TO KLEBSIELLA PNEUMONIAE: ICD-10-CM

## 2023-08-19 PROCEDURE — 96365 THER/PROPH/DIAG IV INF INIT: CPT

## 2023-08-19 RX ORDER — SODIUM CHLORIDE 9 MG/ML
20 INJECTION, SOLUTION INTRAVENOUS ONCE
Status: CANCELLED | OUTPATIENT
Start: 2023-08-20

## 2023-08-19 RX ORDER — SODIUM CHLORIDE 9 MG/ML
20 INJECTION, SOLUTION INTRAVENOUS ONCE
Status: COMPLETED | OUTPATIENT
Start: 2023-08-19 | End: 2023-08-19

## 2023-08-19 RX ADMIN — CEFTRIAXONE SODIUM 2000 MG: 2 INJECTION, POWDER, FOR SOLUTION INTRAMUSCULAR; INTRAVENOUS at 08:05

## 2023-08-19 RX ADMIN — SODIUM CHLORIDE 20 ML/HR: 0.9 INJECTION, SOLUTION INTRAVENOUS at 08:06

## 2023-08-19 NOTE — PROGRESS NOTES
Patient tolerated treatment without issue. PICC remains with brisk blood return, flushed well. Clamp secured, passive disinfecting cap in place. Patient confirmed appt for tomorrow, declines AVS. Patient ambulatory upon DC.

## 2023-08-19 NOTE — PROGRESS NOTES
Patient here for antibiotic infusion. PICC line flushes without issue. Blood return noted. Vitals stable. Call bell within reach.

## 2023-08-20 ENCOUNTER — HOSPITAL ENCOUNTER (OUTPATIENT)
Dept: INFUSION CENTER | Facility: CLINIC | Age: 43
Discharge: HOME | End: 2023-08-20
Payer: COMMERCIAL

## 2023-08-20 VITALS
TEMPERATURE: 97.5 F | DIASTOLIC BLOOD PRESSURE: 69 MMHG | HEART RATE: 74 BPM | RESPIRATION RATE: 18 BRPM | SYSTOLIC BLOOD PRESSURE: 111 MMHG

## 2023-08-20 DIAGNOSIS — K75.0 LIVER ABSCESS: ICD-10-CM

## 2023-08-20 DIAGNOSIS — R78.81 BACTEREMIA DUE TO KLEBSIELLA PNEUMONIAE: ICD-10-CM

## 2023-08-20 DIAGNOSIS — J98.4 CAVITARY PNEUMONIA: Primary | ICD-10-CM

## 2023-08-20 DIAGNOSIS — B96.1 BACTEREMIA DUE TO KLEBSIELLA PNEUMONIAE: ICD-10-CM

## 2023-08-20 DIAGNOSIS — J18.9 CAVITARY PNEUMONIA: Primary | ICD-10-CM

## 2023-08-20 PROCEDURE — 96365 THER/PROPH/DIAG IV INF INIT: CPT

## 2023-08-20 RX ORDER — SODIUM CHLORIDE 9 MG/ML
20 INJECTION, SOLUTION INTRAVENOUS ONCE
Status: CANCELLED | OUTPATIENT
Start: 2023-08-21

## 2023-08-20 RX ORDER — SODIUM CHLORIDE 9 MG/ML
20 INJECTION, SOLUTION INTRAVENOUS ONCE
Status: COMPLETED | OUTPATIENT
Start: 2023-08-20 | End: 2023-08-20

## 2023-08-20 RX ADMIN — CEFTRIAXONE SODIUM 2000 MG: 10 INJECTION, POWDER, FOR SOLUTION INTRAVENOUS at 09:00

## 2023-08-20 RX ADMIN — SODIUM CHLORIDE 20 ML/HR: 0.9 INJECTION, SOLUTION INTRAVENOUS at 09:02

## 2023-08-20 NOTE — PROGRESS NOTES
Pt presents for IV ABX offering no compliants. Pt tolerated treatment without incident. Right PICC line utilized with positive blood return throughout treatment. AVS declined. Next appointment reviewed.

## 2023-08-21 ENCOUNTER — HOSPITAL ENCOUNTER (OUTPATIENT)
Dept: INFUSION CENTER | Facility: CLINIC | Age: 43
Discharge: HOME | End: 2023-08-21
Payer: COMMERCIAL

## 2023-08-21 ENCOUNTER — OFFICE VISIT (OUTPATIENT)
Dept: HEMATOLOGY ONCOLOGY | Facility: CLINIC | Age: 43
End: 2023-08-21
Payer: COMMERCIAL

## 2023-08-21 VITALS
RESPIRATION RATE: 18 BRPM | HEART RATE: 91 BPM | SYSTOLIC BLOOD PRESSURE: 120 MMHG | DIASTOLIC BLOOD PRESSURE: 58 MMHG | TEMPERATURE: 97.6 F | OXYGEN SATURATION: 100 % | HEIGHT: 60 IN | BODY MASS INDEX: 32 KG/M2 | WEIGHT: 163 LBS

## 2023-08-21 VITALS
HEART RATE: 91 BPM | SYSTOLIC BLOOD PRESSURE: 120 MMHG | DIASTOLIC BLOOD PRESSURE: 58 MMHG | TEMPERATURE: 97.6 F | RESPIRATION RATE: 18 BRPM

## 2023-08-21 DIAGNOSIS — B96.1 BACTEREMIA DUE TO KLEBSIELLA PNEUMONIAE: ICD-10-CM

## 2023-08-21 DIAGNOSIS — J98.4 CAVITARY PNEUMONIA: ICD-10-CM

## 2023-08-21 DIAGNOSIS — I82.0 HEPATIC VEIN THROMBOSIS (HCC): Primary | ICD-10-CM

## 2023-08-21 DIAGNOSIS — J18.9 CAVITARY PNEUMONIA: ICD-10-CM

## 2023-08-21 DIAGNOSIS — K75.0 LIVER ABSCESS: Primary | ICD-10-CM

## 2023-08-21 DIAGNOSIS — R78.81 BACTEREMIA DUE TO KLEBSIELLA PNEUMONIAE: ICD-10-CM

## 2023-08-21 PROCEDURE — 99204 OFFICE O/P NEW MOD 45 MIN: CPT | Performed by: INTERNAL MEDICINE

## 2023-08-21 PROCEDURE — 96365 THER/PROPH/DIAG IV INF INIT: CPT

## 2023-08-21 RX ORDER — SODIUM CHLORIDE 9 MG/ML
20 INJECTION, SOLUTION INTRAVENOUS ONCE
Status: CANCELLED | OUTPATIENT
Start: 2023-08-22

## 2023-08-21 RX ORDER — CEFTRIAXONE 2 G/50ML
2000 INJECTION, SOLUTION INTRAVENOUS ONCE
Status: CANCELLED | OUTPATIENT
Start: 2023-08-22

## 2023-08-21 RX ORDER — CEFTRIAXONE 2 G/50ML
2000 INJECTION, SOLUTION INTRAVENOUS ONCE
Status: COMPLETED | OUTPATIENT
Start: 2023-08-21 | End: 2023-08-21

## 2023-08-21 RX ORDER — SODIUM CHLORIDE 9 MG/ML
20 INJECTION, SOLUTION INTRAVENOUS ONCE
Status: COMPLETED | OUTPATIENT
Start: 2023-08-21 | End: 2023-08-21

## 2023-08-21 RX ADMIN — SODIUM CHLORIDE 20 ML/HR: 0.9 INJECTION, SOLUTION INTRAVENOUS at 08:44

## 2023-08-21 RX ADMIN — CEFTRIAXONE 2000 MG: 2 INJECTION, SOLUTION INTRAVENOUS at 08:45

## 2023-08-21 NOTE — PROGRESS NOTES
[address] [facility] [place] [address]    Manuela Quiñonez  1980      PRIMARY HEMATOLOGIC/ONCOLOGIC DIAGNOSIS:  1. Right hepatic vein thrombus    HISTORY OF PRESENT ILLNESS:  Manuela Quiñonez is a 35yo male who presents for evaluation and management of right hepatic vein thrombosis. The patient was admitted to the hospital from 7/12/23--7/20/23 with sepsis secondary to septic pulmonary emboli, PNA with cavitation and liver abscess. The patient was on Abx and the liver abscess was drained by IR. MRI abdomen dated 6/28/23 showed the presence of thrombus extending from the abscess to a small accessory right hepatic vein. Although this could be septic thrombosis, there were no imaging findings to suggest septic thrombus. The patient was started on Eliquis and is currently taking 5mg PO BID. Denies any bleeding episodes. PAST MEDICAL,PAST SURGICAL, FAMILY AND SOCIAL HISTORY:    Patient Active Problem List   Diagnosis   • Sepsis ([address])   • Liver abscess   • Septic embolism (HCC)   • Elevated troponin   • Diabetes mellitus with hyperglycemia (HCC)   • Acute renal failure (ARF) (HCC)   • Hyponatremia   • Hypokalemia   • Thrombosis, hepatic vein (HCC)   • Bacteremia due to Klebsiella pneumoniae   • Hyperlipidemia   • Mild persistent asthma without complication   • Type 2 diabetes mellitus without complication, without long-term current use of insulin (HCC)   • Cavitary pneumonia   • Anemia   • History of noncompliance with medical treatment     Past Medical History:   Diagnosis Date   • Asthma      Past Surgical History:   Procedure Laterality Date   • IR BIOPSY LIVER MASS  6/29/2023   • IR DRAINAGE TUBE CHECK AND/OR REMOVAL  7/20/2023     No family history on file.   Social History     Socioeconomic History   • Marital status: /Civil Union     Spouse name: Not on file   • Number of children: Not on file   • Years of education: Not on file   • Highest education level: Not on file   Occupational History   • Not on file   Tobacco Use   • Smoking status: Never   • Smokeless tobacco: Never   Vaping Use   • Vaping Use: Never used   Substance and Sexual Activity   • Alcohol use: Not Currently   • Drug use: Never   • Sexual activity: Not on file   Other Topics Concern   • Not on file   Social History Narrative   • Not on file     Social Determinants of Health     Financial Resource Strain: Not on file   Food Insecurity: No Food Insecurity (7/14/2023)    Hunger Vital Sign    • Worried About Running Out of Food in the Last Year: Never true    • Ran Out of Food in the Last Year: Never true   Transportation Needs: No Transportation Needs (7/14/2023)    PRAPARE - Transportation    • Lack of Transportation (Medical): No    • Lack of Transportation (Non-Medical):  No   Physical Activity: Not on file   Stress: Not on file   Social Connections: Not on file   Intimate Partner Violence: Not on file   Housing Stability: Low Risk  (7/14/2023)    Housing Stability Vital Sign    • Unable to Pay for Housing in the Last Year: No    • Number of Places Lived in the Last Year: 1    • Unstable Housing in the Last Year: No       Current Outpatient Medications:   •  acetaminophen (TYLENOL) 325 mg tablet, Take 2 tablets (650 mg total) by mouth every 8 (eight) hours as needed for fever or headaches, Disp: , Rfl: 0  •  albuterol (PROVENTIL HFA,VENTOLIN HFA) 90 mcg/act inhaler, Inhale 2 puffs every 6 (six) hours as needed, Disp: , Rfl:   •  atorvastatin (LIPITOR) 10 mg tablet, Take 10 mg by mouth, Disp: , Rfl:   •  cefTRIAXone (ROCEPHIN) 2000 mg IVPB, Inject 50 mL (2,000 mg total) into a catheter in a vein over 30 minutes at 100 mL/hr every 24 hours Do not start before July 21, 2023., Disp: , Rfl: 0  •  [START ON 8/23/2023] cephalexin (KEFLEX) 500 mg capsule, Take 1 capsule (500 mg total) by mouth every 6 (six) hours for 14 days Do not start before August 23, 2023., Disp: 56 capsule, Rfl: 0  •  dextromethorphan-guaiFENesin (ROBITUSSIN DM)  mg/5 mL syrup, Take 10 mL by mouth every 4 (four) hours as needed for cough, Disp: 237 mL, Rfl: 0  •  metFORMIN (GLUCOPHAGE) 1000 MG tablet, Take 1 tablet by mouth 2 (two) times a day with meals, Disp: , Rfl:   •  apixaban (ELIQUIS) 5 mg, Take 1 tablet (5 mg total) by mouth 2 (two) times a day for 3 doses, Disp: 3 tablet, Rfl: 0  •  cyanocobalamin (VITAMIN B-12) 100 MCG tablet, Take 100 mcg by mouth daily (Patient not taking: Reported on 8/3/2023), Disp: , Rfl:   •  montelukast (SINGULAIR) 10 mg tablet, Take 10 mg by mouth (Patient not taking: Reported on 8/3/2023), Disp: , Rfl:   No current facility-administered medications for this visit. Facility-Administered Medications Ordered in Other Visits:   •  alteplase (CATHFLO) injection 2 mg, 2 mg, Intracatheter, Q1MIN PRN, Vu Padilla MD  •  alteplase (CATHFLO) injection 2 mg, 2 mg, Intracatheter, Q1MIN PRN, Vu Padilla MD  •  alteplase (CATHFLO) injection 2 mg, 2 mg, Intracatheter, Q1MIN PRN, Vu Padilla MD  No Known Allergies  Vitals:    08/21/23 1019   BP: 120/58   Pulse: 91   Resp: 18   Temp: 97.6 °F (36.4 °C)   SpO2: 100%       ROS:  CONSTITUTIONAL:  No fever. No chills. No dizziness. No weakness. EYES:  No pain, erythema, or discharge. No blurring of vision. ENT:  No sore throat, URI symptoms. No epistaxis. No tinnitus. CARDIOVASCULAR:  No chest pain. No palpitations. No lower extremity edema. RESPIRATORY:  No shortness of breath, cough, pain with respiration, pleuritic chest pain. No hemoptysis. No dyspnea. No paroxysmal nocturnal dyspnea. GASTROINTESTINAL:  Normal appetite. No nausea, vomiting, diarrhea. No pain. No bloating. No melena. GENITOURINARY:  No frequency, urgency, nocturia. No hematuria or dysuria. MUSCULOSKELETAL:  No arthralgias or myalgias. INTEGUMENTARY:  No swelling. No bruising. No contusions. No abrasions. No lymphangitis. NEUROLOGIC:  No headache. No neck pain. No numbness or tingling of the extremities. No weakness. PSYCHIATRIC:  No confusion. ENDOCRINE:  No fatigue. No weakness. No history of thyroid, diabetes or adrenal problems. HEMATOLOGICAL:  No bleeding. No petechiae. No bruising. PHYSICAL EXAM:    GENERAL: AAO x 3  HEENT: AT,NC  CVS: S1S2 RRR  LUNGS: CTA b/l  ABD: NT,ND, +BS  EXTR: no edema  NEURO: CN II-XII grossly intact    LABS:  I have reviewed pertinent labs:  CBC:   Lab Results   Component Value Date    WBC 6.35 08/15/2023    RBC 4.44 08/15/2023    HGB 11.8 (L) 08/15/2023    HCT 38.1 08/15/2023    MCV 86 08/15/2023     08/15/2023    MCH 26.6 (L) 08/15/2023    MCHC 31.0 (L) 08/15/2023    RDW 15.9 (H) 08/15/2023    MPV 10.9 08/15/2023    NEUTROABS 2.36 08/15/2023     CMP:   Lab Results   Component Value Date    SODIUM 135 08/15/2023    K 4.0 08/15/2023     08/15/2023    CO2 25 08/15/2023    AGAP 6 08/15/2023    BUN 20 08/15/2023    CREATININE 0.92 08/15/2023    GLUC 225 (H) 08/15/2023    CALCIUM 9.5 08/15/2023    AST 23 08/15/2023    ALT 24 08/15/2023    ALKPHOS 69 08/15/2023    TP 8.2 08/15/2023    ALB 4.2 08/15/2023    TBILI 0.50 08/15/2023    EGFR 102 08/15/2023     Liver Enzymes:   Lab Results   Component Value Date    AST 23 08/15/2023    ALT 24 08/15/2023    ALKPHOS 69 08/15/2023    TP 8.2 08/15/2023    ALB 4.2 08/15/2023    TBILI 0.50 08/15/2023     Vitamin B12 No results found for: "[facility]"  Iron Study No results found for: "RETIC", "RETICCTPCT", "FERRITIN", "CONCFE", "TIBC", "PRIMIDONE", "Matt Ogren", "IRON"  Folate No results found for: "FOLATE"  Magnesium   Lab Results   Component Value Date    MG 2.3 07/02/2023     Phosphorus No results found for: "PHOS"  Coagulation Panel   Lab Results   Component Value Date    DDIMER 3.07 (H) 06/26/2023    PROTIME 14.2 07/20/2023    INR 1.12 07/20/2023    PTT 97 (H) 07/15/2023       IMAGING:  No results found.   I reviewed the above laboratory and imaging data.    ASSESSMENT/PLAN:  1. Right hepatic vein thrombus  Continue anticoagulation with Eliquis. Will obtain additional hypercoagulable work-up. Follow-up in 1m for further recommendations.

## 2023-08-21 NOTE — PROGRESS NOTES
Pt reports to unit feeling well with no complaints. Patient receiving IV Rocephin today. Patient R PICC utilized for antibiotic use, tolerated infusions well without any adverse events. Patient aware of next appointment, AVS provided upon discharge.

## 2023-08-22 ENCOUNTER — TELEPHONE (OUTPATIENT)
Dept: INFECTIOUS DISEASES | Facility: CLINIC | Age: 43
End: 2023-08-22

## 2023-08-22 ENCOUNTER — HOSPITAL ENCOUNTER (OUTPATIENT)
Dept: INFUSION CENTER | Facility: CLINIC | Age: 43
Discharge: HOME | End: 2023-08-22
Payer: COMMERCIAL

## 2023-08-22 VITALS
HEART RATE: 89 BPM | RESPIRATION RATE: 18 BRPM | TEMPERATURE: 96.7 F | DIASTOLIC BLOOD PRESSURE: 58 MMHG | SYSTOLIC BLOOD PRESSURE: 116 MMHG

## 2023-08-22 DIAGNOSIS — J98.4 CAVITARY PNEUMONIA: ICD-10-CM

## 2023-08-22 DIAGNOSIS — K75.0 LIVER ABSCESS: Primary | ICD-10-CM

## 2023-08-22 DIAGNOSIS — B96.1 BACTEREMIA DUE TO KLEBSIELLA PNEUMONIAE: ICD-10-CM

## 2023-08-22 DIAGNOSIS — J18.9 CAVITARY PNEUMONIA: ICD-10-CM

## 2023-08-22 DIAGNOSIS — R78.81 BACTEREMIA DUE TO KLEBSIELLA PNEUMONIAE: ICD-10-CM

## 2023-08-22 LAB
ALBUMIN SERPL BCP-MCNC: 4.1 G/DL (ref 3.5–5)
ALP SERPL-CCNC: 62 U/L (ref 34–104)
ALT SERPL W P-5'-P-CCNC: 21 U/L (ref 7–52)
ANION GAP SERPL CALCULATED.3IONS-SCNC: 6 MMOL/L
AST SERPL W P-5'-P-CCNC: 14 U/L (ref 13–39)
BASOPHILS # BLD AUTO: 0.09 THOUSANDS/ÂΜL (ref 0–0.1)
BASOPHILS NFR BLD AUTO: 1 % (ref 0–1)
BILIRUB SERPL-MCNC: 0.36 MG/DL (ref 0.2–1)
BUN SERPL-MCNC: 18 MG/DL (ref 5–25)
CALCIUM SERPL-MCNC: 9.5 MG/DL (ref 8.4–10.2)
CHLORIDE SERPL-SCNC: 104 MMOL/L (ref 96–108)
CO2 SERPL-SCNC: 26 MMOL/L (ref 21–32)
CREAT SERPL-MCNC: 0.95 MG/DL (ref 0.6–1.3)
EOSINOPHIL # BLD AUTO: 0.35 THOUSAND/ÂΜL (ref 0–0.61)
EOSINOPHIL NFR BLD AUTO: 4 % (ref 0–6)
ERYTHROCYTE [DISTWIDTH] IN BLOOD BY AUTOMATED COUNT: 15.5 % (ref 11.6–15.1)
GFR SERPL CREATININE-BSD FRML MDRD: 98 ML/MIN/1.73SQ M
GLUCOSE SERPL-MCNC: 237 MG/DL (ref 65–140)
HCT VFR BLD AUTO: 36 % (ref 36.5–49.3)
HGB BLD-MCNC: 11.3 G/DL (ref 12–17)
IMM GRANULOCYTES # BLD AUTO: 0.01 THOUSAND/UL (ref 0–0.2)
IMM GRANULOCYTES NFR BLD AUTO: 0 % (ref 0–2)
LYMPHOCYTES # BLD AUTO: 3.87 THOUSANDS/ÂΜL (ref 0.6–4.47)
LYMPHOCYTES NFR BLD AUTO: 47 % (ref 14–44)
MCH RBC QN AUTO: 26.8 PG (ref 26.8–34.3)
MCHC RBC AUTO-ENTMCNC: 31.4 G/DL (ref 31.4–37.4)
MCV RBC AUTO: 85 FL (ref 82–98)
MONOCYTES # BLD AUTO: 0.75 THOUSAND/ÂΜL (ref 0.17–1.22)
MONOCYTES NFR BLD AUTO: 9 % (ref 4–12)
NEUTROPHILS # BLD AUTO: 3.18 THOUSANDS/ÂΜL (ref 1.85–7.62)
NEUTS SEG NFR BLD AUTO: 39 % (ref 43–75)
NRBC BLD AUTO-RTO: 0 /100 WBCS
PLATELET # BLD AUTO: 306 THOUSANDS/UL (ref 149–390)
PMV BLD AUTO: 10.5 FL (ref 8.9–12.7)
POTASSIUM SERPL-SCNC: 4 MMOL/L (ref 3.5–5.3)
PROT SERPL-MCNC: 7.8 G/DL (ref 6.4–8.4)
RBC # BLD AUTO: 4.22 MILLION/UL (ref 3.88–5.62)
SODIUM SERPL-SCNC: 136 MMOL/L (ref 135–147)
WBC # BLD AUTO: 8.25 THOUSAND/UL (ref 4.31–10.16)

## 2023-08-22 PROCEDURE — 85025 COMPLETE CBC W/AUTO DIFF WBC: CPT | Performed by: INTERNAL MEDICINE

## 2023-08-22 PROCEDURE — 80053 COMPREHEN METABOLIC PANEL: CPT | Performed by: INTERNAL MEDICINE

## 2023-08-22 PROCEDURE — 96365 THER/PROPH/DIAG IV INF INIT: CPT

## 2023-08-22 RX ORDER — CEFTRIAXONE 2 G/50ML
2000 INJECTION, SOLUTION INTRAVENOUS ONCE
Status: CANCELLED | OUTPATIENT
Start: 2023-08-22

## 2023-08-22 RX ORDER — SODIUM CHLORIDE 9 MG/ML
20 INJECTION, SOLUTION INTRAVENOUS ONCE
Status: CANCELLED | OUTPATIENT
Start: 2023-08-22

## 2023-08-22 RX ORDER — SODIUM CHLORIDE 9 MG/ML
20 INJECTION, SOLUTION INTRAVENOUS ONCE
Status: COMPLETED | OUTPATIENT
Start: 2023-08-22 | End: 2023-08-22

## 2023-08-22 RX ORDER — CEFTRIAXONE 2 G/50ML
2000 INJECTION, SOLUTION INTRAVENOUS ONCE
Status: COMPLETED | OUTPATIENT
Start: 2023-08-22 | End: 2023-08-22

## 2023-08-22 RX ADMIN — CEFTRIAXONE 2000 MG: 2 INJECTION, SOLUTION INTRAVENOUS at 08:21

## 2023-08-22 RX ADMIN — SODIUM CHLORIDE 20 ML/HR: 0.9 INJECTION, SOLUTION INTRAVENOUS at 08:21

## 2023-08-22 NOTE — TELEPHONE ENCOUNTER
C[facility]ed [facility] Scheduling and spoke with leslee. Informed Can Yancey I was calling to get patient scheduled for CT. Patient is scheduled 8/29/23 at [facility]. Called and spoke with patient today. Informed patient was able to get him scheduled 8/29/23 at 9AM at [facility] for his CT. Patient states he has address from previous appointment. Informed patient he is scheduled for follow up appointment 8/24/23 at 10:30AM with Deirdre Valdes at the [place] office. Patient states he was at the infusion center this morning for his las infusion. Patient states he is going to  his Keflex at the pharmacy today so he can begin tomorrow. Informed patient he needs to take the keflex until he is advised with further plan from our office after CT results are reviewed by Dr. Svetlana Giraldo.     Informed patient if there are any further question or concerns to call the office. Patient verbalizes understanding at this time.

## 2023-08-22 NOTE — PROGRESS NOTES
Pt presents for IV ABX offering no compliants. Labs drawn prior to infusion. Right PICC line utilized for treatment with blood return throughout. Pt tolerated treatment without incident. Right PICC line removed. 41cm removed. Help pressure for 5 mins and pressure dressing placed on arm. Instructed patient not to remove for 24 hours. AVS declined. Therapy plan complete.

## 2023-08-23 ENCOUNTER — TELEPHONE (OUTPATIENT)
Dept: HEMATOLOGY ONCOLOGY | Facility: CLINIC | Age: 43
End: 2023-08-23

## 2023-08-23 PROBLEM — I81 PORTAL VEIN THROMBOSIS: Status: ACTIVE | Noted: 2023-08-23

## 2023-08-23 NOTE — TELEPHONE ENCOUNTER
Patient informed that labs are in chart, to go as soon as possible to complete them, patient understood

## 2023-08-24 ENCOUNTER — OFFICE VISIT (OUTPATIENT)
Dept: INFECTIOUS DISEASES | Facility: CLINIC | Age: 43
End: 2023-08-24
Payer: COMMERCIAL

## 2023-08-24 ENCOUNTER — APPOINTMENT (OUTPATIENT)
Dept: LAB | Facility: HOSPITAL | Age: 43
End: 2023-08-24
Payer: COMMERCIAL

## 2023-08-24 ENCOUNTER — TELEPHONE (OUTPATIENT)
Dept: INFECTIOUS DISEASES | Facility: CLINIC | Age: 43
End: 2023-08-24

## 2023-08-24 VITALS
HEART RATE: 78 BPM | DIASTOLIC BLOOD PRESSURE: 68 MMHG | SYSTOLIC BLOOD PRESSURE: 120 MMHG | OXYGEN SATURATION: 98 % | TEMPERATURE: 97 F | RESPIRATION RATE: 15 BRPM

## 2023-08-24 DIAGNOSIS — I82.0 HEPATIC VEIN THROMBOSIS (HCC): ICD-10-CM

## 2023-08-24 DIAGNOSIS — Z91.199 HISTORY OF NONCOMPLIANCE WITH MEDICAL TREATMENT: ICD-10-CM

## 2023-08-24 DIAGNOSIS — B96.1 BACTEREMIA DUE TO KLEBSIELLA PNEUMONIAE: ICD-10-CM

## 2023-08-24 DIAGNOSIS — K75.0 LIVER ABSCESS: Primary | ICD-10-CM

## 2023-08-24 DIAGNOSIS — R78.81 BACTEREMIA DUE TO KLEBSIELLA PNEUMONIAE: ICD-10-CM

## 2023-08-24 DIAGNOSIS — I76 SEPTIC EMBOLISM (HCC): ICD-10-CM

## 2023-08-24 DIAGNOSIS — I82.0 THROMBOSIS, HEPATIC VEIN (HCC): ICD-10-CM

## 2023-08-24 DIAGNOSIS — E11.65 DIABETES MELLITUS WITH HYPERGLYCEMIA (HCC): ICD-10-CM

## 2023-08-24 LAB
ALBUMIN SERPL BCP-MCNC: 4.8 G/DL (ref 3.5–5)
ALP SERPL-CCNC: 68 U/L (ref 34–104)
ALT SERPL W P-5'-P-CCNC: 24 U/L (ref 7–52)
ANION GAP SERPL CALCULATED.3IONS-SCNC: 6 MMOL/L
AST SERPL W P-5'-P-CCNC: 17 U/L (ref 13–39)
BASOPHILS # BLD AUTO: 0.07 THOUSANDS/ÂΜL (ref 0–0.1)
BASOPHILS NFR BLD AUTO: 1 % (ref 0–1)
BILIRUB SERPL-MCNC: 0.4 MG/DL (ref 0.2–1)
BUN SERPL-MCNC: 20 MG/DL (ref 5–25)
CALCIUM SERPL-MCNC: 10.5 MG/DL (ref 8.4–10.2)
CHLORIDE SERPL-SCNC: 103 MMOL/L (ref 96–108)
CO2 SERPL-SCNC: 26 MMOL/L (ref 21–32)
CREAT SERPL-MCNC: 1 MG/DL (ref 0.6–1.3)
DEPRECATED AT III PPP: 113 % OF NORMAL (ref 92–136)
EOSINOPHIL # BLD AUTO: 0.38 THOUSAND/ÂΜL (ref 0–0.61)
EOSINOPHIL NFR BLD AUTO: 6 % (ref 0–6)
ERYTHROCYTE [DISTWIDTH] IN BLOOD BY AUTOMATED COUNT: 15.6 % (ref 11.6–15.1)
GFR SERPL CREATININE-BSD FRML MDRD: 92 ML/MIN/1.73SQ M
GLUCOSE P FAST SERPL-MCNC: 219 MG/DL (ref 65–99)
HCT VFR BLD AUTO: 42.2 % (ref 36.5–49.3)
HGB BLD-MCNC: 13.2 G/DL (ref 12–17)
IMM GRANULOCYTES # BLD AUTO: 0.01 THOUSAND/UL (ref 0–0.2)
IMM GRANULOCYTES NFR BLD AUTO: 0 % (ref 0–2)
LYMPHOCYTES # BLD AUTO: 3.24 THOUSANDS/ÂΜL (ref 0.6–4.47)
LYMPHOCYTES NFR BLD AUTO: 51 % (ref 14–44)
MCH RBC QN AUTO: 26.8 PG (ref 26.8–34.3)
MCHC RBC AUTO-ENTMCNC: 31.3 G/DL (ref 31.4–37.4)
MCV RBC AUTO: 86 FL (ref 82–98)
MONOCYTES # BLD AUTO: 0.48 THOUSAND/ÂΜL (ref 0.17–1.22)
MONOCYTES NFR BLD AUTO: 7 % (ref 4–12)
NEUTROPHILS # BLD AUTO: 2.29 THOUSANDS/ÂΜL (ref 1.85–7.62)
NEUTS SEG NFR BLD AUTO: 35 % (ref 43–75)
NRBC BLD AUTO-RTO: 0 /100 WBCS
PLATELET # BLD AUTO: 348 THOUSANDS/UL (ref 149–390)
PMV BLD AUTO: 10.4 FL (ref 8.9–12.7)
POTASSIUM SERPL-SCNC: 4.3 MMOL/L (ref 3.5–5.3)
PROT SERPL-MCNC: 9 G/DL (ref 6.4–8.4)
RBC # BLD AUTO: 4.92 MILLION/UL (ref 3.88–5.62)
SODIUM SERPL-SCNC: 135 MMOL/L (ref 135–147)
WBC # BLD AUTO: 6.47 THOUSAND/UL (ref 4.31–10.16)

## 2023-08-24 PROCEDURE — 88185 FLOWCYTOMETRY/TC ADD-ON: CPT

## 2023-08-24 PROCEDURE — 88187 FLOWCYTOMETRY/READ 2-8: CPT

## 2023-08-24 PROCEDURE — 85300 ANTITHROMBIN III ACTIVITY: CPT

## 2023-08-24 PROCEDURE — 81291 MTHFR GENE: CPT

## 2023-08-24 PROCEDURE — 85670 THROMBIN TIME PLASMA: CPT

## 2023-08-24 PROCEDURE — 99214 OFFICE O/P EST MOD 30 MIN: CPT | Performed by: PHYSICIAN ASSISTANT

## 2023-08-24 PROCEDURE — 85306 CLOT INHIBIT PROT S FREE: CPT

## 2023-08-24 PROCEDURE — 85732 THROMBOPLASTIN TIME PARTIAL: CPT

## 2023-08-24 PROCEDURE — 85613 RUSSELL VIPER VENOM DILUTED: CPT

## 2023-08-24 PROCEDURE — 88184 FLOWCYTOMETRY/ TC 1 MARKER: CPT

## 2023-08-24 PROCEDURE — 85025 COMPLETE CBC W/AUTO DIFF WBC: CPT

## 2023-08-24 PROCEDURE — 36415 COLL VENOUS BLD VENIPUNCTURE: CPT

## 2023-08-24 PROCEDURE — 85303 CLOT INHIBIT PROT C ACTIVITY: CPT

## 2023-08-24 PROCEDURE — 86147 CARDIOLIPIN ANTIBODY EA IG: CPT

## 2023-08-24 PROCEDURE — 86146 BETA-2 GLYCOPROTEIN ANTIBODY: CPT

## 2023-08-24 PROCEDURE — 85705 THROMBOPLASTIN INHIBITION: CPT

## 2023-08-24 NOTE — PROGRESS NOTES
Assessment/Plan:       Problem List Items Addressed This Visit        Digestive    Liver abscess - Primary       Endocrine    Diabetes mellitus with hyperglycemia ([address])       Cardiovascular and Mediastinum    Septic embolism ([address])       Other    Bacteremia due to Klebsiella pneumoniae    History of noncompliance with medical treatment       1. Septic pulmonary emboli with cavitation. Admitted to [facility] July 2023 with sepsis. Found to have septic emboli which was felt to be due to #2 and #3. There is some slight progression on the imaging in terms of cavitation in size which is likely due to prior noncompliance. Some changes may also be radiographically expected. he is on IV ceftriaxone to complete 6 week course through 8/22. He is tolerating this medication at infusion center, but missed dose today. He is aware of no show policy. Recent 8/24/23 labs reviewed and stable. He has repeat CT C/A/P due that is scheduled next week 8/29 at 9 am.    Completed initial 6 weeks of Ceftriaxone therapy through 8/22  Started cephalexin 500 mg PO q 6 hours today  Continue cephalexin pending results of repeat CT chest/abdomen/pelvis with contrast 8/29/23 9 am   Call patient with CT results and antibiotic/duration plan on 9/5/23     2.  Liver abscess with adjacent thrombosis.  Was diagnosed with this on prior admission in the setting of #3.  Suspect that this contributed to #1.  MRCP without other biliary abnormality. Essentially an occult abscess. Repeat imaging now showing resolution postdrainage. IR drain removed. Antibiotics as above  Will need GI evaluation as outpatient     3. Recent Klebsiella bacteremia.  Course complicated by the above.     4. Thrombocytosis.  Suspect that this is an inflammatory response due to the above. We will continue to trend CBC while on abx.     5.  Poorly controlled diabetes.  Hemoglobin A1c of 12.0.  Likely risk factor for the above.        6. Noncompliance.  Patient unfortunately has eloped from several ER visits and recent hospitalization.  Unfortunately there was a lack of understanding/medical literacy of the true severity of illness.      Above assessment and plan discussed in detail with patient during examination. I have spent a total time of 40 minutes on 08/24/23 in caring for this patient including Diagnostic results, Prognosis, Risks and benefits of tx options, Instructions for management, Patient and family education, Importance of tx compliance, Risk factor reductions, Impressions, Counseling / Coordination of care, Documenting in the medical record, Reviewing / ordering tests, medicine, procedures  , Obtaining or reviewing history   and Communicating with other healthcare professionals .       Antibiotics:  Cephalexin D1     Subjective:      Patient ID: Mona Quiñonez is a 43 y.o. male. Patient presents to outpatient ID office for management of his pulmonary cavitary lesions and liver abscess. He tolerated the 6 weeks of Ceftriaxone IV abx at [facility] through 8/22. He now started cephalexin and is scheduled for CT C/A/P 8/29/23 at 9 am.  He denies fevers chills, n/v/d. He is not having any abdominal pain now but sometimes has RUQ discomfort with movement. He has no cough or SOB. He is tolerating cephalexin so far. The following portions of the patient's history were reviewed and updated as appropriate: allergies, current medications, past family history, past medical history, past social history, past surgical history and problem list.    Review of Systems   All other systems reviewed and are negative. Objective:      /68   Pulse 78   Temp (!) 97 °F (36.1 °C)   Resp 15   SpO2 98%          Physical Exam  Vitals reviewed. Constitutional:       Appearance: Normal appearance. HENT:      Head: Normocephalic.       Right Ear: External ear normal.      Left Ear: External ear normal.      Nose: Nose normal.      Mouth/Throat:      Mouth: Mucous membranes are moist.      Pharynx: Oropharynx is clear. Eyes:      Extraocular Movements: Extraocular movements intact. Conjunctiva/sclera: Conjunctivae normal.   Cardiovascular:      Rate and Rhythm: Normal rate. Pulses: Normal pulses. Heart sounds: Normal heart sounds. Pulmonary:      Effort: Pulmonary effort is normal.      Breath sounds: Normal breath sounds. Abdominal:      General: Bowel sounds are normal.      Palpations: Abdomen is soft. Tenderness: There is no abdominal tenderness. Musculoskeletal:      Cervical back: Normal range of motion and neck supple. Skin:     Findings: No rash. Neurological:      General: No focal deficit present. Mental Status: He is alert and oriented to person, place, and time.    Psychiatric:         Mood and Affect: Mood normal.

## 2023-08-24 NOTE — TELEPHONE ENCOUNTER
Contacted and spoke with patient today. He was made aware that he had cancelled his hospital follow up with GI and that Dr. Garrett Baez is recommending that he follow up with GI. I provided him the phone number to give them a call to reschedule. He is aware this is because of the liver abscess and to prevent further recurrences. He confirms understanding and thanks me for my call.

## 2023-08-25 LAB
B2 GLYCOPROT1 IGA SERPL IA-ACNC: 3.5
B2 GLYCOPROT1 IGG SERPL IA-ACNC: 2.4
B2 GLYCOPROT1 IGM SERPL IA-ACNC: <2.4
CARDIOLIPIN IGA SER IA-ACNC: 3.6
CARDIOLIPIN IGG SER IA-ACNC: 3.2
CARDIOLIPIN IGM SER IA-ACNC: 3.6

## 2023-08-28 LAB — MISCELLANEOUS LAB TEST RESULT: NORMAL

## 2023-08-29 ENCOUNTER — HOSPITAL ENCOUNTER (OUTPATIENT)
Dept: RADIOLOGY | Facility: MEDICAL CENTER | Age: 43
Discharge: HOME | End: 2023-08-29
Payer: COMMERCIAL

## 2023-08-29 DIAGNOSIS — K75.0 LIVER ABSCESS: ICD-10-CM

## 2023-08-29 DIAGNOSIS — I76 SEPTIC EMBOLISM (HCC): Primary | ICD-10-CM

## 2023-08-29 DIAGNOSIS — I76 SEPTIC EMBOLISM (HCC): ICD-10-CM

## 2023-08-29 LAB
APTT SCREEN TO CONFIRM RATIO: 1.04 RATIO (ref 0–1.34)
CONFIRM APTT/NORMAL: 35.7 SEC (ref 0–47.6)
DRVVT IMM 1:2 NP PPP: 42.1 SEC (ref 0–40.4)
DRVVT SCREEN TO CONFIRM RATIO: 1.2 RATIO (ref 0.8–1.2)
LA PPP-IMP: ABNORMAL
SCAN RESULT: NORMAL
SCREEN APTT: 38.4 SEC (ref 0–43.5)
SCREEN DRVVT: 49.7 SEC (ref 0–47)
THROMBIN TIME: 17.7 SEC (ref 0–23)

## 2023-08-29 PROCEDURE — 71260 CT THORAX DX C+: CPT

## 2023-08-29 PROCEDURE — 74177 CT ABD & PELVIS W/CONTRAST: CPT

## 2023-08-29 PROCEDURE — G1004 CDSM NDSC: HCPCS

## 2023-08-29 RX ORDER — CEPHALEXIN 500 MG/1
500 CAPSULE ORAL EVERY 6 HOURS SCHEDULED
Qty: 120 CAPSULE | Refills: 1 | Status: SHIPPED | OUTPATIENT
Start: 2023-08-29 | End: 2023-10-28

## 2023-08-29 RX ADMIN — IOHEXOL 100 ML: 350 INJECTION, SOLUTION INTRAVENOUS at 09:24

## 2023-08-29 NOTE — TELEPHONE ENCOUNTER
Called central scheduling and spoke with Maricel Rolle. Maricel Rolle scheduled patient CT for patient 9/26/23 at 10:30AM at [facility]. Called and spoke with patient today. Informed patient Dr. Xenia Castellanos did review his CT scan results. Per Dr. Xenia Castellanos patient to have repeat CT scan done in 4 weeks and continue Keflex until follow up appointment after repeat CT scan and weekly CBCD and Cre while on antibiotics. Informed patient repeat CT scan scheduled 9/26/23 at 10:30AM at [facility]. Instructions gone over with patient    Informed patient follow up 9/28/23 at 9:30Am with Deirdre Baptiste at the [facility] office. Informed patient he will need  will need weekly CBCD and Cre. Informed patient orders are in computer and he can go to out patient lab to have these done. Informed patient refills of Keflex were sent to his pharmacy. Informed patient to continue to take the Keflex until advised at follow up appointment. Informed patient if there are any further questions or concerns to call the office. Patient verbalizes understanding at this time.

## 2023-08-29 NOTE — TELEPHONE ENCOUNTER
----- Message from Husam Hoffman MD sent at 8/29/2023 10:04 AM EDT -----  The patient got his CT done! Overall the findings are improving but there is still some residual changes in terms of the lung and liver. We should continue antibiotics until we see complete resolution on imaging. Please provide the patient with additional antibiotic refills and plan for repeat CT chest/abdomen/pelvis with IV contrast in about 4 weeks. We can have him follow-up in the office 1 week after the imaging is completed. He should have blood work done weekly while on the antibiotic. Please contact the patient with these recommendations and document that he understands and is aware of next steps.

## 2023-09-05 ENCOUNTER — TELEPHONE (OUTPATIENT)
Dept: OTHER | Facility: HOSPITAL | Age: 43
End: 2023-09-05

## 2023-09-06 ENCOUNTER — APPOINTMENT (OUTPATIENT)
Dept: LAB | Facility: HOSPITAL | Age: 43
End: 2023-09-06
Payer: COMMERCIAL

## 2023-09-06 LAB
BASOPHILS # BLD AUTO: 0.07 THOUSANDS/ÂΜL (ref 0–0.1)
BASOPHILS NFR BLD AUTO: 1 % (ref 0–1)
CREAT SERPL-MCNC: 1.06 MG/DL (ref 0.6–1.3)
EOSINOPHIL # BLD AUTO: 0.19 THOUSAND/ÂΜL (ref 0–0.61)
EOSINOPHIL NFR BLD AUTO: 3 % (ref 0–6)
ERYTHROCYTE [DISTWIDTH] IN BLOOD BY AUTOMATED COUNT: 14.7 % (ref 11.6–15.1)
GFR SERPL CREATININE-BSD FRML MDRD: 86 ML/MIN/1.73SQ M
HCT VFR BLD AUTO: 39.9 % (ref 36.5–49.3)
HGB BLD-MCNC: 12.4 G/DL (ref 12–17)
IMM GRANULOCYTES # BLD AUTO: 0.01 THOUSAND/UL (ref 0–0.2)
IMM GRANULOCYTES NFR BLD AUTO: 0 % (ref 0–2)
LYMPHOCYTES # BLD AUTO: 3.27 THOUSANDS/ÂΜL (ref 0.6–4.47)
LYMPHOCYTES NFR BLD AUTO: 59 % (ref 14–44)
Lab: NORMAL
MCH RBC QN AUTO: 26.3 PG (ref 26.8–34.3)
MCHC RBC AUTO-ENTMCNC: 31.1 G/DL (ref 31.4–37.4)
MCV RBC AUTO: 85 FL (ref 82–98)
MISCELLANEOUS LAB TEST RESULT: NORMAL
MONOCYTES # BLD AUTO: 0.46 THOUSAND/ÂΜL (ref 0.17–1.22)
MONOCYTES NFR BLD AUTO: 8 % (ref 4–12)
MTHFR GENE MUT ANL BLD/T: NORMAL
NEUTROPHILS # BLD AUTO: 1.61 THOUSANDS/ÂΜL (ref 1.85–7.62)
NEUTS SEG NFR BLD AUTO: 29 % (ref 43–75)
NRBC BLD AUTO-RTO: 0 /100 WBCS
PLATELET # BLD AUTO: 290 THOUSANDS/UL (ref 149–390)
PMV BLD AUTO: 11.5 FL (ref 8.9–12.7)
RBC # BLD AUTO: 4.71 MILLION/UL (ref 3.88–5.62)
WBC # BLD AUTO: 5.61 THOUSAND/UL (ref 4.31–10.16)

## 2023-09-06 PROCEDURE — 82565 ASSAY OF CREATININE: CPT

## 2023-09-06 PROCEDURE — 85025 COMPLETE CBC W/AUTO DIFF WBC: CPT

## 2023-09-06 PROCEDURE — 36415 COLL VENOUS BLD VENIPUNCTURE: CPT

## 2023-09-07 ENCOUNTER — TELEPHONE (OUTPATIENT)
Dept: INFECTIOUS DISEASES | Facility: CLINIC | Age: 43
End: 2023-09-07

## 2023-09-07 NOTE — TELEPHONE ENCOUNTER
Called and spoke with patient today. Informed patient Dr. Jacquelyn Cole did receive and review labs results. Per Dr. Jacquelyn Cole cells counts are dropping. Per Dr. Jacquelyn Cole remind patient have weekly labs done next week so results can be followed up. Patient verbalizes understanding at this time.

## 2023-09-07 NOTE — TELEPHONE ENCOUNTER
----- Message from Zeina Min sent at 9/6/2023  3:43 PM EDT -----    ----- Message -----  From: Wan Guerin MD  Sent: 9/6/2023  11:03 AM EDT  To: Infectious Disease [place] [facility]    Cell counts are dropping uncertain if this will be just a blip, but please contact patient to remind him to have repeat labs next week so we can follow up on the results. Thanks.

## 2023-09-12 ENCOUNTER — TELEPHONE (OUTPATIENT)
Dept: GASTROENTEROLOGY | Facility: CLINIC | Age: 43
End: 2023-09-12

## 2023-09-12 NOTE — TELEPHONE ENCOUNTER
Called confirming Colonoscopy with Dr Cristopher Griffin 9/19   Hold Eliquis 2 day  Metformin day of  Emailed prep instructions to : [email or web address]

## 2023-09-18 ENCOUNTER — OFFICE VISIT (OUTPATIENT)
Dept: HEMATOLOGY ONCOLOGY | Facility: CLINIC | Age: 43
End: 2023-09-18
Payer: COMMERCIAL

## 2023-09-18 VITALS
BODY MASS INDEX: 32 KG/M2 | OXYGEN SATURATION: 99 % | DIASTOLIC BLOOD PRESSURE: 80 MMHG | RESPIRATION RATE: 16 BRPM | HEART RATE: 71 BPM | WEIGHT: 163 LBS | TEMPERATURE: 98.1 F | HEIGHT: 60 IN | SYSTOLIC BLOOD PRESSURE: 122 MMHG

## 2023-09-18 DIAGNOSIS — I82.0 HEPATIC VEIN THROMBOSIS (HCC): Primary | ICD-10-CM

## 2023-09-18 PROBLEM — J98.4 CAVITARY PNEUMONIA: Status: RESOLVED | Noted: 2023-07-12 | Resolved: 2023-09-18

## 2023-09-18 PROBLEM — J18.9 CAVITARY PNEUMONIA: Status: RESOLVED | Noted: 2023-07-12 | Resolved: 2023-09-18

## 2023-09-18 PROBLEM — A41.9 SEPSIS (HCC): Status: RESOLVED | Noted: 2023-06-26 | Resolved: 2023-09-18

## 2023-09-18 PROCEDURE — 99214 OFFICE O/P EST MOD 30 MIN: CPT | Performed by: INTERNAL MEDICINE

## 2023-09-18 RX ORDER — PEN NEEDLE, DIABETIC 31 GX5/16"
NEEDLE, DISPOSABLE MISCELLANEOUS
COMMUNITY
Start: 2023-09-07

## 2023-09-18 NOTE — PROGRESS NOTES
[address] [facility]ATOLOGY ONCOL[facility]Y SPECIAL[facility] [place] [id number]    Mahlon Frankel Mbow  1980      PRIMARY HEMATOLOGIC/ONCOLOGIC DIAGNOSIS:  1. Right hepatic vein thrombus    HISTORY OF PRESENT ILLNESS:  Mahlon Frankel Mbow is a 35yo male who presents for evaluation and management of right hepatic vein thrombosis. The patient was admitted to the hospital from 7/12/23--7/20/23 with sepsis secondary to septic pulmonary emboli, PNA with cavitation and liver abscess. The patient was on Abx and the liver abscess was drained by IR. MRI abdomen dated 6/28/23 showed the presence of thrombus extending from the abscess to a small accessory right hepatic vein. Although this could be septic thrombosis, there were no imaging findings to suggest septic thrombus. The patient was started on Eliquis and is currently taking 5mg PO BID. Denies any bleeding episodes. INTERIM HISTORY:  The patient continues to be on Eliquis. No signs of bleeding. No recent hx of recurrent VTE. PAST MEDICAL,PAST SURGICAL, FAMILY AND SOCIAL HISTORY:    Patient Active Problem List   Diagnosis   • Sepsis ([address])   • Liver abscess   • Septic embolism (HCC)   • Elevated troponin   • Diabetes mellitus with hyperglycemia (HCC)   • Acute renal failure (ARF) (HCC)   • Hyponatremia   • Hypokalemia   • Hepatic vein thrombosis (HCC)   • Bacteremia due to Klebsiella pneumoniae   • Hyperlipidemia   • Mild persistent asthma without complication   • Type 2 diabetes mellitus without complication, without long-term current use of insulin (HCC)   • Cavitary pneumonia   • Anemia   • History of noncompliance with medical treatment   • Portal vein thrombosis     Past Medical History:   Diagnosis Date   • Asthma      Past Surgical History:   Procedure Laterality Date   • IR BIOPSY LIVER MASS  6/29/2023   • IR DRAINAGE TUBE CHECK AND/OR REMOVAL  7/20/2023     No family history on file.   Social History Socioeconomic History   • Marital status: /Civil Union     Spouse name: Not on file   • Number of children: Not on file   • Years of education: Not on file   • Highest education level: Not on file   Occupational History   • Not on file   Tobacco Use   • Smoking status: Never   • Smokeless tobacco: Never   Vaping Use   • Vaping Use: Never used   Substance and Sexual Activity   • Alcohol use: Not Currently   • Drug use: Never   • Sexual activity: Not on file   Other Topics Concern   • Not on file   Social History Narrative   • Not on file     Social Determinants of Health     Financial Resource Strain: Not on file   Food Insecurity: No Food Insecurity (7/14/2023)    Hunger Vital Sign    • Worried About Running Out of Food in the Last Year: Never true    • Ran Out of Food in the Last Year: Never true   Transportation Needs: No Transportation Needs (7/14/2023)    PRAPARE - Transportation    • Lack of Transportation (Medical): No    • Lack of Transportation (Non-Medical):  No   Physical Activity: Not on file   Stress: Not on file   Social Connections: Not on file   Intimate Partner Violence: Not on file   Housing Stability: Low Risk  (7/14/2023)    Housing Stability Vital Sign    • Unable to Pay for Housing in the Last Year: No    • Number of Places Lived in the Last Year: 1    • Unstable Housing in the Last Year: No       Current Outpatient Medications:   •  acetaminophen (TYLENOL) 325 mg tablet, Take 2 tablets (650 mg total) by mouth every 8 (eight) hours as needed for fever or headaches, Disp: , Rfl: 0  •  albuterol (PROVENTIL HFA,VENTOLIN HFA) 90 mcg/act inhaler, Inhale 2 puffs every 6 (six) hours as needed, Disp: , Rfl:   •  apixaban (Eliquis) 5 mg, Take 1 tablet (5 mg total) by mouth 2 (two) times a day, Disp: 60 tablet, Rfl: 1  •  atorvastatin (LIPITOR) 10 mg tablet, Take 10 mg by mouth, Disp: , Rfl:   •  cephalexin (KEFLEX) 500 mg capsule, Take 1 capsule (500 mg total) by mouth every 6 (six) hours, Disp: 120 capsule, Rfl: 1  •  glucose blood test strip, e11.65 (Type 2 Diabetes) Test daily before all meals/snacks and once before bedtime. , Disp: , Rfl:   •  glucose blood test strip, Use daily, Disp: , Rfl:   •  Lancets Ultra Thin MISC, e11.65 (Type 2 Diabetes) Test daily before all meals/snacks and once before bedtime. , Disp: , Rfl:   •  metFORMIN (GLUCOPHAGE) 1000 MG tablet, Take 1 tablet by mouth 2 (two) times a day with meals, Disp: , Rfl:   •  cyanocobalamin (VITAMIN B-12) 100 MCG tablet, Take 100 mcg by mouth daily (Patient not taking: Reported on 8/3/2023), Disp: , Rfl:   •  dextromethorphan-guaiFENesin (ROBITUSSIN DM)  mg/5 mL syrup, Take 10 mL by mouth every 4 (four) hours as needed for cough (Patient not taking: Reported on 8/24/2023), Disp: 237 mL, Rfl: 0  •  montelukast (SINGULAIR) 10 mg tablet, Take 10 mg by mouth (Patient not taking: Reported on 8/3/2023), Disp: , Rfl:   No Known Allergies  There were no vitals filed for this visit. ROS:  CONSTITUTIONAL:  No fever. No chills. No dizziness. No weakness. EYES:  No pain, erythema, or discharge. No blurring of vision. ENT:  No sore throat, URI symptoms. No epistaxis. No tinnitus. CARDIOVASCULAR:  No chest pain. No palpitations. No lower extremity edema. RESPIRATORY:  No shortness of breath, cough, pain with respiration, pleuritic chest pain. No hemoptysis. No dyspnea. No paroxysmal nocturnal dyspnea. GASTROINTESTINAL:  Normal appetite. No nausea, vomiting, diarrhea. No pain. No bloating. No melena. GENITOURINARY:  No frequency, urgency, nocturia. No hematuria or dysuria. MUSCULOSKELETAL:  No arthralgias or myalgias. INTEGUMENTARY:  No swelling. No bruising. No contusions. No abrasions. No lymphangitis. NEUROLOGIC:  No headache. No neck pain. No numbness or tingling of the extremities. No weakness. PSYCHIATRIC:  No confusion. ENDOCRINE:  No fatigue. No weakness. No history of thyroid, diabetes or adrenal problems.   HEMATOLOGICAL:  No bleeding. No petechiae. No bruising. PHYSICAL EXAM:    GENERAL: AAO x 3  HEENT: AT,NC  CVS: S1S2 RRR  LUNGS: CTA b/l  ABD: NT,ND, +BS  EXTR: no edema  NEURO: CN II-XII grossly intact    LABS:  I have reviewed pertinent labs:  CBC:   Lab Results   Component Value Date    WBC 5.61 09/06/2023    RBC 4.71 09/06/2023    HGB 12.4 09/06/2023    HCT 39.9 09/06/2023    MCV 85 09/06/2023     09/06/2023    MCH 26.3 (L) 09/06/2023    MCHC 31.1 (L) 09/06/2023    RDW 14.7 09/06/2023    MPV 11.5 09/06/2023    NEUTROABS 1.61 (L) 09/06/2023     CMP:   Lab Results   Component Value Date    SODIUM 135 08/24/2023    K 4.3 08/24/2023     08/24/2023    CO2 26 08/24/2023    AGAP 6 08/24/2023    BUN 20 08/24/2023    CREATININE 1.06 09/06/2023    GLUC 237 (H) 08/22/2023    GLUF 219 (H) 08/24/2023    CALCIUM 10.5 (H) 08/24/2023    AST 17 08/24/2023    ALT 24 08/24/2023    ALKPHOS 68 08/24/2023    TP 9.0 (H) 08/24/2023    ALB 4.8 08/24/2023    TBILI 0.40 08/24/2023    EGFR 86 09/06/2023     Liver Enzymes:   Lab Results   Component Value Date    AST 17 08/24/2023    ALT 24 08/24/2023    ALKPHOS 68 08/24/2023    TP 9.0 (H) 08/24/2023    ALB 4.8 08/24/2023    TBILI 0.40 08/24/2023     Vitamin B12 No results found for: "HNXXLHZZ11"  Iron Study No results found for: "RETIC", "RETICCTPCT", "FERRITIN", "CONCFE", "TIBC", "PRIMIDONE", "Heddie Sessions", "IRON"  Folate No results found for: "FOLATE"  Magnesium   Lab Results   Component Value Date    MG 2.3 07/02/2023     Phosphorus No results found for: "PHOS"  Coagulation Panel   Lab Results   Component Value Date    DDIMER 3.07 (H) 06/26/2023    PROTIME 14.2 07/20/2023    INR 1.12 07/20/2023    PTT 97 (H) 07/15/2023       IMAGING:  No results found. I reviewed the above laboratory and imaging data. ASSESSMENT/PLAN:  1. Right hepatic vein thrombus  Continue anticoagulation with Eliquis.    No flow cytometric evidence of PNH  BETA 2 GLYCO 1 IGG See comment 2.4  1.8    BETA 2 GLYCO 1 IGA See comment 3.5  6.7    BETA 2 GLYCO 1 IGM See comment <2.4  <2.4    No lupus anticoagulant was detected. These results are consistent with specific inhibitors to one or more common pathway factors (X, V, II or fibrinogen). Testing was done while the patient was on Eliquis. MTHFR not detected. Protein C and S activity WNL. AntiThrombIN III Activity 92 - 136 % of Normal 113    Flow cytometry-- mildly increased T-LGL. Jak2  Mutational analysis w/ cascading reflex, Prothrombin gene and Factor V leiden mutational analysis pending. Follow-up in . Labs prior.

## 2023-09-25 ENCOUNTER — TELEPHONE (OUTPATIENT)
Dept: INFECTIOUS DISEASES | Facility: CLINIC | Age: 43
End: 2023-09-25

## 2023-09-25 NOTE — TELEPHONE ENCOUNTER
Called and lmom for patient today. Informed patient to call the office back. Per Deirdre Mcclain would like to make patients appointment 9/28/23 at 9:30AM at the [facility] virtual if patient is able.

## 2023-09-26 NOTE — TELEPHONE ENCOUNTER
Called and spoke with patient today. Informed patient I was calling regarding follow up appointment on 9/28/23. Patient states he is currently in [place] and did not go for his CT scan this morning. Informed patient I can call and reschedule his CT scan. Also informed patient we would need to reschedule his follow up appointment until after the CT scan. Patient confirms he is still taking his Keflex and has enough medication. [employer] and spoke with Kai. Marci Esteban has patient scheduled 9/28/23 at 1PM at [address]. Called and spoke with patient today. Informed patient CT scheduled 9/28/23 at 1PM at [address]. Patient scheduled for virtual appointment 10/12/23 at [address] [place] [address] with Deirdre Ruby. Also reminded patient he has weekly blood work that needs to be done. Informed patient if there are any further questions or concerns to call the office. Patient verbalizes understanding at this time.

## 2023-09-28 ENCOUNTER — HOSPITAL ENCOUNTER (OUTPATIENT)
Dept: RADIOLOGY | Facility: MEDICAL CENTER | Age: 43
Discharge: HOME | End: 2023-09-28
Payer: COMMERCIAL

## 2023-09-28 DIAGNOSIS — I76 SEPTIC EMBOLISM (HCC): ICD-10-CM

## 2023-09-28 DIAGNOSIS — K75.0 LIVER ABSCESS: ICD-10-CM

## 2023-09-28 PROCEDURE — 74177 CT ABD & PELVIS W/CONTRAST: CPT

## 2023-09-28 PROCEDURE — G1004 CDSM NDSC: HCPCS

## 2023-09-28 PROCEDURE — 71260 CT THORAX DX C+: CPT

## 2023-09-28 RX ADMIN — IOHEXOL 100 ML: 350 INJECTION, SOLUTION INTRAVENOUS at 13:26

## 2023-10-12 ENCOUNTER — TELEMEDICINE (OUTPATIENT)
Dept: INFECTIOUS DISEASES | Facility: CLINIC | Age: 43
End: 2023-10-12

## 2023-10-12 DIAGNOSIS — I82.0 HEPATIC VEIN THROMBOSIS (HCC): ICD-10-CM

## 2023-10-12 DIAGNOSIS — K75.0 LIVER ABSCESS: ICD-10-CM

## 2023-10-12 DIAGNOSIS — B96.1 BACTEREMIA DUE TO KLEBSIELLA PNEUMONIAE: ICD-10-CM

## 2023-10-12 DIAGNOSIS — R78.81 BACTEREMIA DUE TO KLEBSIELLA PNEUMONIAE: ICD-10-CM

## 2023-10-12 DIAGNOSIS — I76 SEPTIC EMBOLISM (HCC): Primary | ICD-10-CM

## 2023-10-12 NOTE — PROGRESS NOTES
Virtual Regular Visit    Verification of patient location:    Patient is located at Other in the following state in which I hold an active license [place]      Assessment/Plan:    Problem List Items Addressed This Visit          Digestive    Liver abscess    Hepatic vein thrombosis (HCC)       Cardiovascular and Mediastinum    Septic embolism ([address]) - Primary       Other    Bacteremia due to Klebsiella pneumoniae     1. Septic pulmonary emboli with cavitation. Admitted to [facility] July 2023 with sepsis. Found to have septic emboli which was felt to be due to #2 and #3. He completed IV ceftriaxone 6 week course through 8/22. 9/6/23 labs reviewed and stable. He had rescheduled repeat CT C/A/P 9/28/23 with decreased right lung cavitation and wedge-shaped area of decreased attenuation in the liver at previously drained abscess suspicious for infarct. Completed initial 6 weeks of Ceftriaxone therapy through 8/22  Started cephalexin 500 mg PO q 6 hours 8/24/23  Will discontinue cephalexin now  Patient to follow up with us prn  Follow up with GI scheduled for 10/27/23 as below     2. Liver abscess with adjacent thrombosis. Was diagnosed with this on prior admission in the setting of #3. Suspect that this contributed to #1. MRCP without other biliary abnormality. Essentially an occult abscess. Repeat imaging now showing resolution postdrainage. IR drain removed. CT C/A/P 9/28/23 with decreased right lung cavitation and wedge-shaped area of decreased attenuation in the liver at previously drained abscess suspicious for infarct. Antibiotics completed as above  Patient has outpatient GI appt 10/27/23     3. Recent Klebsiella bacteremia. Course complicated by the above. 4. Thrombocytosis. Suspect that this is an inflammatory response due to the above. We will continue to trend CBC while on abx. 5. Poorly controlled diabetes. Hemoglobin A1c of 12.0. Risk factor for the above.         Above assessment and plan discussed in detail with patient during examination. I have spent a total time of 40 minutes on 10/12/23 in caring for this patient including Diagnostic results, Prognosis, Risks and benefits of tx options, Instructions for management, Patient and family education, Importance of tx compliance, Risk factor reductions, Impressions, Counseling / Coordination of care, Documenting in the medical record, Reviewing / ordering tests, medicine, procedures  , Obtaining or reviewing history   and Communicating with other healthcare professionals . Antibiotics:  Cephalexin D50    Reason for visit is   Chief Complaint   Patient presents with    Virtual Regular Visit          Encounter provider Latonya Webb PA-C    Provider located at [address] [place] [address] INFECTIOUS DISEASE [address] [place] [address] Rd  [address] [facility]  [address] [address] [phone]      Recent Visits  No visits were found meeting these conditions. Showing recent visits within past 7 days and meeting all other requirements  Today's Visits  Date Type Provider Dept   10/12/23 [facility]HAEL Hummel  [facility]   Showing today's visits and meeting all other requirements  Future Appointments  No visits were found meeting these conditions. Showing future appointments within next 150 days and meeting all other requirements       The patient was identified by name and date of birth. Guerda Quiñonez was informed that this is a telemedicine visit and that the visit is being conducted through the [address] [place] [address] [employer] platform. He agrees to proceed. .  My office door was closed. No one else was in the room. He acknowledged consent and understanding of privacy and security of the video platform. The patient has agreed to participate and understands they can discontinue the visit at any time. Patient is aware this is a billable service.      Subjective  Guerda Quiñonez is a 43 y.o. male         Patient here for telemed outpatient ID office for management of his pulmonary cavitary lesions and liver abscess. He tolerated the 6 weeks of Ceftriaxone IV abx at [facility] center through 8/22/23. He has been on cephalexin and had rescheduled CT C/A/P 9/28/23     He denies fevers chills, n/v/d. He is not having any abdominal pain now last couple weeks. He has no cough or SOB. He is tolerating cephalexin so far. He is so happy he is feeling better. States his blood sugars "are coming down."            Past Medical History:   Diagnosis Date    Asthma        Past Surgical History:   Procedure Laterality Date    IR BIOPSY LIVER MASS  6/29/2023    IR DRAINAGE TUBE CHECK AND/OR REMOVAL  7/20/2023       Current Outpatient Medications   Medication Sig Dispense Refill    acetaminophen (TYLENOL) 325 mg tablet Take 2 tablets (650 mg total) by mouth every 8 (eight) hours as needed for fever or headaches  0    albuterol (PROVENTIL HFA,VENTOLIN HFA) 90 mcg/act inhaler Inhale 2 puffs every 6 (six) hours as needed      apixaban (Eliquis) 5 mg Take 1 tablet (5 mg total) by mouth 2 (two) times a day 60 tablet 1    atorvastatin (LIPITOR) 10 mg tablet Take 10 mg by mouth      cephalexin (KEFLEX) 500 mg capsule Take 1 capsule (500 mg total) by mouth every 6 (six) hours 120 capsule 1    cyanocobalamin (VITAMIN B-12) 100 MCG tablet Take 100 mcg by mouth daily (Patient not taking: Reported on 8/3/2023)      dextromethorphan-guaiFENesin (ROBITUSSIN DM)  mg/5 mL syrup Take 10 mL by mouth every 4 (four) hours as needed for cough (Patient not taking: Reported on 8/24/2023) 237 mL 0    glucose blood test strip e11.65 (Type 2 Diabetes)  Test daily before all meals/snacks and once before bedtime. glucose blood test strip Use daily      Lancets Ultra Thin MISC e11.65 (Type 2 Diabetes)  Test daily before all meals/snacks and once before bedtime.       metFORMIN (GLUCOPHAGE) 1000 MG tablet Take 1 tablet by mouth 2 (two) times a day with meals      montelukast (SINGULAIR) 10 mg tablet Take 10 mg by mouth (Patient not taking: Reported on 8/3/2023)       No current facility-administered medications for this visit. No Known Allergies    Review of Systems   All other systems reviewed and are negative. Video Exam    There were no vitals filed for this visit. Physical Exam  Vitals reviewed: no fever, RR 16. Constitutional:       Appearance: Normal appearance. HENT:      Head: Normocephalic and atraumatic. Right Ear: External ear normal.      Left Ear: External ear normal.      Nose: Nose normal.      Mouth/Throat:      Pharynx: Oropharynx is clear. Eyes:      Extraocular Movements: Extraocular movements intact. Conjunctiva/sclera: Conjunctivae normal.   Pulmonary:      Effort: Pulmonary effort is normal.   Abdominal:      General: Abdomen is flat. Palpations: Abdomen is soft. Tenderness: There is no abdominal tenderness. Musculoskeletal:         General: Normal range of motion. Cervical back: Normal range of motion. Skin:     Findings: No rash. Neurological:      General: No focal deficit present. Mental Status: He is alert and oriented to person, place, and time. Mental status is at baseline.    Psychiatric:         Mood and Affect: Mood normal.          Visit Time  Total Visit Duration: 40 minutes

## 2023-12-14 ENCOUNTER — TELEPHONE (OUTPATIENT)
Dept: HEMATOLOGY ONCOLOGY | Facility: CLINIC | Age: 43
End: 2023-12-14

## 2023-12-14 NOTE — TELEPHONE ENCOUNTER
Fallon and gave reminder about the appointment scheduled with Dr. Bronson Bentley and of labs that should be completed prior to the appointment.

## 2023-12-18 ENCOUNTER — APPOINTMENT (OUTPATIENT)
Dept: LAB | Facility: HOSPITAL | Age: 43
End: 2023-12-18
Payer: COMMERCIAL

## 2023-12-18 DIAGNOSIS — I82.0 HEPATIC VEIN THROMBOSIS (HCC): ICD-10-CM

## 2023-12-18 LAB
ALBUMIN SERPL BCP-MCNC: 4.3 G/DL (ref 3.5–5)
ALP SERPL-CCNC: 50 U/L (ref 34–104)
ALT SERPL W P-5'-P-CCNC: 15 U/L (ref 7–52)
ANION GAP SERPL CALCULATED.3IONS-SCNC: 3 MMOL/L
AST SERPL W P-5'-P-CCNC: 15 U/L (ref 13–39)
BILIRUB SERPL-MCNC: 0.59 MG/DL (ref 0.2–1)
BUN SERPL-MCNC: 19 MG/DL (ref 5–25)
CALCIUM SERPL-MCNC: 9.5 MG/DL (ref 8.4–10.2)
CHLORIDE SERPL-SCNC: 105 MMOL/L (ref 96–108)
CO2 SERPL-SCNC: 28 MMOL/L (ref 21–32)
CREAT SERPL-MCNC: 1.12 MG/DL (ref 0.6–1.3)
GFR SERPL CREATININE-BSD FRML MDRD: 80 ML/MIN/1.73SQ M
GLUCOSE P FAST SERPL-MCNC: 257 MG/DL (ref 65–99)
POTASSIUM SERPL-SCNC: 4.3 MMOL/L (ref 3.5–5.3)
PROT SERPL-MCNC: 7.5 G/DL (ref 6.4–8.4)
SODIUM SERPL-SCNC: 136 MMOL/L (ref 135–147)

## 2023-12-18 PROCEDURE — 80053 COMPREHEN METABOLIC PANEL: CPT

## 2023-12-19 ENCOUNTER — OFFICE VISIT (OUTPATIENT)
Dept: HEMATOLOGY ONCOLOGY | Facility: CLINIC | Age: 43
End: 2023-12-19
Payer: COMMERCIAL

## 2023-12-19 VITALS
HEART RATE: 82 BPM | OXYGEN SATURATION: 100 % | RESPIRATION RATE: 16 BRPM | TEMPERATURE: 97.9 F | DIASTOLIC BLOOD PRESSURE: 82 MMHG | HEIGHT: 60 IN | SYSTOLIC BLOOD PRESSURE: 122 MMHG | WEIGHT: 156 LBS | BODY MASS INDEX: 30.63 KG/M2

## 2023-12-19 DIAGNOSIS — I82.0 HEPATIC VEIN THROMBOSIS (HCC): Primary | ICD-10-CM

## 2023-12-19 PROCEDURE — 99212 OFFICE O/P EST SF 10 MIN: CPT | Performed by: INTERNAL MEDICINE

## 2023-12-19 NOTE — PROGRESS NOTES
[facility]ATOLOGY ON[facility]Y SPECIAL[facility] [address] [address]    Beatrice Quiñonez  1980      PRIMARY HEMATOLOGIC/ONCOLOGIC DIAGNOSIS:  1.   Right hepatic vein thrombus    HISTORY OF PRESENT ILLNESS:  Beatrice Quiñonez is a 41yo male who presents for evaluation and management of right hepatic vein thrombosis. The patient was admitted to the hospital from 7/12/23--7/20/23 with sepsis secondary to septic pulmonary emboli, PNA with cavitation and liver abscess. The patient was on Abx and the liver abscess was drained by IR. MRI abdomen dated 6/28/23 showed the presence of thrombus extending from the abscess to a small accessory right hepatic vein. Although this could be septic thrombosis, there were no imaging findings to suggest septic thrombus. The patient was started on Eliquis and is currently taking 5mg PO BID. Denies any bleeding episodes.       INTERIM HISTORY:  The patient continues to be on Eliquis. No signs of bleeding. No recent hx of recurrent VTE.    PAST MEDICAL,PAST SURGICAL, FAMILY AND SOCIAL HISTORY:    Patient Active Problem List   Diagnosis    Liver abscess    Septic embolism (HCC)    Elevated troponin    Diabetes mellitus with hyperglycemia (HCC)    Acute renal failure (ARF) (HCC)    Hyponatremia    Hypokalemia    Hepatic vein thrombosis (HCC)    Bacteremia due to Klebsiella pneumoniae    Hyperlipidemia    Mild persistent asthma without complication    Type 2 diabetes mellitus without complication, without long-term current use of insulin (HCC)    Anemia    History of noncompliance with medical treatment    Portal vein thrombosis     Past Medical History:   Diagnosis Date    Asthma      Past Surgical History:   Procedure Laterality Date    IR BIOPSY LIVER MASS  6/29/2023    IR DRAINAGE TUBE CHECK AND/OR REMOVAL  7/20/2023     No family history on file.  Social History     Socioeconomic History    Marital status: /Civil Union      Spouse name: Not on file    Number of children: Not on file    Years of education: Not on file    Highest education level: Not on file   Occupational History    Not on file   Tobacco Use    Smoking status: Never    Smokeless tobacco: Never   Vaping Use    Vaping status: Never Used   Substance and Sexual Activity    Alcohol use: Not Currently    Drug use: Never    Sexual activity: Not on file   Other Topics Concern    Not on file   Social History Narrative    Not on file     Social Determinants of Health     Financial Resource Strain: Not on file   Food Insecurity: No Food Insecurity (7/14/2023)    Hunger Vital Sign     Worried About Running Out of Food in the Last Year: Never true     Ran Out of Food in the Last Year: Never true   Transportation Needs: No Transportation Needs (7/14/2023)    PRAPARE - Transportation     Lack of Transportation (Medical): No     Lack of Transportation (Non-Medical): No   Physical Activity: Not on file   Stress: Not on file   Social Connections: Not on file   Intimate Partner Violence: Not on file   Housing Stability: Low Risk  (7/14/2023)    Housing Stability Vital Sign     Unable to Pay for Housing in the Last Year: No     Number of Places Lived in the Last Year: 1     Unstable Housing in the Last Year: No       Current Outpatient Medications:     acetaminophen (TYLENOL) 325 mg tablet, Take 2 tablets (650 mg total) by mouth every 8 (eight) hours as needed for fever or headaches, Disp: , Rfl: 0    albuterol (PROVENTIL HFA,VENTOLIN HFA) 90 mcg/act inhaler, Inhale 2 puffs every 6 (six) hours as needed, Disp: , Rfl:     atorvastatin (LIPITOR) 10 mg tablet, Take 10 mg by mouth, Disp: , Rfl:     Blood Glucose Monitoring Suppl (ONE TOUCH ULTRA 2) w/Device KIT, , Disp: , Rfl:     Ertugliflozin L-PyroglutamicAc 5 MG TABS, Take 10 mg by mouth daily, Disp: , Rfl:     glucose blood test strip, e11.65 (Type 2 Diabetes) Test daily before all meals/snacks and once before bedtime., Disp: , Rfl:      glucose blood test strip, Use daily, Disp: , Rfl:     Lancets Ultra Thin MISC, e11.65 (Type 2 Diabetes) Test daily before all meals/snacks and once before bedtime., Disp: , Rfl:     metFORMIN (GLUCOPHAGE) 1000 MG tablet, Take 1 tablet by mouth 2 (two) times a day with meals, Disp: , Rfl:     apixaban (Eliquis) 5 mg, Take 1 tablet (5 mg total) by mouth 2 (two) times a day, Disp: 60 tablet, Rfl: 1    cyanocobalamin (VITAMIN B-12) 100 MCG tablet, Take 100 mcg by mouth daily (Patient not taking: Reported on 8/3/2023), Disp: , Rfl:     dextromethorphan-guaiFENesin (ROBITUSSIN DM)  mg/5 mL syrup, Take 10 mL by mouth every 4 (four) hours as needed for cough (Patient not taking: Reported on 8/24/2023), Disp: 237 mL, Rfl: 0    montelukast (SINGULAIR) 10 mg tablet, Take 10 mg by mouth (Patient not taking: Reported on 8/3/2023), Disp: , Rfl:   No Known Allergies  Vitals:    12/19/23 1322   BP: 122/82   Pulse: 82   Resp: 16   Temp: 97.9 °F (36.6 °C)   SpO2: 100%       ROS:  CONSTITUTIONAL:  No fever. No chills. No dizziness. No weakness.  EYES:  No pain, erythema, or discharge. No blurring of vision.  ENT:  No sore throat, URI symptoms. No epistaxis. No tinnitus.  CARDIOVASCULAR:  No chest pain. No palpitations. No lower extremity edema.  RESPIRATORY:  No shortness of breath, cough, pain with respiration, pleuritic chest pain. No hemoptysis. No dyspnea. No paroxysmal nocturnal dyspnea.  GASTROINTESTINAL:  Normal appetite. No nausea, vomiting, diarrhea. No pain. No bloating. No melena.  GENITOURINARY:  No frequency, urgency, nocturia. No hematuria or dysuria.  MUSCULOSKELETAL:  No arthralgias or myalgias.  INTEGUMENTARY:  No swelling. No bruising. No contusions. No abrasions. No lymphangitis.  NEUROLOGIC:  No headache. No neck pain. No numbness or tingling of the extremities. No weakness.  PSYCHIATRIC:  No confusion.  ENDOCRINE:  No fatigue. No weakness. No history of thyroid, diabetes or adrenal problems.  HEMATOLOGICAL:   "No bleeding. No petechiae. No bruising.    PHYSICAL EXAM:    GENERAL: AAO x 3  HEENT: AT,NC  CVS: S1S2 RRR  LUNGS: CTA b/l  ABD: NT,ND, +BS  EXTR: no edema  NEURO: CN II-XII grossly intact    LABS:  I have reviewed pertinent labs:  CBC:   Lab Results   Component Value Date    WBC 7.19 12/18/2023    RBC 4.67 12/18/2023    HGB 12.7 12/18/2023    HCT 39.5 12/18/2023    MCV 85 12/18/2023     12/18/2023    MCH 27.2 12/18/2023    MCHC 32.2 12/18/2023    RDW 14.3 12/18/2023    MPV 10.2 12/18/2023    NEUTROABS 2.27 12/18/2023     CMP:   Lab Results   Component Value Date    SODIUM 136 12/18/2023    K 4.3 12/18/2023     12/18/2023    CO2 28 12/18/2023    AGAP 3 12/18/2023    BUN 19 12/18/2023    CREATININE 1.12 12/18/2023    GLUC 237 (H) 08/22/2023    GLUF 257 (H) 12/18/2023    CALCIUM 9.5 12/18/2023    AST 15 12/18/2023    ALT 15 12/18/2023    ALKPHOS 50 12/18/2023    TP 7.5 12/18/2023    ALB 4.3 12/18/2023    TBILI 0.59 12/18/2023    EGFR 80 12/18/2023     Liver Enzymes:   Lab Results   Component Value Date    AST 15 12/18/2023    ALT 15 12/18/2023    ALKPHOS 50 12/18/2023    TP 7.5 12/18/2023    ALB 4.3 12/18/2023    TBILI 0.59 12/18/2023     Vitamin B12 No results found for: \"YVHRZWKB67\"  Iron Study No results found for: \"RETIC\", \"RETICCTPCT\", \"FERRITIN\", \"CONCFE\", \"TIBC\", \"PRIMIDONE\", \"FOLATEHEMO\", \"IRON\"  Folate No results found for: \"FOLATE\"  Magnesium   Lab Results   Component Value Date    MG 2.3 07/02/2023     Phosphorus No results found for: \"PHOS\"  Coagulation Panel   Lab Results   Component Value Date    DDIMER 3.07 (H) 06/26/2023    PROTIME 14.2 07/20/2023    INR 1.12 07/20/2023    PTT 97 (H) 07/15/2023       IMAGING:  No results found.  I reviewed the above laboratory and imaging data.    ASSESSMENT/PLAN:  1. Right hepatic vein thrombus  Continue anticoagulation with Eliquis.   No flow cytometric evidence of PNH  BETA 2 GLYCO 1 IGG See comment 2.4  1.8    BETA 2 GLYCO 1 IGA See comment 3.5  6.7  "   BETA 2 GLYCO 1 IGM See comment <2.4  <2.4    No lupus anticoagulant was detected. These results are consistent with specific inhibitors to one or more common pathway factors (X, V, II or fibrinogen). Testing was done while the patient was on Eliquis.   MTHFR not detected.  Protein C and S activity WNL.  AntiThrombIN III Activity 92 - 136 % of Normal 113    Flow cytometry-- mildly increased T-LGL.   Jak2  Mutational analysis w/ cascading reflex, Prothrombin gene and Factor V leiden mutational analysis pending.    Follow-up in . Labs prior.

## 2024-03-13 ENCOUNTER — TELEPHONE (OUTPATIENT)
Dept: HEMATOLOGY ONCOLOGY | Facility: CLINIC | Age: 44
End: 2024-03-13

## 2024-03-13 NOTE — TELEPHONE ENCOUNTER
Voicemail left for patient to have labs completed prior to follow up on Tuesday.  [employer] number left for questions or reschedule

## 2024-03-18 ENCOUNTER — APPOINTMENT (OUTPATIENT)
Dept: LAB | Facility: HOSPITAL | Age: 44
End: 2024-03-18
Payer: COMMERCIAL

## 2024-03-18 DIAGNOSIS — I82.0 HEPATIC VEIN THROMBOSIS (HCC): ICD-10-CM

## 2024-03-18 LAB
ALBUMIN SERPL BCP-MCNC: 4.5 G/DL (ref 3.5–5)
ALP SERPL-CCNC: 67 U/L (ref 34–104)
ALT SERPL W P-5'-P-CCNC: 21 U/L (ref 7–52)
ANION GAP SERPL CALCULATED.3IONS-SCNC: 8 MMOL/L (ref 4–13)
AST SERPL W P-5'-P-CCNC: 17 U/L (ref 13–39)
BASOPHILS # BLD AUTO: 0.06 THOUSANDS/ÂΜL (ref 0–0.1)
BASOPHILS NFR BLD AUTO: 1 % (ref 0–1)
BILIRUB SERPL-MCNC: 0.39 MG/DL (ref 0.2–1)
BUN SERPL-MCNC: 18 MG/DL (ref 5–25)
CALCIUM SERPL-MCNC: 10.1 MG/DL (ref 8.4–10.2)
CHLORIDE SERPL-SCNC: 100 MMOL/L (ref 96–108)
CO2 SERPL-SCNC: 26 MMOL/L (ref 21–32)
CREAT SERPL-MCNC: 1.19 MG/DL (ref 0.6–1.3)
EOSINOPHIL # BLD AUTO: 0.16 THOUSAND/ÂΜL (ref 0–0.61)
EOSINOPHIL NFR BLD AUTO: 2 % (ref 0–6)
ERYTHROCYTE [DISTWIDTH] IN BLOOD BY AUTOMATED COUNT: 12.8 % (ref 11.6–15.1)
GFR SERPL CREATININE-BSD FRML MDRD: 74 ML/MIN/1.73SQ M
GLUCOSE P FAST SERPL-MCNC: 361 MG/DL (ref 65–99)
HCT VFR BLD AUTO: 43.1 % (ref 36.5–49.3)
HGB BLD-MCNC: 13.9 G/DL (ref 12–17)
IMM GRANULOCYTES # BLD AUTO: 0.03 THOUSAND/UL (ref 0–0.2)
IMM GRANULOCYTES NFR BLD AUTO: 0 % (ref 0–2)
LYMPHOCYTES # BLD AUTO: 4.06 THOUSANDS/ÂΜL (ref 0.6–4.47)
LYMPHOCYTES NFR BLD AUTO: 56 % (ref 14–44)
MCH RBC QN AUTO: 28 PG (ref 26.8–34.3)
MCHC RBC AUTO-ENTMCNC: 32.3 G/DL (ref 31.4–37.4)
MCV RBC AUTO: 87 FL (ref 82–98)
MONOCYTES # BLD AUTO: 0.57 THOUSAND/ÂΜL (ref 0.17–1.22)
MONOCYTES NFR BLD AUTO: 8 % (ref 4–12)
NEUTROPHILS # BLD AUTO: 2.37 THOUSANDS/ÂΜL (ref 1.85–7.62)
NEUTS SEG NFR BLD AUTO: 33 % (ref 43–75)
NRBC BLD AUTO-RTO: 0 /100 WBCS
PLATELET # BLD AUTO: 271 THOUSANDS/UL (ref 149–390)
PMV BLD AUTO: 11.5 FL (ref 8.9–12.7)
POTASSIUM SERPL-SCNC: 5.1 MMOL/L (ref 3.5–5.3)
PROT SERPL-MCNC: 8.1 G/DL (ref 6.4–8.4)
RBC # BLD AUTO: 4.97 MILLION/UL (ref 3.88–5.62)
SODIUM SERPL-SCNC: 134 MMOL/L (ref 135–147)
WBC # BLD AUTO: 7.25 THOUSAND/UL (ref 4.31–10.16)

## 2024-03-18 PROCEDURE — 36415 COLL VENOUS BLD VENIPUNCTURE: CPT

## 2024-03-18 PROCEDURE — 85025 COMPLETE CBC W/AUTO DIFF WBC: CPT

## 2024-03-18 PROCEDURE — 80053 COMPREHEN METABOLIC PANEL: CPT

## 2024-03-19 ENCOUNTER — OFFICE VISIT (OUTPATIENT)
Dept: HEMATOLOGY ONCOLOGY | Facility: CLINIC | Age: 44
End: 2024-03-19
Payer: COMMERCIAL

## 2024-03-19 VITALS
WEIGHT: 185.5 LBS | DIASTOLIC BLOOD PRESSURE: 82 MMHG | HEART RATE: 60 BPM | RESPIRATION RATE: 18 BRPM | SYSTOLIC BLOOD PRESSURE: 138 MMHG | OXYGEN SATURATION: 99 % | TEMPERATURE: 97.6 F | BODY MASS INDEX: 36.42 KG/M2 | HEIGHT: 60 IN

## 2024-03-19 DIAGNOSIS — I81 PORTAL VEIN THROMBOSIS: Primary | ICD-10-CM

## 2024-03-19 DIAGNOSIS — I82.0 HEPATIC VEIN THROMBOSIS (HCC): ICD-10-CM

## 2024-03-19 PROCEDURE — 99214 OFFICE O/P EST MOD 30 MIN: CPT | Performed by: INTERNAL MEDICINE

## 2024-03-19 NOTE — PROGRESS NOTES
Hematology/Oncology Outpatient Follow- up Note  Beatrice Quiñonez 43 y.o. male MRN: @ Encounter: [id number]        Date:  3/19/2024        Assessment / Plan:    1 right hepatic vein thrombosis  2 status post treatment for hepatic abscess and septic emboli  3 unable to complete a full thrombophilic workup but what has been done to date is normal.  Plan: We will get an MRI of the abdomen with the also attention to the veins to see if the thrombosis has improved.  The precipitating cause would probably revolve around his infectious processes which have cleared.  He remains on Eliquis.  He will come back in 4 weeks.  If these look like they are clearing we will discuss possibly stopping Eliquis as he has been on for 6 months.        HPI: 43-year-old gentleman with a history of septic emboli with cavitations in his lungs.  He also had a liver abscess.  He had Klebsiella pneumo bacteremia.  He received a long course of IV antibiotics.  He has been doing reasonably well.  He is on Eliquis 5 mg p.o. twice daily for hepatic vein right-sided thrombosis.  This has not been checked in multiple months.  What we will obtain is an MRI of the abdomen with attention to liver and to see if these veins are now patent.  He has been on Eliquis for over 6 months.  Causes with revolved around his sepsis and inflammation of the liver causing this problem.  He has had testing which was negative with the exception he has not had factor V prothrombin 73702 or JAK2 which she will not be able to get as his insurance will not cover it.  In terms of why he obtain the sepsis and abscess that is not totally clear.  Otherwise I think he is doing reasonably well.      Interval History: Note from 12/19/2023:  Beatrice Quiñonez is a 43yo male who presents for evaluation and management of right hepatic vein thrombosis. The patient was admitted to the hospital from 7/12/23--7/20/23 with sepsis secondary to septic pulmonary emboli, PNA with cavitation and  "liver abscess. The patient was on Abx and the liver abscess was drained by IR. MRI abdomen dated 6/28/23 showed the presence of thrombus extending from the abscess to a small accessory right hepatic vein. Although this could be septic thrombosis, there were no imaging findings to suggest septic thrombus. The patient was started on Eliquis and is currently taking 5mg PO BID. Denies any bleeding episodes.       Cancer Staging:  Cancer Staging   No matching staging information was found for the patient.      Molecular Testing:     Previous Hematologic/ Oncologic History:    Oncology History    No history exists.       Current Hematologic/ Oncologic Treatment:       Cycle 1         Test Results:    Imaging: No results found.          Labs:   Lab Results   Component Value Date    WBC 7.25 03/18/2024    HGB 13.9 03/18/2024    HCT 43.1 03/18/2024    MCV 87 03/18/2024     03/18/2024     Lab Results   Component Value Date    K 5.1 03/18/2024     03/18/2024    CO2 26 03/18/2024    BUN 18 03/18/2024    CREATININE 1.19 03/18/2024    GLUF 361 (H) 03/18/2024    CALCIUM 10.1 03/18/2024    CORRECTEDCA 10.0 07/20/2023    AST 17 03/18/2024    ALT 21 03/18/2024    ALKPHOS 67 03/18/2024    EGFR 74 03/18/2024         No results found for: \"SPEP\", \"UPEP\"    No results found for: \"PSA\"    No results found for: \"CEA\"    No results found for: \"\"    No results found for: \"AFP\"    No results found for: \"IRON\", \"TIBC\", \"FERRITIN\"    No results found for: \"[id number]\"      ROS: Review of Systems   Constitutional: Negative.    HENT: Negative.     Eyes: Negative.    Respiratory: Negative.     Cardiovascular: Negative.    Gastrointestinal: Negative.    Endocrine: Negative.    Genitourinary: Negative.    Musculoskeletal: Negative.    Skin: Negative.    Allergic/Immunologic: Negative.    Neurological: Negative.    Hematological: Negative.          Current Medications: Reviewed  Allergies: Reviewed  PMH/FH/SH:  " Reviewed      Physical Exam:    Body surface area is 1.81 meters squared.    Wt Readings from Last 3 Encounters:   03/19/24 84.1 kg (185 lb 8 oz)   12/19/23 70.8 kg (156 lb)   09/18/23 73.9 kg (163 lb)        Temp Readings from Last 3 Encounters:   03/19/24 97.6 °F (36.4 °C) (Temporal)   12/19/23 97.9 °F (36.6 °C) (Temporal)   09/18/23 98.1 °F (36.7 °C) (Temporal)        BP Readings from Last 3 Encounters:   03/19/24 138/82   12/19/23 122/82   09/18/23 122/80         Pulse Readings from Last 3 Encounters:   03/19/24 60   12/19/23 82   09/18/23 71     @LASTSAO2(3)@      Physical Exam  Constitutional:       Appearance: Normal appearance. He is normal weight.   HENT:      Head: Normocephalic and atraumatic.   Eyes:      Extraocular Movements: Extraocular movements intact.      Conjunctiva/sclera: Conjunctivae normal.      Pupils: Pupils are equal, round, and reactive to light.   Cardiovascular:      Rate and Rhythm: Normal rate and regular rhythm.      Heart sounds: Normal heart sounds.   Pulmonary:      Effort: Pulmonary effort is normal.      Breath sounds: Normal breath sounds.   Abdominal:      General: Abdomen is flat. Bowel sounds are normal.      Palpations: Abdomen is soft.   Musculoskeletal:         General: Normal range of motion.      Cervical back: Normal range of motion and neck supple.   Skin:     General: Skin is warm and dry.   Neurological:      General: No focal deficit present.      Mental Status: He is alert and oriented to person, place, and time. Mental status is at baseline.           Goals and Barriers:  Current Goal: Prolong Survival from Cancer.   Barriers: None.      Patient's Capacity to Self Care:  Patient is able to self care.    Code Status: [unfilled]  Advance Directive and Living Will:      Power of :

## 2024-03-20 ENCOUNTER — OFFICE VISIT (OUTPATIENT)
Dept: GASTROENTEROLOGY | Facility: CLINIC | Age: 44
End: 2024-03-20
Payer: COMMERCIAL

## 2024-03-20 VITALS
HEART RATE: 88 BPM | SYSTOLIC BLOOD PRESSURE: 130 MMHG | OXYGEN SATURATION: 99 % | BODY MASS INDEX: 36.32 KG/M2 | DIASTOLIC BLOOD PRESSURE: 76 MMHG | HEIGHT: 60 IN | TEMPERATURE: 98.3 F | WEIGHT: 185 LBS

## 2024-03-20 DIAGNOSIS — I82.0 HEPATIC VEIN THROMBOSIS (HCC): ICD-10-CM

## 2024-03-20 DIAGNOSIS — I76 SEPTIC EMBOLISM (HCC): Primary | ICD-10-CM

## 2024-03-20 DIAGNOSIS — I81 PORTAL VEIN THROMBOSIS: ICD-10-CM

## 2024-03-20 DIAGNOSIS — K75.0 LIVER ABSCESS: ICD-10-CM

## 2024-03-20 PROCEDURE — 99213 OFFICE O/P EST LOW 20 MIN: CPT | Performed by: PHYSICIAN ASSISTANT

## 2024-03-22 ENCOUNTER — APPOINTMENT (OUTPATIENT)
Dept: LAB | Facility: HOSPITAL | Age: 44
End: 2024-03-22
Payer: COMMERCIAL

## 2024-03-22 DIAGNOSIS — I82.0 HEPATIC VEIN THROMBOSIS (HCC): ICD-10-CM

## 2024-03-22 PROCEDURE — 36415 COLL VENOUS BLD VENIPUNCTURE: CPT

## 2024-04-09 LAB
F5 GENE MUT ANL BLD/T: NORMAL
Lab: NORMAL

## 2024-04-12 ENCOUNTER — TELEPHONE (OUTPATIENT)
Dept: HEMATOLOGY ONCOLOGY | Facility: CLINIC | Age: 44
End: 2024-04-12

## 2024-04-15 ENCOUNTER — TELEPHONE (OUTPATIENT)
Dept: HEMATOLOGY ONCOLOGY | Facility: CLINIC | Age: 44
End: 2024-04-15

## 2024-04-17 ENCOUNTER — HOSPITAL ENCOUNTER (OUTPATIENT)
Dept: MRI IMAGING | Facility: CLINIC | Age: 44
Discharge: HOME | End: 2024-04-17
Payer: COMMERCIAL

## 2024-04-17 DIAGNOSIS — I82.0 HEPATIC VEIN THROMBOSIS (HCC): ICD-10-CM

## 2024-04-17 DIAGNOSIS — I81 PORTAL VEIN THROMBOSIS: ICD-10-CM

## 2024-04-17 PROCEDURE — A9585 GADOBUTROL INJECTION: HCPCS | Performed by: INTERNAL MEDICINE

## 2024-04-17 PROCEDURE — 74183 MRI ABD W/O CNTR FLWD CNTR: CPT

## 2024-04-17 RX ORDER — GADOBUTROL 604.72 MG/ML
8 INJECTION INTRAVENOUS
Status: COMPLETED | OUTPATIENT
Start: 2024-04-17 | End: 2024-04-17

## 2024-04-17 RX ADMIN — GADOBUTROL 8 ML: 604.72 INJECTION INTRAVENOUS at 10:08

## 2024-04-30 ENCOUNTER — OFFICE VISIT (OUTPATIENT)
Dept: HEMATOLOGY ONCOLOGY | Facility: CLINIC | Age: 44
End: 2024-04-30
Payer: COMMERCIAL

## 2024-04-30 VITALS
RESPIRATION RATE: 18 BRPM | HEART RATE: 82 BPM | SYSTOLIC BLOOD PRESSURE: 132 MMHG | WEIGHT: 186 LBS | DIASTOLIC BLOOD PRESSURE: 76 MMHG | OXYGEN SATURATION: 98 % | BODY MASS INDEX: 36.52 KG/M2 | HEIGHT: 60 IN | TEMPERATURE: 98.4 F

## 2024-04-30 DIAGNOSIS — I82.0 HEPATIC VEIN THROMBOSIS (HCC): Primary | ICD-10-CM

## 2024-04-30 DIAGNOSIS — K75.0 LIVER ABSCESS: ICD-10-CM

## 2024-04-30 DIAGNOSIS — E11.9 TYPE 2 DIABETES MELLITUS WITHOUT COMPLICATION, WITHOUT LONG-TERM CURRENT USE OF INSULIN (HCC): ICD-10-CM

## 2024-04-30 PROCEDURE — 3075F SYST BP GE 130 - 139MM HG: CPT | Performed by: INTERNAL MEDICINE

## 2024-04-30 PROCEDURE — 3078F DIAST BP <80 MM HG: CPT | Performed by: INTERNAL MEDICINE

## 2024-04-30 PROCEDURE — 99214 OFFICE O/P EST MOD 30 MIN: CPT | Performed by: INTERNAL MEDICINE

## 2024-04-30 NOTE — PROGRESS NOTES
Hematology/Oncology Outpatient Follow- up Note  Beatrice Quiñonez 43 y.o. male MRN: @ Encounter: [id number]        Date:  4/30/2024        Assessment / Plan:    1 resolved hepatic vein thrombosis  2 resolved hepatic abscess and septic emboli  3 type 2 diabetes mellitus poorly controlled  Plan: Patient will come back here as needed.  He will stop his Eliquis.  He was given a list of internist to hopefully be able to see him to help monitor and control his sugars better than they are being done now.        HPI: 43-year-old gentleman comes back for follow-up he is doing reasonably well.  No fevers no chills no sweats no nausea or vomiting.  No abdominal pain chest pain or back pain.  Glucoses are not well-controlled he is only taking metformin twice a day.  He has not been followed regularly by a physician for this.  We gave him information for several internist to contact  His liver on MRI is cleared there is no evidence of abscesses some scarring and there are no hepatic vein thromboses noted.  He appears to be doing well.  Of note his factor V Leiden is negative and the lupus anticoagulant/protein C/protein S/Antithrombin III/anticardiolipin antibody/antibeta-2 glycoprotein antibodies are all negative.  His insurance would not cover a JAK2 or prothrombin 20210.  However he gives no history of anything suggesting chronic clotting problems.      Interval History: Note from 3/19/2024:  Assessment / Plan:    1 right hepatic vein thrombosis  2 status post treatment for hepatic abscess and septic emboli  3 unable to complete a full thrombophilic workup but what has been done to date is normal.  Plan: We will get an MRI of the abdomen with the also attention to the veins to see if the thrombosis has improved.  The precipitating cause would probably revolve around his infectious processes which have cleared.  He remains on Eliquis.  He will come back in 4 weeks.  If these look like they are clearing we will discuss possibly  stopping Eliquis as he has been on for 6 months.  HPI: 43-year-old gentleman with a history of septic emboli with cavitations in his lungs.  He also had a liver abscess.  He had Klebsiella pneumo bacteremia.  He received a long course of IV antibiotics.  He has been doing reasonably well.  He is on Eliquis 5 mg p.o. twice daily for hepatic vein right-sided thrombosis.  This has not been checked in multiple months.  What we will obtain is an MRI of the abdomen with attention to liver and to see if these veins are now patent.  He has been on Eliquis for over 6 months.  Causes with revolved around his sepsis and inflammation of the liver causing this problem.  He has had testing which was negative with the exception he has not had factor V prothrombin 20210 or JAK2 which she will not be able to get as his insurance will not cover it.  In terms of why he obtain the sepsis and abscess that is not totally clear.  Otherwise I think he is doing reasonably well.  Interval History: Note from 12/19/2023:  Beatrice Quiñonez is a 43yo male who presents for evaluation and management of right hepatic vein thrombosis. The patient was admitted to the hospital from 7/12/23--7/20/23 with sepsis secondary to septic pulmonary emboli, PNA with cavitation and liver abscess. The patient was on Abx and the liver abscess was drained by IR. MRI abdomen dated 6/28/23 showed the presence of thrombus extending from the abscess to a small accessory right hepatic vein. Although this could be septic thrombosis, there were no imaging findings to suggest septic thrombus. The patient was started on Eliquis and is currently taking 5mg PO BID. Denies any bleeding episodes.       Cancer Staging:  Cancer Staging   No matching staging information was found for the patient.      Molecular Testing:     Previous Hematologic/ Oncologic History:    Oncology History    No history exists.       Current Hematologic/ Oncologic Treatment:       Cycle 1         Test  "Results:    Imaging: MRI abdomen w wo contrast and mrcp    Result Date: 4/24/2024  Narrative: MRI OF THE ABDOMEN WITH AND WITHOUT CONTRAST WITH MRCP INDICATION: 43 years / Male. 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 Excerpt from Gastroenterology progress note dated 3/20/2024:  \"43-year-old male with a past medical history significant for uncontrolled diabetes mellitus, history of liver abscess, history of septic embolism, history of portal vein thrombosis, history of hepatic vein thrombosis who presents to the GI clinic today for follow-up. Patient was first evaluated by the GI service when he was admitted at [facility] in June 2023. Patient was admitted with Klebsiella pneumonia and also found to have a liver abscess, portal vein thrombosis, hepatic vein thrombosis, and septic embolism. Patient has been maintained on Eliquis 5 mg twice a day since that time. He did complete a prolonged course of IV antibiotics. Patient most recently followed up with our  hematology oncology team yesterday and an MRI with and without contrast has been ordered. \" COMPARISON: MRI abdomen 6/28/2023. CT chest abdomen pelvis 9/28/2023 and 8/29/2023. TECHNIQUE: Multiplanar/multisequence MRI of the abdomen with 3D MRCP was performed before and after administration of contrast. IV Contrast: 8 mL of Gadobutrol injection (SINGLE-DOSE) FINDINGS: LOWER CHEST: Unremarkable. LIVER: Mild hepatomegaly and hepatic steatosis. No suspicious mass. No residual/recurrent abscess. A vague wedge-shaped area of enhancement at the at the resolved abscess site is in keeping with scar/vascular shunt. Patent hepatic and portal veins no residual thrombus.. BILE DUCTS: No intrahepatic or extrahepatic bile duct dilation. Common bile duct is normal in caliber. No choledocholithiasis, biliary stricture or suspicious mass. GALLBLADDER: Normal PANCREAS: Unremarkable. ADRENAL GLANDS: Unremarkable. SPLEEN: Normal. KIDNEYS/PROXIMAL URETERS: No " "hydroureteronephrosis. No suspicious renal mass. BOWEL: No dilated loops of bowel. PERITONEUM/RETROPERITONEUM: No ascites. LYMPH NODES: No abdominal lymphadenopathy. VESSELS: No aneurysm. ABDOMINAL WALL: Unremarkable BONES: No suspicious osseous lesion.     Impression: The right hepatic abscess and associated adjacent accessory hepatic vein thrombus have both resolved. A vague wedge-shaped area of enhancement at this site is in keeping with postinfectious scar/vascular shunt. No new/suspicious hepatic lesions. Mild hepatomegaly and hepatic steatosis. Workstation performed: [id number]             Labs:   Lab Results   Component Value Date    WBC 7.25 03/18/2024    HGB 13.9 03/18/2024    HCT 43.1 03/18/2024    MCV 87 03/18/2024     03/18/2024     Lab Results   Component Value Date    K 5.1 03/18/2024     03/18/2024    CO2 26 03/18/2024    BUN 18 03/18/2024    CREATININE 1.19 03/18/2024    GLUF 361 (H) 03/18/2024    CALCIUM 10.1 03/18/2024    CORRECTEDCA 10.0 07/20/2023    AST 17 03/18/2024    ALT 21 03/18/2024    ALKPHOS 67 03/18/2024    EGFR 74 03/18/2024         No results found for: \"SPEP\", \"UPEP\"    No results found for: \"PSA\"    No results found for: \"CEA\"    No results found for: \"\"    No results found for: \"AFP\"    No results found for: \"IRON\", \"TIBC\", \"FERRITIN\"    No results found for: \"[id number]\"      ROS: Review of Systems   Constitutional: Negative.    HENT: Negative.     Eyes: Negative.    Respiratory: Negative.     Cardiovascular: Negative.    Gastrointestinal: Negative.    Endocrine: Negative.    Genitourinary: Negative.    Musculoskeletal: Negative.    Skin: Negative.    Allergic/Immunologic: Negative.    Neurological: Negative.    Hematological: Negative.          Current Medications: Reviewed  Allergies: Reviewed  PMH/FH/SH:  Reviewed      Physical Exam:    Body surface area is 1.81 meters squared.    Wt Readings from Last 3 Encounters:   04/30/24 84.4 kg (186 lb)   03/20/24 83.9 " kg (185 lb)   03/19/24 84.1 kg (185 lb 8 oz)        Temp Readings from Last 3 Encounters:   04/30/24 98.4 °F (36.9 °C) (Temporal)   03/20/24 98.3 °F (36.8 °C)   03/19/24 97.6 °F (36.4 °C) (Temporal)        BP Readings from Last 3 Encounters:   04/30/24 132/76   03/20/24 130/76   03/19/24 138/82         Pulse Readings from Last 3 Encounters:   04/30/24 82   03/20/24 88   03/19/24 60     @LASTSAO2(3)@      Physical Exam  Constitutional:       Appearance: Normal appearance. He is normal weight.   HENT:      Head: Normocephalic and atraumatic.   Eyes:      Extraocular Movements: Extraocular movements intact.      Conjunctiva/sclera: Conjunctivae normal.      Pupils: Pupils are equal, round, and reactive to light.   Cardiovascular:      Rate and Rhythm: Normal rate and regular rhythm.      Heart sounds: Normal heart sounds.   Pulmonary:      Effort: Pulmonary effort is normal.      Breath sounds: Normal breath sounds.   Abdominal:      General: Abdomen is flat. Bowel sounds are normal.      Palpations: Abdomen is soft.   Musculoskeletal:         General: Normal range of motion.      Cervical back: Normal range of motion and neck supple.   Skin:     General: Skin is warm and dry.   Neurological:      General: No focal deficit present.      Mental Status: He is alert and oriented to person, place, and time. Mental status is at baseline.           Goals and Barriers:  Current Goal: Prolong Survival from Cancer.   Barriers: None.      Patient's Capacity to Self Care:  Patient is able to self care.    Code Status: [unfilled]  Advance Directive and Living Will:      Power of :